# Patient Record
Sex: FEMALE | Race: WHITE | NOT HISPANIC OR LATINO | Employment: OTHER | URBAN - METROPOLITAN AREA
[De-identification: names, ages, dates, MRNs, and addresses within clinical notes are randomized per-mention and may not be internally consistent; named-entity substitution may affect disease eponyms.]

---

## 2017-02-13 ENCOUNTER — GENERIC CONVERSION - ENCOUNTER (OUTPATIENT)
Dept: OTHER | Facility: OTHER | Age: 72
End: 2017-02-13

## 2017-04-20 ENCOUNTER — LAB (OUTPATIENT)
Dept: LAB | Facility: OTHER | Age: 72
End: 2017-04-20
Payer: MEDICARE

## 2017-04-20 ENCOUNTER — TRANSCRIBE ORDERS (OUTPATIENT)
Dept: LAB | Facility: OTHER | Age: 72
End: 2017-04-20

## 2017-04-20 DIAGNOSIS — R94.6 ABNORMAL RESULTS OF THYROID FUNCTION STUDIES: ICD-10-CM

## 2017-04-20 DIAGNOSIS — R73.09 OTHER ABNORMAL GLUCOSE: Primary | ICD-10-CM

## 2017-04-20 DIAGNOSIS — R73.03 DIABETES MELLITUS, LATENT: ICD-10-CM

## 2017-04-20 LAB
EST. AVERAGE GLUCOSE BLD GHB EST-MCNC: 114 MG/DL
HBA1C MFR BLD: 5.6 % (ref 4.2–6.3)
TSH SERPL DL<=0.05 MIU/L-ACNC: 2.44 UIU/ML (ref 0.36–3.74)

## 2017-04-20 PROCEDURE — 83036 HEMOGLOBIN GLYCOSYLATED A1C: CPT

## 2017-04-20 PROCEDURE — 36415 COLL VENOUS BLD VENIPUNCTURE: CPT

## 2017-04-20 PROCEDURE — 84443 ASSAY THYROID STIM HORMONE: CPT

## 2017-04-25 ENCOUNTER — ALLSCRIPTS OFFICE VISIT (OUTPATIENT)
Dept: OTHER | Facility: OTHER | Age: 72
End: 2017-04-25

## 2017-06-16 ENCOUNTER — TRANSCRIBE ORDERS (OUTPATIENT)
Dept: ADMINISTRATIVE | Facility: HOSPITAL | Age: 72
End: 2017-06-16

## 2017-06-16 DIAGNOSIS — Z09 FOLLOW UP: ICD-10-CM

## 2017-06-16 DIAGNOSIS — R91.8 OTHER NONSPECIFIC ABNORMAL FINDING OF LUNG FIELD: Primary | ICD-10-CM

## 2017-06-22 ENCOUNTER — HOSPITAL ENCOUNTER (OUTPATIENT)
Dept: RADIOLOGY | Facility: MEDICAL CENTER | Age: 72
Discharge: HOME | End: 2017-06-22
Payer: MEDICARE

## 2017-06-22 DIAGNOSIS — Z09 FOLLOW UP: ICD-10-CM

## 2017-06-22 DIAGNOSIS — R91.8 OTHER NONSPECIFIC ABNORMAL FINDING OF LUNG FIELD: ICD-10-CM

## 2017-06-22 PROCEDURE — 71250 CT THORAX DX C-: CPT

## 2017-08-14 ENCOUNTER — GENERIC CONVERSION - ENCOUNTER (OUTPATIENT)
Dept: OTHER | Facility: OTHER | Age: 72
End: 2017-08-14

## 2017-09-01 DIAGNOSIS — E78.5 HYPERLIPIDEMIA: ICD-10-CM

## 2017-09-01 DIAGNOSIS — R94.6 ABNORMAL RESULTS OF THYROID FUNCTION STUDIES: ICD-10-CM

## 2017-09-01 DIAGNOSIS — R73.03 PREDIABETES: ICD-10-CM

## 2017-09-01 DIAGNOSIS — M85.80 OTHER SPECIFIED DISORDERS OF BONE DENSITY AND STRUCTURE, UNSPECIFIED SITE: ICD-10-CM

## 2017-10-19 ENCOUNTER — APPOINTMENT (OUTPATIENT)
Dept: LAB | Facility: OTHER | Age: 72
End: 2017-10-19
Payer: MEDICARE

## 2017-10-19 ENCOUNTER — TRANSCRIBE ORDERS (OUTPATIENT)
Dept: LAB | Facility: OTHER | Age: 72
End: 2017-10-19

## 2017-10-19 DIAGNOSIS — R73.03 PREDIABETES: ICD-10-CM

## 2017-10-19 DIAGNOSIS — M85.80 OTHER SPECIFIED DISORDERS OF BONE DENSITY AND STRUCTURE: ICD-10-CM

## 2017-10-19 DIAGNOSIS — E78.5 HYPERLIPIDEMIA: ICD-10-CM

## 2017-10-19 DIAGNOSIS — R94.6 ABNORMAL RESULTS OF THYROID FUNCTION STUDIES: ICD-10-CM

## 2017-10-19 LAB
ALBUMIN SERPL BCP-MCNC: 3.7 G/DL (ref 3.5–5)
ALP SERPL-CCNC: 58 U/L (ref 46–116)
ALT SERPL W P-5'-P-CCNC: 46 U/L (ref 12–78)
ANION GAP SERPL CALCULATED.3IONS-SCNC: 4 MMOL/L (ref 4–13)
AST SERPL W P-5'-P-CCNC: 24 U/L (ref 5–45)
BASOPHILS # BLD AUTO: 0.05 THOUSANDS/ΜL (ref 0–0.1)
BASOPHILS NFR BLD AUTO: 1 % (ref 0–1)
BILIRUB SERPL-MCNC: 0.51 MG/DL (ref 0.2–1)
BUN SERPL-MCNC: 15 MG/DL (ref 5–25)
CALCIUM SERPL-MCNC: 9.3 MG/DL (ref 8.3–10.1)
CHLORIDE SERPL-SCNC: 103 MMOL/L (ref 100–108)
CHOLEST SERPL-MCNC: 179 MG/DL (ref 50–200)
CO2 SERPL-SCNC: 32 MMOL/L (ref 21–32)
CREAT SERPL-MCNC: 0.58 MG/DL (ref 0.6–1.3)
EOSINOPHIL # BLD AUTO: 0.28 THOUSAND/ΜL (ref 0–0.61)
EOSINOPHIL NFR BLD AUTO: 3 % (ref 0–6)
ERYTHROCYTE [DISTWIDTH] IN BLOOD BY AUTOMATED COUNT: 13 % (ref 11.6–15.1)
GFR SERPL CREATININE-BSD FRML MDRD: 93 ML/MIN/1.73SQ M
GLUCOSE P FAST SERPL-MCNC: 100 MG/DL (ref 65–99)
HCT VFR BLD AUTO: 45.9 % (ref 34.8–46.1)
HDLC SERPL-MCNC: 64 MG/DL (ref 40–60)
HGB BLD-MCNC: 15.2 G/DL (ref 11.5–15.4)
LDLC SERPL CALC-MCNC: 94 MG/DL (ref 0–100)
LYMPHOCYTES # BLD AUTO: 2.39 THOUSANDS/ΜL (ref 0.6–4.47)
LYMPHOCYTES NFR BLD AUTO: 28 % (ref 14–44)
MCH RBC QN AUTO: 30.2 PG (ref 26.8–34.3)
MCHC RBC AUTO-ENTMCNC: 33.1 G/DL (ref 31.4–37.4)
MCV RBC AUTO: 91 FL (ref 82–98)
MONOCYTES # BLD AUTO: 0.45 THOUSAND/ΜL (ref 0.17–1.22)
MONOCYTES NFR BLD AUTO: 5 % (ref 4–12)
NEUTROPHILS # BLD AUTO: 5.24 THOUSANDS/ΜL (ref 1.85–7.62)
NEUTS SEG NFR BLD AUTO: 63 % (ref 43–75)
NRBC BLD AUTO-RTO: 0 /100 WBCS
PLATELET # BLD AUTO: 190 THOUSANDS/UL (ref 149–390)
PMV BLD AUTO: 11.5 FL (ref 8.9–12.7)
POTASSIUM SERPL-SCNC: 4.3 MMOL/L (ref 3.5–5.3)
PROT SERPL-MCNC: 7 G/DL (ref 6.4–8.2)
RBC # BLD AUTO: 5.04 MILLION/UL (ref 3.81–5.12)
SODIUM SERPL-SCNC: 139 MMOL/L (ref 136–145)
TRIGL SERPL-MCNC: 107 MG/DL
TSH SERPL DL<=0.05 MIU/L-ACNC: 3.68 UIU/ML (ref 0.36–3.74)
WBC # BLD AUTO: 8.43 THOUSAND/UL (ref 4.31–10.16)

## 2017-10-19 PROCEDURE — 36415 COLL VENOUS BLD VENIPUNCTURE: CPT

## 2017-10-19 PROCEDURE — 84443 ASSAY THYROID STIM HORMONE: CPT

## 2017-10-19 PROCEDURE — 85025 COMPLETE CBC W/AUTO DIFF WBC: CPT

## 2017-10-19 PROCEDURE — 80061 LIPID PANEL: CPT

## 2017-10-19 PROCEDURE — 80053 COMPREHEN METABOLIC PANEL: CPT

## 2017-10-31 ENCOUNTER — ALLSCRIPTS OFFICE VISIT (OUTPATIENT)
Dept: OTHER | Facility: OTHER | Age: 72
End: 2017-10-31

## 2017-11-01 NOTE — PROGRESS NOTES
Assessment  1  History of Cataract Surgery   2  Elevated glucose level (790 29) (R73 09)   3  Encounter for preventive health examination (V70 0) (Z00 00)   4  Screening mammogram, encounter for (V76 12) (Z12 31)   5  Osteoporosis (733 00) (M81 0)   6  Chronic obstructive pulmonary disease (496) (J44 9)   7  Prediabetes (790 29) (R73 03)   8  Hyperlipidemia (272 4) (E78 5)    Plan  PMH: Flu vaccine need    · Fluzone High-Dose 0 5 ML Intramuscular Suspension Prefilled Syringe  Screening mammogram, encounter for    · * MAMMO SCREENING BILATERAL W CAD; Status:Active - Retrospective Authorization; Requested for:23Nov2017;     Flu shot  Blood work and recheck in 6 months  Discussion/Summary    1  VAQT-vwcrbjEzphiizeqysHxqlgxfgivgk-psmgnxvi treatment  Will continue calcium supplement and vitamin-D supplementation  Weightbearing exercise as she is able  Avoid bone robbers  Abdominal pain-not consistent  Had a relatively normal colonoscopy  May be IBS  Will monitor; should let me know if becomes more constant or bothersome  Health -flu shot today  Declines Tdap and Zostavax  Mammogram  -resolved      History of Present Illness  Rosa Arnold is doing well overall  Her breathing is stable  Using her Advair bid and Combivent 2-3 times a day  Used the albuterol once daily over the summer  respiratory infections  checkup due  ; declines mammogram   a Dexa scan in 2016  Has been treated with Fosamax in the past  Had Dexa scan in 2011 with -2 5 femoral neck  Same finding 2016  She declined treatment  Taking Ca supp and Vit D   pulmonology  Doesn't go back until 2019?still gets occzings n her RLQ  MOre frequent but still lasts only seconds  Had colonoscopy last year which showed some diverticulosis in the sigmoid colon  Has had an appendectomy  Active Problems  1  Anaphylaxis Due To Bee Venom (995 0)   2  History of Breast cancer screening (V76 10) (Z12 39)   3  Chronic obstructive pulmonary disease (496) (J44 9)   4   Elevated glucose level (790 29) (R73 09)   5  Elevated TSH (794 5) (R94 6)   6  Hyperlipidemia (272 4) (E78 5)   7  Need for pneumococcal vaccination (V03 82) (Z23)   8  Osteopenia (733 90) (M85 80)   9  Palpitations (785 1) (R00 2)   10  Prediabetes (790 29) (R73 03)   11  Screen for colon cancer (V76 51) (Z12 11)   12  Throat discomfort (784 1) (R07 0)   13  Upper respiratory infection (465 9) (J06 9)    Past Medical History  1  History of Breast cancer screening (V76 10) (Z12 39)   2  Former smoker (V15 82) (N05 390)   3  History of Influenza vaccination administered at current visit (V04 81) (Z23)   4  History of Osteoporosis screening (V82 81) (Z13 820)   5  History of Screening for hypothyroidism (V77 0) (Z13 29)    Surgical History  1  History of Appendectomy   2  History of Tonsillectomy    Family History  Mother    1  Family history of Arthritis (V17 7)   2  Family history of Breast Cancer (V16 3)   3  Family history of Heart Disease (V17 49)   4  Family history of Hypertension (V17 49)   5  Family history of Osteoporosis (V17 81)  Father    10  Family history of Diabetes Mellitus (V18 0)   7  Family history of Father  At Age 76   11  Family history of Heart Disease (V17 49)   9  Family history of Hypertension (V17 49)  Family History    10  Family history of Mother  At Age 80    Social History   · Alcohol   · Caffeine Use   · Former smoker (W82 50) (J78 567)    Current Meds   1  Advair Diskus 250-50 MCG/DOSE Inhalation Aerosol Powder Breath Activated; Therapy: 07HYG9531 to (Last Rx:2011)  Requested for: 39IFQ9727 Ordered   2  Aspir-81 TBEC; Take 1 tablet daily Recorded   3  Combivent  MCG/ACT AERO; Therapy: 70ZZW9539 to (Last Rx:33Cai5090)  Requested for: 88RAL2247 Ordered   4  Ventolin  (90 Base) MCG/ACT Inhalation Aerosol Solution; Therapy: 11NFB7806 to (Last Rx:64Qrr6266)  Requested for: 20BPM3389 Ordered    Allergies  1  Biaxin TABS   2  Clarinex TABS   3   Claritin TABS 4  Levaquin TABS  5  Food Dye   6  Bee sting    Vitals  Vital Signs    Recorded: 47YSV1786 12:16AM Recorded: 42NLI0656 08:20AM   Heart Rate  66   Respiration  24   Systolic 710,    Diastolic 80, RUE 84   Height  5 ft    Weight  146 lb 8 oz   BMI Calculated  28 61   BSA Calculated  1 64     Physical Exam    Constitutional   General appearance: No acute distress, well appearing and well nourished  Ears, Nose, Mouth, and Throat   Lips, teeth, and gums: Normal, good dentition  Oropharynx: Normal with no erythema, edema, exudate or lesions  Neck   Neck: Supple, symmetric, trachea midline, no masses  Thyroid: Normal, no thyromegaly  Pulmonary   Respiratory effort: Abnormal  -- Significantly diffusely decreased breath sounds  Auscultation of lungs: Clear to auscultation  Cardiovascular   Auscultation of heart: Normal rate and rhythm, normal S1 and S2, no murmurs  Carotid pulses: 2+ bilaterally  Abdominal aorta: Normal     Femoral pulses: 2+ bilaterally  Pedal pulses: 2+ bilaterally  Peripheral vascular exam: Normal     Examination of extremities for edema and/or varicosities: Normal     Abdomen   Abdomen: Non-tender, no masses  Liver and spleen: No hepatomegaly or splenomegaly  Lymphatic   Palpation of lymph nodes in neck: No lymphadenopathy  Palpation of lymph nodes in groin: No lymphadenopathy  Results/Data  (1) CBC/PLT/DIFF 19Oct2017 08:29AM Carrie Valiente Order Number: [id number]     Test Name Result Flag Reference   WBC COUNT 8 43 Thousand/uL  4 31-10 16   RBC COUNT 5 04 Million/uL  3 81-5 12   HEMOGLOBIN 15 2 g/dL  11 5-15 4   HEMATOCRIT 45 9 %  34 8-46  1   MCV 91 fL  82-98   MCH 30 2 pg  26 8-34 3   MCHC 33 1 g/dL  31 4-37 4   RDW 13 0 %  11 6-15 1   MPV 11 5 fL  8 9-12 7   PLATELET COUNT 378 Thousands/uL  149-390   nRBC AUTOMATED 0 /100 WBCs     NEUTROPHILS RELATIVE PERCENT 63 %  43-75   LYMPHOCYTES RELATIVE PERCENT 28 %  14-44   MONOCYTES RELATIVE PERCENT 5 %  4-12   EOSINOPHILS RELATIVE PERCENT 3 %  0-6   BASOPHILS RELATIVE PERCENT 1 %  0-1   NEUTROPHILS ABSOLUTE COUNT 5 24 Thousands/? ??L  1 85-7 62   LYMPHOCYTES ABSOLUTE COUNT 2 39 Thousands/? ??L  0 60-4 47   MONOCYTES ABSOLUTE COUNT 0 45 Thousand/? ??L  0 17-1 22   EOSINOPHILS ABSOLUTE COUNT 0 28 Thousand/? ??L  0 00-0 61   BASOPHILS ABSOLUTE COUNT 0 05 Thousands/? ??L  0 00-0 10     (1) COMPREHENSIVE METABOLIC PANEL 44ADQ6873 97:47MH Mark Wilkins Order Number: [id number]     Test Name Result Flag Reference   SODIUM 139 mmol/L  136-145   POTASSIUM 4 3 mmol/L  3 5-5 3   CHLORIDE 103 mmol/L  100-108   CARBON DIOXIDE 32 mmol/L  21-32   ANION GAP (CALC) 4 mmol/L  4-13   BLOOD UREA NITROGEN 15 mg/dL  5-25   CREATININE 0 58 mg/dL L 0 60-1 30   Standardized to IDMS reference method   CALCIUM 9 3 mg/dL  8 3-10 1   BILI, TOTAL 0 51 mg/dL  0 20-1 00   ALK PHOSPHATAS 58 U/L     ALT (SGPT) 46 U/L  12-78   Specimen collection should occur prior to Sulfasalazine and/or Sulfapyridine administration due to the potential for falsely depressed results  AST(SGOT) 24 U/L  5-45   Specimen collection should occur prior to Sulfasalazine administration due to the potential for falsely depressed results  ALBUMIN 3 7 g/dL  3 5-5 0   TOTAL PROTEIN 7 0 g/dL  6 4-8 2   eGFR 93 ml/min/1 73sq m     National Kidney Disease Education Program recommendations are as follows:  GFR calculation is accurate only with a steady state creatinine  Chronic Kidney disease less than 60 ml/min/1 73 sq  meters  Kidney failure less than 15 ml/min/1 73 sq  meters  GLUCOSE FASTING 100 mg/dL H 65-99   Specimen collection should occur prior to Sulfasalazine administration due to the potential for falsely depressed results  Specimen collection should occur prior to Sulfapyridine administration due to the potential for falsely elevated results       (1) LIPID PANEL, FASTING 19Oct2017 08:29AM Antonia Black   TW Order Number: [id number]     Test Name Result Flag Reference   CHOLESTEROL 179 mg/dL     HDL,DIRECT 64 mg/dL H 40-60   Specimen collection should occur prior to Metamizole administration due to the potential for falsley depressed results  LDL CHOLESTEROL CALCULATED 94 mg/dL  0-100   Triglyceride:        Normal <150 mg/dl   Borderline High 150-199 mg/dl   High 200-499 mg/dl   Very High >499 mg/dl      Cholesterol:       Desirable <200 mg/dl    Borderline High 200-239 mg/dl    High >239 mg/dl      HDL Cholesterol:       High>59 mg/dL    Low <41 mg/dL      This screening LDL is a calculated result  It does not have the accuracy of the Direct Measured LDL in the monitoring of patients with hyperlipidemia and/or statin therapy  Direct Measure LDL (BYH094) must be ordered separately in these patients  TRIGLYCERIDES 107 mg/dL  <=150   Specimen collection should occur prior to N-Acetylcysteine or Metamizole administration due to the potential for falsely depressed results  (1) TSH 19Oct2017 08:29AM [facility] Number: [id number]     Test Name Result Flag Reference   TSH 3 680 uIU/mL  0 358-3 740   Patients undergoing fluorescein dye angiography may retain small amounts of fluorescein in the body for 48-72 hours post procedure  Samples containing fluorescein can produce falsely depressed TSH values  If the patient had this procedure,a specimen should be resubmitted post fluorescein clearance  The recommended reference ranges for TSH during pregnancy are as follows:  First trimester 0 1 to 2 5 uIU/mL  Second trimester  0 2 to 3 0 uIU/mL  Third trimester 0 3 to 3 0 uIU/m     Future Appointments    Date/Time Provider Specialty Site   05/08/2018 10:00 AM MARK Levin   [address]     Signatures   Electronically signed by : MARK Gilbert ; Nov 1 2017 12:17AM EST                       (Author)

## 2018-01-12 VITALS
RESPIRATION RATE: 24 BRPM | SYSTOLIC BLOOD PRESSURE: 132 MMHG | OXYGEN SATURATION: 95 % | HEART RATE: 64 BPM | HEIGHT: 60 IN | WEIGHT: 148.38 LBS | DIASTOLIC BLOOD PRESSURE: 80 MMHG | BODY MASS INDEX: 29.13 KG/M2

## 2018-01-15 VITALS
RESPIRATION RATE: 24 BRPM | BODY MASS INDEX: 28.76 KG/M2 | WEIGHT: 146.5 LBS | HEIGHT: 60 IN | HEART RATE: 66 BPM | SYSTOLIC BLOOD PRESSURE: 120 MMHG | DIASTOLIC BLOOD PRESSURE: 80 MMHG

## 2018-04-26 ENCOUNTER — LAB (OUTPATIENT)
Dept: LAB | Facility: CLINIC | Age: 73
End: 2018-04-26
Payer: MEDICARE

## 2018-04-26 DIAGNOSIS — M85.80 OTHER SPECIFIED DISORDERS OF BONE DENSITY AND STRUCTURE, UNSPECIFIED SITE (CODE): ICD-10-CM

## 2018-04-26 DIAGNOSIS — Z13.21 ENCOUNTER FOR SCREENING FOR NUTRITIONAL DISORDER: ICD-10-CM

## 2018-04-26 DIAGNOSIS — R73.03 PREDIABETES: ICD-10-CM

## 2018-04-26 DIAGNOSIS — R73.09 OTHER ABNORMAL GLUCOSE: ICD-10-CM

## 2018-04-26 LAB
25(OH)D3 SERPL-MCNC: 18.7 NG/ML (ref 30–100)
EST. AVERAGE GLUCOSE BLD GHB EST-MCNC: 114 MG/DL
HBA1C MFR BLD: 5.6 % (ref 4.2–6.3)

## 2018-04-26 PROCEDURE — 82306 VITAMIN D 25 HYDROXY: CPT

## 2018-04-26 PROCEDURE — 83036 HEMOGLOBIN GLYCOSYLATED A1C: CPT

## 2018-04-26 PROCEDURE — 36415 COLL VENOUS BLD VENIPUNCTURE: CPT

## 2018-04-30 DIAGNOSIS — M85.80 OTHER SPECIFIED DISORDERS OF BONE DENSITY AND STRUCTURE, UNSPECIFIED SITE (CODE): ICD-10-CM

## 2018-04-30 DIAGNOSIS — R73.03 PREDIABETES: ICD-10-CM

## 2018-04-30 DIAGNOSIS — Z13.21 ENCOUNTER FOR SCREENING FOR NUTRITIONAL DISORDER: ICD-10-CM

## 2018-04-30 DIAGNOSIS — R73.09 OTHER ABNORMAL GLUCOSE: ICD-10-CM

## 2018-05-08 ENCOUNTER — OFFICE VISIT (OUTPATIENT)
Dept: FAMILY MEDICINE CLINIC | Facility: MEDICAL CENTER | Age: 73
End: 2018-05-08
Payer: MEDICARE

## 2018-05-08 VITALS
BODY MASS INDEX: 29.22 KG/M2 | WEIGHT: 149.6 LBS | SYSTOLIC BLOOD PRESSURE: 134 MMHG | HEART RATE: 80 BPM | RESPIRATION RATE: 18 BRPM | DIASTOLIC BLOOD PRESSURE: 76 MMHG

## 2018-05-08 DIAGNOSIS — J43.9 PULMONARY EMPHYSEMA, UNSPECIFIED EMPHYSEMA TYPE (HCC): ICD-10-CM

## 2018-05-08 DIAGNOSIS — E55.9 VITAMIN D DEFICIENCY: ICD-10-CM

## 2018-05-08 DIAGNOSIS — M81.0 OSTEOPOROSIS WITHOUT CURRENT PATHOLOGICAL FRACTURE, UNSPECIFIED OSTEOPOROSIS TYPE: ICD-10-CM

## 2018-05-08 DIAGNOSIS — R73.03 PREDIABETES: Primary | ICD-10-CM

## 2018-05-08 PROCEDURE — 99214 OFFICE O/P EST MOD 30 MIN: CPT | Performed by: FAMILY MEDICINE

## 2018-05-08 RX ORDER — ALBUTEROL SULFATE 90 UG/1
AEROSOL, METERED RESPIRATORY (INHALATION)
COMMUNITY
Start: 2011-07-15 | End: 2019-12-31 | Stop reason: SDUPTHER

## 2018-05-08 NOTE — PROGRESS NOTES
Deepa Bullard has been doing well  No respiratory infections this past winter  She says her breathing is about the same  Do was seen pulmonology soon  Dr Ana King  Looking for a pulmonologist who is closer to home  She will be  amelia prakash to [employer]  She is a little concerned about humidity  She has been taking Vit d 1000 units daily  Will increase to twice daily  O: /76 (BP Location: Left arm, Patient Position: Sitting, Cuff Size: Adult)   Pulse 80   Resp 18   Wt 67 9 kg (149 lb 9 6 oz)   BMI 29 22 kg/m²   Neck no adenopathy thyromegaly bruits  Chest markedly diffusely decreased breath sounds bilaterally but otherwise clear  Cardiac regular rate without murmur    BW 4/26/18  A1C 5 7  Vit D 18 8    Assessment  1  COPD-appears stable  Fortunately she has had no red respiratory infections  2   Pre diabetes very stable  Encouraged exercise as she is able  3  Vitamin-D deficiency-advised to increase her vitamin D3 supplement to 2000 units daily  Has osteoporosis   Due for Dexa scan in November 4    HM-immunizations are up-to-date    Plan  As above  Recheck 6 months

## 2018-10-03 ENCOUNTER — OFFICE VISIT (OUTPATIENT)
Dept: FAMILY MEDICINE CLINIC | Facility: MEDICAL CENTER | Age: 73
End: 2018-10-03
Payer: MEDICARE

## 2018-10-03 VITALS
BODY MASS INDEX: 28.67 KG/M2 | HEART RATE: 78 BPM | SYSTOLIC BLOOD PRESSURE: 146 MMHG | RESPIRATION RATE: 16 BRPM | WEIGHT: 146.8 LBS | OXYGEN SATURATION: 92 % | TEMPERATURE: 99 F | DIASTOLIC BLOOD PRESSURE: 80 MMHG

## 2018-10-03 DIAGNOSIS — Z23 FLU VACCINE NEED: Primary | ICD-10-CM

## 2018-10-03 PROCEDURE — G0008 ADMIN INFLUENZA VIRUS VAC: HCPCS

## 2018-10-03 PROCEDURE — 90662 IIV NO PRSV INCREASED AG IM: CPT

## 2018-10-03 PROCEDURE — 99213 OFFICE O/P EST LOW 20 MIN: CPT | Performed by: FAMILY MEDICINE

## 2018-10-03 NOTE — PROGRESS NOTES
Madie Sewell has  had several episodes of dizziness the past  few months  Occurs with movement  Getting up or turning  Brief  Not spinning  Feels like she is gong to crumble  No associated nausea or other symptoms  No headaches or tinnitus or hearing loss  Doesn't happen when getting up in the morning  No gait abnormalities  Has had a recent cold  Getting better  O: /80 (Cuff Size: Standard)   Pulse 78   Temp 99 °F (37 2 °C)   Resp 16   Wt 66 6 kg (146 lb 12 8 oz)   SpO2 92%   BMI 28 67 kg/m²   HEENT-pupils equally react to light  Extraocular moves intact  No nystagmus  TMs normal   Pharynx normal   Neck no adenopathy thyromegaly bruits  Chest diffusely decreased breath  Sounds but clear   Cardiac regular rate without murmur    Assessment  Dizziness-likely BPH  Due to visual symptoms she will also check with her ophthalmologist  COPD-should have flu shot  Plan  Flu shot  As above

## 2018-10-11 ENCOUNTER — TRANSCRIBE ORDERS (OUTPATIENT)
Dept: ADMINISTRATIVE | Facility: HOSPITAL | Age: 73
End: 2018-10-11

## 2018-10-11 DIAGNOSIS — R91.1 PULMONARY NODULE: Primary | ICD-10-CM

## 2018-10-22 ENCOUNTER — HOSPITAL ENCOUNTER (OUTPATIENT)
Dept: RADIOLOGY | Facility: MEDICAL CENTER | Age: 73
Discharge: HOME | End: 2018-10-22
Payer: MEDICARE

## 2018-10-22 DIAGNOSIS — R91.1 PULMONARY NODULE: ICD-10-CM

## 2018-10-22 PROCEDURE — 71250 CT THORAX DX C-: CPT

## 2018-11-27 ENCOUNTER — OFFICE VISIT (OUTPATIENT)
Dept: FAMILY MEDICINE CLINIC | Facility: MEDICAL CENTER | Age: 73
End: 2018-11-27
Payer: MEDICARE

## 2018-11-27 VITALS
WEIGHT: 144 LBS | RESPIRATION RATE: 24 BRPM | BODY MASS INDEX: 28.12 KG/M2 | SYSTOLIC BLOOD PRESSURE: 140 MMHG | OXYGEN SATURATION: 91 % | HEART RATE: 82 BPM | DIASTOLIC BLOOD PRESSURE: 78 MMHG

## 2018-11-27 DIAGNOSIS — R73.03 PREDIABETES: ICD-10-CM

## 2018-11-27 DIAGNOSIS — R79.89 ELEVATED TSH: ICD-10-CM

## 2018-11-27 DIAGNOSIS — M81.0 OSTEOPOROSIS WITHOUT CURRENT PATHOLOGICAL FRACTURE, UNSPECIFIED OSTEOPOROSIS TYPE: ICD-10-CM

## 2018-11-27 DIAGNOSIS — R73.09 ELEVATED GLUCOSE LEVEL: Primary | ICD-10-CM

## 2018-11-27 DIAGNOSIS — J43.9 PULMONARY EMPHYSEMA, UNSPECIFIED EMPHYSEMA TYPE (HCC): ICD-10-CM

## 2018-11-27 PROCEDURE — 99214 OFFICE O/P EST MOD 30 MIN: CPT | Performed by: FAMILY MEDICINE

## 2018-11-27 RX ORDER — IPRATROPIUM/ALBUTEROL SULFATE 20-100 MCG
MIST INHALER (GRAM) INHALATION
COMMUNITY
Start: 2018-10-04 | End: 2019-12-31 | Stop reason: SDUPTHER

## 2018-11-27 RX ORDER — BUDESONIDE 1 MG/2ML
1 INHALANT ORAL 2 TIMES DAILY
COMMUNITY
End: 2021-07-22 | Stop reason: ALTCHOICE

## 2018-11-28 NOTE — PROGRESS NOTES
In is here for six-month follow-up  She is currently being followed for a  COPD exacerbation  She saw her pulmonologist Dr Richardson  She said she had very little response to prednisone  He is trying her on steroid via nebulizer  She apparently has declined use of oxygen  She said that she is very fatigued and even getting dressed can be an effort  She said she was told that she has severe emphysema  Her oxygen saturations are below 90 with walking  She notes her breathing is  worse with bending over  She has history of a fatty liver  She got a flu shot last month  She otherwise is doing well    O: /78   Pulse 82   Resp (!) 24   Wt 65 3 kg (144 lb)   SpO2 91%   BMI 28 12 kg/m²   Appears mildly dyspneic at rest  Neck no adenopathy thyromegaly bruits  Chest clear however very diffusely decreased breath sounds  Cardiac regular rate without murmur    Assessment  1  Severe COPD-hopefully she will improve with the steroids via nebulizer  2   Pre diabetes-due for blood work  3  Health maintenance-up-to-date    Plan  Check blood work  As above    Recheck 6 months

## 2018-12-03 DIAGNOSIS — R06.02 SHORTNESS OF BREATH: Primary | ICD-10-CM

## 2018-12-30 DIAGNOSIS — R10.13 DYSPEPSIA: Primary | ICD-10-CM

## 2019-01-07 ENCOUNTER — APPOINTMENT (OUTPATIENT)
Dept: LAB | Facility: CLINIC | Age: 74
End: 2019-01-07
Payer: MEDICARE

## 2019-01-07 DIAGNOSIS — M81.0 OSTEOPOROSIS WITHOUT CURRENT PATHOLOGICAL FRACTURE, UNSPECIFIED OSTEOPOROSIS TYPE: ICD-10-CM

## 2019-01-07 DIAGNOSIS — R73.03 PREDIABETES: ICD-10-CM

## 2019-01-07 DIAGNOSIS — R73.09 ELEVATED GLUCOSE LEVEL: ICD-10-CM

## 2019-01-07 DIAGNOSIS — R79.89 ELEVATED TSH: ICD-10-CM

## 2019-01-07 LAB
25(OH)D3 SERPL-MCNC: 36 NG/ML (ref 30–100)
ALBUMIN SERPL BCP-MCNC: 3.6 G/DL (ref 3.5–5)
ALP SERPL-CCNC: 56 U/L (ref 46–116)
ALT SERPL W P-5'-P-CCNC: 38 U/L (ref 12–78)
ANION GAP SERPL CALCULATED.3IONS-SCNC: 7 MMOL/L (ref 4–13)
AST SERPL W P-5'-P-CCNC: 23 U/L (ref 5–45)
BASOPHILS # BLD AUTO: 0.05 THOUSANDS/ΜL (ref 0–0.1)
BASOPHILS NFR BLD AUTO: 1 % (ref 0–1)
BILIRUB SERPL-MCNC: 0.4 MG/DL (ref 0.2–1)
BUN SERPL-MCNC: 16 MG/DL (ref 5–25)
CALCIUM SERPL-MCNC: 8.9 MG/DL (ref 8.3–10.1)
CHLORIDE SERPL-SCNC: 102 MMOL/L (ref 100–108)
CHOLEST SERPL-MCNC: 170 MG/DL (ref 50–200)
CO2 SERPL-SCNC: 31 MMOL/L (ref 21–32)
CREAT SERPL-MCNC: 0.59 MG/DL (ref 0.6–1.3)
EOSINOPHIL # BLD AUTO: 0.14 THOUSAND/ΜL (ref 0–0.61)
EOSINOPHIL NFR BLD AUTO: 2 % (ref 0–6)
ERYTHROCYTE [DISTWIDTH] IN BLOOD BY AUTOMATED COUNT: 13.2 % (ref 11.6–15.1)
GFR SERPL CREATININE-BSD FRML MDRD: 91 ML/MIN/1.73SQ M
GLUCOSE P FAST SERPL-MCNC: 92 MG/DL (ref 65–99)
HCT VFR BLD AUTO: 48.3 % (ref 34.8–46.1)
HDLC SERPL-MCNC: 58 MG/DL (ref 40–60)
HGB BLD-MCNC: 14.8 G/DL (ref 11.5–15.4)
IMM GRANULOCYTES # BLD AUTO: 0.02 THOUSAND/UL (ref 0–0.2)
IMM GRANULOCYTES NFR BLD AUTO: 0 % (ref 0–2)
LDLC SERPL CALC-MCNC: 92 MG/DL (ref 0–100)
LYMPHOCYTES # BLD AUTO: 1.57 THOUSANDS/ΜL (ref 0.6–4.47)
LYMPHOCYTES NFR BLD AUTO: 21 % (ref 14–44)
MCH RBC QN AUTO: 28 PG (ref 26.8–34.3)
MCHC RBC AUTO-ENTMCNC: 30.6 G/DL (ref 31.4–37.4)
MCV RBC AUTO: 92 FL (ref 82–98)
MONOCYTES # BLD AUTO: 0.5 THOUSAND/ΜL (ref 0.17–1.22)
MONOCYTES NFR BLD AUTO: 7 % (ref 4–12)
NEUTROPHILS # BLD AUTO: 5.36 THOUSANDS/ΜL (ref 1.85–7.62)
NEUTS SEG NFR BLD AUTO: 69 % (ref 43–75)
NONHDLC SERPL-MCNC: 112 MG/DL
NRBC BLD AUTO-RTO: 0 /100 WBCS
PLATELET # BLD AUTO: 150 THOUSANDS/UL (ref 149–390)
PMV BLD AUTO: 12.4 FL (ref 8.9–12.7)
POTASSIUM SERPL-SCNC: 4.1 MMOL/L (ref 3.5–5.3)
PROT SERPL-MCNC: 7.2 G/DL (ref 6.4–8.2)
RBC # BLD AUTO: 5.28 MILLION/UL (ref 3.81–5.12)
SODIUM SERPL-SCNC: 140 MMOL/L (ref 136–145)
TRIGL SERPL-MCNC: 100 MG/DL
TSH SERPL DL<=0.05 MIU/L-ACNC: 2.92 UIU/ML (ref 0.36–3.74)
WBC # BLD AUTO: 7.64 THOUSAND/UL (ref 4.31–10.16)

## 2019-01-07 PROCEDURE — 84443 ASSAY THYROID STIM HORMONE: CPT

## 2019-01-07 PROCEDURE — 80061 LIPID PANEL: CPT

## 2019-01-07 PROCEDURE — 80053 COMPREHEN METABOLIC PANEL: CPT

## 2019-01-07 PROCEDURE — 85025 COMPLETE CBC W/AUTO DIFF WBC: CPT

## 2019-01-07 PROCEDURE — 36415 COLL VENOUS BLD VENIPUNCTURE: CPT

## 2019-01-07 PROCEDURE — 83036 HEMOGLOBIN GLYCOSYLATED A1C: CPT

## 2019-01-07 PROCEDURE — 82306 VITAMIN D 25 HYDROXY: CPT

## 2019-01-08 LAB
EST. AVERAGE GLUCOSE BLD GHB EST-MCNC: 131 MG/DL
HBA1C MFR BLD: 6.2 % (ref 4.2–6.3)

## 2019-01-10 ENCOUNTER — TELEPHONE (OUTPATIENT)
Dept: FAMILY MEDICINE CLINIC | Facility: MEDICAL CENTER | Age: 74
End: 2019-01-10

## 2019-01-10 NOTE — TELEPHONE ENCOUNTER
----- Message from Nikky Puri MD sent at 1/10/2019  9:33 AM EST -----  Notify blood work results  Everything is good  Her A1c is a little bit higher at 6 2 although still consistent with pre diabetes  Continue to watch diet and repeat the A1c in 6 months

## 2019-01-22 ENCOUNTER — HOSPITAL ENCOUNTER (OUTPATIENT)
Dept: PULMONOLOGY | Facility: HOSPITAL | Age: 74
Discharge: HOME | End: 2019-01-22
Attending: INTERNAL MEDICINE
Payer: MEDICARE

## 2019-01-22 DIAGNOSIS — R10.13 DYSPEPSIA: ICD-10-CM

## 2019-01-22 PROCEDURE — 94729 DIFFUSING CAPACITY: CPT | Performed by: INTERNAL MEDICINE

## 2019-01-22 PROCEDURE — 94729 DIFFUSING CAPACITY: CPT

## 2019-01-22 PROCEDURE — 94060 EVALUATION OF WHEEZING: CPT | Performed by: INTERNAL MEDICINE

## 2019-01-22 PROCEDURE — 94761 N-INVAS EAR/PLS OXIMETRY MLT: CPT

## 2019-01-22 PROCEDURE — 94618 PULMONARY STRESS TESTING: CPT | Performed by: INTERNAL MEDICINE

## 2019-01-22 PROCEDURE — 94726 PLETHYSMOGRAPHY LUNG VOLUMES: CPT | Performed by: INTERNAL MEDICINE

## 2019-01-22 PROCEDURE — 94726 PLETHYSMOGRAPHY LUNG VOLUMES: CPT

## 2019-01-22 PROCEDURE — 94060 EVALUATION OF WHEEZING: CPT

## 2019-01-22 RX ORDER — ALBUTEROL SULFATE 2.5 MG/3ML
2.5 SOLUTION RESPIRATORY (INHALATION) ONCE
Status: COMPLETED | OUTPATIENT
Start: 2019-01-22 | End: 2019-01-22

## 2019-01-22 RX ADMIN — ALBUTEROL SULFATE 2.5 MG: 2.5 SOLUTION RESPIRATORY (INHALATION) at 10:10

## 2019-01-23 NOTE — PROGRESS NOTES
Chayo Garibay 73 y o  :1945 [id number]    Six minute walk test    Performed on 19  Baseline saturations are 94% on room air  Heart rate is 73 beats per minute with a Grover scale for dyspnea rated at 0/10  Patient walked for a total of 6 minutes  After walking for 2 minutes, saturations dropped to 85% on room air  Patient was then placed on supplemental oxygen at 2 liters/minute to complete testing  Saturations on 2 liters/minute remained greater than or equal to 94%  Patient walked a total distance of 272 meters

## 2019-04-16 ENCOUNTER — CLINICAL SUPPORT (OUTPATIENT)
Dept: PULMONOLOGY | Age: 74
End: 2019-04-16
Payer: MEDICARE

## 2019-04-16 VITALS — WEIGHT: 145 LBS | BODY MASS INDEX: 28.47 KG/M2 | HEIGHT: 60 IN

## 2019-04-16 DIAGNOSIS — J44.9 CHRONIC OBSTRUCTIVE PULMONARY DISEASE, UNSPECIFIED COPD TYPE (HCC): Primary | ICD-10-CM

## 2019-04-17 ENCOUNTER — OFFICE VISIT (OUTPATIENT)
Dept: PULMONOLOGY | Facility: CLINIC | Age: 74
End: 2019-04-17

## 2019-04-17 DIAGNOSIS — J43.9 PULMONARY EMPHYSEMA, UNSPECIFIED EMPHYSEMA TYPE (HCC): Primary | ICD-10-CM

## 2019-04-17 PROCEDURE — RECHECK: Performed by: INTERNAL MEDICINE

## 2019-04-23 ENCOUNTER — CLINICAL SUPPORT (OUTPATIENT)
Dept: PULMONOLOGY | Age: 74
End: 2019-04-23
Payer: MEDICARE

## 2019-04-23 DIAGNOSIS — J44.9 CHRONIC OBSTRUCTIVE PULMONARY DISEASE, UNSPECIFIED COPD TYPE (HCC): ICD-10-CM

## 2019-04-23 PROCEDURE — G0424 PULMONARY REHAB W EXER: HCPCS

## 2019-04-25 ENCOUNTER — CLINICAL SUPPORT (OUTPATIENT)
Dept: PULMONOLOGY | Age: 74
End: 2019-04-25
Payer: MEDICARE

## 2019-04-25 DIAGNOSIS — J43.9 PULMONARY EMPHYSEMA, UNSPECIFIED EMPHYSEMA TYPE (HCC): ICD-10-CM

## 2019-04-25 PROCEDURE — G0424 PULMONARY REHAB W EXER: HCPCS

## 2019-04-30 ENCOUNTER — CLINICAL SUPPORT (OUTPATIENT)
Dept: PULMONOLOGY | Age: 74
End: 2019-04-30
Payer: MEDICARE

## 2019-04-30 DIAGNOSIS — J43.9 PULMONARY EMPHYSEMA, UNSPECIFIED EMPHYSEMA TYPE (HCC): ICD-10-CM

## 2019-04-30 PROCEDURE — G0424 PULMONARY REHAB W EXER: HCPCS

## 2019-05-02 ENCOUNTER — CLINICAL SUPPORT (OUTPATIENT)
Dept: PULMONOLOGY | Age: 74
End: 2019-05-02
Payer: MEDICARE

## 2019-05-02 DIAGNOSIS — J44.9 CHRONIC OBSTRUCTIVE PULMONARY DISEASE, UNSPECIFIED COPD TYPE (HCC): ICD-10-CM

## 2019-05-02 PROCEDURE — G0424 PULMONARY REHAB W EXER: HCPCS

## 2019-05-07 ENCOUNTER — CLINICAL SUPPORT (OUTPATIENT)
Dept: PULMONOLOGY | Age: 74
End: 2019-05-07
Payer: MEDICARE

## 2019-05-07 DIAGNOSIS — J43.9 PULMONARY EMPHYSEMA, UNSPECIFIED EMPHYSEMA TYPE (HCC): ICD-10-CM

## 2019-05-07 PROCEDURE — G0424 PULMONARY REHAB W EXER: HCPCS

## 2019-05-09 ENCOUNTER — CLINICAL SUPPORT (OUTPATIENT)
Dept: PULMONOLOGY | Age: 74
End: 2019-05-09
Payer: MEDICARE

## 2019-05-09 DIAGNOSIS — J43.9 PULMONARY EMPHYSEMA, UNSPECIFIED EMPHYSEMA TYPE (HCC): ICD-10-CM

## 2019-05-09 PROCEDURE — G0424 PULMONARY REHAB W EXER: HCPCS

## 2019-05-14 ENCOUNTER — CLINICAL SUPPORT (OUTPATIENT)
Dept: PULMONOLOGY | Age: 74
End: 2019-05-14
Payer: MEDICARE

## 2019-05-14 DIAGNOSIS — J43.9 PULMONARY EMPHYSEMA, UNSPECIFIED EMPHYSEMA TYPE (HCC): ICD-10-CM

## 2019-05-14 PROCEDURE — G0424 PULMONARY REHAB W EXER: HCPCS

## 2019-05-16 ENCOUNTER — CLINICAL SUPPORT (OUTPATIENT)
Dept: PULMONOLOGY | Age: 74
End: 2019-05-16
Payer: MEDICARE

## 2019-05-16 DIAGNOSIS — J43.9 PULMONARY EMPHYSEMA, UNSPECIFIED EMPHYSEMA TYPE (HCC): ICD-10-CM

## 2019-05-16 PROCEDURE — G0424 PULMONARY REHAB W EXER: HCPCS

## 2019-05-21 ENCOUNTER — CLINICAL SUPPORT (OUTPATIENT)
Dept: PULMONOLOGY | Age: 74
End: 2019-05-21
Payer: MEDICARE

## 2019-05-21 DIAGNOSIS — J43.9 PULMONARY EMPHYSEMA, UNSPECIFIED EMPHYSEMA TYPE (HCC): ICD-10-CM

## 2019-05-21 PROCEDURE — G0424 PULMONARY REHAB W EXER: HCPCS

## 2019-05-23 ENCOUNTER — CLINICAL SUPPORT (OUTPATIENT)
Dept: PULMONOLOGY | Age: 74
End: 2019-05-23
Payer: MEDICARE

## 2019-05-23 DIAGNOSIS — J43.9 PULMONARY EMPHYSEMA, UNSPECIFIED EMPHYSEMA TYPE (HCC): ICD-10-CM

## 2019-05-23 PROCEDURE — G0424 PULMONARY REHAB W EXER: HCPCS

## 2019-05-28 ENCOUNTER — CLINICAL SUPPORT (OUTPATIENT)
Dept: PULMONOLOGY | Age: 74
End: 2019-05-28
Payer: MEDICARE

## 2019-05-28 DIAGNOSIS — J43.9 PULMONARY EMPHYSEMA, UNSPECIFIED EMPHYSEMA TYPE (HCC): ICD-10-CM

## 2019-05-28 PROCEDURE — G0424 PULMONARY REHAB W EXER: HCPCS

## 2019-05-29 ENCOUNTER — OFFICE VISIT (OUTPATIENT)
Dept: FAMILY MEDICINE CLINIC | Facility: MEDICAL CENTER | Age: 74
End: 2019-05-29
Payer: MEDICARE

## 2019-05-29 VITALS
HEART RATE: 72 BPM | WEIGHT: 147 LBS | RESPIRATION RATE: 16 BRPM | HEIGHT: 60 IN | DIASTOLIC BLOOD PRESSURE: 70 MMHG | BODY MASS INDEX: 28.86 KG/M2 | SYSTOLIC BLOOD PRESSURE: 110 MMHG

## 2019-05-29 DIAGNOSIS — Z13.820 SCREENING FOR OSTEOPOROSIS: Primary | ICD-10-CM

## 2019-05-29 DIAGNOSIS — Z00.00 MEDICARE ANNUAL WELLNESS VISIT, SUBSEQUENT: ICD-10-CM

## 2019-05-29 DIAGNOSIS — Z12.31 SCREENING MAMMOGRAM, ENCOUNTER FOR: ICD-10-CM

## 2019-05-29 DIAGNOSIS — M81.0 OSTEOPOROSIS WITHOUT CURRENT PATHOLOGICAL FRACTURE, UNSPECIFIED OSTEOPOROSIS TYPE: ICD-10-CM

## 2019-05-29 PROCEDURE — G0439 PPPS, SUBSEQ VISIT: HCPCS | Performed by: FAMILY MEDICINE

## 2019-05-30 ENCOUNTER — CLINICAL SUPPORT (OUTPATIENT)
Dept: PULMONOLOGY | Age: 74
End: 2019-05-30
Payer: MEDICARE

## 2019-05-30 DIAGNOSIS — J43.9 PULMONARY EMPHYSEMA, UNSPECIFIED EMPHYSEMA TYPE (HCC): ICD-10-CM

## 2019-05-30 PROCEDURE — G0424 PULMONARY REHAB W EXER: HCPCS

## 2019-06-04 ENCOUNTER — CLINICAL SUPPORT (OUTPATIENT)
Dept: PULMONOLOGY | Age: 74
End: 2019-06-04
Payer: MEDICARE

## 2019-06-04 DIAGNOSIS — J43.9 PULMONARY EMPHYSEMA, UNSPECIFIED EMPHYSEMA TYPE (HCC): ICD-10-CM

## 2019-06-04 PROCEDURE — G0424 PULMONARY REHAB W EXER: HCPCS

## 2019-06-06 ENCOUNTER — CLINICAL SUPPORT (OUTPATIENT)
Dept: PULMONOLOGY | Age: 74
End: 2019-06-06
Payer: MEDICARE

## 2019-06-06 ENCOUNTER — OFFICE VISIT (OUTPATIENT)
Dept: PULMONOLOGY | Facility: CLINIC | Age: 74
End: 2019-06-06

## 2019-06-06 DIAGNOSIS — J43.9 PULMONARY EMPHYSEMA, UNSPECIFIED EMPHYSEMA TYPE (HCC): Primary | ICD-10-CM

## 2019-06-06 DIAGNOSIS — J43.9 PULMONARY EMPHYSEMA, UNSPECIFIED EMPHYSEMA TYPE (HCC): ICD-10-CM

## 2019-06-06 PROCEDURE — RECHECK: Performed by: INTERNAL MEDICINE

## 2019-06-06 PROCEDURE — G0424 PULMONARY REHAB W EXER: HCPCS

## 2019-06-11 ENCOUNTER — CLINICAL SUPPORT (OUTPATIENT)
Dept: PULMONOLOGY | Age: 74
End: 2019-06-11
Payer: MEDICARE

## 2019-06-11 DIAGNOSIS — J43.9 PULMONARY EMPHYSEMA, UNSPECIFIED EMPHYSEMA TYPE (HCC): ICD-10-CM

## 2019-06-11 PROCEDURE — G0424 PULMONARY REHAB W EXER: HCPCS

## 2019-06-13 ENCOUNTER — CLINICAL SUPPORT (OUTPATIENT)
Dept: PULMONOLOGY | Age: 74
End: 2019-06-13
Payer: MEDICARE

## 2019-06-13 DIAGNOSIS — J43.9 PULMONARY EMPHYSEMA, UNSPECIFIED EMPHYSEMA TYPE (HCC): ICD-10-CM

## 2019-06-13 PROCEDURE — G0424 PULMONARY REHAB W EXER: HCPCS

## 2019-06-18 ENCOUNTER — CLINICAL SUPPORT (OUTPATIENT)
Dept: PULMONOLOGY | Age: 74
End: 2019-06-18
Payer: MEDICARE

## 2019-06-18 DIAGNOSIS — J43.9 PULMONARY EMPHYSEMA, UNSPECIFIED EMPHYSEMA TYPE (HCC): ICD-10-CM

## 2019-06-18 PROCEDURE — G0424 PULMONARY REHAB W EXER: HCPCS

## 2019-06-20 ENCOUNTER — CLINICAL SUPPORT (OUTPATIENT)
Dept: PULMONOLOGY | Age: 74
End: 2019-06-20
Payer: MEDICARE

## 2019-06-20 DIAGNOSIS — J43.9 PULMONARY EMPHYSEMA, UNSPECIFIED EMPHYSEMA TYPE (HCC): ICD-10-CM

## 2019-06-20 PROCEDURE — G0424 PULMONARY REHAB W EXER: HCPCS

## 2019-06-25 ENCOUNTER — CLINICAL SUPPORT (OUTPATIENT)
Dept: PULMONOLOGY | Age: 74
End: 2019-06-25
Payer: MEDICARE

## 2019-06-25 DIAGNOSIS — J43.9 PULMONARY EMPHYSEMA, UNSPECIFIED EMPHYSEMA TYPE (HCC): ICD-10-CM

## 2019-06-25 PROCEDURE — G0424 PULMONARY REHAB W EXER: HCPCS

## 2019-06-27 ENCOUNTER — CLINICAL SUPPORT (OUTPATIENT)
Dept: PULMONOLOGY | Age: 74
End: 2019-06-27
Payer: MEDICARE

## 2019-06-27 DIAGNOSIS — J43.9 PULMONARY EMPHYSEMA, UNSPECIFIED EMPHYSEMA TYPE (HCC): ICD-10-CM

## 2019-06-27 PROCEDURE — G0424 PULMONARY REHAB W EXER: HCPCS

## 2019-07-02 ENCOUNTER — CLINICAL SUPPORT (OUTPATIENT)
Dept: PULMONOLOGY | Age: 74
End: 2019-07-02
Payer: MEDICARE

## 2019-07-02 DIAGNOSIS — J43.9 PULMONARY EMPHYSEMA, UNSPECIFIED EMPHYSEMA TYPE (HCC): ICD-10-CM

## 2019-07-02 PROCEDURE — G0424 PULMONARY REHAB W EXER: HCPCS

## 2019-07-09 ENCOUNTER — CLINICAL SUPPORT (OUTPATIENT)
Dept: PULMONOLOGY | Age: 74
End: 2019-07-09
Payer: MEDICARE

## 2019-07-09 DIAGNOSIS — J43.9 PULMONARY EMPHYSEMA, UNSPECIFIED EMPHYSEMA TYPE (HCC): ICD-10-CM

## 2019-07-09 PROCEDURE — G0424 PULMONARY REHAB W EXER: HCPCS

## 2019-07-11 ENCOUNTER — CLINICAL SUPPORT (OUTPATIENT)
Dept: PULMONOLOGY | Age: 74
End: 2019-07-11
Payer: MEDICARE

## 2019-07-11 DIAGNOSIS — J43.9 PULMONARY EMPHYSEMA, UNSPECIFIED EMPHYSEMA TYPE (HCC): ICD-10-CM

## 2019-07-11 PROCEDURE — G0424 PULMONARY REHAB W EXER: HCPCS

## 2019-07-16 ENCOUNTER — CLINICAL SUPPORT (OUTPATIENT)
Dept: PULMONOLOGY | Age: 74
End: 2019-07-16
Payer: MEDICARE

## 2019-07-16 ENCOUNTER — OFFICE VISIT (OUTPATIENT)
Dept: PULMONOLOGY | Facility: CLINIC | Age: 74
End: 2019-07-16

## 2019-07-16 VITALS — HEIGHT: 60 IN | WEIGHT: 146 LBS | BODY MASS INDEX: 28.66 KG/M2

## 2019-07-16 DIAGNOSIS — J43.9 PULMONARY EMPHYSEMA, UNSPECIFIED EMPHYSEMA TYPE (HCC): ICD-10-CM

## 2019-07-16 DIAGNOSIS — J43.2 CENTRILOBULAR EMPHYSEMA (HCC): Primary | ICD-10-CM

## 2019-07-16 PROCEDURE — G0424 PULMONARY REHAB W EXER: HCPCS

## 2019-07-16 PROCEDURE — RECHECK: Performed by: INTERNAL MEDICINE

## 2019-07-16 NOTE — PROGRESS NOTES
Medical Director Pulmonary Rehabilitation Review    I certify that I have met with Chago Holloway face-to face to provide a  progress review of his/her pulmonary rehabilitation program at [address]  I have reviewed the most recent individualized treatment plan (ITP), outcomes assessment, and provided opportunity for discussion with the patient    Comments:  Completed therapy, feeling better      Continue with current treatment plan yes    Please provide the following modifications to the current treatment plan: Bonita Miranda MD

## 2019-07-16 NOTE — PROGRESS NOTES
Mau Baldwin   1945     Risk: moderate     Pre Post % Change Goal   Date:   19     Physical       Sub Max ETT (mets)    10% increase   6MWT (feet) 940 950 1% 10% increase   UCSD Dyspnea Score 55  40 15 pt decrease 5 pt decrease   Supplemental O2 use (L) 2 O2 Flow Rate (L/min): 2 L/min same    [facility] (est peak O2) CAT 16 CAT 15 1 pt decrease    Peak exercise CR/RI (mets)    40% increase   Emotional       PHQ9 (> 10 refer to MD) 1 1 same 4 pt decrease   [place] (lower score = improvement)       Total 25  21 4 pt decrease < 27   Feelings 2 1 1 pt decrease < 3   Physical Fitness 5  4 1 pt decrease < 3   Social Support 2  2 same < 3   Daily Activities 4 3 1 pt decrease < 3   Social Activities 3 3 same < 3   Pain 1 1 same < 3   Overall Health 3 3 same < 3   Quality of Life 2 2 same < 3   Change in Health 3 2 1 pt decrease < 3   Dietary       Rate your plate 52 50 2 pt decrease > 58   Measurements       Weight 145 Weight - Scale: 66 2 kg (146 lb)   1 lb increase 2 5 - 5%   BMI 28 3 Body mass index is 28 51 kg/m²  same 19 - 25   Waist Circ       < 40 M / < 35 F   % Body fat     < 25 M / < 33 F   BP left arm               (systolic) 932 596 good < 514   (diastolic) 78 81 good < 90   Smoking #/day  (if applicable)    0                                                    CARDIAC/PULMONARY REHAB ASSESSMENT    Today's date: 2019   Patient name: Mau Baldwin      : 1945       MRN: [id number]  PCP: Emani River MD  Pulmonologist: Bard Boston  Dx: COPD  Date of onset: 19 PFT  Cultural needs: none    Height: Height: 5' (152 4 cm)   Weight:  Weight - Scale: 66 2 kg (146 lb)   Medical History:   Past Medical History:   Diagnosis Date    History of screening mammography     last assessed: 10/31/2017       Physical Limitations: none aside from shortness of breath    Risk Factors   Smoking: Quit in   HTN: no  DM: no  Obesity: no   Inactivity: yes  Family History:   Family History   Problem Relation Age of Onset    Arthritis Mother     Breast cancer Mother     Heart disease Mother     Hypertension Mother     Osteoporosis Mother     Diabetes Father     Heart disease Father     Hypertension Father      Allergies: Allergies   Allergen Reactions    Bee Venom     Clarithromycin      Reaction Date: 2011;     Food Color Red Itching    Levofloxacin     Loratadine      Reaction Date: [id number]; Other: none    Current Medications:   Current Outpatient Medications   Medication Sig Dispense Refill    albuterol (VENTOLIN HFA) 90 mcg/act inhaler Inhale      aspirin 81 MG tablet Take 1 tablet by mouth daily      budesonide (PULMICORT) 1 MG/2ML nebulizer solution Take 1 mg by nebulization 2 (two) times a day      COMBIVENT RESPIMAT inhaler       fluticasone-salmeterol (ADVAIR DISKUS) 250-50 mcg/dose inhaler Inhale       No current facility-administered medications for this visit  Functional Status Prior to Diagnosis for Treatment   Occupation: retired  Recreation: walking  ADLs: none  [place]: yes  Exercise: little  Other: none    Short Term Program Goals: Decrease shortness of breath, improve stamina, increase strength   Long Term Goals: Be able to participate in favorite activity, sport, hobby (golf, hunt, sew, play games, cook) and enjoy family, friends without breathing difficulties    Comments:  Ability to reach goals/rehabilitation potential: medium    Projected return to function: Partial    Emotional/Social    Marital status: single    Life Stressors: shortness of breath    Goals: To gain strength and stamina and feel more energetic with less   shortness of breath         Pulmonary Rehabilitation Plan of Care   Completion review          Today's date: 2019   Total visits to date:  25  Patient name: Alok Hollins      : 1945  Age: 68 y o         MRN: [id number]  Referring Physician: Loreta Alfonso MD  Provider: Ida Herring  Clinician: Joe Ramirez    Dx: COPD, Very severe  Date of onset: 19    COMMENTS:  Patient did a good job in [place] and gained strength and endurance  She made improvements in the assessments and plans to continue exercising  She still struggles with shortness of breath during activities      Comments: none  Medication compliance: Yes   Comments: none  Fall Risk: Low   Comments: none    EXERCISE/ACTIVITY    Cardiopulmonary Goals:   Min: 30-50   HR:    RPE: 5-7 moderate to somewhat hard exercise   O2 sat: >90%    Modalities: Treadmill, UBE, NuStep and Recumbent bike  Strength trainin-3 days / week, 12-15 repitations  and 1-2 sets per modality    Modalities: Leg press, Chest press, Lateral raise, Arm curl and Seated Row    Exercise Progression: Treadmill for 15 min at speed of 1 7 and incline of 1 5; Nustep for 12 min on level 5; arm ergometer for 10 min on level 4 5; weights increasing on Cybex    RPE 5  Home activity: yes  Goals: 10% improvement in functional capacity and home exercise days opposite PA  Education: Benefit of exercise, home exercise and pursed lip breathing   Plan:home exercise target 30 mins, 2 days opposite PA  Readiness to change: Action    NUTRITION    Weight control:    Starting weight: 145 lb Ending 146 lb     Diabetes: N/A  Goals:Improved Rate Your Plate score  Education:More frequent meals, smaller portions  hydration  Plan: Education Video- Diet and Nutrition  Readiness to change: Action    PSYCHOSOCIAL    Emotional: depression and chronic disease  Social support: good  Goals: Physical Fitness in [place] Score < 3, Daily Activity in [place] Score < 3, Overall Health in [place] Score < 3 and Change in Health in [place] Score < 3   Education: Mental Health Education  Plan: Stress and Your Lungs  Readiness to change: Action    OTHER CORE COMPONENTS     Tobacco:   Social History     Tobacco Use   Smoking Status Former Smoker    Types: Cigarettes   Smokeless Tobacco Never Used     Oxygen: room air  Blood pressure: Resting: Resting BP: 135/81   Exercise:    Goals: consistent exercise >150 mins/wk  Education: Pulmonary Disease, physiology, Exercise, strength, flexibility, Conservation of energy, oxygen, breathing techniques, Mental Health, Diet,nutrition, Medication and Avoiding Infections  Plan: Exercise benefits and Proper nutrition  Readiness to change: Action

## 2019-08-12 ENCOUNTER — APPOINTMENT (OUTPATIENT)
Dept: RADIOLOGY | Facility: CLINIC | Age: 74
End: 2019-08-12
Payer: MEDICARE

## 2019-08-12 ENCOUNTER — TRANSCRIBE ORDERS (OUTPATIENT)
Dept: ADMINISTRATIVE | Facility: HOSPITAL | Age: 74
End: 2019-08-12

## 2019-08-12 DIAGNOSIS — M25.559 ARTHRALGIA OF HIP, UNSPECIFIED LATERALITY: ICD-10-CM

## 2019-08-12 DIAGNOSIS — M54.5 LOW BACK PAIN, UNSPECIFIED BACK PAIN LATERALITY, UNSPECIFIED CHRONICITY, WITH SCIATICA PRESENCE UNSPECIFIED: ICD-10-CM

## 2019-08-12 DIAGNOSIS — M54.5 LOW BACK PAIN, UNSPECIFIED BACK PAIN LATERALITY, UNSPECIFIED CHRONICITY, WITH SCIATICA PRESENCE UNSPECIFIED: Primary | ICD-10-CM

## 2019-08-12 PROCEDURE — 73502 X-RAY EXAM HIP UNI 2-3 VIEWS: CPT

## 2019-08-12 PROCEDURE — 72100 X-RAY EXAM L-S SPINE 2/3 VWS: CPT

## 2019-10-11 ENCOUNTER — HOSPITAL ENCOUNTER (OUTPATIENT)
Dept: RADIOLOGY | Age: 74
Discharge: HOME | End: 2019-10-11
Payer: MEDICARE

## 2019-10-11 VITALS — WEIGHT: 145 LBS | BODY MASS INDEX: 28.47 KG/M2 | HEIGHT: 60 IN

## 2019-10-11 DIAGNOSIS — M81.0 OSTEOPOROSIS WITHOUT CURRENT PATHOLOGICAL FRACTURE, UNSPECIFIED OSTEOPOROSIS TYPE: ICD-10-CM

## 2019-10-11 DIAGNOSIS — Z12.31 SCREENING MAMMOGRAM, ENCOUNTER FOR: ICD-10-CM

## 2019-10-11 PROCEDURE — 77080 DXA BONE DENSITY AXIAL: CPT

## 2019-10-11 PROCEDURE — 77067 SCR MAMMO BI INCL CAD: CPT

## 2019-10-14 ENCOUNTER — TELEPHONE (OUTPATIENT)
Dept: FAMILY MEDICINE CLINIC | Facility: MEDICAL CENTER | Age: 74
End: 2019-10-14

## 2019-10-14 DIAGNOSIS — M81.0 OSTEOPOROSIS WITHOUT CURRENT PATHOLOGICAL FRACTURE, UNSPECIFIED OSTEOPOROSIS TYPE: Primary | ICD-10-CM

## 2019-10-14 NOTE — TELEPHONE ENCOUNTER
----- Message from Adriano Calvo MD sent at 10/13/2019  8:23 PM EDT -----  DEXA scan shows osteoporosis  Lumbar spine did not change much but the hip showed a 10% decrease     I think that she should see Endocrinology for further recommendations

## 2019-12-23 ENCOUNTER — OFFICE VISIT (OUTPATIENT)
Dept: FAMILY MEDICINE CLINIC | Facility: MEDICAL CENTER | Age: 74
End: 2019-12-23
Payer: MEDICARE

## 2019-12-23 VITALS
DIASTOLIC BLOOD PRESSURE: 80 MMHG | SYSTOLIC BLOOD PRESSURE: 138 MMHG | BODY MASS INDEX: 28.15 KG/M2 | HEIGHT: 60 IN | RESPIRATION RATE: 14 BRPM | HEART RATE: 80 BPM | WEIGHT: 143.38 LBS

## 2019-12-23 DIAGNOSIS — J43.8 OTHER EMPHYSEMA (HCC): Primary | ICD-10-CM

## 2019-12-23 DIAGNOSIS — M81.6 LOCALIZED OSTEOPOROSIS WITHOUT CURRENT PATHOLOGICAL FRACTURE: ICD-10-CM

## 2019-12-23 DIAGNOSIS — R73.03 PREDIABETES: ICD-10-CM

## 2019-12-23 PROCEDURE — 99214 OFFICE O/P EST MOD 30 MIN: CPT | Performed by: FAMILY MEDICINE

## 2019-12-23 NOTE — PROGRESS NOTES
Rosana Gaucher is here for 6 month checkup  She sees pulmonology for her emphysema  It continues to get worse  She did do pulmonary rehab which she thought was helpful  She requests a 2nd opinion for pulmonology due to distance and problems with the office  She was referred to Endocrinology because based on her last DEXA scan which shows worsening osteoporosis of her hip  She is not taking a calcium supplement  She does gets dietary at least 3 servings a day  Otherwise no complaints    O: //80   Pulse 80   Resp 14   Ht 4' 11 5" (1 511 m)   Wt 65 kg (143 lb 6 oz)   BMI 28 47 kg/m²    Neck no adenopathy thyromegaly  Chest significant diffusely decreased breath sounds but clear  Cardiac regular rate without murmur    Assessment  1  Pulmonary emphysema-requests refill of her albuterol  Discussed 2nd opinion and lung reduction surgery  2  Pre diabetes-due for blood work  Weight is stable  3  Osteoporosis-encouraged to follow through with Endocrinology referral   I encouraged her to take a calcium supplement and consider treatment which will likely be recommended by Endocrinology  Plan  As above  Blood work in January  Call with results    Pulmonology referral   Aron Mehta 6 months

## 2019-12-31 ENCOUNTER — OFFICE VISIT (OUTPATIENT)
Dept: PULMONOLOGY | Facility: CLINIC | Age: 74
End: 2019-12-31
Payer: MEDICARE

## 2019-12-31 VITALS
OXYGEN SATURATION: 93 % | DIASTOLIC BLOOD PRESSURE: 100 MMHG | HEART RATE: 78 BPM | SYSTOLIC BLOOD PRESSURE: 150 MMHG | HEIGHT: 59 IN | BODY MASS INDEX: 28.87 KG/M2 | TEMPERATURE: 96.6 F | WEIGHT: 143.2 LBS

## 2019-12-31 DIAGNOSIS — J43.8 OTHER EMPHYSEMA (HCC): Primary | ICD-10-CM

## 2019-12-31 DIAGNOSIS — R06.02 SOB (SHORTNESS OF BREATH): ICD-10-CM

## 2019-12-31 DIAGNOSIS — J44.9 COPD, SEVERE (HCC): ICD-10-CM

## 2019-12-31 PROCEDURE — 99205 OFFICE O/P NEW HI 60 MIN: CPT | Performed by: INTERNAL MEDICINE

## 2019-12-31 RX ORDER — ALBUTEROL SULFATE 90 UG/1
2 AEROSOL, METERED RESPIRATORY (INHALATION) EVERY 4 HOURS PRN
Qty: 1 INHALER | Refills: 5 | Status: SHIPPED | OUTPATIENT
Start: 2019-12-31 | End: 2020-07-01 | Stop reason: SDUPTHER

## 2019-12-31 RX ORDER — IPRATROPIUM/ALBUTEROL SULFATE 20-100 MCG
1 MIST INHALER (GRAM) INHALATION 3 TIMES DAILY
Qty: 3 INHALER | Refills: 3 | Status: SHIPPED | OUTPATIENT
Start: 2019-12-31 | End: 2020-07-01 | Stop reason: ALTCHOICE

## 2019-12-31 NOTE — PROGRESS NOTES
Pulmonary Outpatient Consultation Note   Simba Wallis 76 y o  female MRN: [id number]  12/31/2019    Referring provider: Dr Darlyn Aguilar    Assessment/Plan:      COPD, very severe [facility])   Patient has very severe /advanced COPD /  Emphysema and despite maximal medical therapy, she continues to struggle with daily symptoms of shortness of breath and exercise intolerance  She is compliant with her inhaler regimen  She has completed pulmonary rehabilitation  She is not a lung transplant candidate  She is seeking more advanced therapies, specifically bronchoscopic lung volume reduction  Based on testing to date, she appears to be a candidate, however requires additional testing including chest CT to evaluate for emphysema score and fissure completeness, 2D echocardiogram to rule out pulmonary hypertension and arterial blood gas to rule out hypercapnia  We will also update pulmonary function testing to be sure that her FEV1 is still within the range of 15-45% of predicted, as indicated for the valves  Once testing is complete, I will call the patient to determine if she is a candidate  I spent 60 minutes with the patient counseling and coordinating care:  reviewing all available patient data related to procedure, personally reviewing imaging, studies and pulmonary function testing and discussing the procedure of bronchoscopic lung volume reduction  I shared a slide show presentation and reviewed data from the LIBERATE trial   They understand that 50% of patients will have a 15% improvement in FEV1  Patients will generally have improved exercise tolerance and quality of life related to COPD after treatment  We also discussed the risks, notably a 30% risk of pneumothorax in the postprocedure period  Patient will require a minimum of a 4 day hospitalization to monitor for postprocedure complications   Patient also  provided with literature and web site resource regarding [place] valve treatment (www [email or web address])      Visit orders:    Diagnoses and all orders for this visit:    Other emphysema ([address] )  -     Six minute walk; Future    COPD, severe (HCC)  -     fluticasone-salmeterol (ADVAIR DISKUS) 250-50 mcg/dose inhaler; Inhale 1 puff 2 (two) times a day  -     COMBIVENT RESPIMAT inhaler; Inhale 1 puff 3 (three) times a day  -     albuterol (VENTOLIN HFA) 90 mcg/act inhaler; Inhale 2 puffs every 4 (four) hours as needed for wheezing  -     Complete PFT with Post Bronchodilator and ABG; Future  -     CT chest without contrast; Future  -     Echo complete with contrast if indicated; Future  -     Six minute walk; Future    SOB (shortness of breath)        History of Present Illness   HPI:  Shanika Sandhu is a 76 y o  female who  Is here today seeking an opinion regarding bronchoscopic lung volume reduction  She has been treated for advanced COPD / emphysema for the past 15 years by Dr Jermaine Castañeda  Her current inhaler regimen includes Advair and Combivent  Despite compliance with her regimen, she struggles with shortness of breath  She finds it difficult to perform activities of daily living including making her bed and cleaning up the house  She also becomes more breathless when bending over  She does not have significant cough, wheeze or sputum production  She had an exacerbation in November of 2018 in feels that she never fully regained function thereafter  She has not been hospitalized or intubated for COPD  She had pneumonia as a child, but none in adulthood  She uses Ventolin approximately 2 times per week for symptoms of shortness of breath  She completed pulmonary rehabilitation  She does not use supplemental oxygen  Review of Systems   Constitutional: Negative for appetite change and fever  HENT: Positive for rhinorrhea  Negative for ear pain, postnasal drip, sneezing, sore throat and trouble swallowing  Respiratory: Positive for shortness of breath      Cardiovascular: Negative for chest pain  Gastrointestinal: Negative for diarrhea and nausea  Genitourinary: Negative  Musculoskeletal: Negative for myalgias  Skin: Negative for rash  Neurological: Negative for headaches  Hematological: Negative for adenopathy         All other review systems are negative    Historical Information   Past Medical History:   Diagnosis Date    History of screening mammography     last assessed: 10/31/2017    Osteoporosis      Past Surgical History:   Procedure Laterality Date    APPENDECTOMY      onset: 1966    CATARACT EXTRACTION      last assessed: 10/31/2017    TONSILLECTOMY      onset: 1954     Family History   Problem Relation Age of Onset    Arthritis Mother     Breast cancer Mother [de-identified]    Heart disease Mother     Hypertension Mother     Osteoporosis Mother     Diabetes Father     Heart disease Father     Hypertension Father     Ovarian cancer Sister 52    No Known Problems Maternal Grandmother     No Known Problems Maternal Grandfather     No Known Problems Paternal Grandmother     No Known Problems Paternal Grandfather     No Known Problems Maternal Aunt     No Known Problems Maternal Aunt     No Known Problems Paternal Aunt     No Known Problems Paternal Aunt     No Known Problems Paternal Aunt          Social History     Tobacco Use   Smoking Status Former Smoker    Packs/day: 1 00    Years: 40 00    Pack years: 40 00    Types: Cigarettes    Last attempt to quit: 2005    Years since quitting: 15 0   Smokeless Tobacco Never Used       Meds/Allergies     Current Outpatient Medications:     albuterol (VENTOLIN HFA) 90 mcg/act inhaler, Inhale 2 puffs every 4 (four) hours as needed for wheezing, Disp: 1 Inhaler, Rfl: 5    aspirin 81 MG tablet, Take 1 tablet by mouth daily, Disp: , Rfl:     budesonide (PULMICORT) 1 MG/2ML nebulizer solution, Take 1 mg by nebulization 2 (two) times a day, Disp: , Rfl:     COMBIVENT RESPIMAT inhaler, Inhale 1 puff 3 (three) times a day, Disp: 3 Inhaler, Rfl: 3    fluticasone-salmeterol (ADVAIR DISKUS) 250-50 mcg/dose inhaler, Inhale 1 puff 2 (two) times a day, Disp: 3 Inhaler, Rfl: 3  Allergies   Allergen Reactions    Bee Venom     Clarithromycin      Reaction Date: 29Apr2011;     Food Color Red Itching    Levofloxacin     Loratadine      Reaction Date: [id number];        Vitals: Blood pressure 150/100, pulse 78, temperature (!) 96 6 °F (35 9 °C), temperature source Tympanic, height 4' 11" (1 499 m), weight 65 kg (143 lb 3 2 oz), SpO2 93 %  , Body mass index is 28 92 kg/m²  Oxygen Therapy  SpO2: 93 %  Oxygen Therapy: None (Room air)      Physical Exam   Constitutional: She is oriented to person, place, and time  No distress  HENT:   Head: Normocephalic  Mouth/Throat: No oropharyngeal exudate  Eyes: Pupils are equal, round, and reactive to light  No scleral icterus  Neck: Neck supple  No JVD present  Cardiovascular: Normal rate and regular rhythm  Pulmonary/Chest:    Markedly decreased breath sounds throughout, no wheeze, rales or rhonchi   Abdominal: Soft  There is no tenderness  Musculoskeletal: She exhibits no edema  Lymphadenopathy:     She has no cervical adenopathy  Neurological: She is alert and oriented to person, place, and time  Skin: Skin is warm and dry  Psychiatric: She has a normal mood and affect  Labs: I have personally reviewed pertinent lab results    Lab Results   Component Value Date    WBC 7 64 01/07/2019    HGB 14 8 01/07/2019    HCT 48 3 (H) 01/07/2019    MCV 92 01/07/2019     01/07/2019     Lab Results   Component Value Date    GLUCOSE 105 06/25/2015    CALCIUM 8 9 01/07/2019     06/25/2015    K 4 1 01/07/2019    CO2 31 01/07/2019     01/07/2019    BUN 16 01/07/2019    CREATININE 0 59 (L) 01/07/2019     No results found for: IGE  Lab Results   Component Value Date    ALT 38 01/07/2019    AST 23 01/07/2019    ALKPHOS 56 01/07/2019    BILITOT 0 51 06/25/2015       Imaging and other studies: I have personally reviewed pertinent reports  and I have personally reviewed pertinent films in PACS   chest CT 10/22/18   there is centrilobular emphysema involving both lungs with chronic scarring in the right middle lobe and lingula  There is a 13 mm ground-glass nodule which is stable  There is an additional 5 mm nodule in the superior segment left lower lobe which appears less conspicuous when compared with scan in 2017    Pulmonary function testing:  Performed  1/22/19   FEV1/FVC ratio 39%    FEV1 27% predicted  FVC 51% predicted  No response to bronchodilators  Post BD FEV1  25% predicted   % predicted   % predicted  DLCO corrected for hemoglobin 26 % predicted   PFTs show very severe obstruction with reduced vital capacity due to severe air trapping  Diffusion capacity is reduced      Pulmonary Rehabilitation - completed

## 2020-01-06 ENCOUNTER — HOSPITAL ENCOUNTER (OUTPATIENT)
Dept: RADIOLOGY | Facility: MEDICAL CENTER | Age: 75
Discharge: HOME | End: 2020-01-06
Payer: MEDICARE

## 2020-01-06 DIAGNOSIS — J44.9 COPD, SEVERE (HCC): ICD-10-CM

## 2020-01-06 PROCEDURE — 71250 CT THORAX DX C-: CPT

## 2020-01-10 ENCOUNTER — HOSPITAL ENCOUNTER (OUTPATIENT)
Dept: NON INVASIVE DIAGNOSTICS | Facility: CLINIC | Age: 75
Discharge: HOME | End: 2020-01-10
Payer: MEDICARE

## 2020-01-10 DIAGNOSIS — J44.9 COPD, SEVERE (HCC): ICD-10-CM

## 2020-01-10 PROCEDURE — 93306 TTE W/DOPPLER COMPLETE: CPT

## 2020-01-10 PROCEDURE — 93306 TTE W/DOPPLER COMPLETE: CPT | Performed by: INTERNAL MEDICINE

## 2020-01-13 ENCOUNTER — TELEPHONE (OUTPATIENT)
Dept: PULMONOLOGY | Facility: CLINIC | Age: 75
End: 2020-01-13

## 2020-01-13 DIAGNOSIS — J44.9 CHRONIC OBSTRUCTIVE PULMONARY DISEASE, UNSPECIFIED COPD TYPE (HCC): Primary | ICD-10-CM

## 2020-01-13 NOTE — TELEPHONE ENCOUNTER
Patient sent in email saying she is sick and wants to know if she should reschedule her PFT  I called patient  She has been sick for a week now  She is coughing up yellow/green mucous and she is SOB on exertion  She is tired and has the chills  She has no appetite  Denies wheezing, chest tightness and fever  She is using her nebulizer and inhalers  She is not taking anything OTC  She was supposed to get her PFT for EBV evaluation  I advised her it would be best to reschedule it  Please advise

## 2020-01-14 RX ORDER — PREDNISONE 20 MG/1
40 TABLET ORAL DAILY
Qty: 10 TABLET | Refills: 0 | Status: SHIPPED | OUTPATIENT
Start: 2020-01-14 | End: 2020-05-15 | Stop reason: ALTCHOICE

## 2020-01-14 RX ORDER — AZITHROMYCIN 250 MG/1
TABLET, FILM COATED ORAL
Qty: 6 TABLET | Refills: 0 | Status: SHIPPED | OUTPATIENT
Start: 2020-01-14 | End: 2020-01-18

## 2020-01-14 NOTE — TELEPHONE ENCOUNTER
I spoke with Rich Eric via telephone  She has a harsh wheezy cough productive of yellow mucus  She is short of breath on exertion  She is using her rescue inhaler more than normal   She denies any chest pain  She has not had any fevers or chills  Her  was at the PCP and was prescribed amoxicillin per her report  I have prescribed her prednisone 40 milligrams daily for five days and azithromycin, which she reports she has taken before and tolerated  She is aware to call our office if symptoms do not improve or with any questions or concerns  She will be rescheduling her PFTs for mid to late February  Discussed with KELSEY Armijo

## 2020-01-30 ENCOUNTER — CONSULT (OUTPATIENT)
Dept: ENDOCRINOLOGY | Facility: CLINIC | Age: 75
End: 2020-01-30
Payer: MEDICARE

## 2020-01-30 DIAGNOSIS — E55.9 VITAMIN D DEFICIENCY: Primary | ICD-10-CM

## 2020-01-30 DIAGNOSIS — R79.89 ELEVATED TSH: ICD-10-CM

## 2020-01-30 DIAGNOSIS — M81.0 OSTEOPOROSIS WITHOUT CURRENT PATHOLOGICAL FRACTURE, UNSPECIFIED OSTEOPOROSIS TYPE: ICD-10-CM

## 2020-01-30 PROCEDURE — 1160F RVW MEDS BY RX/DR IN RCRD: CPT | Performed by: INTERNAL MEDICINE

## 2020-01-30 PROCEDURE — 99204 OFFICE O/P NEW MOD 45 MIN: CPT | Performed by: INTERNAL MEDICINE

## 2020-01-30 PROCEDURE — 1036F TOBACCO NON-USER: CPT | Performed by: INTERNAL MEDICINE

## 2020-01-30 PROCEDURE — 4040F PNEUMOC VAC/ADMIN/RCVD: CPT | Performed by: INTERNAL MEDICINE

## 2020-01-30 RX ORDER — PHENOL 1.4 %
AEROSOL, SPRAY (ML) MUCOUS MEMBRANE
Start: 2020-01-30 | End: 2021-07-22 | Stop reason: ALTCHOICE

## 2020-01-30 RX ORDER — ACETAMINOPHEN 160 MG
TABLET,DISINTEGRATING ORAL
Refills: 0
Start: 2020-01-30 | End: 2021-07-22 | Stop reason: ALTCHOICE

## 2020-01-30 NOTE — PATIENT INSTRUCTIONS
Zoledronic Acid (By injection)   Zoledronic Acid ([id number]-[id number]-PIIM-ik AS-id)  Treats high blood calcium levels  Also treats bone damage caused by Paget disease, multiple myeloma, and cancers that spread to the bone  Also treats osteoporosis and reduces the risk of hip fractures in certain patients  Brand Name(s): [facility] Rx Zoledronic Acid, Reclast, Zoledronic Acid Novaplus, Zometa   There may be other brand names for this medicine  When This Medicine Should Not Be Used: This medicine is not right for everyone  You should not receive it if you had an allergic reaction to zoledronic acid, or if you are pregnant  How to Use This Medicine:   Injectable  · A nurse or other health provider will give you this medicine  · Your doctor will prescribe your dose and schedule  This medicine is given through a needle placed in a vein  · Your doctor may tell you to drink extra liquids before your treatment to prevent kidney problems  · Your doctor may also give you vitamin D and calcium supplements  Tell your doctor if you are not able to take these medicines  · This medicine should come with a Medication Guide  Ask your pharmacist for a copy if you do not have one  · Missed dose: You must use this medicine on a fixed schedule  Call your doctor or pharmacist if you miss a dose  Drugs and Foods to Avoid:   Ask your doctor or pharmacist before using any other medicine, including over-the-counter medicines, vitamins, and herbal products  · Do not use other medicines that also contain zoledronic acid  Do not use zoledronic acid together with another bisphosphonate medicine  · Some foods and medicines can affect how zoledronic acid works  Tell your doctor if you are using digoxin, antibiotics, diuretics (water pills), an NSAID pain or arthritis medicine (such as aspirin, celecoxib, ibuprofen, naproxen), steroid medicines, or cancer medicines    Warnings While Using This Medicine:   · It is not safe to take this medicine during pregnancy  It could harm an unborn baby  Tell your doctor right away if you become pregnant  · Tell your doctor if you are breastfeeding, or if you have kidney disease, anemia, aspirin-sensitive asthma, bleeding problems, cancer, congestive heart failure, low blood calcium levels, stomach absorption problems, mineral imbalance, dental problems or gum disease  Also tell your doctor if you had surgery on your bowel or parathyroid or thyroid gland  · This medicine may cause the following problems:  ¨ Jaw or teeth problems  ¨ Severe bone, joint, or muscle pain  ¨ Increased risk of thigh bone fracture  ¨ Low calcium levels in your blood  · You must have regular dental exams while you are being treated with this medicine  Tell your dentist or oral surgeon that you are using this medicine  · Your doctor will do lab tests at regular visits to check on the effects of this medicine  Keep all appointments    Possible Side Effects While Using This Medicine:   Call your doctor right away if you notice any of these side effects:  · Allergic reaction: Itching or hives, swelling in your face or hands, swelling or tingling in your mouth or throat, chest tightness, trouble breathing  · Chest pain, trouble breathing, fast or uneven heartbeat  · Decrease in how much or how often you urinate, blood in the urine, lower back or side pain, burning or painful urination  · Muscle spasm or twitching, or numbness or tingling in your fingers, feet, or around your mouth  · Pain, swelling, or numbness in the mouth or jaw, loose teeth or other teeth problems  · Severe muscle, bone, or joint pain  · Unusual pain in your thigh, groin, or hip  If you notice these less serious side effects, talk with your doctor:   · Fever, chills, cough, sore throat, and body aches  · Headache  · Mild nausea, constipation, diarrhea, stomach pain or upset  · Redness, pain, or swelling of your skin where the needle is placed  If you notice other side effects that you think are caused by this medicine, tell your doctor  Call your doctor for medical advice about side effects  You may report side effects to FDA at 6-586-FDA-6049  © 2017 [address] [employer] Information is for End User's use only and may not be sold, redistributed or otherwise used for commercial purposes  The above information is an  only  It is not intended as medical advice for individual conditions or treatments  Talk to your doctor, nurse or pharmacist before following any medical regimen to see if it is safe and effective for you

## 2020-01-30 NOTE — PROGRESS NOTES
Eletha Dakin 76 y o  female MRN: [id number]    Encounter: [id number]      Assessment/Plan     Assessment: This is a 76y o -year-old female with vitamin D deficiency, Osteoporosis and Abnormal TSH     Plan:     Diagnoses and all orders for this visit:    Vitamin D deficiency  Will obtain vitamin-D level  Continue current dose of vitamin-D supplementation for now  -     Vitamin D 25 hydroxy; Future  -     Cholecalciferol (VITAMIN D3) 50 MCG (2000 UT) capsule; She takes 1 capsule daily    Osteoporosis without current pathological fracture, unspecified osteoporosis type  Patient has worsening of bone density, and also because of her history of steroid use in the past for chronic emphysema, she would benefit from injectable bisphosphonates or Prolia injection  Discussed about both once yearly Reclast injection as well as Prolia injection, discussed the safety and efficacy of both  Patient is leaning towords IV Reclast injection, she wants to do research and she wants to read about the medication and then she will let us know when she is ready  Also discussed importance of fall precautions  Discussed to continue calcium supplementation 500 mg, and rest of calcium through diet  Discussed to continue vitamin-D supplementation  Also have ordered the blood work to rule out secondary causes of osteoporosis  -     Ambulatory referral to Endocrinology  -     Basic metabolic panel; Future  -     PTH, intact- Lab Collect; Future  -     Protein electrophoresis, serum Lab Collect; Future  -     calcium carbonate (OS-CRISTIANO) 600 MG tablet; Take one with dinner    Elevated TSH  Obtain TSH and free T4 as previously TSH was elevated  -     TSH, 3rd generation; Future  -     T4, free;  Future      CC:     Osteoporosis     History of Present Illness     HPI:    Eletha Dakin is 77-year-old woman with past medical history of osteoporosis, pulmonary emphysema, prediabetes, vitamin-D deficiency is here for evaluation of osteoporosis and management  Patient has history of emphysema and she needs steroids at least once or twice a year  She denies any history of major fracture bones  She denies history of kidney stones  She is currently taking vitamin-D supplementation, she takes calcium through diet    She was   Dexa Scan     TECHNIQUE: Bone densitometry was performed using a Hologic Horizon A bone densitometer  Regions of interest appear properly placed  There are no obvious fractures or other confounding variables which could limit the study  Degenerative changes of the   lumbar spine and hip  This will falsely elevate the bone mineral densities in these regions  Mild shaped scoliosis      COMPARISON:  Multiple prior studies, most recent November 18, 2016     RESULTS:   LUMBAR SPINE:  L1-L4:  BMD 0 875 gm/cm2  T-score -1 6  Z-score 0 8     LUMBAR SPINE L1, L3 and L4 (average) :   BMD 0 871 gm/sq-cm  T-score is -1 7   Z-score is 0 7            LEFT TOTAL HIP:  BMD 0 704 gm/cm2  T-score -2 0  Z-score -0 2     LEFT FEMORAL NECK:  BMD 0 538 gm/cm2  T-score -2 8  Z-score -0 8           IMPRESSION:  1  Based on the [facility] classification, the T-score of -2 8 in the left hip is consistent with OSTEOPOROSIS      2    When compared to the prior examination, there has been NO SIGNIFICANT CHANGE in the total bone mineral density of the lumbar spine, when allowing for the least significant change  There has however been a 10% DECREASE in the total bone mineral   density of the left hip  Review of Systems   Constitutional: Negative for activity change, diaphoresis, fatigue, fever and unexpected weight change  HENT: Negative  Eyes: Negative for visual disturbance  Respiratory: Positive for shortness of breath  Negative for cough and chest tightness  Cardiovascular: Negative for chest pain, palpitations and leg swelling  Gastrointestinal: Negative for abdominal pain, constipation, diarrhea, nausea and vomiting     Endocrine: Negative for cold intolerance, heat intolerance, polydipsia, polyphagia and polyuria  Genitourinary: Negative for dysuria, enuresis, frequency and urgency  Musculoskeletal: Positive for arthralgias and myalgias  Skin: Negative for pallor, rash and wound  Allergic/Immunologic: Negative  Neurological: Negative for dizziness, tremors, weakness and numbness  Hematological: Negative  Psychiatric/Behavioral: Negative          Historical Information   Past Medical History:   Diagnosis Date    History of screening mammography     last assessed: 10/31/2017    Osteoporosis      Past Surgical History:   Procedure Laterality Date    APPENDECTOMY      onset: 1966    CATARACT EXTRACTION      last assessed: 10/31/2017    TONSILLECTOMY      onset: 12     Social History   Social History     Substance and Sexual Activity   Alcohol Use Yes    Frequency: Monthly or less     Social History     Substance and Sexual Activity   Drug Use Never     Social History     Tobacco Use   Smoking Status Former Smoker    Packs/day: 1 00    Years: 40 00    Pack years: 40 00    Types: Cigarettes    Last attempt to quit: 2005    Years since quitting: 15 0   Smokeless Tobacco Never Used     Family History:   Family History   Problem Relation Age of Onset    Arthritis Mother     Breast cancer Mother [de-identified]    Heart disease Mother     Hypertension Mother     Osteoporosis Mother     Diabetes Father     Heart disease Father     Hypertension Father     Ovarian cancer Sister 52    No Known Problems Maternal Grandmother     No Known Problems Maternal Grandfather     No Known Problems Paternal Grandmother     No Known Problems Paternal Grandfather     No Known Problems Maternal Aunt     No Known Problems Maternal Aunt     No Known Problems Paternal Aunt     No Known Problems Paternal Aunt     No Known Problems Paternal Aunt        Meds/Allergies   Current Outpatient Medications   Medication Sig Dispense Refill    albuterol (VENTOLIN HFA) 90 mcg/act inhaler Inhale 2 puffs every 4 (four) hours as needed for wheezing 1 Inhaler 5    aspirin 81 MG tablet Take 1 tablet by mouth daily      budesonide (PULMICORT) 1 MG/2ML nebulizer solution Take 1 mg by nebulization 2 (two) times a day      COMBIVENT RESPIMAT inhaler Inhale 1 puff 3 (three) times a day 3 Inhaler 3    fluticasone-salmeterol (ADVAIR DISKUS) 250-50 mcg/dose inhaler Inhale 1 puff 2 (two) times a day 3 Inhaler 3    predniSONE 20 mg tablet Take 2 tablets (40 mg total) by mouth daily 10 tablet 0     No current facility-administered medications for this visit  Allergies   Allergen Reactions    Bee Venom     Clarithromycin      Reaction Date: [id number];     Food Color Red Itching    Levofloxacin     Loratadine      Reaction Date: [id number];        Objective   Vitals: Blood pressure 126/68, pulse 76, height 4' 10" (1 473 m), weight 63 kg (139 lb)  Physical Exam   Constitutional: She is oriented to person, place, and time  She appears well-developed and well-nourished  No distress  HENT:   Head: Normocephalic and atraumatic  Eyes: Conjunctivae and EOM are normal  Right eye exhibits no discharge  Left eye exhibits no discharge  Neck: Normal range of motion  Neck supple  No thyromegaly present  Cardiovascular: Normal rate, regular rhythm and normal heart sounds  No murmur heard  Pulmonary/Chest: Effort normal and breath sounds normal  No respiratory distress  Abdominal: Soft  Bowel sounds are normal    Musculoskeletal: Normal range of motion  She exhibits no edema or deformity  Neurological: She is alert and oriented to person, place, and time  She has normal reflexes  She displays normal reflexes  Skin: Skin is warm and dry  She is not diaphoretic  No erythema  Psychiatric: She has a normal mood and affect  Her behavior is normal    Vitals reviewed  The history was obtained from the review of the chart, patient      Lab Results:          Imaging Studies:            Results for orders placed during the hospital encounter of 10/11/19   DXA bone density spine hip and pelvis    Impression 1  Based on the [facility] classification, the T-score of -2 8 in the left hip is consistent with OSTEOPOROSIS     2  When compared to the prior examination, there has been NO SIGNIFICANT CHANGE in the total bone mineral density of the lumbar spine, when allowing for the least significant change  There has however been a 10% DECREASE in the total bone mineral   density of the left hip  3   According to the [address] [place] [address], prescription therapy is recommended with a T-score of -2 5 or less in the spine or hip after appropriate evaluation to exclude secondary causes  4   A daily intake of at least 1200 mg Calcium and 800 to 1000 IU of Vitamin D, as well as weight bearing and muscle strengthening exercise, fall prevention and avoidance of tobacco and excessive alcohol intake as  basic preventive measures are   suggested  5   Repeat DXA  in 18 - 24 months, on the same machine, as clinically indicated  The 10 year risk of hip fracture is 20%, with the 10 year risk of major osteoporotic fracture being 32%, as calculated by the [facility] fracture risk assessment tool (FRAX)  The current NOF guidelines recommend treating patients with FRAX 10 year risk score   of >3% for hip fracture and >20% for major osteoporotic fracture  WHO CLASSIFICATION:  Normal (a T-score of -1 0 or higher)  Low bone mineral density (a T-score of less than -1 0 but higher than -2 5)  Osteoporosis (a T-score of -2 5 or less)  Severe osteoporosis (a T-score of -2 5 or less with a fragility fracture)                Workstation performed: [id number]                  I have personally reviewed pertinent reports  Portions of the record may have been created with voice recognition software   Occasional wrong word or "sound a like" substitutions may have occurred due to the inherent limitations of voice recognition software  Read the chart carefully and recognize, using context, where substitutions have occurred

## 2020-02-03 ENCOUNTER — PATIENT MESSAGE (OUTPATIENT)
Dept: ENDOCRINOLOGY | Facility: CLINIC | Age: 75
End: 2020-02-03

## 2020-02-03 ENCOUNTER — APPOINTMENT (OUTPATIENT)
Dept: LAB | Facility: CLINIC | Age: 75
End: 2020-02-03
Payer: MEDICARE

## 2020-02-03 DIAGNOSIS — R73.03 PREDIABETES: ICD-10-CM

## 2020-02-03 DIAGNOSIS — M81.0 OSTEOPOROSIS WITHOUT CURRENT PATHOLOGICAL FRACTURE, UNSPECIFIED OSTEOPOROSIS TYPE: ICD-10-CM

## 2020-02-03 DIAGNOSIS — R79.89 ELEVATED TSH: ICD-10-CM

## 2020-02-03 DIAGNOSIS — J43.8 OTHER EMPHYSEMA (HCC): ICD-10-CM

## 2020-02-03 DIAGNOSIS — M81.6 LOCALIZED OSTEOPOROSIS WITHOUT CURRENT PATHOLOGICAL FRACTURE: ICD-10-CM

## 2020-02-03 DIAGNOSIS — E55.9 VITAMIN D DEFICIENCY: ICD-10-CM

## 2020-02-03 LAB
25(OH)D3 SERPL-MCNC: 34.5 NG/ML (ref 30–100)
ALBUMIN SERPL BCP-MCNC: 3.5 G/DL (ref 3.5–5)
ALP SERPL-CCNC: 60 U/L (ref 46–116)
ALT SERPL W P-5'-P-CCNC: 39 U/L (ref 12–78)
ANION GAP SERPL CALCULATED.3IONS-SCNC: 2 MMOL/L (ref 4–13)
AST SERPL W P-5'-P-CCNC: 19 U/L (ref 5–45)
BASOPHILS # BLD AUTO: 0.05 THOUSANDS/ΜL (ref 0–0.1)
BASOPHILS NFR BLD AUTO: 1 % (ref 0–1)
BILIRUB SERPL-MCNC: 0.51 MG/DL (ref 0.2–1)
BUN SERPL-MCNC: 13 MG/DL (ref 5–25)
CALCIUM SERPL-MCNC: 9 MG/DL (ref 8.3–10.1)
CHLORIDE SERPL-SCNC: 105 MMOL/L (ref 100–108)
CHOLEST SERPL-MCNC: 179 MG/DL (ref 50–200)
CO2 SERPL-SCNC: 31 MMOL/L (ref 21–32)
CREAT SERPL-MCNC: 0.59 MG/DL (ref 0.6–1.3)
EOSINOPHIL # BLD AUTO: 0.18 THOUSAND/ΜL (ref 0–0.61)
EOSINOPHIL NFR BLD AUTO: 3 % (ref 0–6)
ERYTHROCYTE [DISTWIDTH] IN BLOOD BY AUTOMATED COUNT: 13.1 % (ref 11.6–15.1)
EST. AVERAGE GLUCOSE BLD GHB EST-MCNC: 126 MG/DL
GFR SERPL CREATININE-BSD FRML MDRD: 91 ML/MIN/1.73SQ M
GLUCOSE P FAST SERPL-MCNC: 99 MG/DL (ref 65–99)
HBA1C MFR BLD: 6 % (ref 4.2–6.3)
HCT VFR BLD AUTO: 47.1 % (ref 34.8–46.1)
HDLC SERPL-MCNC: 58 MG/DL
HGB BLD-MCNC: 14.5 G/DL (ref 11.5–15.4)
IMM GRANULOCYTES # BLD AUTO: 0.01 THOUSAND/UL (ref 0–0.2)
IMM GRANULOCYTES NFR BLD AUTO: 0 % (ref 0–2)
LDLC SERPL CALC-MCNC: 103 MG/DL (ref 0–100)
LYMPHOCYTES # BLD AUTO: 1.61 THOUSANDS/ΜL (ref 0.6–4.47)
LYMPHOCYTES NFR BLD AUTO: 22 % (ref 14–44)
MCH RBC QN AUTO: 29.3 PG (ref 26.8–34.3)
MCHC RBC AUTO-ENTMCNC: 30.8 G/DL (ref 31.4–37.4)
MCV RBC AUTO: 95 FL (ref 82–98)
MONOCYTES # BLD AUTO: 0.49 THOUSAND/ΜL (ref 0.17–1.22)
MONOCYTES NFR BLD AUTO: 7 % (ref 4–12)
NEUTROPHILS # BLD AUTO: 4.94 THOUSANDS/ΜL (ref 1.85–7.62)
NEUTS SEG NFR BLD AUTO: 67 % (ref 43–75)
NONHDLC SERPL-MCNC: 121 MG/DL
NRBC BLD AUTO-RTO: 0 /100 WBCS
PLATELET # BLD AUTO: 166 THOUSANDS/UL (ref 149–390)
PMV BLD AUTO: 11.6 FL (ref 8.9–12.7)
POTASSIUM SERPL-SCNC: 4 MMOL/L (ref 3.5–5.3)
PROT SERPL-MCNC: 6.9 G/DL (ref 6.4–8.2)
PTH-INTACT SERPL-MCNC: 88.4 PG/ML (ref 18.4–80.1)
RBC # BLD AUTO: 4.95 MILLION/UL (ref 3.81–5.12)
SODIUM SERPL-SCNC: 138 MMOL/L (ref 136–145)
T4 FREE SERPL-MCNC: 1.09 NG/DL (ref 0.76–1.46)
TRIGL SERPL-MCNC: 89 MG/DL
TSH SERPL DL<=0.05 MIU/L-ACNC: 2.64 UIU/ML (ref 0.36–3.74)
WBC # BLD AUTO: 7.28 THOUSAND/UL (ref 4.31–10.16)

## 2020-02-03 PROCEDURE — 83036 HEMOGLOBIN GLYCOSYLATED A1C: CPT

## 2020-02-03 PROCEDURE — 80053 COMPREHEN METABOLIC PANEL: CPT

## 2020-02-03 PROCEDURE — 84165 PROTEIN E-PHORESIS SERUM: CPT | Performed by: PATHOLOGY

## 2020-02-03 PROCEDURE — 84439 ASSAY OF FREE THYROXINE: CPT

## 2020-02-03 PROCEDURE — 36415 COLL VENOUS BLD VENIPUNCTURE: CPT

## 2020-02-03 PROCEDURE — 84165 PROTEIN E-PHORESIS SERUM: CPT

## 2020-02-03 PROCEDURE — 85025 COMPLETE CBC W/AUTO DIFF WBC: CPT

## 2020-02-03 PROCEDURE — 82306 VITAMIN D 25 HYDROXY: CPT

## 2020-02-03 PROCEDURE — 84443 ASSAY THYROID STIM HORMONE: CPT

## 2020-02-03 PROCEDURE — 80061 LIPID PANEL: CPT

## 2020-02-03 PROCEDURE — 83970 ASSAY OF PARATHORMONE: CPT

## 2020-02-04 VITALS
HEIGHT: 60 IN | SYSTOLIC BLOOD PRESSURE: 126 MMHG | WEIGHT: 139 LBS | BODY MASS INDEX: 27.29 KG/M2 | HEART RATE: 76 BPM | DIASTOLIC BLOOD PRESSURE: 68 MMHG

## 2020-02-05 LAB
ALBUMIN SERPL ELPH-MCNC: 4.03 G/DL (ref 3.5–5)
ALBUMIN SERPL ELPH-MCNC: 62.9 % (ref 52–65)
ALPHA1 GLOB SERPL ELPH-MCNC: 0.25 G/DL (ref 0.1–0.4)
ALPHA1 GLOB SERPL ELPH-MCNC: 3.9 % (ref 2.5–5)
ALPHA2 GLOB SERPL ELPH-MCNC: 0.76 G/DL (ref 0.4–1.2)
ALPHA2 GLOB SERPL ELPH-MCNC: 11.9 % (ref 7–13)
BETA GLOB ABNORMAL SERPL ELPH-MCNC: 0.41 G/DL (ref 0.4–0.8)
BETA1 GLOB SERPL ELPH-MCNC: 6.4 % (ref 5–13)
BETA2 GLOB SERPL ELPH-MCNC: 4.6 % (ref 2–8)
BETA2+GAMMA GLOB SERPL ELPH-MCNC: 0.29 G/DL (ref 0.2–0.5)
GAMMA GLOB ABNORMAL SERPL ELPH-MCNC: 0.66 G/DL (ref 0.5–1.6)
GAMMA GLOB SERPL ELPH-MCNC: 10.3 % (ref 12–22)
IGG/ALB SER: 1.7 {RATIO} (ref 1.1–1.8)
PROT PATTERN SERPL ELPH-IMP: ABNORMAL
PROT SERPL-MCNC: 6.4 G/DL (ref 6.4–8.2)

## 2020-02-10 DIAGNOSIS — E21.3 HYPERPARATHYROIDISM (HCC): Primary | ICD-10-CM

## 2020-02-10 DIAGNOSIS — E55.9 VITAMIN D DEFICIENCY: ICD-10-CM

## 2020-02-10 RX ORDER — ACETAMINOPHEN 160 MG
TABLET,DISINTEGRATING ORAL
Refills: 0 | Status: CANCELLED
Start: 2020-02-10

## 2020-02-10 NOTE — TELEPHONE ENCOUNTER
----- Message from Marian Jarrett MD sent at 2/10/2020  1:05 PM EST -----  Please call the patient regarding her blood work results, her serum protein analysis is normal, kidney function is normal, thyroid function test is normal, her parathyroid hormone is slightly elevated, she should increase her vitamin-D supplementation to 4000 International Units daily, she should follow-up as scheduled

## 2020-02-10 NOTE — TELEPHONE ENCOUNTER
Spoke to patient but she has questions   She would like you to call her re: increasing vitamin d  Med list updated

## 2020-02-10 NOTE — TELEPHONE ENCOUNTER
Spoke with patient, her dose was recently increased to [employer] Units daily from 1000 International Units daily for vitamin-D    Discussed to continue 2000 International Units daily  Repeat PTH, basic metabolic profile and vitamin-D in 3 months    Vanna Mckeon MD

## 2020-02-13 ENCOUNTER — TELEPHONE (OUTPATIENT)
Dept: FAMILY MEDICINE CLINIC | Facility: MEDICAL CENTER | Age: 75
End: 2020-02-13

## 2020-02-13 NOTE — TELEPHONE ENCOUNTER
----- Message from Adelaide Go MD sent at 2/13/2020  9:48 AM EST -----  Notify blood work is good  A1c is stable at 6 0 pre diabetes  Cholesterol is good at 179

## 2020-02-26 ENCOUNTER — HOSPITAL ENCOUNTER (OUTPATIENT)
Dept: PULMONOLOGY | Facility: HOSPITAL | Age: 75
Discharge: HOME | End: 2020-02-26
Attending: INTERNAL MEDICINE
Payer: MEDICARE

## 2020-02-26 DIAGNOSIS — J44.9 COPD, SEVERE (HCC): ICD-10-CM

## 2020-02-26 DIAGNOSIS — J43.8 OTHER EMPHYSEMA (HCC): ICD-10-CM

## 2020-02-26 LAB
ARTERIAL PATENCY WRIST A: ABNORMAL
BASE EXCESS BLDA CALC-SCNC: 2 MMOL/L (ref -2–3)
CA-I BLD-SCNC: 1.2 MMOL/L (ref 1.12–1.32)
FIO2 GAS DIL.REBREATH: 0.21 L
GLUCOSE SERPL-MCNC: 114 MG/DL (ref 65–140)
HCO3 BLDA-SCNC: 27.7 MMOL/L (ref 22–28)
HCT VFR BLD CALC: 44 % (ref 34.8–46.1)
HGB BLDA-MCNC: 15 G/DL (ref 11.5–15.4)
PCO2 BLD: 29 MMOL/L (ref 21–32)
PCO2 BLD: 43.7 MM HG (ref 36–44)
PH BLD: 7.41 [PH] (ref 7.35–7.45)
PO2 BLD: 71 MM HG (ref 75–129)
POTASSIUM BLD-SCNC: 4.4 MMOL/L (ref 3.5–5.3)
SAMPLE SITE: ABNORMAL
SAO2 % BLD FROM PO2: 94 % (ref 60–85)
SODIUM BLD-SCNC: 138 MMOL/L (ref 136–145)
SPECIMEN SOURCE: ABNORMAL

## 2020-02-26 PROCEDURE — 94726 PLETHYSMOGRAPHY LUNG VOLUMES: CPT | Performed by: INTERNAL MEDICINE

## 2020-02-26 PROCEDURE — 36600 WITHDRAWAL OF ARTERIAL BLOOD: CPT

## 2020-02-26 PROCEDURE — 85014 HEMATOCRIT: CPT

## 2020-02-26 PROCEDURE — 94060 EVALUATION OF WHEEZING: CPT | Performed by: INTERNAL MEDICINE

## 2020-02-26 PROCEDURE — 84132 ASSAY OF SERUM POTASSIUM: CPT

## 2020-02-26 PROCEDURE — 82947 ASSAY GLUCOSE BLOOD QUANT: CPT

## 2020-02-26 PROCEDURE — 82803 BLOOD GASES ANY COMBINATION: CPT

## 2020-02-26 PROCEDURE — 94761 N-INVAS EAR/PLS OXIMETRY MLT: CPT

## 2020-02-26 PROCEDURE — 82330 ASSAY OF CALCIUM: CPT

## 2020-02-26 PROCEDURE — 94729 DIFFUSING CAPACITY: CPT | Performed by: INTERNAL MEDICINE

## 2020-02-26 PROCEDURE — 84295 ASSAY OF SERUM SODIUM: CPT

## 2020-02-26 PROCEDURE — 94729 DIFFUSING CAPACITY: CPT

## 2020-02-26 PROCEDURE — 94618 PULMONARY STRESS TESTING: CPT | Performed by: INTERNAL MEDICINE

## 2020-02-26 PROCEDURE — 94060 EVALUATION OF WHEEZING: CPT

## 2020-02-26 PROCEDURE — 94726 PLETHYSMOGRAPHY LUNG VOLUMES: CPT

## 2020-02-26 RX ORDER — ALBUTEROL SULFATE 2.5 MG/3ML
2.5 SOLUTION RESPIRATORY (INHALATION) ONCE
Status: COMPLETED | OUTPATIENT
Start: 2020-02-26 | End: 2020-02-26

## 2020-02-26 RX ADMIN — ALBUTEROL SULFATE 2.5 MG: 2.5 SOLUTION RESPIRATORY (INHALATION) at 09:38

## 2020-03-04 DIAGNOSIS — J43.8 OTHER EMPHYSEMA (HCC): Primary | ICD-10-CM

## 2020-03-04 DIAGNOSIS — J43.2 CENTRILOBULAR EMPHYSEMA (HCC): ICD-10-CM

## 2020-03-10 DIAGNOSIS — J44.9 COPD, VERY SEVERE (HCC): Primary | ICD-10-CM

## 2020-03-11 ENCOUNTER — HOSPITAL ENCOUNTER (OUTPATIENT)
Dept: NUCLEAR MEDICINE | Facility: HOSPITAL | Age: 75
Discharge: HOME | End: 2020-03-11
Attending: INTERNAL MEDICINE
Payer: MEDICARE

## 2020-03-11 DIAGNOSIS — J43.2 CENTRILOBULAR EMPHYSEMA (HCC): ICD-10-CM

## 2020-03-11 PROCEDURE — 78597 LUNG PERFUSION DIFFERENTIAL: CPT

## 2020-03-11 PROCEDURE — A9540 TC99M MAA: HCPCS

## 2020-05-15 DIAGNOSIS — J44.9 COPD, VERY SEVERE (HCC): Primary | ICD-10-CM

## 2020-05-28 ENCOUNTER — HOSPITAL ENCOUNTER (OUTPATIENT)
Dept: PULMONOLOGY | Facility: HOSPITAL | Age: 75
Discharge: HOME | End: 2020-05-28
Attending: INTERNAL MEDICINE
Payer: MEDICARE

## 2020-05-28 DIAGNOSIS — J44.9 COPD, VERY SEVERE (HCC): ICD-10-CM

## 2020-05-28 PROCEDURE — 94729 DIFFUSING CAPACITY: CPT | Performed by: INTERNAL MEDICINE

## 2020-05-28 PROCEDURE — 94010 BREATHING CAPACITY TEST: CPT | Performed by: INTERNAL MEDICINE

## 2020-05-28 PROCEDURE — 94760 N-INVAS EAR/PLS OXIMETRY 1: CPT

## 2020-05-28 PROCEDURE — 94729 DIFFUSING CAPACITY: CPT

## 2020-05-28 PROCEDURE — 94010 BREATHING CAPACITY TEST: CPT

## 2020-06-01 ENCOUNTER — OFFICE VISIT (OUTPATIENT)
Dept: FAMILY MEDICINE CLINIC | Facility: MEDICAL CENTER | Age: 75
End: 2020-06-01
Payer: MEDICARE

## 2020-06-01 VITALS
OXYGEN SATURATION: 88 % | RESPIRATION RATE: 20 BRPM | DIASTOLIC BLOOD PRESSURE: 82 MMHG | HEIGHT: 60 IN | WEIGHT: 143.13 LBS | BODY MASS INDEX: 28.1 KG/M2 | SYSTOLIC BLOOD PRESSURE: 136 MMHG | TEMPERATURE: 97 F | HEART RATE: 90 BPM

## 2020-06-01 DIAGNOSIS — M81.0 OSTEOPOROSIS WITHOUT CURRENT PATHOLOGICAL FRACTURE, UNSPECIFIED OSTEOPOROSIS TYPE: ICD-10-CM

## 2020-06-01 DIAGNOSIS — R73.03 PREDIABETES: ICD-10-CM

## 2020-06-01 DIAGNOSIS — Z00.00 MEDICARE ANNUAL WELLNESS VISIT, SUBSEQUENT: ICD-10-CM

## 2020-06-01 DIAGNOSIS — J43.8 OTHER EMPHYSEMA (HCC): Primary | ICD-10-CM

## 2020-06-01 PROCEDURE — 1036F TOBACCO NON-USER: CPT | Performed by: FAMILY MEDICINE

## 2020-06-01 PROCEDURE — 1170F FXNL STATUS ASSESSED: CPT | Performed by: FAMILY MEDICINE

## 2020-06-01 PROCEDURE — 99213 OFFICE O/P EST LOW 20 MIN: CPT | Performed by: FAMILY MEDICINE

## 2020-06-01 PROCEDURE — 1125F AMNT PAIN NOTED PAIN PRSNT: CPT | Performed by: FAMILY MEDICINE

## 2020-06-01 PROCEDURE — 1160F RVW MEDS BY RX/DR IN RCRD: CPT | Performed by: FAMILY MEDICINE

## 2020-06-01 PROCEDURE — 4040F PNEUMOC VAC/ADMIN/RCVD: CPT | Performed by: FAMILY MEDICINE

## 2020-06-01 PROCEDURE — G0438 PPPS, INITIAL VISIT: HCPCS | Performed by: FAMILY MEDICINE

## 2020-06-01 PROCEDURE — 3008F BODY MASS INDEX DOCD: CPT | Performed by: FAMILY MEDICINE

## 2020-06-03 ENCOUNTER — TELEPHONE (OUTPATIENT)
Dept: PULMONOLOGY | Facility: CLINIC | Age: 75
End: 2020-06-03

## 2020-06-04 ENCOUNTER — TELEPHONE (OUTPATIENT)
Dept: PULMONOLOGY | Facility: CLINIC | Age: 75
End: 2020-06-04

## 2020-06-04 ENCOUNTER — OFFICE VISIT (OUTPATIENT)
Dept: PULMONOLOGY | Facility: CLINIC | Age: 75
End: 2020-06-04
Payer: MEDICARE

## 2020-06-04 ENCOUNTER — PREP FOR PROCEDURE (OUTPATIENT)
Dept: PULMONOLOGY | Facility: CLINIC | Age: 75
End: 2020-06-04

## 2020-06-04 VITALS
HEIGHT: 60 IN | WEIGHT: 142.2 LBS | DIASTOLIC BLOOD PRESSURE: 76 MMHG | BODY MASS INDEX: 27.92 KG/M2 | SYSTOLIC BLOOD PRESSURE: 118 MMHG | OXYGEN SATURATION: 96 % | TEMPERATURE: 98.1 F | HEART RATE: 71 BPM

## 2020-06-04 DIAGNOSIS — J43.8 OTHER EMPHYSEMA (HCC): Primary | ICD-10-CM

## 2020-06-04 DIAGNOSIS — J43.2 CENTRILOBULAR EMPHYSEMA (HCC): Primary | ICD-10-CM

## 2020-06-04 DIAGNOSIS — R73.03 DIABETES MELLITUS, LATENT: ICD-10-CM

## 2020-06-04 DIAGNOSIS — J44.9 COPD, VERY SEVERE (HCC): ICD-10-CM

## 2020-06-04 PROBLEM — R91.1 PULMONARY NODULE: Status: ACTIVE | Noted: 2020-06-04

## 2020-06-04 PROBLEM — J30.1 NON-SEASONAL ALLERGIC RHINITIS DUE TO POLLEN: Status: ACTIVE | Noted: 2020-06-04

## 2020-06-04 PROCEDURE — 4040F PNEUMOC VAC/ADMIN/RCVD: CPT | Performed by: INTERNAL MEDICINE

## 2020-06-04 PROCEDURE — 1036F TOBACCO NON-USER: CPT | Performed by: INTERNAL MEDICINE

## 2020-06-04 PROCEDURE — 99215 OFFICE O/P EST HI 40 MIN: CPT | Performed by: INTERNAL MEDICINE

## 2020-06-04 PROCEDURE — 3008F BODY MASS INDEX DOCD: CPT | Performed by: INTERNAL MEDICINE

## 2020-06-04 PROCEDURE — 1170F FXNL STATUS ASSESSED: CPT | Performed by: INTERNAL MEDICINE

## 2020-06-04 PROCEDURE — 1160F RVW MEDS BY RX/DR IN RCRD: CPT | Performed by: INTERNAL MEDICINE

## 2020-06-05 ENCOUNTER — CLINICAL SUPPORT (OUTPATIENT)
Dept: URGENT CARE | Facility: MEDICAL CENTER | Age: 75
End: 2020-06-05
Payer: MEDICARE

## 2020-06-05 ENCOUNTER — APPOINTMENT (OUTPATIENT)
Dept: LAB | Facility: MEDICAL CENTER | Age: 75
End: 2020-06-05
Payer: MEDICARE

## 2020-06-05 DIAGNOSIS — J44.9 COPD, VERY SEVERE (HCC): ICD-10-CM

## 2020-06-05 DIAGNOSIS — R73.03 DIABETES MELLITUS, LATENT: ICD-10-CM

## 2020-06-05 LAB
ALBUMIN SERPL BCP-MCNC: 3.8 G/DL (ref 3.5–5)
ALP SERPL-CCNC: 58 U/L (ref 46–116)
ALT SERPL W P-5'-P-CCNC: 46 U/L (ref 12–78)
ANION GAP SERPL CALCULATED.3IONS-SCNC: 2 MMOL/L (ref 4–13)
APTT PPP: 32 SECONDS (ref 23–37)
AST SERPL W P-5'-P-CCNC: 24 U/L (ref 5–45)
ATRIAL RATE: 66 BPM
BASOPHILS # BLD AUTO: 0.07 THOUSANDS/ΜL (ref 0–0.1)
BASOPHILS NFR BLD AUTO: 1 % (ref 0–1)
BILIRUB SERPL-MCNC: 0.66 MG/DL (ref 0.2–1)
BUN SERPL-MCNC: 17 MG/DL (ref 5–25)
CALCIUM SERPL-MCNC: 9.6 MG/DL (ref 8.3–10.1)
CHLORIDE SERPL-SCNC: 105 MMOL/L (ref 100–108)
CO2 SERPL-SCNC: 33 MMOL/L (ref 21–32)
CREAT SERPL-MCNC: 0.62 MG/DL (ref 0.6–1.3)
EOSINOPHIL # BLD AUTO: 0.13 THOUSAND/ΜL (ref 0–0.61)
EOSINOPHIL NFR BLD AUTO: 2 % (ref 0–6)
ERYTHROCYTE [DISTWIDTH] IN BLOOD BY AUTOMATED COUNT: 12.7 % (ref 11.6–15.1)
GFR SERPL CREATININE-BSD FRML MDRD: 89 ML/MIN/1.73SQ M
GLUCOSE P FAST SERPL-MCNC: 113 MG/DL (ref 65–99)
HCT VFR BLD AUTO: 49.2 % (ref 34.8–46.1)
HGB BLD-MCNC: 15.7 G/DL (ref 11.5–15.4)
IMM GRANULOCYTES # BLD AUTO: 0.02 THOUSAND/UL (ref 0–0.2)
IMM GRANULOCYTES NFR BLD AUTO: 0 % (ref 0–2)
INR PPP: 0.97 (ref 0.84–1.19)
LYMPHOCYTES # BLD AUTO: 2.11 THOUSANDS/ΜL (ref 0.6–4.47)
LYMPHOCYTES NFR BLD AUTO: 25 % (ref 14–44)
MCH RBC QN AUTO: 29.8 PG (ref 26.8–34.3)
MCHC RBC AUTO-ENTMCNC: 31.9 G/DL (ref 31.4–37.4)
MCV RBC AUTO: 93 FL (ref 82–98)
MONOCYTES # BLD AUTO: 0.53 THOUSAND/ΜL (ref 0.17–1.22)
MONOCYTES NFR BLD AUTO: 6 % (ref 4–12)
NEUTROPHILS # BLD AUTO: 5.51 THOUSANDS/ΜL (ref 1.85–7.62)
NEUTS SEG NFR BLD AUTO: 66 % (ref 43–75)
NRBC BLD AUTO-RTO: 0 /100 WBCS
P AXIS: 78 DEGREES
PLATELET # BLD AUTO: 155 THOUSANDS/UL (ref 149–390)
PMV BLD AUTO: 11.7 FL (ref 8.9–12.7)
POTASSIUM SERPL-SCNC: 4.7 MMOL/L (ref 3.5–5.3)
PR INTERVAL: 162 MS
PROT SERPL-MCNC: 7.1 G/DL (ref 6.4–8.2)
PROTHROMBIN TIME: 12.5 SECONDS (ref 11.6–14.5)
QRS AXIS: -6 DEGREES
QRSD INTERVAL: 70 MS
QT INTERVAL: 416 MS
QTC INTERVAL: 436 MS
RBC # BLD AUTO: 5.27 MILLION/UL (ref 3.81–5.12)
SODIUM SERPL-SCNC: 140 MMOL/L (ref 136–145)
T WAVE AXIS: 54 DEGREES
VENTRICULAR RATE: 66 BPM
WBC # BLD AUTO: 8.37 THOUSAND/UL (ref 4.31–10.16)

## 2020-06-05 PROCEDURE — 85025 COMPLETE CBC W/AUTO DIFF WBC: CPT

## 2020-06-05 PROCEDURE — 85610 PROTHROMBIN TIME: CPT

## 2020-06-05 PROCEDURE — 85730 THROMBOPLASTIN TIME PARTIAL: CPT

## 2020-06-05 PROCEDURE — 93010 ELECTROCARDIOGRAM REPORT: CPT | Performed by: INTERNAL MEDICINE

## 2020-06-05 PROCEDURE — 80053 COMPREHEN METABOLIC PANEL: CPT

## 2020-06-05 PROCEDURE — 93005 ELECTROCARDIOGRAM TRACING: CPT

## 2020-06-05 PROCEDURE — 36415 COLL VENOUS BLD VENIPUNCTURE: CPT

## 2020-06-11 DIAGNOSIS — Z11.59 SCREENING FOR VIRAL DISEASE: ICD-10-CM

## 2020-06-11 DIAGNOSIS — Z11.59 SCREENING FOR VIRAL DISEASE: Primary | ICD-10-CM

## 2020-06-11 PROCEDURE — U0003 INFECTIOUS AGENT DETECTION BY NUCLEIC ACID (DNA OR RNA); SEVERE ACUTE RESPIRATORY SYNDROME CORONAVIRUS 2 (SARS-COV-2) (CORONAVIRUS DISEASE [COVID-19]), AMPLIFIED PROBE TECHNIQUE, MAKING USE OF HIGH THROUGHPUT TECHNOLOGIES AS DESCRIBED BY CMS-2020-01-R: HCPCS

## 2020-06-12 LAB — SARS-COV-2 RNA SPEC QL NAA+PROBE: NOT DETECTED

## 2020-06-17 DIAGNOSIS — J43.2 CENTRILOBULAR EMPHYSEMA (HCC): Primary | ICD-10-CM

## 2020-06-18 ENCOUNTER — HOSPITAL ENCOUNTER (OUTPATIENT)
Dept: PERIOP | Facility: HOSPITAL | Age: 75
Setting detail: OUTPATIENT SURGERY
Discharge: HOME | End: 2020-06-18
Attending: INTERNAL MEDICINE | Admitting: INTERNAL MEDICINE
Payer: MEDICARE

## 2020-06-18 ENCOUNTER — HOSPITAL ENCOUNTER (OUTPATIENT)
Dept: RADIOLOGY | Facility: HOSPITAL | Age: 75
Discharge: HOME | End: 2020-06-18
Attending: INTERNAL MEDICINE

## 2020-06-18 ENCOUNTER — ANESTHESIA EVENT (OUTPATIENT)
Dept: PERIOP | Facility: HOSPITAL | Age: 75
End: 2020-06-18

## 2020-06-18 ENCOUNTER — ANESTHESIA (OUTPATIENT)
Dept: PERIOP | Facility: HOSPITAL | Age: 75
End: 2020-06-18

## 2020-06-18 VITALS
HEIGHT: 60 IN | HEART RATE: 84 BPM | DIASTOLIC BLOOD PRESSURE: 72 MMHG | RESPIRATION RATE: 24 BRPM | SYSTOLIC BLOOD PRESSURE: 120 MMHG | OXYGEN SATURATION: 87 % | WEIGHT: 142 LBS | TEMPERATURE: 97.6 F | BODY MASS INDEX: 27.88 KG/M2

## 2020-06-18 DIAGNOSIS — Z11.59 SCREENING FOR VIRAL DISEASE: Primary | ICD-10-CM

## 2020-06-18 DIAGNOSIS — J43.2 CENTRILOBULAR EMPHYSEMA (HCC): ICD-10-CM

## 2020-06-18 DIAGNOSIS — J44.9 COPD, VERY SEVERE (HCC): ICD-10-CM

## 2020-06-18 PROCEDURE — C1889 IMPLANT/INSERT DEVICE, NOC: HCPCS

## 2020-06-18 PROCEDURE — 71045 X-RAY EXAM CHEST 1 VIEW: CPT

## 2020-06-18 PROCEDURE — 31634 BRONCH W/BALLOON OCCLUSION: CPT | Performed by: INTERNAL MEDICINE

## 2020-06-18 RX ORDER — LIDOCAINE HYDROCHLORIDE 10 MG/ML
INJECTION, SOLUTION EPIDURAL; INFILTRATION; INTRACAUDAL; PERINEURAL AS NEEDED
Status: DISCONTINUED | OUTPATIENT
Start: 2020-06-18 | End: 2020-06-18 | Stop reason: SURG

## 2020-06-18 RX ORDER — GLYCOPYRROLATE 0.2 MG/ML
INJECTION INTRAMUSCULAR; INTRAVENOUS AS NEEDED
Status: DISCONTINUED | OUTPATIENT
Start: 2020-06-18 | End: 2020-06-18 | Stop reason: SURG

## 2020-06-18 RX ORDER — ROCURONIUM BROMIDE 10 MG/ML
INJECTION, SOLUTION INTRAVENOUS AS NEEDED
Status: DISCONTINUED | OUTPATIENT
Start: 2020-06-18 | End: 2020-06-18 | Stop reason: SURG

## 2020-06-18 RX ORDER — ONDANSETRON 2 MG/ML
4 INJECTION INTRAMUSCULAR; INTRAVENOUS ONCE AS NEEDED
Status: DISCONTINUED | OUTPATIENT
Start: 2020-06-18 | End: 2020-06-18 | Stop reason: HOSPADM

## 2020-06-18 RX ORDER — ONDANSETRON 2 MG/ML
INJECTION INTRAMUSCULAR; INTRAVENOUS AS NEEDED
Status: DISCONTINUED | OUTPATIENT
Start: 2020-06-18 | End: 2020-06-18 | Stop reason: SURG

## 2020-06-18 RX ORDER — LIDOCAINE HYDROCHLORIDE 20 MG/ML
INJECTION, SOLUTION EPIDURAL; INFILTRATION; INTRACAUDAL; PERINEURAL CODE/TRAUMA/SEDATION MEDICATION
Status: COMPLETED | OUTPATIENT
Start: 2020-06-18 | End: 2020-06-18

## 2020-06-18 RX ORDER — FENTANYL CITRATE 50 UG/ML
INJECTION, SOLUTION INTRAMUSCULAR; INTRAVENOUS AS NEEDED
Status: DISCONTINUED | OUTPATIENT
Start: 2020-06-18 | End: 2020-06-18 | Stop reason: SURG

## 2020-06-18 RX ORDER — SODIUM CHLORIDE, SODIUM LACTATE, POTASSIUM CHLORIDE, CALCIUM CHLORIDE 600; 310; 30; 20 MG/100ML; MG/100ML; MG/100ML; MG/100ML
INJECTION, SOLUTION INTRAVENOUS CONTINUOUS PRN
Status: DISCONTINUED | OUTPATIENT
Start: 2020-06-18 | End: 2020-06-18 | Stop reason: SURG

## 2020-06-18 RX ORDER — SUCCINYLCHOLINE/SOD CL,ISO/PF 100 MG/5ML
SYRINGE (ML) INTRAVENOUS AS NEEDED
Status: DISCONTINUED | OUTPATIENT
Start: 2020-06-18 | End: 2020-06-18 | Stop reason: SURG

## 2020-06-18 RX ORDER — SODIUM CHLORIDE, SODIUM LACTATE, POTASSIUM CHLORIDE, CALCIUM CHLORIDE 600; 310; 30; 20 MG/100ML; MG/100ML; MG/100ML; MG/100ML
75 INJECTION, SOLUTION INTRAVENOUS CONTINUOUS
Status: DISCONTINUED | OUTPATIENT
Start: 2020-06-18 | End: 2020-06-22 | Stop reason: HOSPADM

## 2020-06-18 RX ORDER — SODIUM CHLORIDE, SODIUM LACTATE, POTASSIUM CHLORIDE, CALCIUM CHLORIDE 600; 310; 30; 20 MG/100ML; MG/100ML; MG/100ML; MG/100ML
125 INJECTION, SOLUTION INTRAVENOUS CONTINUOUS
Status: DISCONTINUED | OUTPATIENT
Start: 2020-06-18 | End: 2020-06-22 | Stop reason: HOSPADM

## 2020-06-18 RX ORDER — PROPOFOL 10 MG/ML
INJECTION, EMULSION INTRAVENOUS AS NEEDED
Status: DISCONTINUED | OUTPATIENT
Start: 2020-06-18 | End: 2020-06-18 | Stop reason: SURG

## 2020-06-18 RX ORDER — IPRATROPIUM BROMIDE AND ALBUTEROL SULFATE 2.5; .5 MG/3ML; MG/3ML
3 SOLUTION RESPIRATORY (INHALATION) ONCE
Status: COMPLETED | OUTPATIENT
Start: 2020-06-18 | End: 2020-06-18

## 2020-06-18 RX ORDER — DEXAMETHASONE SODIUM PHOSPHATE 10 MG/ML
INJECTION, SOLUTION INTRAMUSCULAR; INTRAVENOUS AS NEEDED
Status: DISCONTINUED | OUTPATIENT
Start: 2020-06-18 | End: 2020-06-18 | Stop reason: SURG

## 2020-06-18 RX ORDER — IPRATROPIUM BROMIDE AND ALBUTEROL SULFATE 2.5; .5 MG/3ML; MG/3ML
SOLUTION RESPIRATORY (INHALATION)
Status: DISCONTINUED
Start: 2020-06-18 | End: 2020-06-18 | Stop reason: WASHOUT

## 2020-06-18 RX ORDER — PROPOFOL 10 MG/ML
INJECTION, EMULSION INTRAVENOUS CONTINUOUS PRN
Status: DISCONTINUED | OUTPATIENT
Start: 2020-06-18 | End: 2020-06-18 | Stop reason: SURG

## 2020-06-18 RX ORDER — FENTANYL CITRATE/PF 50 MCG/ML
25 SYRINGE (ML) INJECTION
Status: DISCONTINUED | OUTPATIENT
Start: 2020-06-18 | End: 2020-06-18 | Stop reason: HOSPADM

## 2020-06-18 RX ADMIN — FENTANYL CITRATE 50 MCG: 50 INJECTION, SOLUTION INTRAMUSCULAR; INTRAVENOUS at 09:55

## 2020-06-18 RX ADMIN — SUGAMMADEX 200 MG: 100 INJECTION, SOLUTION INTRAVENOUS at 10:28

## 2020-06-18 RX ADMIN — IPRATROPIUM BROMIDE AND ALBUTEROL SULFATE 3 ML: 2.5; .5 SOLUTION RESPIRATORY (INHALATION) at 08:57

## 2020-06-18 RX ADMIN — FENTANYL CITRATE 50 MCG: 50 INJECTION, SOLUTION INTRAMUSCULAR; INTRAVENOUS at 10:31

## 2020-06-18 RX ADMIN — SODIUM CHLORIDE 5 MCG/MIN: 0.9 INJECTION, SOLUTION INTRAVENOUS at 10:05

## 2020-06-18 RX ADMIN — DEXAMETHASONE SODIUM PHOSPHATE 4 MG: 10 INJECTION, SOLUTION INTRAMUSCULAR; INTRAVENOUS at 10:05

## 2020-06-18 RX ADMIN — SODIUM CHLORIDE, SODIUM LACTATE, POTASSIUM CHLORIDE, AND CALCIUM CHLORIDE: .6; .31; .03; .02 INJECTION, SOLUTION INTRAVENOUS at 09:49

## 2020-06-18 RX ADMIN — LIDOCAINE HYDROCHLORIDE 8 ML: 20 INJECTION, SOLUTION EPIDURAL; INFILTRATION; INTRACAUDAL; PERINEURAL at 10:05

## 2020-06-18 RX ADMIN — ROCURONIUM BROMIDE 10 MG: 10 INJECTION, SOLUTION INTRAVENOUS at 10:17

## 2020-06-18 RX ADMIN — ROCURONIUM BROMIDE 25 MG: 10 INJECTION, SOLUTION INTRAVENOUS at 09:57

## 2020-06-18 RX ADMIN — PHENYLEPHRINE HYDROCHLORIDE 100 MCG: 10 INJECTION INTRAVENOUS at 10:08

## 2020-06-18 RX ADMIN — ONDANSETRON 4 MG: 2 INJECTION INTRAMUSCULAR; INTRAVENOUS at 10:14

## 2020-06-18 RX ADMIN — ROCURONIUM BROMIDE 5 MG: 10 INJECTION, SOLUTION INTRAVENOUS at 09:55

## 2020-06-18 RX ADMIN — Medication 80 MG: at 09:55

## 2020-06-18 RX ADMIN — SODIUM CHLORIDE, SODIUM LACTATE, POTASSIUM CHLORIDE, AND CALCIUM CHLORIDE: .6; .31; .03; .02 INJECTION, SOLUTION INTRAVENOUS at 08:25

## 2020-06-18 RX ADMIN — SODIUM CHLORIDE, SODIUM LACTATE, POTASSIUM CHLORIDE, AND CALCIUM CHLORIDE 125 ML/HR: .6; .31; .03; .02 INJECTION, SOLUTION INTRAVENOUS at 08:10

## 2020-06-18 RX ADMIN — LIDOCAINE HYDROCHLORIDE 50 MG: 10 INJECTION, SOLUTION EPIDURAL; INFILTRATION; INTRACAUDAL; PERINEURAL at 09:55

## 2020-06-18 RX ADMIN — PROPOFOL 150 MG: 10 INJECTION, EMULSION INTRAVENOUS at 09:55

## 2020-06-18 RX ADMIN — GLYCOPYRROLATE 0.2 MG: 0.2 INJECTION, SOLUTION INTRAMUSCULAR; INTRAVENOUS at 09:49

## 2020-06-18 RX ADMIN — PROPOFOL 120 MCG/KG/MIN: 10 INJECTION, EMULSION INTRAVENOUS at 09:56

## 2020-07-01 ENCOUNTER — OFFICE VISIT (OUTPATIENT)
Dept: PULMONOLOGY | Facility: CLINIC | Age: 75
End: 2020-07-01
Payer: MEDICARE

## 2020-07-01 ENCOUNTER — TELEPHONE (OUTPATIENT)
Dept: PULMONOLOGY | Facility: CLINIC | Age: 75
End: 2020-07-01

## 2020-07-01 VITALS
HEART RATE: 82 BPM | SYSTOLIC BLOOD PRESSURE: 112 MMHG | HEIGHT: 60 IN | OXYGEN SATURATION: 93 % | DIASTOLIC BLOOD PRESSURE: 70 MMHG | WEIGHT: 140 LBS | TEMPERATURE: 96.7 F | BODY MASS INDEX: 27.48 KG/M2

## 2020-07-01 DIAGNOSIS — J44.9 COPD, SEVERE (HCC): ICD-10-CM

## 2020-07-01 DIAGNOSIS — J44.9 COPD, VERY SEVERE (HCC): Primary | ICD-10-CM

## 2020-07-01 DIAGNOSIS — R09.02 HYPOXIA: ICD-10-CM

## 2020-07-01 DIAGNOSIS — R91.1 PULMONARY NODULE: ICD-10-CM

## 2020-07-01 PROCEDURE — 3008F BODY MASS INDEX DOCD: CPT | Performed by: INTERNAL MEDICINE

## 2020-07-01 PROCEDURE — 1160F RVW MEDS BY RX/DR IN RCRD: CPT | Performed by: INTERNAL MEDICINE

## 2020-07-01 PROCEDURE — 99215 OFFICE O/P EST HI 40 MIN: CPT | Performed by: INTERNAL MEDICINE

## 2020-07-01 PROCEDURE — 1036F TOBACCO NON-USER: CPT | Performed by: INTERNAL MEDICINE

## 2020-07-01 PROCEDURE — 4040F PNEUMOC VAC/ADMIN/RCVD: CPT | Performed by: INTERNAL MEDICINE

## 2020-07-01 RX ORDER — ALBUTEROL SULFATE 90 UG/1
2 AEROSOL, METERED RESPIRATORY (INHALATION) EVERY 4 HOURS PRN
Qty: 3 INHALER | Refills: 3 | Status: SHIPPED | OUTPATIENT
Start: 2020-07-01 | End: 2020-10-13 | Stop reason: SDUPTHER

## 2020-07-01 NOTE — PROGRESS NOTES
Pulmonary Follow Up Note   Yaritza John 76 y o  female MRN: [id number]  7/1/2020      Assessment/Plan:     COPD, very severe [facility])   Patient underwent bronchoscopy with Chartis  Assessment on 6/18/20  She was found to be collateral ventilation positive, therefore no valves were placed  Her age precludes her from transplant evaluation  We will instead try to focus on medical management  I have given her sample of Trelegy Ellipta to use in place of Combivent and Advair  Hopefully this will improve her symptoms and reduce her rescue inhaler needs  I also suggested that she re-enroll in pulmonary rehabilitation, but she would like to wait until the [place] pandemic settles down  Pulmonary nodule    Patient will need repeat chest CT in October for follow-up of ground-glass pulmonary nodule  This was initially identified in October 2018  Hypoxia   Patient has demonstrated exertional hypoxemia and requires 2 liters/minute with ambulation  I have faxed over the prescription for her to obtain an Inogen  Unit which she will be purchasing out of pocket  Visit orders:    Diagnoses and all orders for this visit:    COPD, very severe ([address] )  -     fluticasone-umeclidinium-vilanterol (TRELEGY) 100-62 5-25 MCG/INH inhaler; Inhale 1 puff daily Rinse mouth after use  -     fluticasone-umeclidinium-vilanterol (TRELEGY) 100-62 5-25 MCG/INH inhaler; Inhale 1 puff daily Rinse mouth after use  COPD, severe ([address] )  -     albuterol (Ventolin HFA) 90 mcg/act inhaler; Inhale 2 puffs every 4 (four) hours as needed for wheezing    Pulmonary nodule    Hypoxia  -     Home Oxygen Portability Evaluation Only        Return in about 3 months (around 10/1/2020)  History of Present Illness   HPI:  Yaritza John is a 76 y o  female who  Is here today for follow-up regarding severe COPD  She underwent bronchoscopy last week with the intention of having bronchoscopic lung volume reduction    Unfortunately, she was found to be collateral ventilation positive, therefore no valves were placed  She comes in today for further evaluation and to discuss next steps  Patient continues to have shortness of breath with exertion  She denies dyspnea at rest   She has no significant cough, wheeze or sputum production  She has been on Advair twice daily and Combivent as needed for several years  She feels that the Advair causes some shakiness in her hands, more over the past few months  She has tried other inhalers in the past, but is not sure which ones  She is using her rescue inhaler 1-2 times per day for symptoms of shortness of breath  She does not have supplemental oxygen, but is planning to purchase an Inogen unit  Recent 6 minutes walk test did show that she requires 2 liters/minute with ambulation  Review of Systems   Constitutional: Negative for chills, fever and unexpected weight change  HENT: Negative for postnasal drip and sore throat  Eyes: Negative for visual disturbance  Respiratory:        As noted in HPI   Cardiovascular: Negative for chest pain  Gastrointestinal: Negative for abdominal pain, diarrhea and vomiting  Genitourinary: Negative for difficulty urinating  Skin: Negative for rash  Neurological: Negative for headaches  Hematological: Negative for adenopathy  Psychiatric/Behavioral: Negative  All other systems reviewed and are negative          Historical Information   Past Medical History:   Diagnosis Date    COPD (chronic obstructive pulmonary disease) ([facility] [address] [address] )     History of screening mammography     last assessed: 10/31/2017    Osteoporosis      Past Surgical History:   Procedure Laterality Date    APPENDECTOMY      onset: 1966    CATARACT EXTRACTION      last assessed: 10/31/2017    TONSILLECTOMY      onset: 1954     Family History   Problem Relation Age of Onset    Arthritis Mother     Breast cancer Mother [de-identified]    Heart disease Mother     Hypertension Mother     Osteoporosis Mother  Diabetes Father     Heart disease Father     Hypertension Father     Ovarian cancer Sister 52    No Known Problems Maternal Grandmother     No Known Problems Maternal Grandfather     No Known Problems Paternal Grandmother     No Known Problems Paternal Grandfather     No Known Problems Maternal Aunt     No Known Problems Maternal Aunt     No Known Problems Paternal Aunt     No Known Problems Paternal Aunt     No Known Problems Paternal Aunt        Social History     Tobacco Use   Smoking Status Former Smoker    Packs/day: 1 00    Years: 40 00    Pack years: 40 00    Types: Cigarettes    Last attempt to quit: 2005    Years since quitting: 15 5   Smokeless Tobacco Never Used         Meds/Allergies     Current Outpatient Medications:     albuterol (Ventolin HFA) 90 mcg/act inhaler, Inhale 2 puffs every 4 (four) hours as needed for wheezing, Disp: 3 Inhaler, Rfl: 3    calcium carbonate (OS-CRISTIANO) 600 MG tablet, Take one with dinner, Disp: , Rfl:     Cholecalciferol (VITAMIN D3) 50 MCG (2000 UT) capsule, She takes 1 capsule daily, Disp: , Rfl: 0    budesonide (PULMICORT) 1 MG/2ML nebulizer solution, Take 1 mg by nebulization 2 (two) times a day, Disp: , Rfl:     fluticasone-umeclidinium-vilanterol (TRELEGY) 100-62 5-25 MCG/INH inhaler, Inhale 1 puff daily Rinse mouth after use , Disp: 3 Inhaler, Rfl: 3    fluticasone-umeclidinium-vilanterol (TRELEGY) 100-62 5-25 MCG/INH inhaler, Inhale 1 puff daily Rinse mouth after use , Disp: 1 Inhaler, Rfl: 12  Allergies   Allergen Reactions    Clarinex [Desloratadine] Wheezing    Bee Venom     Clarithromycin      Reaction Date: 29Apr2011;     Food Color Red Itching    Levofloxacin     Loratadine      Reaction Date: 29Apr2011;        Vitals: Blood pressure 112/70, pulse 82, temperature (!) 96 7 °F (35 9 °C), temperature source Temporal, height 4' 11 5" (1 511 m), weight 63 5 kg (140 lb), SpO2 93 %  Body mass index is 27 8 kg/m²   Oxygen Therapy  SpO2: 93 %  Oxygen Therapy: None (Room air)    Physical Exam   Constitutional: She is oriented to person, place, and time  No distress  HENT:   Head: Normocephalic  Mouth/Throat: No oropharyngeal exudate  Eyes: Pupils are equal, round, and reactive to light  No scleral icterus  Neck: Neck supple  No JVD present  Cardiovascular: Normal rate and regular rhythm  Pulmonary/Chest: No respiratory distress  She has no wheezes  She has no rales  Abdominal: Soft  There is no tenderness  Musculoskeletal: She exhibits no edema  Lymphadenopathy:     She has no cervical adenopathy  Neurological: She is alert and oriented to person, place, and time  Skin: Skin is warm and dry  Psychiatric: She has a normal mood and affect  Labs: I have personally reviewed pertinent lab results  Lab Results   Component Value Date    WBC 8 37 06/05/2020    HGB 15 7 (H) 06/05/2020    HCT 49 2 (H) 06/05/2020    MCV 93 06/05/2020     06/05/2020     Lab Results   Component Value Date    GLUCOSE 114 02/26/2020    CALCIUM 9 6 06/05/2020     06/25/2015    K 4 7 06/05/2020    CO2 33 (H) 06/05/2020     06/05/2020    BUN 17 06/05/2020    CREATININE 0 62 06/05/2020     No results found for: IGE  Lab Results   Component Value Date    ALT 46 06/05/2020    AST 24 06/05/2020    ALKPHOS 58 06/05/2020    BILITOT 0 51 06/25/2015       Imaging and other studies: I have personally reviewed pertinent reports  and I have personally reviewed pertinent films in PACS   Chest x-ray on 6/18/20 shows clear lungs       Pulmonary function testing:  Performed  2/26/20   FEV1/FVC ratio 36%   FEV1 21% predicted  FVC 50% predicted  No response to bronchodilators   post bronchodilator FEV1 is 430 mL, 21% predicted   % predicted   % predicted  DLCO corrected for hemoglobin 9 % predicted      Pulmonary function testing:  Performed  5/28/20   FEV1/FVC ratio 38%   FEV1  23% predicted  FVC 51% predicted    DLCO corrected for hemoglobin 23 % predicted       6 minutes walk distance 263 m     Other Studies: I have personally reviewed pertinent reports      echocardiogram from 1/10/20 shows grade 1 diastolic dysfunction, ejection fraction 60%, no evidence of pulmonary hypertension

## 2020-07-01 NOTE — ASSESSMENT & PLAN NOTE
Patient has demonstrated exertional hypoxemia and requires 2 liters/minute with ambulation  I have faxed over the prescription for her to obtain an Inogen  Unit which she will be purchasing out of pocket

## 2020-07-01 NOTE — ASSESSMENT & PLAN NOTE
Patient underwent bronchoscopy with Chartis  Assessment on 6/18/20  She was found to be collateral ventilation positive, therefore no valves were placed  Her age precludes her from transplant evaluation  We will instead try to focus on medical management  I have given her sample of Trelegy Ellipta to use in place of Combivent and Advair  Hopefully this will improve her symptoms and reduce her rescue inhaler needs  I also suggested that she re-enroll in pulmonary rehabilitation, but she would like to wait until the [place] pandemic settles down

## 2020-07-01 NOTE — ASSESSMENT & PLAN NOTE
Patient will need repeat chest CT in October for follow-up of ground-glass pulmonary nodule  This was initially identified in October 2018

## 2020-07-01 NOTE — TELEPHONE ENCOUNTER
1  Do you presently have a fever or flu-like symptoms? NO   2  Do you have symptoms of an upper respiratory infection like runny nose, sore throat, or cough? NO  3  Are you suffering from new headache that you have not had in the past? NO  4  Do you have/have you experienced any new shortness of breath recently? NO  5  Do you have any new diarrhea, nausea or vomiting? NO  6  Have you been in contact with anyone who has been sick or diagnosed with COVID-19?   NO

## 2020-10-01 ENCOUNTER — HOSPITAL ENCOUNTER (OUTPATIENT)
Dept: RADIOLOGY | Facility: MEDICAL CENTER | Age: 75
Discharge: HOME | End: 2020-10-01
Payer: MEDICARE

## 2020-10-01 DIAGNOSIS — J43.2 CENTRILOBULAR EMPHYSEMA (HCC): ICD-10-CM

## 2020-10-01 PROCEDURE — 71250 CT THORAX DX C-: CPT

## 2020-10-13 ENCOUNTER — OFFICE VISIT (OUTPATIENT)
Dept: PULMONOLOGY | Facility: CLINIC | Age: 75
End: 2020-10-13
Payer: MEDICARE

## 2020-10-13 VITALS
WEIGHT: 139 LBS | RESPIRATION RATE: 18 BRPM | OXYGEN SATURATION: 94 % | HEART RATE: 69 BPM | TEMPERATURE: 97.3 F | SYSTOLIC BLOOD PRESSURE: 128 MMHG | HEIGHT: 59 IN | BODY MASS INDEX: 28.02 KG/M2 | DIASTOLIC BLOOD PRESSURE: 74 MMHG

## 2020-10-13 DIAGNOSIS — J44.9 COPD, SEVERE (HCC): ICD-10-CM

## 2020-10-13 DIAGNOSIS — R91.1 PULMONARY NODULE: ICD-10-CM

## 2020-10-13 DIAGNOSIS — Z23 NEED FOR INFLUENZA VACCINATION: ICD-10-CM

## 2020-10-13 DIAGNOSIS — J44.9 COPD, VERY SEVERE (HCC): Primary | ICD-10-CM

## 2020-10-13 PROCEDURE — 90662 IIV NO PRSV INCREASED AG IM: CPT

## 2020-10-13 PROCEDURE — G0008 ADMIN INFLUENZA VIRUS VAC: HCPCS

## 2020-10-13 PROCEDURE — 99214 OFFICE O/P EST MOD 30 MIN: CPT | Performed by: INTERNAL MEDICINE

## 2020-10-13 RX ORDER — ALBUTEROL SULFATE 90 UG/1
2 AEROSOL, METERED RESPIRATORY (INHALATION) EVERY 4 HOURS PRN
Qty: 3 INHALER | Refills: 3 | Status: SHIPPED | OUTPATIENT
Start: 2020-10-13 | End: 2021-10-25 | Stop reason: SDUPTHER

## 2020-12-08 ENCOUNTER — OFFICE VISIT (OUTPATIENT)
Dept: FAMILY MEDICINE CLINIC | Facility: MEDICAL CENTER | Age: 75
End: 2020-12-08
Payer: MEDICARE

## 2020-12-08 VITALS
HEIGHT: 59 IN | DIASTOLIC BLOOD PRESSURE: 88 MMHG | SYSTOLIC BLOOD PRESSURE: 140 MMHG | TEMPERATURE: 98.4 F | WEIGHT: 141 LBS | BODY MASS INDEX: 28.43 KG/M2 | HEART RATE: 71 BPM

## 2020-12-08 DIAGNOSIS — J44.9 COPD, VERY SEVERE (HCC): ICD-10-CM

## 2020-12-08 DIAGNOSIS — R73.03 PREDIABETES: ICD-10-CM

## 2020-12-08 DIAGNOSIS — L98.9 FACIAL LESION: Primary | ICD-10-CM

## 2020-12-08 PROCEDURE — 99214 OFFICE O/P EST MOD 30 MIN: CPT | Performed by: FAMILY MEDICINE

## 2020-12-09 ENCOUNTER — TELEPHONE (OUTPATIENT)
Dept: FAMILY MEDICINE CLINIC | Facility: MEDICAL CENTER | Age: 75
End: 2020-12-09

## 2020-12-09 DIAGNOSIS — R73.03 PREDIABETES: Primary | ICD-10-CM

## 2021-01-20 DIAGNOSIS — Z23 ENCOUNTER FOR IMMUNIZATION: ICD-10-CM

## 2021-01-27 ENCOUNTER — IMMUNIZATIONS (OUTPATIENT)
Dept: FAMILY MEDICINE CLINIC | Facility: HOSPITAL | Age: 76
End: 2021-01-27

## 2021-01-27 DIAGNOSIS — Z23 ENCOUNTER FOR IMMUNIZATION: Primary | ICD-10-CM

## 2021-01-27 PROCEDURE — 0011A SARS-COV-2 / COVID-19 MRNA VACCINE (MODERNA) 100 MCG: CPT

## 2021-01-27 PROCEDURE — 91301 SARS-COV-2 / COVID-19 MRNA VACCINE (MODERNA) 100 MCG: CPT

## 2021-02-05 ENCOUNTER — OFFICE VISIT (OUTPATIENT)
Dept: PULMONOLOGY | Facility: CLINIC | Age: 76
End: 2021-02-05
Payer: MEDICARE

## 2021-02-05 VITALS
DIASTOLIC BLOOD PRESSURE: 80 MMHG | BODY MASS INDEX: 27.88 KG/M2 | RESPIRATION RATE: 18 BRPM | OXYGEN SATURATION: 90 % | TEMPERATURE: 97.5 F | HEART RATE: 77 BPM | HEIGHT: 60 IN | WEIGHT: 142 LBS | SYSTOLIC BLOOD PRESSURE: 132 MMHG

## 2021-02-05 DIAGNOSIS — R91.1 PULMONARY NODULE: Primary | ICD-10-CM

## 2021-02-05 DIAGNOSIS — J44.9 COPD, VERY SEVERE (HCC): ICD-10-CM

## 2021-02-05 PROCEDURE — 99213 OFFICE O/P EST LOW 20 MIN: CPT | Performed by: INTERNAL MEDICINE

## 2021-02-05 NOTE — ASSESSMENT & PLAN NOTE
She will undergo repeat CT in October 2021 to document stability of right upper lobe ground-glass nodule which has been present going back to November 2016

## 2021-02-05 NOTE — PROGRESS NOTES
Pulmonary Follow Up Note   Abhijit Sharp 76 y o  female MRN: [id number]  2/5/2021      Assessment/Plan:     COPD, very severe [facility]  Patient has very severe COPD/asthma  We have explored the option of bronchoscopic lung volume reduction, however she was noted to have collateral ventilation on Chartis assessment  She tried using Trelegy Ellipta but it was not as effective as Advair and Combivent  She will continue on this regimen  She is up-to-date on refills  Pulmonary nodule  She will undergo repeat CT in October 2021 to document stability of right upper lobe ground-glass nodule which has been present going back to November 2016      Visit orders:    Diagnoses and all orders for this visit:    Pulmonary nodule    COPD, very severe ([facility] [address] )      History of Present Illness   HPI:  Abhijit Sharp is a 76 y o  female who is here today for follow-up regarding severe COPD  She is stable from pulmonary perspective  She has been compliant with Advair and Combivent  She has no significant cough, wheeze or sputum production  She has shortness of breath with exertion but denies dyspnea at rest   She received her first COVID vaccine  No recent hospitalizations or ER visits  Review of Systems   Constitutional: Negative for chills, fever and unexpected weight change  HENT: Negative for postnasal drip and sore throat  Eyes: Negative for visual disturbance  Respiratory:        As noted in HPI   Cardiovascular: Negative for chest pain  Gastrointestinal: Negative for abdominal pain, diarrhea and vomiting  Musculoskeletal: Negative for arthralgias  Skin: Negative for rash  Neurological: Negative for headaches  Hematological: Negative for adenopathy  Psychiatric/Behavioral: Negative  All other systems reviewed and are negative          Medical, Family and Social history reviewed and updated as appropriate    Historical Information   Past Medical History:   Diagnosis Date    COPD (chronic obstructive pulmonary disease) ([facility] [address] )     History of screening mammography     last assessed: 10/31/2017    Osteoporosis      Past Surgical History:   Procedure Laterality Date    APPENDECTOMY      onset:     CATARACT EXTRACTION      last assessed: 10/31/2017    TONSILLECTOMY      onset:      Family History   Problem Relation Age of Onset    Arthritis Mother     Breast cancer Mother [address]    Heart disease Mother     Hypertension Mother     Osteoporosis Mother     Diabetes Father     Heart disease Father     Hypertension Father     Ovarian cancer Sister 52    No Known Problems Maternal Grandmother     No Known Problems Maternal Grandfather     No Known Problems Paternal Grandmother     No Known Problems Paternal Grandfather     No Known Problems Maternal Aunt     No Known Problems Maternal Aunt     No Known Problems Paternal Aunt     No Known Problems Paternal Aunt     No Known Problems Paternal Aunt        Social History     Tobacco Use   Smoking Status Former Smoker    Packs/day: 1 00    Years: 40 00    Pack years: 40 00    Types: Cigarettes    Quit date:     Years since quittin 1   Smokeless Tobacco Never Used         Meds/Allergies     Current Outpatient Medications:     albuterol (Ventolin HFA) 90 mcg/act inhaler, Inhale 2 puffs every 4 (four) hours as needed for wheezing, Disp: 3 Inhaler, Rfl: 3    budesonide (PULMICORT) 1 MG/2ML nebulizer solution, Take 1 mg by nebulization 2 (two) times a day, Disp: , Rfl:     calcium carbonate (OS-CRISTIANO) 600 MG tablet, Take one with dinner, Disp: , Rfl:     Cholecalciferol (VITAMIN D3) 50 MCG (2000) capsule, She takes 1 capsule daily, Disp: , Rfl: 0    fluticasone-salmeterol (Advair Diskus) 250-50 mcg/dose inhaler, Inhale 1 puff every 12 (twelve) hours, Disp: 3 Inhaler, Rfl: 3    ipratropium-albuterol (COMBIVENT RESPIMAT) inhaler, Inhale 1 puff 4 (four) times a day, Disp: 4 g, Rfl: 5  Allergies   Allergen Reactions    Clarinex [Desloratadine] Wheezing    Bee Venom     Clarithromycin      Reaction Date: [id number];     Food Color Red Itching    Levofloxacin     Loratadine      Reaction Date: [id number];        Vitals: Blood pressure 132/80, pulse 77, temperature 97 5 °F (36 4 °C), temperature source Tympanic, resp  rate 18, height 5' (1 524 m), weight 64 4 kg (142 lb), SpO2 90 %  Body mass index is 27 73 kg/m²  Oxygen Therapy  SpO2: 90 %    Physical Exam   Physical Exam  Constitutional:       General: She is not in acute distress  HENT:      Head: Normocephalic  Eyes:      General: No scleral icterus  Neck:      Musculoskeletal: Neck supple  Vascular: No JVD  Cardiovascular:      Rate and Rhythm: Normal rate and regular rhythm  Pulmonary:      Breath sounds: No wheezing or rhonchi  Musculoskeletal:         General: No swelling  Skin:     General: Skin is warm and dry  Neurological:      Mental Status: She is alert and oriented to person, place, and time  Psychiatric:         Mood and Affect: Mood normal          Behavior: Behavior normal          Labs: I have personally reviewed pertinent lab results  Lab Results   Component Value Date    WBC 8 37 06/05/2020    HGB 15 7 (H) 06/05/2020    HCT 49 2 (H) 06/05/2020    MCV 93 06/05/2020     06/05/2020     Lab Results   Component Value Date    GLUCOSE 114 02/26/2020    CALCIUM 9 6 06/05/2020     06/25/2015    K 4 7 06/05/2020    CO2 33 (H) 06/05/2020     06/05/2020    BUN 17 06/05/2020    CREATININE 0 62 06/05/2020     No results found for: IGE  Lab Results   Component Value Date    ALT 46 06/05/2020    AST 24 06/05/2020    ALKPHOS 58 06/05/2020    BILITOT 0 51 06/25/2015       Imaging and other studies: I have personally reviewed pertinent reports  and I have personally reviewed pertinent films in PACS chest CT from 10/1/20 shows ground-glass opacity in the right upper lobe, stable going back to 2016   There are additional tiny nodules in the left upper and lower lobes, also stable  Emphysema unchanged  Pulmonary function testing:  Performed 5/28/20 and personally interpreted  FEV1/FVC ratio 38%   FEV1 23% predicted  FVC 51% predicted  DLCO corrected for hemoglobin 23 % predicted    Spirometry shows very severe obstruction, reduced vital capacity, likely on the basis of air trapping and severe diffusion impairment

## 2021-02-05 NOTE — ASSESSMENT & PLAN NOTE
Patient has very severe COPD/asthma  We have explored the option of bronchoscopic lung volume reduction, however she was noted to have collateral ventilation on Chartis assessment  She tried using Trelegy Ellipta but it was not as effective as Advair and Combivent  She will continue on this regimen  She is up-to-date on refills

## 2021-02-23 ENCOUNTER — IMMUNIZATIONS (OUTPATIENT)
Dept: FAMILY MEDICINE CLINIC | Facility: HOSPITAL | Age: 76
End: 2021-02-23

## 2021-02-23 DIAGNOSIS — Z23 ENCOUNTER FOR IMMUNIZATION: Primary | ICD-10-CM

## 2021-02-23 PROCEDURE — 0012A SARS-COV-2 / COVID-19 MRNA VACCINE (MODERNA) 100 MCG: CPT

## 2021-02-23 PROCEDURE — 91301 SARS-COV-2 / COVID-19 MRNA VACCINE (MODERNA) 100 MCG: CPT

## 2021-05-13 ENCOUNTER — APPOINTMENT (OUTPATIENT)
Dept: LAB | Facility: CLINIC | Age: 76
End: 2021-05-13
Payer: MEDICARE

## 2021-05-13 DIAGNOSIS — R73.03 PREDIABETES: ICD-10-CM

## 2021-05-13 LAB
EST. AVERAGE GLUCOSE BLD GHB EST-MCNC: 105 MG/DL
HBA1C MFR BLD: 5.3 %
TSH SERPL DL<=0.05 MIU/L-ACNC: 1.9 UIU/ML (ref 0.36–3.74)

## 2021-05-13 PROCEDURE — 36415 COLL VENOUS BLD VENIPUNCTURE: CPT

## 2021-05-13 PROCEDURE — 83036 HEMOGLOBIN GLYCOSYLATED A1C: CPT

## 2021-05-13 PROCEDURE — 84443 ASSAY THYROID STIM HORMONE: CPT

## 2021-05-20 ENCOUNTER — OFFICE VISIT (OUTPATIENT)
Dept: PULMONOLOGY | Facility: CLINIC | Age: 76
End: 2021-05-20
Payer: MEDICARE

## 2021-05-20 ENCOUNTER — APPOINTMENT (EMERGENCY)
Dept: RADIOLOGY | Facility: HOSPITAL | Age: 76
DRG: 189 | End: 2021-05-20
Payer: MEDICARE

## 2021-05-20 ENCOUNTER — HOSPITAL ENCOUNTER (INPATIENT)
Facility: HOSPITAL | Age: 76
LOS: 6 days | Discharge: HOME | DRG: 189 | End: 2021-05-26
Attending: EMERGENCY MEDICINE | Admitting: INTERNAL MEDICINE
Payer: MEDICARE

## 2021-05-20 VITALS
BODY MASS INDEX: 25.01 KG/M2 | OXYGEN SATURATION: 92 % | WEIGHT: 127.4 LBS | HEART RATE: 105 BPM | DIASTOLIC BLOOD PRESSURE: 74 MMHG | SYSTOLIC BLOOD PRESSURE: 138 MMHG | HEIGHT: 60 IN | TEMPERATURE: 98 F

## 2021-05-20 DIAGNOSIS — J44.1 COPD WITH ACUTE EXACERBATION (HCC): Primary | ICD-10-CM

## 2021-05-20 DIAGNOSIS — J96.01 ACUTE RESPIRATORY FAILURE WITH HYPOXIA (HCC): ICD-10-CM

## 2021-05-20 DIAGNOSIS — J43.2 CENTRILOBULAR EMPHYSEMA (HCC): ICD-10-CM

## 2021-05-20 DIAGNOSIS — R09.02 HYPOXIA: ICD-10-CM

## 2021-05-20 DIAGNOSIS — J44.9 COPD, VERY SEVERE (HCC): Chronic | ICD-10-CM

## 2021-05-20 DIAGNOSIS — J44.9 COPD, VERY SEVERE (HCC): Primary | ICD-10-CM

## 2021-05-20 DIAGNOSIS — J96.21 ACUTE ON CHRONIC RESPIRATORY FAILURE WITH HYPOXIA (HCC): ICD-10-CM

## 2021-05-20 LAB
ALBUMIN SERPL BCP-MCNC: 3.4 G/DL (ref 3.5–5)
ALP SERPL-CCNC: 60 U/L (ref 46–116)
ALT SERPL W P-5'-P-CCNC: 41 U/L (ref 12–78)
ANION GAP SERPL CALCULATED.3IONS-SCNC: 6 MMOL/L (ref 4–13)
AST SERPL W P-5'-P-CCNC: 21 U/L (ref 5–45)
BASOPHILS # BLD AUTO: 0.08 THOUSANDS/ΜL (ref 0–0.1)
BASOPHILS NFR BLD AUTO: 1 % (ref 0–1)
BILIRUB SERPL-MCNC: 0.33 MG/DL (ref 0.2–1)
BUN SERPL-MCNC: 16 MG/DL (ref 5–25)
CALCIUM ALBUM COR SERPL-MCNC: 9.5 MG/DL (ref 8.3–10.1)
CALCIUM SERPL-MCNC: 9 MG/DL (ref 8.3–10.1)
CHLORIDE SERPL-SCNC: 103 MMOL/L (ref 100–108)
CO2 SERPL-SCNC: 34 MMOL/L (ref 21–32)
CREAT SERPL-MCNC: 0.5 MG/DL (ref 0.6–1.3)
EOSINOPHIL # BLD AUTO: 0.18 THOUSAND/ΜL (ref 0–0.61)
EOSINOPHIL NFR BLD AUTO: 2 % (ref 0–6)
ERYTHROCYTE [DISTWIDTH] IN BLOOD BY AUTOMATED COUNT: 12.4 % (ref 11.6–15.1)
GFR SERPL CREATININE-BSD FRML MDRD: 95 ML/MIN/1.73SQ M
GLUCOSE SERPL-MCNC: 103 MG/DL (ref 65–140)
HCT VFR BLD AUTO: 46 % (ref 34.8–46.1)
HGB BLD-MCNC: 14.1 G/DL (ref 11.5–15.4)
IMM GRANULOCYTES # BLD AUTO: 0.05 THOUSAND/UL (ref 0–0.2)
IMM GRANULOCYTES NFR BLD AUTO: 1 % (ref 0–2)
LYMPHOCYTES # BLD AUTO: 1.29 THOUSANDS/ΜL (ref 0.6–4.47)
LYMPHOCYTES NFR BLD AUTO: 12 % (ref 14–44)
MCH RBC QN AUTO: 28.8 PG (ref 26.8–34.3)
MCHC RBC AUTO-ENTMCNC: 30.7 G/DL (ref 31.4–37.4)
MCV RBC AUTO: 94 FL (ref 82–98)
MONOCYTES # BLD AUTO: 0.77 THOUSAND/ΜL (ref 0.17–1.22)
MONOCYTES NFR BLD AUTO: 7 % (ref 4–12)
NEUTROPHILS # BLD AUTO: 8.43 THOUSANDS/ΜL (ref 1.85–7.62)
NEUTS SEG NFR BLD AUTO: 77 % (ref 43–75)
NRBC BLD AUTO-RTO: 0 /100 WBCS
PLATELET # BLD AUTO: 235 THOUSANDS/UL (ref 149–390)
PMV BLD AUTO: 10.3 FL (ref 8.9–12.7)
POTASSIUM SERPL-SCNC: 4.2 MMOL/L (ref 3.5–5.3)
PROT SERPL-MCNC: 7.2 G/DL (ref 6.4–8.2)
RBC # BLD AUTO: 4.89 MILLION/UL (ref 3.81–5.12)
SARS-COV-2 RNA RESP QL NAA+PROBE: NEGATIVE
SODIUM SERPL-SCNC: 143 MMOL/L (ref 136–145)
WBC # BLD AUTO: 10.8 THOUSAND/UL (ref 4.31–10.16)

## 2021-05-20 PROCEDURE — 80053 COMPREHEN METABOLIC PANEL: CPT | Performed by: EMERGENCY MEDICINE

## 2021-05-20 PROCEDURE — 71045 X-RAY EXAM CHEST 1 VIEW: CPT

## 2021-05-20 PROCEDURE — U0005 INFEC AGEN DETEC AMPLI PROBE: HCPCS | Performed by: EMERGENCY MEDICINE

## 2021-05-20 PROCEDURE — 99215 OFFICE O/P EST HI 40 MIN: CPT | Performed by: INTERNAL MEDICINE

## 2021-05-20 PROCEDURE — 99223 1ST HOSP IP/OBS HIGH 75: CPT | Performed by: INTERNAL MEDICINE

## 2021-05-20 PROCEDURE — 85025 COMPLETE CBC W/AUTO DIFF WBC: CPT | Performed by: EMERGENCY MEDICINE

## 2021-05-20 PROCEDURE — U0003 INFECTIOUS AGENT DETECTION BY NUCLEIC ACID (DNA OR RNA); SEVERE ACUTE RESPIRATORY SYNDROME CORONAVIRUS 2 (SARS-COV-2) (CORONAVIRUS DISEASE [COVID-19]), AMPLIFIED PROBE TECHNIQUE, MAKING USE OF HIGH THROUGHPUT TECHNOLOGIES AS DESCRIBED BY CMS-2020-01-R: HCPCS | Performed by: EMERGENCY MEDICINE

## 2021-05-20 PROCEDURE — 99285 EMERGENCY DEPT VISIT HI MDM: CPT

## 2021-05-20 PROCEDURE — 36415 COLL VENOUS BLD VENIPUNCTURE: CPT | Performed by: EMERGENCY MEDICINE

## 2021-05-20 PROCEDURE — 84145 PROCALCITONIN (PCT): CPT | Performed by: PHYSICIAN ASSISTANT

## 2021-05-20 PROCEDURE — 99285 EMERGENCY DEPT VISIT HI MDM: CPT | Performed by: EMERGENCY MEDICINE

## 2021-05-20 PROCEDURE — 96374 THER/PROPH/DIAG INJ IV PUSH: CPT

## 2021-05-20 PROCEDURE — 94640 AIRWAY INHALATION TREATMENT: CPT

## 2021-05-20 RX ORDER — LEVALBUTEROL 1.25 MG/.5ML
1.25 SOLUTION, CONCENTRATE RESPIRATORY (INHALATION)
Status: DISCONTINUED | OUTPATIENT
Start: 2021-05-20 | End: 2021-05-26 | Stop reason: HOSPADM

## 2021-05-20 RX ORDER — ALBUTEROL SULFATE 2.5 MG/3ML
2.5 SOLUTION RESPIRATORY (INHALATION)
Status: DISCONTINUED | OUTPATIENT
Start: 2021-05-20 | End: 2021-05-20

## 2021-05-20 RX ORDER — METHYLPREDNISOLONE SODIUM SUCCINATE 40 MG/ML
40 INJECTION, POWDER, LYOPHILIZED, FOR SOLUTION INTRAMUSCULAR; INTRAVENOUS EVERY 8 HOURS SCHEDULED
Status: DISCONTINUED | OUTPATIENT
Start: 2021-05-20 | End: 2021-05-22

## 2021-05-20 RX ORDER — METHYLPREDNISOLONE SODIUM SUCCINATE 125 MG/2ML
80 INJECTION, POWDER, LYOPHILIZED, FOR SOLUTION INTRAMUSCULAR; INTRAVENOUS ONCE
Status: COMPLETED | OUTPATIENT
Start: 2021-05-20 | End: 2021-05-20

## 2021-05-20 RX ORDER — FLUTICASONE FUROATE AND VILANTEROL 200; 25 UG/1; UG/1
1 POWDER RESPIRATORY (INHALATION)
Status: DISCONTINUED | OUTPATIENT
Start: 2021-05-21 | End: 2021-05-22

## 2021-05-20 RX ORDER — ACETAMINOPHEN 325 MG/1
650 TABLET ORAL EVERY 6 HOURS PRN
Status: DISCONTINUED | OUTPATIENT
Start: 2021-05-20 | End: 2021-05-26 | Stop reason: HOSPADM

## 2021-05-20 RX ORDER — MELATONIN
2000 DAILY
Status: DISCONTINUED | OUTPATIENT
Start: 2021-05-20 | End: 2021-05-26 | Stop reason: HOSPADM

## 2021-05-20 RX ORDER — IPRATROPIUM BROMIDE AND ALBUTEROL SULFATE 2.5; .5 MG/3ML; MG/3ML
3 SOLUTION RESPIRATORY (INHALATION) ONCE
Status: COMPLETED | OUTPATIENT
Start: 2021-05-20 | End: 2021-05-20

## 2021-05-20 RX ADMIN — METHYLPREDNISOLONE SODIUM SUCCINATE 40 MG: 40 INJECTION, POWDER, FOR SOLUTION INTRAMUSCULAR; INTRAVENOUS at 21:20

## 2021-05-20 RX ADMIN — Medication 2000 UNITS: at 20:12

## 2021-05-20 RX ADMIN — IPRATROPIUM BROMIDE AND ALBUTEROL SULFATE 3 ML: .5; 3 SOLUTION RESPIRATORY (INHALATION) at 12:32

## 2021-05-20 RX ADMIN — IPRATROPIUM BROMIDE 0.5 MG: 0.5 SOLUTION RESPIRATORY (INHALATION) at 20:31

## 2021-05-20 RX ADMIN — METHYLPREDNISOLONE SODIUM SUCCINATE 80 MG: 125 INJECTION, POWDER, FOR SOLUTION INTRAMUSCULAR; INTRAVENOUS at 12:32

## 2021-05-20 RX ADMIN — LEVALBUTEROL HYDROCHLORIDE 1.25 MG: 1.25 SOLUTION, CONCENTRATE RESPIRATORY (INHALATION) at 20:31

## 2021-05-20 RX ADMIN — ACETAMINOPHEN 650 MG: 325 TABLET, FILM COATED ORAL at 22:20

## 2021-05-20 NOTE — ASSESSMENT & PLAN NOTE
· Patient was satting in the 70s at her pulmonary disease provider's office   Does not wear O2 at home  · Continue supplementary O2  · Plan as above

## 2021-05-20 NOTE — ED PROVIDER NOTES
History  Chief Complaint   Patient presents with    Shortness of Breath     states since april has been experiencing shortness of breath  sent by doctor's office for eval today  denies chest pain     19-year-old female with past medical history significant for COPD presents today from her pulmonologist's office for evaluation of severe shortness of breath and hypoxia  According to the pulmonology note she was 77% on room air  She does not wear oxygen at home  States her breathing has been difficult for the last several weeks, getting progressively worse  She denies fever  Denies chest pain  She has not had a nebulizer or steroids prior to arrival       History provided by:  Patient  Shortness of Breath  Severity:  Moderate  Onset quality:  Gradual  Timing:  Constant  Progression:  Worsening  Chronicity:  Recurrent  Context comment:  See HPI  Relieved by:  None tried  Worsened by:  Nothing  Ineffective treatments:  None tried  Associated symptoms: cough    Associated symptoms: no abdominal pain, no fever, no headaches, no rash and no sore throat    Risk factors: tobacco use (Former smoker)    Risk factors: no hx of cancer and no hx of PE/DVT        Prior to Admission Medications   Prescriptions Last Dose Informant Patient Reported? Taking?    Cholecalciferol (VITAMIN D3) 50 MCG (2000 UT) capsule  Self No No   Sig: She takes 1 capsule daily   albuterol (Ventolin HFA) 90 mcg/act inhaler  Self No No   Sig: Inhale 2 puffs every 4 (four) hours as needed for wheezing   budesonide (PULMICORT) 1 MG/2ML nebulizer solution  Self Yes No   Sig: Take 1 mg by nebulization 2 (two) times a day   calcium carbonate (OS-CRISTIANO) 600 MG tablet  Self No No   Sig: Take one with dinner   fluticasone-salmeterol (Advair Diskus) 250-50 mcg/dose inhaler  Self No No   Sig: Inhale 1 puff every 12 (twelve) hours   ipratropium-albuterol (COMBIVENT RESPIMAT) inhaler  Self No No   Sig: Inhale 1 puff 4 (four) times a day      Facility-Administered Medications: None       Past Medical History:   Diagnosis Date    COPD (chronic obstructive pulmonary disease) ([facility] [address]     History of screening mammography     last assessed: 10/31/2017    Osteoporosis        Past Surgical History:   Procedure Laterality Date    APPENDECTOMY      onset:     CATARACT EXTRACTION      last assessed: 10/31/2017    TONSILLECTOMY      onset:        Family History   Problem Relation Age of Onset    Arthritis Mother     Breast cancer Mother [de-identified]    Heart disease Mother     Hypertension Mother     Osteoporosis Mother     Diabetes Father     Heart disease Father     Hypertension Father     Ovarian cancer Sister 52    No Known Problems Maternal Grandmother     No Known Problems Maternal Grandfather     No Known Problems Paternal Grandmother     No Known Problems Paternal Grandfather     No Known Problems Maternal Aunt     No Known Problems Maternal Aunt     No Known Problems Paternal Aunt     No Known Problems Paternal Aunt     No Known Problems Paternal Aunt      I have reviewed and agree with the history as documented  E-Cigarette/Vaping    E-Cigarette Use Never User      E-Cigarette/Vaping Substances    Nicotine No     THC No     CBD No     Flavoring No     Other No     Unknown No      Social History     Tobacco Use    Smoking status: Former Smoker     Packs/day: 1 00     Years: 40 00     Pack years: 40 00     Types: Cigarettes     Quit date:      Years since quittin 3    Smokeless tobacco: Never Used   Substance Use Topics    Alcohol use: Yes     Frequency: Monthly or less    Drug use: Never       Review of Systems   Constitutional: Negative for chills, fatigue and fever  HENT: Negative for congestion, postnasal drip, sore throat and trouble swallowing  Eyes: Negative for visual disturbance  Respiratory: Positive for cough and shortness of breath  Negative for chest tightness  Gastrointestinal: Negative for abdominal pain  Genitourinary: Negative for dysuria  Musculoskeletal: Negative for back pain  Skin: Negative for rash  Allergic/Immunologic: Negative for immunocompromised state  Neurological: Negative for dizziness, light-headedness and headaches  Psychiatric/Behavioral: Negative for confusion  Physical Exam  Physical Exam  Vitals signs and nursing note reviewed  Constitutional:       Appearance: She is well-developed  HENT:      Head: Normocephalic and atraumatic  Mouth/Throat:      Mouth: Mucous membranes are moist       Pharynx: Uvula midline  Tonsils: No tonsillar exudate  Eyes:      Pupils: Pupils are equal, round, and reactive to light  Neck:      Musculoskeletal: Normal range of motion and neck supple  Cardiovascular:      Rate and Rhythm: Normal rate and regular rhythm  Pulmonary:      Effort: Tachypnea, accessory muscle usage and respiratory distress (pursed lip breathing) present  Breath sounds: Decreased breath sounds (severely, diffuse) present  Abdominal:      General: Bowel sounds are normal       Palpations: Abdomen is soft  Tenderness: There is no abdominal tenderness  There is no guarding or rebound  Musculoskeletal:         General: No tenderness or deformity  Right lower leg: No edema  Left lower leg: No edema  Skin:     General: Skin is warm and dry  Capillary Refill: Capillary refill takes less than 2 seconds  Neurological:      General: No focal deficit present  Mental Status: She is alert and oriented to person, place, and time  Comments: Patient moving all extremities equally, no focal neuro deficits noted         Psychiatric:         Mood and Affect: Mood normal          Behavior: Behavior normal          Vital Signs  ED Triage Vitals   Temperature Pulse Respirations Blood Pressure SpO2   05/20/21 1122 05/20/21 1120 05/20/21 1120 05/20/21 1121 05/20/21 1120   98 6 °F (37 °C) 77 20 145/80 97 %      Temp Source Heart Rate Source Patient Position - Orthostatic VS BP Location FiO2 (%)   05/20/21 1122 05/20/21 1535 05/20/21 1535 05/20/21 1535 --   Oral Monitor Lying Right arm       Pain Score       --                  Vitals:    05/20/21 1120 05/20/21 1121 05/20/21 1535   BP:  145/80 145/64   Pulse: 77  77   Patient Position - Orthostatic VS:   Lying         Visual Acuity      ED Medications  Medications   cholecalciferol (VITAMIN D3) tablet 2,000 Units (has no administration in time range)   fluticasone-vilanterol (BREO ELLIPTA) 200-25 MCG/INH inhaler 1 puff (has no administration in time range)   acetaminophen (TYLENOL) tablet 650 mg (has no administration in time range)   ipratropium (ATROVENT) 0 02 % inhalation solution 0 5 mg (has no administration in time range)   albuterol inhalation solution 2 5 mg (has no administration in time range)   methylPREDNISolone sodium succinate (Solu-MEDROL) injection 40 mg (has no administration in time range)   enoxaparin (LOVENOX) subcutaneous injection 40 mg (has no administration in time range)   methylPREDNISolone sodium succinate (Solu-MEDROL) injection 80 mg (80 mg Intravenous Given 5/20/21 1232)   ipratropium-albuterol (DUO-NEB) 0 5-2 5 mg/3 mL inhalation solution 3 mL (3 mL Nebulization Given 5/20/21 1232)       Diagnostic Studies  Results Reviewed     Procedure Component Value Units Date/Time    Procalcitonin with AM Reflex [[id number]]     Lab Status: No result Specimen: Blood     Platelet count [[id number]]     Lab Status: No result Specimen: Blood     Novel Coronavirus (Covid-19),PCR [facility] - 2 Hour Stat [[id number]]  (Normal) Collected: 05/20/21 1220    Lab Status: Final result Specimen: Nares from Nose Updated: 05/20/21 1355     SARS-CoV-2 Negative    Narrative:       The specimen collection materials, transport medium, and/or testing methodology utilized in the production of these test results have been proven to be reliable in a limited validation with an abbreviated program under the Emergency Utilization Authorization provided by the FDA  Testing reported as "Presumptive positive" will be confirmed with secondary testing to ensure result accuracy  Clinical caution and judgement should be used with the interpretation of these results with consideration of the clinical impression and other laboratory testing  Testing reported as "Positive" or "Negative" has been proven to be accurate according to standard laboratory validation requirements  All testing is performed with control materials showing appropriate reactivity at standard intervals        Comprehensive metabolic panel [[id number]]  (Abnormal) Collected: 05/20/21 1224    Lab Status: Final result Specimen: Blood from Arm, Right Updated: 05/20/21 1332     Sodium 143 mmol/L      Potassium 4 2 mmol/L      Chloride 103 mmol/L      CO2 34 mmol/L      ANION GAP 6 mmol/L      BUN 16 mg/dL      Creatinine 0 50 mg/dL      Glucose 103 mg/dL      Calcium 9 0 mg/dL      Corrected Calcium 9 5 mg/dL      AST 21 U/L      ALT 41 U/L      Alkaline Phosphatase 60 U/L      Total Protein 7 2 g/dL      Albumin 3 4 g/dL      Total Bilirubin 0 33 mg/dL      eGFR 95 ml/min/1 73sq m     Narrative:      [place] guidelines for Chronic Kidney Disease (CKD):     Stage 1 with normal or high GFR (GFR > 90 mL/min/1 73 square meters)    Stage 2 Mild CKD (GFR = 60-89 mL/min/1 73 square meters)    Stage 3A Moderate CKD (GFR = 45-59 mL/min/1 73 square meters)    Stage 3B Moderate CKD (GFR = 30-44 mL/min/1 73 square meters)    Stage 4 Severe CKD (GFR = 15-29 mL/min/1 73 square meters)    Stage 5 End Stage CKD (GFR <15 mL/min/1 73 square meters)  Note: GFR calculation is accurate only with a steady state creatinine    CBC and differential [[id number]]  (Abnormal) Collected: 05/20/21 1224    Lab Status: Final result Specimen: Blood from Arm, Right Updated: 05/20/21 1318     WBC 10 80 Thousand/uL      RBC 4 89 Million/uL      Hemoglobin 14 1 g/dL Hematocrit 46 0 %      MCV 94 fL      MCH 28 8 pg      MCHC 30 7 g/dL      RDW 12 4 %      MPV 10 3 fL      Platelets 873 Thousands/uL      nRBC 0 /100 WBCs      Neutrophils Relative 77 %      Immat GRANS % 1 %      Lymphocytes Relative 12 %      Monocytes Relative 7 %      Eosinophils Relative 2 %      Basophils Relative 1 %      Neutrophils Absolute 8 43 Thousands/µL      Immature Grans Absolute 0 05 Thousand/uL      Lymphocytes Absolute 1 29 Thousands/µL      Monocytes Absolute 0 77 Thousand/µL      Eosinophils Absolute 0 18 Thousand/µL      Basophils Absolute 0 08 Thousands/µL                  XR chest 1 view portable   Final Result by Woodward Dancer, MD (05/20 1250)      No acute cardiopulmonary disease  Emphysema  Workstation performed: [id number]                    Procedures  Procedures         ED Course                                           MDM  Number of Diagnoses or Management Options  COPD with acute exacerbation [facility]): new and requires workup  Hypoxia: new and requires workup  Diagnosis management comments: 12:11 PM  Patient sent here for evaluation from her pulmonologist's office for shortness of breath  Per pulmonology note:· Patient seems to be having significantly increased shortness of breath  ·  she states that her oxygen drops down to the low 70s with even taking a couple of steps at home  On our evaluation here, she was noted to be 77 % on ambient air while resting   · She is unable to walk even a few steps without getting markedly short of breath and hypoxic  · She is placed on 2 L of supplemental oxygen via nasal cannula  · I recommended evaluation emergency department for potential admission to the hospital given her acute hypoxic respiratory failure and worsening symptoms of shortness of breath    12:12 PM  Patient is currently 97% on 2 L nasal cannula      4:00 PM  MDM: Patient presents to the Emergency Department and was diagnosed with acute COPD exacerbation with hypoxia at 77% on room air  This is a new problem that will require additional planned work-up in a hospitalized setting  Clinical laboratory testing, radiology imaging, and medical testing (EKG) were ordered  I independently reviewed the radiologic imaging, EKG, and laboratory studies  This case is considered high risk secondary to the above listed disease process that poses a threat to bodily function that requires further diagnostic testing and management which may include the administration of parenteral controlled substances  Discussed with MÓNICA  We had a detailed discussion of the patient's condition and case,  including need for admission  Accepts to his/her service  Bed request/bridging orders placed  Amount and/or Complexity of Data Reviewed  Clinical lab tests: ordered and reviewed  Tests in the radiology section of CPT®: ordered and reviewed  Tests in the medicine section of CPT®: ordered and reviewed  Review and summarize past medical records: yes  Discuss the patient with other providers: yes  Independent visualization of images, tracings, or specimens: yes    Risk of Complications, Morbidity, and/or Mortality  Presenting problems: high  Diagnostic procedures: high  Management options: high    Patient Progress  Patient progress: stable      Disposition  Final diagnoses:   COPD with acute exacerbation ([facility] )   Hypoxia     Time reflects when diagnosis was documented in both MDM as applicable and the Disposition within this note     Time User Action Codes Description Comment    5/20/2021 12:20 PM Greg Ansari Add [J44 1] COPD with acute exacerbation ([facility] )     5/20/2021 12:20 PM Ankit Solitario Add [R09 02] Hypoxia       ED Disposition     ED Disposition Condition Date/Time Comment    Admit Stable Thu May 20, 2021  1:46 PM Case was discussed with MÓNICA and the patient's admission status was agreed to be Admission Status: inpatient status to the service of Dr Bard Boucher           Follow-up Information None         Patient's Medications   Discharge Prescriptions    No medications on file     No discharge procedures on file      PDMP Review     None          ED Provider  Electronically Signed by           Bella Almendarez DO  05/20/21 6411

## 2021-05-20 NOTE — ASSESSMENT & PLAN NOTE
· POA, patient with worsening SOB with exertion minimally over the last month, worse over last week  Not moving much air on admission and very tight  Slight wheeze auscultated  Suggestive of COPD exacerbation   COVID and CXR negative   · Admit patient to med/surg under inpatient status   · Consult Pulmonary Disease   · Solumedrol 40 mg IV Q8   · Albuterol/Atrovent nebs  · Continue Breo in place of Advair   · Check Procalcitonin   · Continue supplementary O2

## 2021-05-20 NOTE — H&P
[address] 1945, 76 y o  female MRN: [id number]  Unit/Bed#: ED 14 Encounter: [id number]  Primary Care Provider: Neris Curiel MD   Date and time admitted to hospital: 5/20/2021 12:07 PM    * COPD, very severe, wtih acute excaerbation [facility])  Assessment & Plan  · POA, patient with worsening SOB with exertion minimally over the last month, worse over last week  Not moving much air on admission and very tight  Slight wheeze auscultated  Suggestive of COPD exacerbation  COVID and CXR negative   · Admit patient to med/surg under inpatient status   · Consult Pulmonary Disease   · Solumedrol 40 mg IV Q8   · Albuterol/Atrovent nebs  · Continue Breo in place of Advair   · Check Procalcitonin   · Continue supplementary O2     Acute respiratory failure with hypoxia (HCC)  Assessment & Plan  · Patient was satting in the 70s at her pulmonary disease provider's office  Does not wear O2 at home  · Continue supplementary O2  · Plan as above       VTE Prophylaxis: Enoxaparin (Lovenox)  / sequential compression device   Code Status: DNR/DNI  POLST: POLST form is not discussed and not completed at this time  Discussion with family:  at bedside     Anticipated Length of Stay:  Patient will be admitted on an Inpatient basis with an anticipated length of stay of  Greater than 2 midnights  Justification for Hospital Stay: As per above assessment and plan     Total Time for Visit, including Counseling / Coordination of Care: 70 minutes  Greater than 50% of this total time spent on direct patient counseling and coordination of care  Chief Complaint:   SOB    History of Present Illness:    Nikole Dee is a 76 y o  female with a history of COPD  who presents with SOB  Patient reports that her shortness of breath has been getting worse for about the last month  She reports extreme worsening over the last week    She states that she would get short of breath with minimal exertion such as bending over  She denies cough or wheezing  She denies chest pain or chest tightness  She denies leg swelling or increased weight gain  She denies fevers or chills  Patient states she was using her home Advair, albuterol, and Combivent with no relief  She was sent in from her pulmonary disease provider's office for COPD exacerbation  Patient denies using any fragments is secondary to her sensitivity to them  She denies smoking  Denies other exposure  Review of Systems:    Review of Systems   Constitutional: Negative for appetite change, chills, diaphoresis, fatigue and fever  HENT: Negative for congestion, rhinorrhea and sore throat  Eyes: Negative for visual disturbance  Respiratory: Positive for shortness of breath  Negative for cough, chest tightness and wheezing  Cardiovascular: Negative for chest pain, palpitations and leg swelling  Gastrointestinal: Negative for abdominal pain, constipation, diarrhea, nausea and vomiting  Genitourinary: Negative for dysuria  Musculoskeletal: Negative for arthralgias and myalgias  Neurological: Negative for dizziness, syncope, weakness, light-headedness, numbness and headaches  All other systems reviewed and are negative  Past Medical and Surgical History:     Past Medical History:   Diagnosis Date    COPD [facility]chronic obstructive pulmonary disease) ([facility] [address] )     History of screening mammography     last assessed: 10/31/2017    Osteoporosis        Past Surgical History:   Procedure Laterality Date    APPENDECTOMY      onset: 1966    CATARACT EXTRACTION      last assessed: 10/31/2017    TONSILLECTOMY      onset: 1954       Meds/Allergies:    Prior to Admission medications    Medication Sig Start Date End Date Taking?  Authorizing Provider   albuterol (Ventolin HFA) 90 mcg/act inhaler Inhale 2 puffs every 4 (four) hours as needed for wheezing 10/13/20   Chloe Tolentino,    budesonide (PULMICORT) 1 MG/2ML nebulizer solution Take 1 mg by nebulization 2 (two) times a day    Historical Provider, MD   calcium carbonate (OS-CRISTIANO) 600 MG tablet Take one with dinner 20   Kathrine Jarvis MD   Cholecalciferol (VITAMIN D3) 50 MCG (2000) capsule She takes 1 capsule daily 20   Kathrine Jarvis MD   fluticasone-salmeterol (Advair Diskus) 250-50 mcg/dose inhaler Inhale 1 puff every 12 (twelve) hours 10/13/20   Dania Wong DO   ipratropium-albuterol (COMBIVENT RESPIMAT) inhaler Inhale 1 puff 4 (four) times a day 10/13/20   Dania Wong DO     I have reviewed home medications with patient personally  Allergies:    Allergies   Allergen Reactions    Clarinex [Desloratadine] Wheezing    Bee Venom Swelling and Facial Swelling    Clarithromycin Wheezing     Reaction Date: 2011;     Food Color Red - Food Allergy Itching    Levofloxacin Other (See Comments)     Blood clot in leg    Loratadine Wheezing     Reaction Date: 2011;        Social History:     Marital Status: /Civil Union   Occupation: Noncontributory   Patient Pre-hospital Living Situation: Home  Patient Pre-hospital Level of Mobility: Full  Patient Pre-hospital Diet Restrictions: None  Substance Use History:   Social History     Substance and Sexual Activity   Alcohol Use Yes    Frequency: Monthly or less     Social History     Tobacco Use   Smoking Status Former Smoker    Packs/day: 1 00    Years: 40 00    Pack years: 40 00    Types: Cigarettes    Quit date:     Years since quittin 3   Smokeless Tobacco Never Used     Social History     Substance and Sexual Activity   Drug Use Never       Family History:    Family History   Problem Relation Age of Onset    Arthritis Mother     Breast cancer Mother [de-identified]    Heart disease Mother     Hypertension Mother     Osteoporosis Mother     Diabetes Father     Heart disease Father     Hypertension Father     Ovarian cancer Sister 52    No Known Problems Maternal Grandmother     No Known Problems Maternal Grandfather     No Known Problems Paternal Grandmother     No Known Problems Paternal Grandfather     No Known Problems Maternal Aunt     No Known Problems Maternal Aunt     No Known Problems Paternal Aunt     No Known Problems Paternal Aunt     No Known Problems Paternal Aunt        Physical Exam:     Vitals:   Blood Pressure: 145/80 (05/20/21 1121)  Pulse: 77 (05/20/21 1120)  Temperature: 98 6 °F (37 °C) (05/20/21 1122)  Temp Source: Oral (05/20/21 1122)  Respirations: 20 (05/20/21 1120)  SpO2: 97 % (05/20/21 1120)    Physical Exam  Constitutional:       General: She is not in acute distress  Appearance: Normal appearance  She is normal weight  She is not ill-appearing or diaphoretic  HENT:      Head: Normocephalic and atraumatic  Mouth/Throat:      Mouth: Mucous membranes are moist    Eyes:      General: No scleral icterus  Pupils: Pupils are equal, round, and reactive to light  Cardiovascular:      Rate and Rhythm: Normal rate and regular rhythm  Pulses: Normal pulses  Heart sounds: Normal heart sounds, S1 normal and S2 normal  No murmur  No systolic murmur  No diastolic murmur  No gallop  No S3 or S4 sounds  Pulmonary:      Effort: Pulmonary effort is normal  No accessory muscle usage or respiratory distress  Breath sounds: No stridor  Examination of the right-upper field reveals decreased breath sounds and wheezing  Examination of the left-upper field reveals decreased breath sounds and wheezing  Examination of the right-middle field reveals decreased breath sounds and wheezing  Examination of the left-middle field reveals decreased breath sounds and wheezing  Examination of the right-lower field reveals decreased breath sounds  Examination of the left-lower field reveals decreased breath sounds  Decreased breath sounds and wheezing present  No rhonchi or rales  Chest:      Chest wall: No tenderness     Abdominal:      General: Bowel sounds are normal  There is no distension  Palpations: Abdomen is soft  Tenderness: There is no abdominal tenderness  There is no guarding  Musculoskeletal:      Right lower leg: No edema  Left lower leg: No edema  Skin:     General: Skin is warm and dry  Coloration: Skin is not jaundiced  Neurological:      General: No focal deficit present  Mental Status: She is alert  Mental status is at baseline  Motor: No tremor or seizure activity  Psychiatric:         Behavior: Behavior is cooperative  Additional Data:     Lab Results: I have personally reviewed pertinent reports  Results from last 7 days   Lab Units 05/20/21  1224   WBC Thousand/uL 10 80*   HEMOGLOBIN g/dL 14 1   HEMATOCRIT % 46 0   PLATELETS Thousands/uL 235   NEUTROS PCT % 77*   LYMPHS PCT % 12*   MONOS PCT % 7   EOS PCT % 2     Results from last 7 days   Lab Units 05/20/21  1224   SODIUM mmol/L 143   POTASSIUM mmol/L 4 2   CHLORIDE mmol/L 103   CO2 mmol/L 34*   BUN mg/dL 16   CREATININE mg/dL 0 50*   ANION GAP mmol/L 6   CALCIUM mg/dL 9 0   ALBUMIN g/dL 3 4*   TOTAL BILIRUBIN mg/dL 0 33   ALK PHOS U/L 60   ALT U/L 41   AST U/L 21   GLUCOSE RANDOM mg/dL 103                       Imaging: I have personally reviewed pertinent reports  XR chest 1 view portable   Final Result by Darrius Sutton MD (05/20 1250)      No acute cardiopulmonary disease  Emphysema  Workstation performed: [id number]             EKG, Pathology, and Other Studies Reviewed on Admission:   · CXR: no acute pulmonary disease, emphysema     Allscripts / Epic Records Reviewed: Yes     ** Please Note: This note has been constructed using a voice recognition system   **

## 2021-05-20 NOTE — PROGRESS NOTES
Pulmonary Follow Up Note  Brooke Montana 76 y o  female MRN: [id number]  2021      HPI:     patient states that she has been having worsening shortness of breath for approximately 1 month now over the last 1 week things have gotten extremely difficult  She has been unable to even dress herself without having significant shortness of breath  She denies any fevers or chills  She has a chronic cough but this is not been worse than her baseline  Meds:    Advair   Combivent    ROS:  Constitutional: - Fatigue, - chills, - fever, - weight change  HEENT: - rhinorrhea, - sneezing, - sore throat  Respiratory:  + cough, +shortness of breath, - wheezing  Cardiovascular: - chest pain,  -palpitations, - leg swelling  Gastrointestinal: - abdominal pain, - constipation, - diarrhea, - nausea, - vomiting  Endocrine: - cold intolerance, - heat intolerance  Genitourinary: - dysuria  Musculoskeletal: - arthralgias  Skin:- rash, - wound  Allergic/Immunologic: - allergies  Neurological: - dizziness, - numbness        Vitals: Blood pressure 138/74, pulse 105, temperature 98 °F (36 7 °C), temperature source Temporal, height 5' (1 524 m), weight 57 8 kg (127 lb 6 4 oz), SpO2 92 %  , Body mass index is 24 88 kg/m²      Physical Exam:  GEN  NAD  HEENT  ncat, non icteric, MM moist  NECK  supple, no JVD, no LAD  CV  +s1s2, no mrg,  +tachycardia  PULM   Markedly diminished breath sounds diffusely  ABD  soft, ntnd, + BS  EXT   +lower extremity pitting edema, no cyanosis, no clubbing  NEURO  Aox3, no focal weakness    Imaging and other studies:    none evaluated    Pulmonary function testin/15/2020   Very severe obstructive lung disease   Severe diffusing capacity reduction  Very severe obstructive appearing flow volume loop       EKG, Pathology, and Other Studies:   none    Assessment:   very severe COPD with  Acute exacerbation  Acute on chronic hypoxic respiratory failure    Plan:  · Patient seems to be having significantly increased shortness of breath  ·  she states that her oxygen drops down to the low 70s with even taking a couple of steps at home  On our evaluation here, she was noted to be 77 % on ambient air while resting   · She is unable to walk even a few steps without getting markedly short of breath and hypoxic  · She is placed on 2 L of supplemental oxygen via nasal cannula  · I recommended evaluation emergency department for potential admission to the hospital given her acute hypoxic respiratory failure and worsening symptoms of shortness of breath    Return visit with her regular pulmonologist after discharge from hospital      [facility]

## 2021-05-21 LAB
BASOPHILS # BLD AUTO: 0.01 THOUSANDS/ΜL (ref 0–0.1)
BASOPHILS NFR BLD AUTO: 0 % (ref 0–1)
EOSINOPHIL # BLD AUTO: 0 THOUSAND/ΜL (ref 0–0.61)
EOSINOPHIL NFR BLD AUTO: 0 % (ref 0–6)
ERYTHROCYTE [DISTWIDTH] IN BLOOD BY AUTOMATED COUNT: 12.1 % (ref 11.6–15.1)
HCT VFR BLD AUTO: 43.7 % (ref 34.8–46.1)
HGB BLD-MCNC: 13.4 G/DL (ref 11.5–15.4)
IMM GRANULOCYTES # BLD AUTO: 0.05 THOUSAND/UL (ref 0–0.2)
IMM GRANULOCYTES NFR BLD AUTO: 1 % (ref 0–2)
LYMPHOCYTES # BLD AUTO: 0.83 THOUSANDS/ΜL (ref 0.6–4.47)
LYMPHOCYTES NFR BLD AUTO: 10 % (ref 14–44)
MCH RBC QN AUTO: 28.9 PG (ref 26.8–34.3)
MCHC RBC AUTO-ENTMCNC: 30.7 G/DL (ref 31.4–37.4)
MCV RBC AUTO: 94 FL (ref 82–98)
MONOCYTES # BLD AUTO: 0.22 THOUSAND/ΜL (ref 0.17–1.22)
MONOCYTES NFR BLD AUTO: 3 % (ref 4–12)
NEUTROPHILS # BLD AUTO: 7.56 THOUSANDS/ΜL (ref 1.85–7.62)
NEUTS SEG NFR BLD AUTO: 86 % (ref 43–75)
NRBC BLD AUTO-RTO: 0 /100 WBCS
PLATELET # BLD AUTO: 212 THOUSANDS/UL (ref 149–390)
PMV BLD AUTO: 10.2 FL (ref 8.9–12.7)
PROCALCITONIN SERPL-MCNC: <0.05 NG/ML
RBC # BLD AUTO: 4.64 MILLION/UL (ref 3.81–5.12)
WBC # BLD AUTO: 8.67 THOUSAND/UL (ref 4.31–10.16)

## 2021-05-21 PROCEDURE — 99232 SBSQ HOSP IP/OBS MODERATE 35: CPT | Performed by: PHYSICIAN ASSISTANT

## 2021-05-21 PROCEDURE — 94760 N-INVAS EAR/PLS OXIMETRY 1: CPT

## 2021-05-21 PROCEDURE — 94640 AIRWAY INHALATION TREATMENT: CPT

## 2021-05-21 PROCEDURE — 99222 1ST HOSP IP/OBS MODERATE 55: CPT | Performed by: INTERNAL MEDICINE

## 2021-05-21 PROCEDURE — 85025 COMPLETE CBC W/AUTO DIFF WBC: CPT | Performed by: PHYSICIAN ASSISTANT

## 2021-05-21 RX ADMIN — METHYLPREDNISOLONE SODIUM SUCCINATE 40 MG: 40 INJECTION, POWDER, FOR SOLUTION INTRAMUSCULAR; INTRAVENOUS at 21:50

## 2021-05-21 RX ADMIN — METHYLPREDNISOLONE SODIUM SUCCINATE 40 MG: 40 INJECTION, POWDER, FOR SOLUTION INTRAMUSCULAR; INTRAVENOUS at 14:40

## 2021-05-21 RX ADMIN — FLUTICASONE FUROATE AND VILANTEROL TRIFENATATE 1 PUFF: 200; 25 POWDER RESPIRATORY (INHALATION) at 12:13

## 2021-05-21 RX ADMIN — LEVALBUTEROL HYDROCHLORIDE 1.25 MG: 1.25 SOLUTION, CONCENTRATE RESPIRATORY (INHALATION) at 08:16

## 2021-05-21 RX ADMIN — Medication 2000 UNITS: at 09:08

## 2021-05-21 RX ADMIN — IPRATROPIUM BROMIDE 0.5 MG: 0.5 SOLUTION RESPIRATORY (INHALATION) at 08:16

## 2021-05-21 RX ADMIN — IPRATROPIUM BROMIDE 0.5 MG: 0.5 SOLUTION RESPIRATORY (INHALATION) at 13:44

## 2021-05-21 RX ADMIN — METHYLPREDNISOLONE SODIUM SUCCINATE 40 MG: 40 INJECTION, POWDER, FOR SOLUTION INTRAMUSCULAR; INTRAVENOUS at 06:16

## 2021-05-21 RX ADMIN — LEVALBUTEROL HYDROCHLORIDE 1.25 MG: 1.25 SOLUTION, CONCENTRATE RESPIRATORY (INHALATION) at 13:44

## 2021-05-21 RX ADMIN — LEVALBUTEROL HYDROCHLORIDE 1.25 MG: 1.25 SOLUTION, CONCENTRATE RESPIRATORY (INHALATION) at 21:12

## 2021-05-21 RX ADMIN — IPRATROPIUM BROMIDE 0.5 MG: 0.5 SOLUTION RESPIRATORY (INHALATION) at 21:12

## 2021-05-21 NOTE — ASSESSMENT & PLAN NOTE
· POA, patient with worsening SOB with exertion minimally over the last month, worse over last week  Not moving much air on admission and very tight  Slight wheeze auscultated  Suggestive of COPD exacerbation  COVID and CXR negative   · Patient feeling slightly better today   Moving more air, but still wheezing and dyspneic    · Consult Pulmonary Disease, appreciate input    · Solumedrol 40 mg IV Q8   · Albuterol/Atrovent nebs  · Continue Breo in place of Advair   · Continue supplementary O2

## 2021-05-21 NOTE — PROGRESS NOTES
[facility]  Progress Note - Darryl Draper 1945, 76 y o  female MRN: [id number]  Unit/Bed#: S -37 Encounter: [id number]  Primary Care Provider: Job Neff MD   Date and time admitted to hospital: 5/20/2021 12:07 PM    * COPD, very severe, wtih acute excaerbation [facility])  Assessment & Plan  · POA, patient with worsening SOB with exertion minimally over the last month, worse over last week  Not moving much air on admission and very tight  Slight wheeze auscultated  Suggestive of COPD exacerbation  COVID and CXR negative   · Patient feeling slightly better today  Moving more air, but still wheezing and dyspneic    · Consult Pulmonary Disease, appreciate input    · Solumedrol 40 mg IV Q8   · Albuterol/Atrovent nebs  · Continue Breo in place of Advair   · Continue supplementary O2     Acute respiratory failure with hypoxia (HCC)  Assessment & Plan  · Patient was satting in the 70s at her pulmonary disease provider's office  Does not wear O2 at home  · Continue supplementary O2  · Plan as above       VTE Pharmacologic Prophylaxis:   Pharmacologic: Patient has refused VTE prophylaxis  Mechanical VTE Prophylaxis in Place: Yes    Patient Centered Rounds: I have performed bedside rounds with nursing staff today  Discussions with Specialists or Other Care Team Provider: Discussed with RN, CM    Education and Discussions with Family / Patient: Discussed with patient,  at bedside     Time Spent for Care: 30 minutes  More than 50% of total time spent on counseling and coordination of care as described above      Current Length of Stay: 1 day(s)    Current Patient Status: Inpatient   Certification Statement: The patient will continue to require additional inpatient hospital stay due to on going management of COPD exacerbation     Discharge Plan: Pending, likely 24-48 hours     Code Status: Level 3 - DNAR and DNI      Subjective:   Patient reports that she is feeling slightly better today   at bedside feels that she is still SOB  She states that she is ambulating to the restroom  Denies fevers or chills  Objective:     Vitals:   Temp (24hrs), Av 5 °F (36 4 °C), Min:97 4 °F (36 3 °C), Max:97 6 °F (36 4 °C)    Temp:  [97 4 °F (36 3 °C)-97 6 °F (36 4 °C)] 97 4 °F (36 3 °C)  HR:  [] 69  Resp:  [16-22] 20  BP: (133-147)/(62-82) 133/62  SpO2:  [94 %-97 %] 97 %  Body mass index is 27 34 kg/m²  Input and Output Summary (last 24 hours):     No intake or output data in the 24 hours ending 21 1451    Physical Exam:     Physical Exam  Constitutional:       General: She is not in acute distress  Appearance: She is well-developed  She is not ill-appearing or diaphoretic  Interventions: Nasal cannula in place  HENT:      Head: Normocephalic and atraumatic  Mouth/Throat:      Mouth: Mucous membranes are moist    Eyes:      General: No scleral icterus  Conjunctiva/sclera: Conjunctivae normal       Pupils: Pupils are equal, round, and reactive to light  Neck:      Musculoskeletal: Neck supple  Cardiovascular:      Rate and Rhythm: Normal rate and regular rhythm  No extrasystoles are present  Heart sounds: Normal heart sounds, S1 normal and S2 normal  No murmur  No S3 or S4 sounds  Pulmonary:      Effort: Pulmonary effort is normal  No respiratory distress  Breath sounds: Examination of the right-upper field reveals decreased breath sounds and wheezing  Examination of the left-upper field reveals decreased breath sounds and wheezing  Examination of the right-lower field reveals decreased breath sounds and wheezing  Examination of the left-lower field reveals decreased breath sounds and wheezing  Decreased breath sounds and wheezing present  No rhonchi or rales  Chest:      Chest wall: No tenderness  Abdominal:      General: Bowel sounds are normal  There is no distension  Palpations: Abdomen is soft  There is no mass        Tenderness: There is no abdominal tenderness  There is no guarding or rebound  Musculoskeletal:      Right lower leg: No edema  Left lower leg: No edema  Skin:     General: Skin is warm and dry  Neurological:      General: No focal deficit present  Mental Status: She is alert  Mental status is at baseline  Motor: No tremor or seizure activity  Additional Data:     Labs:    Results from last 7 days   Lab Units 05/21/21  0519   WBC Thousand/uL 8 67   HEMOGLOBIN g/dL 13 4   HEMATOCRIT % 43 7   PLATELETS Thousands/uL 212   NEUTROS PCT % 86*   LYMPHS PCT % 10*   MONOS PCT % 3*   EOS PCT % 0     Results from last 7 days   Lab Units 05/20/21  1224   POTASSIUM mmol/L 4 2   CHLORIDE mmol/L 103   CO2 mmol/L 34*   BUN mg/dL 16   CREATININE mg/dL 0 50*   CALCIUM mg/dL 9 0   ALK PHOS U/L 60   ALT U/L 41   AST U/L 21           * I Have Reviewed All Lab Data Listed Above  * Additional Pertinent Lab Tests Reviewed: [address] Admission Reviewed    Imaging:    Imaging Reports Reviewed Today Include: None  Imaging Personally Reviewed by Myself Includes:  None    Recent Cultures (last 7 days):           Last 24 Hours Medication List:   Current Facility-Administered Medications   Medication Dose Route Frequency Provider Last Rate    acetaminophen  650 mg Oral Q6H PRN Brown Mendez PA-C      cholecalciferol  2,000 Units Oral Daily Efrain Brunson PA-C      enoxaparin  40 mg Subcutaneous Daily Efrain Brunson PA-C      fluticasone-vilanterol  1 puff Inhalation Daily Efrain Brunson PA-C      ipratropium  0 5 mg Nebulization TID Efrain Andrade PA-C      levalbuterol  1 25 mg Nebulization TID Vladimir Kirby MD      methylPREDNISolone sodium succinate  40 mg Intravenous Q8H [address] Efrain Brunson PA-C          Today, Patient Was Seen By: Brown Mendez PA-C    ** Please Note: Dictation voice to text software may have been used in the creation of this document   **

## 2021-05-21 NOTE — CONSULTS
Consultation - Pulmonary Medicine   Emilio Lamb 76 y o  female MRN: [id number]  Unit/Bed#: S -01 Encounter: [id number]      Assessment/Plan:    Acute hypoxic respiratory failure due to very severe COPD with acute exacerbation   Titrate oxygen to maintain POX > or = 89%  OOB as tolerated  Ambulatory POX prior to D/C  Continue Solumedrol 40 mg IV q8hrs today  Continue Breo in place of home Advair  Continue Xopenex/Atrovent TID  At D/C, resume Advair and Combivent with PRN albuterol  Would benefit from pulmonary rehab after D/C  Pulmonary nodule   Repeat imaging due October 2021  Outpatient follow-up as per D/C instructions  D/W primary team     History of Present Illness   Physician Requesting Consult: Susan Gagnon MD  Reason for Consult / Principal Problem: COPD with acute exacerbation  Hx and PE limited by: None  Chief Complaint: "I couldn't breathe "  HPI: Emilio Lamb is a 76 y o   female who presented to [address] [place] [address] with complaints of worsening SOB  Jennifer Isabel reports that over the last few weeks, she has had increased SOB on exertion  She reports a dry cough without mucous production and wheezing as well  These symptoms have been despite increased use of Combivent and albuterol MDI  She reports she has been having a hard time even getting her mail due to SOB  She called our office for evaluation and at sick visit, she was hypoxic and was sent to the ER  In addition to the above, she noted some coldness of her hands and feet, but this has resolved  She denies any other complaints  No chest pain, leg pain/swelling, lightheadedness, dizziness, nausea, vomiting, weight loss  From a pulmonary standpoint, Dianelys follows with Dr Milligan Mail  She maintains on Advair, Combivent BID, and albuterol MDI as needed  She also has a nebulizer, but does not use it  She does not have oxygen at home at present, but has had it in the past with prior acute illnesses      Inpatient consult to Pulmonology  Consult performed by: KELSEY Salcedo  Consult ordered by: Sandro Mccabe PA-C        Review of Systems   All other systems reviewed and are negative  A full 12-point review of systems was completed and is negative except for those outlined in the HPI  Historical Information   Past Medical History:   Diagnosis Date    COPD (chronic obstructive pulmonary disease) [facility] [address] )     History of screening mammography     last assessed: 10/31/2017    Osteoporosis      Past Surgical History:   Procedure Laterality Date    APPENDECTOMY      onset:     CATARACT EXTRACTION      last assessed: 10/31/2017    TONSILLECTOMY      onset: 12     Social History   Social History     Substance and Sexual Activity   Alcohol Use Yes    Frequency: Monthly or less     Social History     Substance and Sexual Activity   Drug Use Never     Social History     Tobacco Use   Smoking Status Former Smoker    Packs/day: 1 00    Years: 40 00    Pack years: 40 00    Types: Cigarettes    Quit date:     Years since quittin 3   Smokeless Tobacco Never Used     E-Cigarette/Vaping    E-Cigarette Use Never User      E-Cigarette/Vaping Substances    Nicotine No     THC No     CBD No     Flavoring No     Other No     Unknown No      Occupational History: Retired      Family History:   Family History   Problem Relation Age of Onset    Arthritis Mother    [facility] Breast cancer Mother [de-identified]    Heart disease Mother     Hypertension Mother     Osteoporosis Mother     Diabetes Father     Heart disease Father     Hypertension Father     Ovarian cancer Sister 52    No Known Problems Maternal Grandmother     No Known Problems Maternal Grandfather     No Known Problems Paternal Grandmother     No Known Problems Paternal Grandfather     No Known Problems Maternal Aunt     No Known Problems Maternal Aunt     No Known Problems Paternal Aunt     No Known Problems Paternal Aunt     No Known Problems Paternal Aunt        Meds/Allergies   all current active meds have been reviewed, pertinent pulmonary meds have been reviewed, current meds:   Current Facility-Administered Medications   Medication Dose Route Frequency    acetaminophen (TYLENOL) tablet 650 mg  650 mg Oral Q6H PRN    cholecalciferol (VITAMIN D3) tablet 2,000 Units  2,000 Units Oral Daily    enoxaparin (LOVENOX) subcutaneous injection 40 mg  40 mg Subcutaneous Daily    fluticasone-vilanterol (BREO ELLIPTA) 200-25 MCG/INH inhaler 1 puff  1 puff Inhalation Daily    ipratropium (ATROVENT) 0 02 % inhalation solution 0 5 mg  0 5 mg Nebulization TID    levalbuterol (XOPENEX) inhalation solution 1 25 mg  1 25 mg Nebulization TID    methylPREDNISolone sodium succinate (Solu-MEDROL) injection 40 mg  40 mg Intravenous Q8H [address]    and PTA meds:   Prior to Admission Medications   Prescriptions Last Dose Informant Patient Reported? Taking?    Cholecalciferol (VITAMIN D3) 50 MCG (2000 UT) capsule 5/19/2021 at Unknown time Self No Yes   Sig: She takes 1 capsule daily   albuterol (Ventolin HFA) 90 mcg/act inhaler 5/19/2021 at Unknown time Self No Yes   Sig: Inhale 2 puffs every 4 (four) hours as needed for wheezing   budesonide (PULMICORT) 1 MG/2ML nebulizer solution More than a month at Unknown time Self Yes No   Sig: Take 1 mg by nebulization 2 (two) times a day   calcium carbonate (OS-CRISTIANO) 600 MG tablet 5/19/2021 at Unknown time Self No Yes   Sig: Take one with dinner   fluticasone-salmeterol (Advair Diskus) 250-50 mcg/dose inhaler 5/20/2021 at Unknown time Self No Yes   Sig: Inhale 1 puff every 12 (twelve) hours   ipratropium-albuterol (COMBIVENT RESPIMAT) inhaler 5/20/2021 at Unknown time Self No Yes   Sig: Inhale 1 puff 4 (four) times a day      Facility-Administered Medications: None       Allergies   Allergen Reactions    Clarinex [Desloratadine] Wheezing    Bee Venom Swelling and Facial Swelling    Clarithromycin Wheezing     Reaction Date: 29Apr2011;     Food Color Red - Food Allergy Itching    Levofloxacin Other (See Comments)     Blood clot in leg    Loratadine Wheezing     Reaction Date: 29Apr2011;        Objective   Vitals: Blood pressure 133/62, pulse 69, temperature (!) 97 4 °F (36 3 °C), temperature source Oral, resp  rate 20, weight 63 5 kg (139 lb 15 9 oz), SpO2 97 %  2L NC,Body mass index is 27 34 kg/m²  No intake or output data in the 24 hours ending 05/21/21 1412  Invasive Devices     Peripheral Intravenous Line            Peripheral IV 05/20/21 Right Antecubital 1 day                Physical Exam  Vitals signs reviewed  Constitutional:       General: She is not in acute distress  Appearance: She is well-developed  She is not toxic-appearing or diaphoretic  Interventions: Nasal cannula in place  HENT:      Head: Normocephalic and atraumatic  Eyes:      General: No scleral icterus  Neck:      Musculoskeletal: Neck supple  Vascular: No JVD  Trachea: No tracheal deviation  Cardiovascular:      Rate and Rhythm: Normal rate and regular rhythm  Heart sounds: S1 normal and S2 normal  No murmur  No friction rub  No gallop  Pulmonary:      Effort: Pulmonary effort is normal  No tachypnea, accessory muscle usage or respiratory distress  Breath sounds: No stridor  Decreased breath sounds and wheezing present  No rhonchi or rales  Chest:      Chest wall: No tenderness  Abdominal:      General: Bowel sounds are normal  There is no distension  Palpations: Abdomen is soft  Tenderness: There is no abdominal tenderness  There is no guarding or rebound  Skin:     General: Skin is warm and dry  Findings: No rash  Neurological:      Mental Status: She is alert and oriented to person, place, and time  GCS: GCS eye subscore is 4  GCS verbal subscore is 5  GCS motor subscore is 6  Psychiatric:         Speech: Speech normal          Behavior: Behavior normal  Behavior is cooperative  Lab Results:   CBC:   Lab Results   Component Value Date    WBC 8 67 05/21/2021    HGB 13 4 05/21/2021    HCT 43 7 05/21/2021    MCV 94 05/21/2021     05/21/2021    MCH 28 9 05/21/2021    MCHC 30 7 (L) 05/21/2021    RDW 12 1 05/21/2021    MPV 10 2 05/21/2021    NRBC 0 05/21/2021   , CMP:   Lab Results   Component Value Date     06/25/2015    SODIUM 143 05/20/2021    K 4 2 05/20/2021     05/20/2021    CO2 34 (H) 05/20/2021    ANIONGAP 4 06/25/2015    AGAP 6 05/20/2021    BUN 16 05/20/2021    CREATININE 0 50 (L) 05/20/2021    GLUC 103 05/20/2021    GLUF 113 (H) 06/05/2020    CALCIUM 9 0 05/20/2021    AST 21 05/20/2021    ALT 41 05/20/2021    ALKPHOS 60 05/20/2021    PROT 7 3 06/25/2015    TP 7 2 05/20/2021    BILITOT 0 51 06/25/2015    TBILI 0 33 05/20/2021    EGFR 95 05/20/2021     Procalcitonin: < 0 05    Viral PCR: COVID negative    Imaging Studies: I have personally reviewed pertinent reports  and I have personally reviewed pertinent films in PACS   CXR shows no acute pulmonary infiltrate, effusion, or mass  EKG, Pathology, and Other Studies: I have personally reviewed pertinent reports  Echocardiogram from 01/2020 shows EF 60% with grade one diastolic dysfunction  Pulmonary Results (PFTs, PSG): I have personally reviewed pertinent reports  PFTs 05/28/2020:   FEV1/FVC Ratio: 38%   FEV1: 0 47L or 23% predicted   FVC: 1 22L or 51% predicted   DLCO corrected for hgb: 23% predicted   Consistent with very severe obstruction  VTE Prophylaxis: Sequential compression device (Venodyne)  and Enoxaparin (Lovenox)    Code Status: Level 3 - DNAR and DNI    None    Portions of the record may have been created with voice recognition software  Occasional wrong word or "sound a like" substitutions may have occurred due to the inherent limitations of voice recognition software  Read the chart carefully and recognize, using context, where substitutions have occurred

## 2021-05-21 NOTE — ED NOTES
Patient provided with lunch box - tolerated well with no complaints        Rachel Mendieta, RN  05/20/21 9871

## 2021-05-21 NOTE — PLAN OF CARE
Problem: Potential for Falls  Goal: Patient will remain free of falls  Description: INTERVENTIONS:  - Assess patient frequently for physical needs  -  Identify cognitive and physical deficits and behaviors that affect risk of falls    -  [place] fall precautions as indicated by assessment   - Educate patient/family on patient safety including physical limitations  - Instruct patient to call for assistance with activity based on assessment  - Modify environment to reduce risk of injury  - Consider OT/PT consult to assist with strengthening/mobility  Outcome: Progressing

## 2021-05-22 LAB
ERYTHROCYTE [DISTWIDTH] IN BLOOD BY AUTOMATED COUNT: 12 % (ref 11.6–15.1)
HCT VFR BLD AUTO: 41.3 % (ref 34.8–46.1)
HGB BLD-MCNC: 12.8 G/DL (ref 11.5–15.4)
MCH RBC QN AUTO: 29.4 PG (ref 26.8–34.3)
MCHC RBC AUTO-ENTMCNC: 31 G/DL (ref 31.4–37.4)
MCV RBC AUTO: 95 FL (ref 82–98)
PLATELET # BLD AUTO: 220 THOUSANDS/UL (ref 149–390)
PMV BLD AUTO: 10.1 FL (ref 8.9–12.7)
PROCALCITONIN SERPL-MCNC: <0.05 NG/ML
RBC # BLD AUTO: 4.36 MILLION/UL (ref 3.81–5.12)
WBC # BLD AUTO: 18.04 THOUSAND/UL (ref 4.31–10.16)

## 2021-05-22 PROCEDURE — 94640 AIRWAY INHALATION TREATMENT: CPT

## 2021-05-22 PROCEDURE — 84145 PROCALCITONIN (PCT): CPT | Performed by: PHYSICIAN ASSISTANT

## 2021-05-22 PROCEDURE — 85027 COMPLETE CBC AUTOMATED: CPT | Performed by: PHYSICIAN ASSISTANT

## 2021-05-22 PROCEDURE — 99232 SBSQ HOSP IP/OBS MODERATE 35: CPT | Performed by: PHYSICIAN ASSISTANT

## 2021-05-22 PROCEDURE — 94760 N-INVAS EAR/PLS OXIMETRY 1: CPT

## 2021-05-22 PROCEDURE — 99233 SBSQ HOSP IP/OBS HIGH 50: CPT | Performed by: INTERNAL MEDICINE

## 2021-05-22 RX ORDER — ECHINACEA PURPUREA EXTRACT 125 MG
1 TABLET ORAL
Status: DISCONTINUED | OUTPATIENT
Start: 2021-05-22 | End: 2021-05-26 | Stop reason: HOSPADM

## 2021-05-22 RX ORDER — FORMOTEROL FUMARATE 20 UG/2ML
20 SOLUTION RESPIRATORY (INHALATION)
Status: DISCONTINUED | OUTPATIENT
Start: 2021-05-22 | End: 2021-05-26 | Stop reason: HOSPADM

## 2021-05-22 RX ORDER — METHYLPREDNISOLONE SODIUM SUCCINATE 40 MG/ML
40 INJECTION, POWDER, LYOPHILIZED, FOR SOLUTION INTRAMUSCULAR; INTRAVENOUS EVERY 6 HOURS SCHEDULED
Status: DISCONTINUED | OUTPATIENT
Start: 2021-05-22 | End: 2021-05-24

## 2021-05-22 RX ORDER — GUAIFENESIN 600 MG
1200 TABLET, EXTENDED RELEASE 12 HR ORAL EVERY 12 HOURS SCHEDULED
Status: DISCONTINUED | OUTPATIENT
Start: 2021-05-22 | End: 2021-05-26 | Stop reason: HOSPADM

## 2021-05-22 RX ORDER — BUDESONIDE 0.5 MG/2ML
0.5 INHALANT ORAL
Status: DISCONTINUED | OUTPATIENT
Start: 2021-05-22 | End: 2021-05-26 | Stop reason: HOSPADM

## 2021-05-22 RX ADMIN — GUAIFENESIN 1200 MG: 600 TABLET, EXTENDED RELEASE ORAL at 21:16

## 2021-05-22 RX ADMIN — Medication 2000 UNITS: at 09:19

## 2021-05-22 RX ADMIN — IPRATROPIUM BROMIDE 0.5 MG: 0.5 SOLUTION RESPIRATORY (INHALATION) at 07:32

## 2021-05-22 RX ADMIN — METHYLPREDNISOLONE SODIUM SUCCINATE 40 MG: 40 INJECTION, POWDER, FOR SOLUTION INTRAMUSCULAR; INTRAVENOUS at 18:22

## 2021-05-22 RX ADMIN — IPRATROPIUM BROMIDE 0.5 MG: 0.5 SOLUTION RESPIRATORY (INHALATION) at 13:07

## 2021-05-22 RX ADMIN — LEVALBUTEROL HYDROCHLORIDE 1.25 MG: 1.25 SOLUTION, CONCENTRATE RESPIRATORY (INHALATION) at 07:32

## 2021-05-22 RX ADMIN — METHYLPREDNISOLONE SODIUM SUCCINATE 40 MG: 40 INJECTION, POWDER, FOR SOLUTION INTRAMUSCULAR; INTRAVENOUS at 23:51

## 2021-05-22 RX ADMIN — FLUTICASONE FUROATE AND VILANTEROL TRIFENATATE 1 PUFF: 200; 25 POWDER RESPIRATORY (INHALATION) at 09:20

## 2021-05-22 RX ADMIN — IPRATROPIUM BROMIDE 0.5 MG: 0.5 SOLUTION RESPIRATORY (INHALATION) at 19:55

## 2021-05-22 RX ADMIN — METHYLPREDNISOLONE SODIUM SUCCINATE 40 MG: 40 INJECTION, POWDER, FOR SOLUTION INTRAMUSCULAR; INTRAVENOUS at 12:16

## 2021-05-22 RX ADMIN — LEVALBUTEROL HYDROCHLORIDE 1.25 MG: 1.25 SOLUTION, CONCENTRATE RESPIRATORY (INHALATION) at 19:55

## 2021-05-22 RX ADMIN — BUDESONIDE 0.5 MG: 0.5 INHALANT ORAL at 19:56

## 2021-05-22 RX ADMIN — LEVALBUTEROL HYDROCHLORIDE 1.25 MG: 1.25 SOLUTION, CONCENTRATE RESPIRATORY (INHALATION) at 13:07

## 2021-05-22 RX ADMIN — GUAIFENESIN 1200 MG: 600 TABLET, EXTENDED RELEASE ORAL at 12:16

## 2021-05-22 RX ADMIN — METHYLPREDNISOLONE SODIUM SUCCINATE 40 MG: 40 INJECTION, POWDER, FOR SOLUTION INTRAMUSCULAR; INTRAVENOUS at 05:57

## 2021-05-22 RX ADMIN — FORMOTEROL FUMARATE DIHYDRATE 20 MCG: 20 SOLUTION RESPIRATORY (INHALATION) at 19:55

## 2021-05-22 NOTE — PROGRESS NOTES
Progress Note - Pulmonary   Gaile Pap 76 y o  female MRN: [id number]  Unit/Bed#: S -99 Encounter: [id number]  Code Status: Level 3 - DNAR and DNI    Jennifer Isabel is a 76 y o  Past Medical History:   Diagnosis Date    COPD [facility]chronic obstructive pulmonary disease) ([facility] [address] )     History of screening mammography     last assessed: 10/31/2017    Osteoporosis      71-year-old female with very severe COPD FEV1/FVC Ratio is 39%   FEV1 is 27% predicted admitted for COPD exacerbation    Hospital day 2     Assessment/Plan:   Very severe COPD with acute exacerbation:  - FEV1/FVC Ratio is 39%   FEV1 is 27% predicted   -lung sounds very diminished  -transition Breo to Perforomist/Pulmicort  -increased steroids from 40 mg q 8 to 40 mg q 6 hours    Acute hypoxemia:  -currently requiring 2 L nasal cannula oxygen  -titrate for oxygen saturations 89-92%    Leukocytosis:  -secondary to steroid demargination  -afebrile with no signs or symptoms of acute infection  _________________________________________________    Subjective: Pt seen and examined at bedside    Chief Complaint:  I am still short of breath     Labs Reviewed:  Yes, increased white blood cell count  Imaging Reviewed:  Chest x-ray :  No acute disease  Collaborative Discussion:  Discussed with physician assistant from the medicine team to increase steroids and transition of Pulmicort Perforomist  Tele Events:     Vitals:   Vitals:    21 1535 21 2112 21 2219 21 0733   BP: 142/66  141/60    BP Location: Left arm  Left arm    Pulse: 91  (!) 113    Resp: 20  18    Temp: 97 8 °F (36 6 °C)  97 8 °F (36 6 °C)    TempSrc: Oral  Oral    SpO2: (!) 88% 96% 90% 92%   Weight:         Weight (last 2 days)     Date/Time   Weight    21 0107   63 5 (139 99)            Temp (24hrs), Av 8 °F (36 6 °C), Min:97 8 °F (36 6 °C), Max:97 8 °F (36 6 °C)  Current: Temperature: 97 8 °F (36 6 °C)          IV Infusions:       Nutrition:        Diet Orders   (From admission, onward)             Start     Ordered    05/21/21 0818  Room Service  Once     Question:  Type of Service  Answer:  Room Service - Appropriate with Assistance    05/21/21 0817    05/20/21 1540  Diet Regular; Regular House  Diet effective now     Question Answer Comment   Diet Type Regular    Regular Regular House    RD to adjust diet per protocol? Yes        05/20/21 1539                Ins/Outs:   I/O       05/20 0701 - 05/21 0700 05/21 0701 - 05/22 0700 05/22 0701 - 05/23 0700    P  O   600     Total Intake(mL/kg)  600 (9 4)     Net  +600            Unmeasured Urine Occurrence  1 x           Lines/Drains:  Invasive Devices     Peripheral Intravenous Line            Peripheral IV 05/20/21 Right Antecubital 1 day                 Active medications:  Scheduled Meds:  Current Facility-Administered Medications   Medication Dose Route Frequency Provider Last Rate    acetaminophen  650 mg Oral Q6H PRN Edith Salmon PA-C      cholecalciferol  2,000 Units Oral Daily Efrain Bernal PA-C      enoxaparin  40 mg Subcutaneous Daily Efrain Bernal PA-C      fluticasone-vilanterol  1 puff Inhalation Daily Efrain Bernal PA-C      ipratropium  0 5 mg Nebulization TID Efrain Andrade PA-C      levalbuterol  1 25 mg Nebulization TID Alexia Ramirez MD      methylPREDNISolone sodium succinate  40 mg Intravenous Q8H [address] Efrain Andrade PA-C       PRN Meds:acetaminophen, 650 mg, Q6H PRN      ____________________________________________________________________    Physical Exam  Constitutional:       Appearance: She is well-developed  HENT:      Head: Normocephalic and atraumatic  Eyes:      Conjunctiva/sclera: Conjunctivae normal       Pupils: Pupils are equal, round, and reactive to light  Neck:      Musculoskeletal: Normal range of motion and neck supple  Cardiovascular:      Rate and Rhythm: Normal rate and regular rhythm  Heart sounds: Normal heart sounds     Pulmonary:      Effort: Accessory muscle usage present  No respiratory distress  Breath sounds: Examination of the right-upper field reveals decreased breath sounds and wheezing  Examination of the left-upper field reveals decreased breath sounds and wheezing  Examination of the right-middle field reveals decreased breath sounds and wheezing  Examination of the left-middle field reveals decreased breath sounds and wheezing  Examination of the right-lower field reveals decreased breath sounds and wheezing  Examination of the left-lower field reveals decreased breath sounds and wheezing  Decreased breath sounds and wheezing present  No rales  Comments: Very diminished breath sounds diffusely with very faint end expiratory wheezes    94% on 1 5 L nasal cannula    Mild abdominal accessory muscle use  Chest:      Chest wall: No tenderness  Abdominal:      General: Bowel sounds are normal       Palpations: Abdomen is soft  Musculoskeletal: Normal range of motion  Skin:     General: Skin is warm and dry  Neurological:      Mental Status: She is alert and oriented to person, place, and time         ____________________________________________________________________    Invasive/non-invasive ventilation settings   Respiratory    Lab Data (Last 4 hours)    None         O2/Vent Data (Last 4 hours)    None                Laboratory and Diagnostics:  Results from last 7 days   Lab Units 05/22/21  0459 05/21/21  0519 05/20/21  1224   WBC Thousand/uL 18 04* 8 67 10 80*   HEMOGLOBIN g/dL 12 8 13 4 14 1   HEMATOCRIT % 41 3 43 7 46 0   PLATELETS Thousands/uL 220 212 235   NEUTROS PCT %  --  86* 77*   MONOS PCT %  --  3* 7     Results from last 7 days   Lab Units 05/20/21  1224   SODIUM mmol/L 143   POTASSIUM mmol/L 4 2   CHLORIDE mmol/L 103   CO2 mmol/L 34*   ANION GAP mmol/L 6   BUN mg/dL 16   CREATININE mg/dL 0 50*   CALCIUM mg/dL 9 0   GLUCOSE RANDOM mg/dL 103   ALT U/L 41   AST U/L 21   ALK PHOS U/L 60   ALBUMIN g/dL 3 4*   TOTAL BILIRUBIN mg/dL 0 33 ABG:    VBG:    Results from last 7 days   Lab Units 05/20/21  2343   PROCALCITONIN ng/ml <0 05       Micro        Imaging:   XR chest 1 view portable   Final Result by Abraham Phan MD (05/20 1250)      No acute cardiopulmonary disease  Emphysema           Workstation performed: [id number]             Micro: No results found for: [facility], [facility], [facility], [facility] [address], [address] [facility]         Invalid input(s): Esvin Guan

## 2021-05-22 NOTE — ASSESSMENT & PLAN NOTE
· POA, patient with worsening SOB with exertion minimally over the last month, worse over last week  Not moving much air on admission and very tight  Slight wheeze auscultated  Suggestive of COPD exacerbation  COVID and CXR negative   · Patient feeling slightly better today  Moving more air, but still wheezing and dyspneic   Requiring O2    · Consult Pulmonary Disease, appreciate input    · Solumedrol 40 mg IV Q6   · Albuterol/Atrovent nebs  · Continue Perforomist and Pulmicort in place of Breo   · Add Mucinex  · Continue supplementary O2

## 2021-05-22 NOTE — PROGRESS NOTES
[facility]  Progress Note - Bianca Burns 1945, 76 y o  female MRN: [id number]  Unit/Bed#: S -69 Encounter: [id number]  Primary Care Provider: Nicolette Pelayo MD   Date and time admitted to hospital: 5/20/2021 12:07 PM    * COPD, very severe, wtih acute excaerbation [facility])  Assessment & Plan  · POA, patient with worsening SOB with exertion minimally over the last month, worse over last week  Not moving much air on admission and very tight  Slight wheeze auscultated  Suggestive of COPD exacerbation  COVID and CXR negative   · Patient feeling slightly better today  Moving more air, but still wheezing and dyspneic  Requiring O2    · Consult Pulmonary Disease, appreciate input    · Solumedrol 40 mg IV Q6   · Albuterol/Atrovent nebs  · Continue Perforomist and Pulmicort in place of Breo   · Add Mucinex  · Continue supplementary O2     Acute respiratory failure with hypoxia ([address] )  Assessment & Plan  · Patient was satting in the 70s at her pulmonary disease provider's office  Does not wear O2 at home  · Continue supplementary O2  · Plan as above       VTE Pharmacologic Prophylaxis:   Pharmacologic: Patient has refused VTE prophylaxis  Mechanical VTE Prophylaxis in Place: Yes    Patient Centered Rounds: I have performed bedside rounds with nursing staff today  Discussions with Specialists or Other Care Team Provider: Discussed with pulmonary, RN, CM    Education and Discussions with Family / Patient: Discussed with patient,      Time Spent for Care: 30 minutes  More than 50% of total time spent on counseling and coordination of care as described above      Current Length of Stay: 2 day(s)    Current Patient Status: Inpatient   Certification Statement: The patient will continue to require additional inpatient hospital stay due to on going IV steroids, nebulization     Discharge Plan: Pending improved respiratory status     Code Status: Level 3 - DNAR and DNI      Subjective: Patient reports that she is feeling slightly better  States that she coughed a bit after her one breathing treatment and felt that she had some mucus to bring up  Denies chest pain  Denies fevers or chills  Objective:     Vitals:   Temp (24hrs), Av 8 °F (36 6 °C), Min:97 8 °F (36 6 °C), Max:97 8 °F (36 6 °C)    Temp:  [97 8 °F (36 6 °C)] 97 8 °F (36 6 °C)  HR:  [] 113  Resp:  [18-20] 18  BP: (141-142)/(60-66) 141/60  SpO2:  [88 %-97 %] 92 %  Body mass index is 27 34 kg/m²  Input and Output Summary (last 24 hours): Intake/Output Summary (Last 24 hours) at 2021 1253  Last data filed at 2021 0601  Gross per 24 hour   Intake 120 ml   Output --   Net 120 ml       Physical Exam:     Physical Exam  Constitutional:       General: She is not in acute distress  Appearance: She is well-developed  She is not ill-appearing or diaphoretic  Interventions: Nasal cannula in place  HENT:      Head: Normocephalic and atraumatic  Mouth/Throat:      Mouth: Mucous membranes are moist    Eyes:      General: No scleral icterus  Conjunctiva/sclera: Conjunctivae normal       Pupils: Pupils are equal, round, and reactive to light  Neck:      Musculoskeletal: Neck supple  Cardiovascular:      Rate and Rhythm: Normal rate and regular rhythm  Heart sounds: Normal heart sounds, S1 normal and S2 normal  No murmur  No S3 or S4 sounds  Pulmonary:      Effort: Pulmonary effort is normal  No accessory muscle usage or respiratory distress  Breath sounds: No stridor  Examination of the right-upper field reveals wheezing  Examination of the left-upper field reveals wheezing  Examination of the right-lower field reveals wheezing  Examination of the left-lower field reveals wheezing  Decreased breath sounds and wheezing present  No rales  Chest:      Chest wall: No tenderness  Abdominal:      General: Bowel sounds are normal  There is no distension        Palpations: Abdomen is soft  There is no mass  Tenderness: There is no abdominal tenderness  There is no guarding or rebound  Musculoskeletal:      Right lower leg: No edema  Left lower leg: No edema  Skin:     General: Skin is warm and dry  Neurological:      General: No focal deficit present  Mental Status: She is alert  Mental status is at baseline  Motor: No tremor or seizure activity  Additional Data:     Labs:    Results from last 7 days   Lab Units 05/22/21  0459 05/21/21  0519   WBC Thousand/uL 18 04* 8 67   HEMOGLOBIN g/dL 12 8 13 4   HEMATOCRIT % 41 3 43 7   PLATELETS Thousands/uL 220 212   NEUTROS PCT %  --  86*   LYMPHS PCT %  --  10*   MONOS PCT %  --  3*   EOS PCT %  --  0     Results from last 7 days   Lab Units 05/20/21  1224   POTASSIUM mmol/L 4 2   CHLORIDE mmol/L 103   CO2 mmol/L 34*   BUN mg/dL 16   CREATININE mg/dL 0 50*   CALCIUM mg/dL 9 0   ALK PHOS U/L 60   ALT U/L 41   AST U/L 21           * I Have Reviewed All Lab Data Listed Above  * Additional Pertinent Lab Tests Reviewed:  Benito Carvalho Admission Reviewed    Imaging:    Imaging Reports Reviewed Today Include: None  Imaging Personally Reviewed by Myself Includes:  None    Recent Cultures (last 7 days):           Last 24 Hours Medication List:   Current Facility-Administered Medications   Medication Dose Route Frequency Provider Last Rate    acetaminophen  650 mg Oral Q6H PRN Wesly Hunter PA-C      budesonide  0 5 mg Nebulization Q12H Shankar Phan PA-C      cholecalciferol  2,000 Units Oral Daily Efrain Luu PA-C      enoxaparin  40 mg Subcutaneous Daily Efrain Luu PA-C      formoterol  20 mcg Nebulization Q12H Shankar Phan PA-C      guaiFENesin  1,200 mg Oral Q12H [address] Efrain Luu PA-C      ipratropium  0 5 mg Nebulization TID Efrain Luu PA-C      levalbuterol  1 25 mg Nebulization TID Misbah Presley MD      methylPREDNISolone sodium succinate  40 mg Intravenous Q6H [address] Thomas Ponce PA-C      sodium chloride  1 spray Each Nare Q1H PRN Elsa Weiner PA-C          Today, Patient Was Seen By: Elsa Weiner PA-C    ** Please Note: Dictation voice to text software may have been used in the creation of this document   ** 27.4

## 2021-05-22 NOTE — PLAN OF CARE
Problem: Potential for Falls  Goal: Patient will remain free of falls  Description: INTERVENTIONS:  - Assess patient frequently for physical needs  -  Identify cognitive and physical deficits and behaviors that affect risk of falls    -  [place] fall precautions as indicated by assessment   - Educate patient/family on patient safety including physical limitations  - Instruct patient to call for assistance with activity based on assessment  - Modify environment to reduce risk of injury  - Consider OT/PT consult to assist with strengthening/mobility  Outcome: Progressing

## 2021-05-23 LAB
BASOPHILS # BLD AUTO: 0.01 THOUSANDS/ΜL (ref 0–0.1)
BASOPHILS NFR BLD AUTO: 0 % (ref 0–1)
EOSINOPHIL # BLD AUTO: 0 THOUSAND/ΜL (ref 0–0.61)
EOSINOPHIL NFR BLD AUTO: 0 % (ref 0–6)
ERYTHROCYTE [DISTWIDTH] IN BLOOD BY AUTOMATED COUNT: 12 % (ref 11.6–15.1)
HCT VFR BLD AUTO: 41.8 % (ref 34.8–46.1)
HGB BLD-MCNC: 12.8 G/DL (ref 11.5–15.4)
IMM GRANULOCYTES # BLD AUTO: 0.1 THOUSAND/UL (ref 0–0.2)
IMM GRANULOCYTES NFR BLD AUTO: 1 % (ref 0–2)
LYMPHOCYTES # BLD AUTO: 0.7 THOUSANDS/ΜL (ref 0.6–4.47)
LYMPHOCYTES NFR BLD AUTO: 5 % (ref 14–44)
MCH RBC QN AUTO: 29.2 PG (ref 26.8–34.3)
MCHC RBC AUTO-ENTMCNC: 30.6 G/DL (ref 31.4–37.4)
MCV RBC AUTO: 95 FL (ref 82–98)
MONOCYTES # BLD AUTO: 0.29 THOUSAND/ΜL (ref 0.17–1.22)
MONOCYTES NFR BLD AUTO: 2 % (ref 4–12)
NEUTROPHILS # BLD AUTO: 13.13 THOUSANDS/ΜL (ref 1.85–7.62)
NEUTS SEG NFR BLD AUTO: 92 % (ref 43–75)
NRBC BLD AUTO-RTO: 0 /100 WBCS
PLATELET # BLD AUTO: 203 THOUSANDS/UL (ref 149–390)
PMV BLD AUTO: 10 FL (ref 8.9–12.7)
RBC # BLD AUTO: 4.38 MILLION/UL (ref 3.81–5.12)
WBC # BLD AUTO: 14.23 THOUSAND/UL (ref 4.31–10.16)

## 2021-05-23 PROCEDURE — 99232 SBSQ HOSP IP/OBS MODERATE 35: CPT | Performed by: PHYSICIAN ASSISTANT

## 2021-05-23 PROCEDURE — 85025 COMPLETE CBC W/AUTO DIFF WBC: CPT | Performed by: PHYSICIAN ASSISTANT

## 2021-05-23 PROCEDURE — 94760 N-INVAS EAR/PLS OXIMETRY 1: CPT

## 2021-05-23 PROCEDURE — 94640 AIRWAY INHALATION TREATMENT: CPT

## 2021-05-23 PROCEDURE — 99232 SBSQ HOSP IP/OBS MODERATE 35: CPT | Performed by: INTERNAL MEDICINE

## 2021-05-23 RX ADMIN — METHYLPREDNISOLONE SODIUM SUCCINATE 40 MG: 40 INJECTION, POWDER, FOR SOLUTION INTRAMUSCULAR; INTRAVENOUS at 23:29

## 2021-05-23 RX ADMIN — LEVALBUTEROL HYDROCHLORIDE 1.25 MG: 1.25 SOLUTION, CONCENTRATE RESPIRATORY (INHALATION) at 19:58

## 2021-05-23 RX ADMIN — IPRATROPIUM BROMIDE 0.5 MG: 0.5 SOLUTION RESPIRATORY (INHALATION) at 13:15

## 2021-05-23 RX ADMIN — METHYLPREDNISOLONE SODIUM SUCCINATE 40 MG: 40 INJECTION, POWDER, FOR SOLUTION INTRAMUSCULAR; INTRAVENOUS at 05:01

## 2021-05-23 RX ADMIN — BUDESONIDE 0.5 MG: 0.5 INHALANT ORAL at 07:22

## 2021-05-23 RX ADMIN — IPRATROPIUM BROMIDE 0.5 MG: 0.5 SOLUTION RESPIRATORY (INHALATION) at 07:22

## 2021-05-23 RX ADMIN — Medication 2000 UNITS: at 09:50

## 2021-05-23 RX ADMIN — LEVALBUTEROL HYDROCHLORIDE 1.25 MG: 1.25 SOLUTION, CONCENTRATE RESPIRATORY (INHALATION) at 13:15

## 2021-05-23 RX ADMIN — METHYLPREDNISOLONE SODIUM SUCCINATE 40 MG: 40 INJECTION, POWDER, FOR SOLUTION INTRAMUSCULAR; INTRAVENOUS at 12:56

## 2021-05-23 RX ADMIN — IPRATROPIUM BROMIDE 0.5 MG: 0.5 SOLUTION RESPIRATORY (INHALATION) at 19:58

## 2021-05-23 RX ADMIN — BUDESONIDE 0.5 MG: 0.5 INHALANT ORAL at 19:58

## 2021-05-23 RX ADMIN — FORMOTEROL FUMARATE DIHYDRATE 20 MCG: 20 SOLUTION RESPIRATORY (INHALATION) at 07:42

## 2021-05-23 RX ADMIN — FORMOTEROL FUMARATE DIHYDRATE 20 MCG: 20 SOLUTION RESPIRATORY (INHALATION) at 19:58

## 2021-05-23 RX ADMIN — METHYLPREDNISOLONE SODIUM SUCCINATE 40 MG: 40 INJECTION, POWDER, FOR SOLUTION INTRAMUSCULAR; INTRAVENOUS at 18:23

## 2021-05-23 RX ADMIN — LEVALBUTEROL HYDROCHLORIDE 1.25 MG: 1.25 SOLUTION, CONCENTRATE RESPIRATORY (INHALATION) at 07:22

## 2021-05-23 NOTE — PROGRESS NOTES
[facility]  Progress Note - Hernandoadwoa Brodie 1945, 76 y o  female MRN: [id number]  Unit/Bed#: S -61 Encounter: [id number]  Primary Care Provider: Ariane Lema MD   Date and time admitted to hospital: 5/20/2021 12:07 PM    * COPD, very severe, wtih acute excaerbation [facility])  Assessment & Plan  · POA, patient with worsening SOB with exertion minimally over the last month, worse over last week  Not moving much air on admission and very tight  Slight wheeze auscultated  Suggestive of COPD exacerbation  COVID and CXR negative   · Patient feeling slightly better today and is moving around easier  Moving more air, but still wheezing and dyspneic  Requiring O2, but able to wean to 1 L today     · Consult Pulmonary Disease, appreciate input    · Solumedrol 40 mg IV Q6   · Albuterol/Atrovent nebs  · Continue Perforomist and Pulmicort in place of Breo   · Add Mucinex  · Continue supplementary O2, wean as tolerated   · Likely will need home O2 eval      Acute respiratory failure with hypoxia ([address] )  Assessment & Plan  · Patient was satting in the 70s at her pulmonary disease provider's office  Does not wear O2 at home  · Continue supplementary O2  · Plan as above       VTE Pharmacologic Prophylaxis:   Pharmacologic: Enoxaparin (Lovenox)  Mechanical VTE Prophylaxis in Place: Yes    Patient Centered Rounds: I have performed bedside rounds with nursing staff today  Discussions with Specialists or Other Care Team Provider: Discussed with RN, CM    Education and Discussions with Family / Patient: Discussed with patient, called      Time Spent for Care: 30 minutes  More than 50% of total time spent on counseling and coordination of care as described above      Current Length of Stay: 3 day(s)    Current Patient Status: Inpatient   Certification Statement: The patient will continue to require additional inpatient hospital stay due to management of severe COPD exacerbation     Discharge Plan: Perhaps 24-48 hours pending further improvement and steroid wean     Code Status: Level 3 - DNAR and DNI      Subjective:   Patient reports that she is feeling better today  States that she is moving around more easily without SOB  States that her cough has improved  States that the Pulmicort and Perforomist seem to be helping  Denies chest pain or fevers or chills  Objective:     Vitals:   Temp (24hrs), Av 5 °F (36 9 °C), Min:98 2 °F (36 8 °C), Max:98 8 °F (37 1 °C)    Temp:  [98 2 °F (36 8 °C)-98 8 °F (37 1 °C)] 98 8 °F (37 1 °C)  HR:  [73-81] 73  Resp:  [18] 18  BP: (153-165)/(65-78) 153/74  SpO2:  [91 %-94 %] 91 %  Body mass index is 27 34 kg/m²  Input and Output Summary (last 24 hours):     No intake or output data in the 24 hours ending 21 1309    Physical Exam:     Physical Exam  Constitutional:       General: She is not in acute distress  Appearance: She is well-developed  She is not ill-appearing or diaphoretic  Interventions: Nasal cannula in place  HENT:      Head: Normocephalic and atraumatic  Mouth/Throat:      Mouth: Mucous membranes are moist    Eyes:      General: No scleral icterus  Conjunctiva/sclera: Conjunctivae normal       Pupils: Pupils are equal, round, and reactive to light  Neck:      Musculoskeletal: Neck supple  Cardiovascular:      Rate and Rhythm: Normal rate and regular rhythm  Heart sounds: Normal heart sounds, S1 normal and S2 normal  No murmur  No S3 or S4 sounds  Pulmonary:      Effort: Pulmonary effort is normal  No accessory muscle usage or respiratory distress  Breath sounds: No stridor  Examination of the right-upper field reveals decreased breath sounds and wheezing  Examination of the left-upper field reveals decreased breath sounds and wheezing  Examination of the right-lower field reveals decreased breath sounds and wheezing  Examination of the left-lower field reveals decreased breath sounds and wheezing   Decreased breath sounds and wheezing present  No rhonchi or rales  Chest:      Chest wall: No tenderness  Abdominal:      General: Bowel sounds are normal  There is no distension  Palpations: Abdomen is soft  There is no mass  Tenderness: There is no abdominal tenderness  There is no guarding or rebound  Skin:     General: Skin is warm and dry  Neurological:      General: No focal deficit present  Mental Status: She is alert and oriented to person, place, and time  Motor: No tremor or seizure activity  Additional Data:     Labs:    Results from last 7 days   Lab Units 05/23/21  0448   WBC Thousand/uL 14 23*   HEMOGLOBIN g/dL 12 8   HEMATOCRIT % 41 8   PLATELETS Thousands/uL 203   NEUTROS PCT % 92*   LYMPHS PCT % 5*   MONOS PCT % 2*   EOS PCT % 0     Results from last 7 days   Lab Units 05/20/21  1224   POTASSIUM mmol/L 4 2   CHLORIDE mmol/L 103   CO2 mmol/L 34*   BUN mg/dL 16   CREATININE mg/dL 0 50*   CALCIUM mg/dL 9 0   ALK PHOS U/L 60   ALT U/L 41   AST U/L 21           * I Have Reviewed All Lab Data Listed Above  * Additional Pertinent Lab Tests Reviewed:  [address] Admission Reviewed    Imaging:    Imaging Reports Reviewed Today Include: None  Imaging Personally Reviewed by Myself Includes:  None    Recent Cultures (last 7 days):           Last 24 Hours Medication List:   Current Facility-Administered Medications   Medication Dose Route Frequency Provider Last Rate    acetaminophen  650 mg Oral Q6H PRN Shanelle Coad, PA-C      budesonide  0 5 mg Nebulization Q12H Nadiya Santana, PA-C      cholecalciferol  2,000 Units Oral Daily Efrain Artur [place], PA-C      enoxaparin  40 mg Subcutaneous Daily Efrain Artur [place], PA-C      formoterol  20 mcg Nebulization Q12H Nadiya Santana, PA-C      guaiFENesin  1,200 mg Oral Q12H [address] Efrain Artur [place], PA-C      ipratropium  0 5 mg Nebulization TID Efrain Cristobalcky Dan[place], PA-C      levalbuterol  1 25 mg Nebulization TID Garo Cox MD  methylPREDNISolone sodium succinate  40 mg Intravenous Q6H [address] [facility], [place]      sodium chloride  1 spray Each Nare Q1H PRN Aline Lance PA-C          Today, Patient Was Seen By: Aline Lance PA-C    ** Please Note: Dictation voice to text software may have been used in the creation of this document   **

## 2021-05-23 NOTE — PROGRESS NOTES
Progress Note - Pulmonary   Lisset Mann 76 y o  female MRN: [id number]  Unit/Bed#: S -50 Encounter: [id number]  Code Status: Level 3 - DNAR and DNI    Jena Alonso is a 76 y o  Past Medical History:   Diagnosis Date    COPD (chronic obstructive pulmonary disease) [address] [address] )     History of screening mammography     last assessed: 10/31/2017    Osteoporosis      77-year-old female with very severe COPD FEV1/FVC Ratio is 39%   FEV1 is 27% predicted admitted for COPD exacerbation    Hospital day 2     Assessment/Plan:   Very severe COPD with acute exacerbation:  - FEV1/FVC Ratio is 39%   FEV1 is 27% predicted   -lung sounds very diminished  -continue Perforomist/Pulmicort  -continue 40 mg q 6 hours steroids    Acute hypoxemia:  -improved to RA   -continue to monitor  -titrate for oxygen saturations 89-92%    Leukocytosis:  -secondary to steroid demargination  -improved today   -afebrile with no signs or symptoms of acute infection  _________________________________________________    Subjective: Pt seen and examined at bedside    Chief Complaint:  I am still short of breath     Labs Reviewed:  Yes, improved white blood cell count  Imaging Reviewed:  Chest x-ray :  No acute disease  Tele Events:     Vitals:   Vitals:    21 2228 21 0722 21 0725 21 1315   BP: 165/78  153/74    BP Location: Right arm  Right arm    Pulse: 78  73    Resp: 18  18    Temp: 98 2 °F (36 8 °C)  98 8 °F (37 1 °C)    TempSrc: Oral  Oral    SpO2: 92% 91% 91% 93%   Weight:         Weight (last 2 days)     Date/Time   Weight    21 0107   63 5 (139 99)            Temp (24hrs), Av 5 °F (36 9 °C), Min:98 2 °F (36 8 °C), Max:98 8 °F (37 1 °C)  Current: Temperature: 98 8 °F (37 1 °C)          IV Infusions:       Nutrition:        Diet Orders   (From admission, onward)             Start     Ordered    21 0818  Room Service  Once     Question:  Type of Service  Answer:  Room Service - Appropriate with Assistance 05/21/21 0817    05/20/21 1540  Diet Regular; Regular House  Diet effective now     Question Answer Comment   Diet Type Regular    Regular Regular House    RD to adjust diet per protocol? Yes        05/20/21 1539                Ins/Outs:   I/O       05/20 0701 - 05/21 0700 05/21 0701 - 05/22 0700 05/22 0701 - 05/23 0700    P  O   600     Total Intake(mL/kg)  600 (9 4)     Net  +600            Unmeasured Urine Occurrence  1 x           Lines/Drains:  Invasive Devices     Peripheral Intravenous Line            Peripheral IV 05/20/21 Right Antecubital 3 days                 Active medications:  Scheduled Meds:  Current Facility-Administered Medications   Medication Dose Route Frequency Provider Last Rate    acetaminophen  650 mg Oral Q6H PRN Lonoscar Nayak PA-C      budesonide  0 5 mg Nebulization Q12H Kailee Farr PA-C      cholecalciferol  2,000 Units Oral Daily Efrain Gonzalez PA-C      enoxaparin  40 mg Subcutaneous Daily Efrain Gonzalez PA-C      formoterol  20 mcg Nebulization Q12H Kailee Farr PA-C      guaiFENesin  1,200 mg Oral Q12H [address] Efrain Gonzalez PA-C      ipratropium  0 5 mg Nebulization TID Efrain Andrade PA-C      levalbuterol  1 25 mg Nebulization TID Claire Valentine MD      methylPREDNISolone sodium succinate  40 mg Intravenous Q6H [facility]      sodium chloride  1 spray Each Nare Q1H PRN Efrain Andrade PA-C       PRN Meds:acetaminophen, 650 mg, Q6H PRN  sodium chloride, 1 spray, Q1H PRN      ____________________________________________________________________    Physical Exam  Constitutional:       Appearance: She is well-developed  HENT:      Head: Normocephalic and atraumatic  Eyes:      Conjunctiva/sclera: Conjunctivae normal       Pupils: Pupils are equal, round, and reactive to light  Neck:      Musculoskeletal: Normal range of motion and neck supple  Cardiovascular:      Rate and Rhythm: Normal rate and regular rhythm        Heart sounds: Normal heart sounds  Pulmonary:      Effort: Pulmonary effort is normal  No respiratory distress  Breath sounds: Examination of the right-upper field reveals decreased breath sounds and wheezing  Examination of the left-upper field reveals decreased breath sounds and wheezing  Examination of the right-middle field reveals decreased breath sounds and wheezing  Examination of the left-middle field reveals decreased breath sounds and wheezing  Examination of the right-lower field reveals decreased breath sounds and wheezing  Examination of the left-lower field reveals decreased breath sounds and wheezing  Decreased breath sounds and wheezing present  No rales  Comments: Moderately decreased breath sounds ( improved from yesterday )    RA 02     B/L expiratory wheezing now present   Chest:      Chest wall: No tenderness  Abdominal:      General: Bowel sounds are normal       Palpations: Abdomen is soft  Musculoskeletal: Normal range of motion  Skin:     General: Skin is warm and dry  Neurological:      Mental Status: She is alert and oriented to person, place, and time         ____________________________________________________________________    Invasive/non-invasive ventilation settings   Respiratory    Lab Data (Last 4 hours)    None         O2/Vent Data (Last 4 hours)    None                Laboratory and Diagnostics:  Results from last 7 days   Lab Units 05/23/21  0448 05/22/21  0459 05/21/21  0519 05/20/21  1224   WBC Thousand/uL 14 23* 18 04* 8 67 10 80*   HEMOGLOBIN g/dL 12 8 12 8 13 4 14 1   HEMATOCRIT % 41 8 41 3 43 7 46 0   PLATELETS Thousands/uL 203 220 212 235   NEUTROS PCT % 92*  --  86* 77*   MONOS PCT % 2*  --  3* 7     Results from last 7 days   Lab Units 05/20/21  1224   SODIUM mmol/L 143   POTASSIUM mmol/L 4 2   CHLORIDE mmol/L 103   CO2 mmol/L 34*   ANION GAP mmol/L 6   BUN mg/dL 16   CREATININE mg/dL 0 50*   CALCIUM mg/dL 9 0   GLUCOSE RANDOM mg/dL 103   ALT U/L 41   AST U/L 21   ALK PHOS U/L 60   ALBUMIN g/dL 3 4*   TOTAL BILIRUBIN mg/dL 0 33                       ABG:    VBG:    Results from last 7 days   Lab Units 05/22/21  0459 05/20/21  2343   PROCALCITONIN ng/ml <0 05 <0 05       Micro        Imaging:   XR chest 1 view portable   Final Result by Abraham Phan MD (05/20 1250)      No acute cardiopulmonary disease  Emphysema           Workstation performed: [id number]             Micro: No results found for: [facility], [facility] [place] [facility], [address], [address] [facility]         Invalid input(s): Esvin Guan

## 2021-05-23 NOTE — ASSESSMENT & PLAN NOTE
· POA, patient with worsening SOB with exertion minimally over the last month, worse over last week  Not moving much air on admission and very tight  Slight wheeze auscultated  Suggestive of COPD exacerbation  COVID and CXR negative   · Patient feeling slightly better today and is moving around easier  Moving more air, but still wheezing and dyspneic   Requiring O2, but able to wean to 1 L today     · Consult Pulmonary Disease, appreciate input    · Solumedrol 40 mg IV Q6   · Albuterol/Atrovent nebs  · Continue Perforomist and Pulmicort in place of Breo   · Add Mucinex  · Continue supplementary O2, wean as tolerated   · Likely will need home O2 eval

## 2021-05-24 PROCEDURE — 94640 AIRWAY INHALATION TREATMENT: CPT

## 2021-05-24 PROCEDURE — 99232 SBSQ HOSP IP/OBS MODERATE 35: CPT | Performed by: INTERNAL MEDICINE

## 2021-05-24 PROCEDURE — 99232 SBSQ HOSP IP/OBS MODERATE 35: CPT | Performed by: PHYSICIAN ASSISTANT

## 2021-05-24 PROCEDURE — 94760 N-INVAS EAR/PLS OXIMETRY 1: CPT

## 2021-05-24 RX ORDER — METHYLPREDNISOLONE SODIUM SUCCINATE 40 MG/ML
40 INJECTION, POWDER, LYOPHILIZED, FOR SOLUTION INTRAMUSCULAR; INTRAVENOUS EVERY 8 HOURS SCHEDULED
Status: DISCONTINUED | OUTPATIENT
Start: 2021-05-24 | End: 2021-05-24

## 2021-05-24 RX ORDER — METHYLPREDNISOLONE SODIUM SUCCINATE 40 MG/ML
40 INJECTION, POWDER, LYOPHILIZED, FOR SOLUTION INTRAMUSCULAR; INTRAVENOUS EVERY 12 HOURS SCHEDULED
Status: DISCONTINUED | OUTPATIENT
Start: 2021-05-24 | End: 2021-05-25

## 2021-05-24 RX ORDER — METHYLPREDNISOLONE SODIUM SUCCINATE 40 MG/ML
40 INJECTION, POWDER, LYOPHILIZED, FOR SOLUTION INTRAMUSCULAR; INTRAVENOUS EVERY 12 HOURS SCHEDULED
Status: DISCONTINUED | OUTPATIENT
Start: 2021-05-24 | End: 2021-05-24

## 2021-05-24 RX ADMIN — IPRATROPIUM BROMIDE 0.5 MG: 0.5 SOLUTION RESPIRATORY (INHALATION) at 07:17

## 2021-05-24 RX ADMIN — BUDESONIDE 0.5 MG: 0.5 INHALANT ORAL at 07:17

## 2021-05-24 RX ADMIN — Medication 2000 UNITS: at 09:57

## 2021-05-24 RX ADMIN — METHYLPREDNISOLONE SODIUM SUCCINATE 40 MG: 40 INJECTION, POWDER, FOR SOLUTION INTRAMUSCULAR; INTRAVENOUS at 05:19

## 2021-05-24 RX ADMIN — FORMOTEROL FUMARATE DIHYDRATE 20 MCG: 20 SOLUTION RESPIRATORY (INHALATION) at 20:16

## 2021-05-24 RX ADMIN — LEVALBUTEROL HYDROCHLORIDE 1.25 MG: 1.25 SOLUTION, CONCENTRATE RESPIRATORY (INHALATION) at 13:13

## 2021-05-24 RX ADMIN — IPRATROPIUM BROMIDE 0.5 MG: 0.5 SOLUTION RESPIRATORY (INHALATION) at 20:16

## 2021-05-24 RX ADMIN — LEVALBUTEROL HYDROCHLORIDE 1.25 MG: 1.25 SOLUTION, CONCENTRATE RESPIRATORY (INHALATION) at 20:16

## 2021-05-24 RX ADMIN — FORMOTEROL FUMARATE DIHYDRATE 20 MCG: 20 SOLUTION RESPIRATORY (INHALATION) at 07:18

## 2021-05-24 RX ADMIN — BUDESONIDE 0.5 MG: 0.5 INHALANT ORAL at 20:16

## 2021-05-24 RX ADMIN — IPRATROPIUM BROMIDE 0.5 MG: 0.5 SOLUTION RESPIRATORY (INHALATION) at 13:13

## 2021-05-24 RX ADMIN — METHYLPREDNISOLONE SODIUM SUCCINATE 40 MG: 40 INJECTION, POWDER, FOR SOLUTION INTRAMUSCULAR; INTRAVENOUS at 16:44

## 2021-05-24 RX ADMIN — LEVALBUTEROL HYDROCHLORIDE 1.25 MG: 1.25 SOLUTION, CONCENTRATE RESPIRATORY (INHALATION) at 07:17

## 2021-05-24 NOTE — ASSESSMENT & PLAN NOTE
· Patient was satting in the 70s at her pulmonary disease provider's office   Does not wear O2 at home  · Requiring 1-2L NC  · Continue supplementary O2  · Plan as above

## 2021-05-24 NOTE — PLAN OF CARE
Problem: Potential for Falls  Goal: Patient will remain free of falls  Description: INTERVENTIONS:  - Assess patient frequently for physical needs  -  Identify cognitive and physical deficits and behaviors that affect risk of falls    -  [place] fall precautions as indicated by assessment   - Educate patient/family on patient safety including physical limitations  - Instruct patient to call for assistance with activity based on assessment  - Modify environment to reduce risk of injury  - Consider OT/PT consult to assist with strengthening/mobility  Outcome: Progressing     Problem: RESPIRATORY - ADULT  Goal: Achieves optimal ventilation and oxygenation  Description: INTERVENTIONS:  - Assess for changes in respiratory status  - Assess for changes in mentation and behavior  - Position to facilitate oxygenation and minimize respiratory effort  - Oxygen administered by appropriate delivery if ordered  - Initiate smoking cessation education as indicated  - Encourage broncho-pulmonary hygiene including cough, deep breathe, Incentive Spirometry  - Assess the need for suctioning and aspirate as needed  - Assess and instruct to report SOB or any respiratory difficulty  - Respiratory Therapy support as indicated  Outcome: Progressing     Problem: METABOLIC, FLUID AND ELECTROLYTES - ADULT  Goal: Electrolytes maintained within normal limits  Description: INTERVENTIONS:  - Monitor labs and assess patient for signs and symptoms of electrolyte imbalances  - Administer electrolyte replacement as ordered  - Monitor response to electrolyte replacements, including repeat lab results as appropriate  - Instruct patient on fluid and nutrition as appropriate  Outcome: Progressing

## 2021-05-24 NOTE — PROGRESS NOTES
Progress Note - Pulmonary   Marilyn Care 76 y o  female MRN: [id number]  Unit/Bed#: S -01 Encounter: [id number]    Assessment/Plan:    1  Acute hypoxic respiratory failure likely secondary to COPD exacerbation        -   Patient currently on 2 L 91%,  Patient does not wear home O2        -   Continue saturations greater than 89%        -   Pulmonary toileting;  Deep breathing cough, IS Q 1 hr, OOB  As tolerated        -   Will need ambulatory pulse ox prior to discharge    2  Very severe COPD with acute exacerbation       -   Inpatient:  IV Solu-Medrol 40 mg b i d ,  Budesonide/Perforomist b i d , Xopenex/Atrovent t i d         -   Home regimen:  Advair 250/50 mcg 1 puff b i d , Combivent 1 puff q i d ,  Than on 2 puffs q i d  p r n         -   Will need close pulmonary follow-up upon discharge        Chief Complaint:    "I am  Starting to feel better"    Subjective: Marshall Chavez  Was comfortably sitting in her bed  She reports that she does feel somewhat short of breath however she overall feels better since admission  No significant overnight events reported  Patient currently denies any fever, chills, hemoptysis, headaches, night sweats, pleuritic chest pain, or palpitations  Objective:    Vitals: Blood pressure 159/75, pulse 100, temperature 97 8 °F (36 6 °C), temperature source Oral, resp  rate 20, weight 63 5 kg (139 lb 15 9 oz), SpO2 91 %  2L,Body mass index is 27 34 kg/m²  No intake or output data in the 24 hours ending 05/24/21 1334    Invasive Devices     Peripheral Intravenous Line            Peripheral IV 05/24/21 Right Antecubital less than 1 day                Physical Exam:   Physical Exam  Constitutional:       General: She is not in acute distress  Appearance: Normal appearance  She is normal weight  She is not ill-appearing  HENT:      Head: Normocephalic and atraumatic  Nose: Nose normal  No congestion or rhinorrhea  Mouth/Throat:      Mouth: Mucous membranes are dry  Pharynx: No oropharyngeal exudate or posterior oropharyngeal erythema  Neck:      Musculoskeletal: Normal range of motion and neck supple  No neck rigidity or muscular tenderness  Cardiovascular:      Rate and Rhythm: Normal rate and regular rhythm  Pulses: Normal pulses  Heart sounds: Normal heart sounds  No murmur  No friction rub  No gallop  Pulmonary:      Effort: Pulmonary effort is normal  No tachypnea, bradypnea, accessory muscle usage or respiratory distress  Breath sounds: Decreased air movement present  No stridor or transmitted upper airway sounds  Decreased breath sounds present  No wheezing, rhonchi or rales  Comments:  Significantly diminished  aeration  Chest:      Chest wall: No tenderness  Abdominal:      General: Abdomen is flat  Bowel sounds are normal  There is no distension  Palpations: Abdomen is soft  There is no mass  Musculoskeletal: Normal range of motion  General: No swelling or tenderness  Skin:     General: Skin is warm and dry  Coloration: Skin is not jaundiced or pale  Neurological:      General: No focal deficit present  Mental Status: She is alert and oriented to person, place, and time  Mental status is at baseline  Cranial Nerves: No cranial nerve deficit  Sensory: No sensory deficit  Psychiatric:         Mood and Affect: Mood normal          Behavior: Behavior normal          Labs:  I have personally reviewed pertinent lab results 5/23/2021    Imaging and other studies: I have personally reviewed pertinent films in PACS      chest x-ray 5/20/2021-  No acute cardiopulmonary disease

## 2021-05-24 NOTE — PROGRESS NOTES
[facility]  Progress Note - Lexis Mccarthy 1945, 76 y o  female MRN: [id number]  Unit/Bed#: S -62 Encounter: [id number]  Primary Care Provider: Obi Guaman MD   Date and time admitted to hospital: 5/20/2021 12:07 PM    * COPD, very severe, wtih acute excaerbation [facility])  Assessment & Plan  · POA, patient with worsening SOB with exertion minimally over the last month, worse over last week  Suggestive of COPD exacerbation  COVID and CXR negative   · Patient feeling better today, less SOB and is moving around easier  Patient continues w/ decreased breath sounds and w/ less wheezing today  Requiring 1-2L NC today  · Consult Pulmonary Disease, appreciate input    · Wean Solumedrol 40 mg IV Q6 to 40mg IV Q8  · Albuterol/Atrovent nebs  · Continue Perforomist and Pulmicort in place of Breo   · Continue Mucinex  · Continue supplementary O2, wean as tolerated   · Likely will need home O2 eval      Acute respiratory failure with hypoxia (HCC)  Assessment & Plan  · Patient was satting in the 70s at her pulmonary disease provider's office  Does not wear O2 at home  · Requiring 1-2L NC  · Continue supplementary O2  · Plan as above       VTE Pharmacologic Prophylaxis: VTE Score: 6 Moderate Risk (Score 3-4) - Pharmacological DVT Prophylaxis Ordered: enoxaparin (Lovenox)  Patient Centered Rounds: I performed bedside rounds with nursing staff today  Discussions with Specialists or Other Care Team Provider: YRIS, RN, Pulm    Education and Discussions with Family / Patient: Patient declined call to   Time Spent for Care: 30 minutes  More than 50% of total time spent on counseling and coordination of care as described above      Current Length of Stay: 4 day(s)  Current Patient Status: Inpatient   Certification Statement: The patient will continue to require additional inpatient hospital stay due to ongoing tx of COPD exacerbation  Discharge Plan: Anticipate discharge in 48-72 hrs to home  Code Status: Level 3 - DNAR and DNI    Subjective:   Patient reports feeling better this morning  She feels less SOB  She is walking around her room  She does note SOB when she is bending forward  Objective:     Vitals:   Temp (24hrs), Av °F (36 7 °C), Min:97 8 °F (36 6 °C), Max:98 3 °F (36 8 °C)    Temp:  [97 8 °F (36 6 °C)-98 3 °F (36 8 °C)] 97 8 °F (36 6 °C)  HR:  [] 100  Resp:  [20] 20  BP: (140-159)/(61-81) 159/75  SpO2:  [90 %-95 %] 91 %  Body mass index is 27 34 kg/m²  Input and Output Summary (last 24 hours):   No intake or output data in the 24 hours ending 21 1316    Physical Exam:   Physical Exam  Constitutional:       General: She is not in acute distress  Appearance: Normal appearance  She is not ill-appearing  HENT:      Head: Normocephalic  Mouth/Throat:      Mouth: Mucous membranes are moist    Eyes:      Pupils: Pupils are equal, round, and reactive to light  Cardiovascular:      Rate and Rhythm: Normal rate and regular rhythm  Heart sounds: No murmur  No friction rub  No gallop  Pulmonary:      Effort: Pulmonary effort is normal       Breath sounds: No wheezing  Comments: Decreased breath sounds throughout  Abdominal:      General: Abdomen is flat  Bowel sounds are normal       Palpations: Abdomen is soft  Tenderness: There is no abdominal tenderness  Musculoskeletal: Normal range of motion  Right lower leg: No edema  Left lower leg: No edema  Skin:     General: Skin is warm and dry  Neurological:      General: No focal deficit present  Mental Status: She is alert and oriented to person, place, and time  Mental status is at baseline     Psychiatric:         Mood and Affect: Mood normal          Additional Data:     Labs:  Results from last 7 days   Lab Units 21  0448   WBC Thousand/uL 14 23*   HEMOGLOBIN g/dL 12 8   HEMATOCRIT % 41 8   PLATELETS Thousands/uL 203   NEUTROS PCT % 92*   LYMPHS PCT % 5*   MONOS PCT % 2*   EOS PCT % 0     Results from last 7 days   Lab Units 05/20/21  1224   SODIUM mmol/L 143   POTASSIUM mmol/L 4 2   CHLORIDE mmol/L 103   CO2 mmol/L 34*   BUN mg/dL 16   CREATININE mg/dL 0 50*   ANION GAP mmol/L 6   CALCIUM mg/dL 9 0   ALBUMIN g/dL 3 4*   TOTAL BILIRUBIN mg/dL 0 33   ALK PHOS U/L 60   ALT U/L 41   AST U/L 21   GLUCOSE RANDOM mg/dL 103                 Results from last 7 days   Lab Units 05/22/21  0459 05/20/21  2343   PROCALCITONIN ng/ml <0 05 <0 05       Lines/Drains:  Invasive Devices     Peripheral Intravenous Line            Peripheral IV 05/24/21 Right Antecubital less than 1 day                      Imaging: Reviewed radiology reports from this admission including: chest xray    Recent Cultures (last 7 days):         Last 24 Hours Medication List:   Current Facility-Administered Medications   Medication Dose Route Frequency Provider Last Rate    acetaminophen  650 mg Oral Q6H PRN Trino Nayak PA-C      budesonide  0 5 mg Nebulization Q12H Kailee Farr PA-C      cholecalciferol  2,000 Units Oral Daily Efraincarolina Gonzalez PA-C      enoxaparin  40 mg Subcutaneous Daily Efraincarolina Gonzalez PA-C      formoterol  20 mcg Nebulization Q12H Kailee Farr PA-C      guaiFENesin  1,200 mg Oral Q12H [facility] Efraincarolina Gonzalez PA-C      ipratropium  0 5 mg Nebulization TID Efraincarolina Gonzalez PA-C      levalbuterol  1 25 mg Nebulization TID Claire Valentine MD      methylPREDNISolone sodium succinate  40 mg Intravenous Q8H [facility] Viktoriya Johnson PA-C      sodium chloride  1 spray Each Nare Q1H PRN Trino Nayak PA-C          Today, Patient Was Seen By: Kush Cerda PA-C    **Please Note: This note may have been constructed using a voice recognition system  **

## 2021-05-24 NOTE — CASE MANAGEMENT
LOS: 4 days  Readmission: No  Risk of readmission: Green  Bundle: No    CM met with pt and her  Zack King at bedside  CM introduced self/role with dcp  Pt resides with her  in a single story home with 3 CARMELO  Pt independent with ADLs and ambulation  Pt uses a SPC as needed  No other DME in the home  Drives  Retired  No hx of VNA, STR, MH, or drug/alcohol abuse  Pharmacy: [facility]   to transport home at d/c  No POA or LW - pt reports her , Zack King, would be her health care decision maker in the event she was unable to make her own  CM reviewed d/c planning process including the following: identifying help at home, patient preference for d/c planning needs, Discharge Lounge, [facility] Meds to Bed program, availability of treatment team to discuss questions or concerns patient and/or family may have regarding understanding medications and recognizing signs and symptoms once discharged  CM also encouraged patient to follow up with all recommended appointments after discharge  Patient advised of importance for patient and family to participate in managing patients medical well being

## 2021-05-24 NOTE — ASSESSMENT & PLAN NOTE
· POA, patient with worsening SOB with exertion minimally over the last month, worse over last week  Suggestive of COPD exacerbation  COVID and CXR negative   · Patient feeling better today, less SOB and is moving around easier  Patient continues w/ decreased breath sounds and w/ less wheezing today   Requiring 1-2L NC today  · Consult Pulmonary Disease, appreciate input    · Wean Solumedrol 40 mg IV Q6 to 40mg IV Q8  · Albuterol/Atrovent nebs  · Continue Perforomist and Pulmicort in place of Breo   · Continue Mucinex  · Continue supplementary O2, wean as tolerated   · Likely will need home O2 eval

## 2021-05-25 LAB
ANION GAP SERPL CALCULATED.3IONS-SCNC: 0 MMOL/L (ref 4–13)
BUN SERPL-MCNC: 18 MG/DL (ref 5–25)
CALCIUM SERPL-MCNC: 8.5 MG/DL (ref 8.3–10.1)
CHLORIDE SERPL-SCNC: 104 MMOL/L (ref 100–108)
CO2 SERPL-SCNC: 37 MMOL/L (ref 21–32)
CREAT SERPL-MCNC: 0.48 MG/DL (ref 0.6–1.3)
ERYTHROCYTE [DISTWIDTH] IN BLOOD BY AUTOMATED COUNT: 12 % (ref 11.6–15.1)
GFR SERPL CREATININE-BSD FRML MDRD: 96 ML/MIN/1.73SQ M
GLUCOSE SERPL-MCNC: 139 MG/DL (ref 65–140)
HCT VFR BLD AUTO: 41.4 % (ref 34.8–46.1)
HGB BLD-MCNC: 12.8 G/DL (ref 11.5–15.4)
MCH RBC QN AUTO: 29.6 PG (ref 26.8–34.3)
MCHC RBC AUTO-ENTMCNC: 30.9 G/DL (ref 31.4–37.4)
MCV RBC AUTO: 96 FL (ref 82–98)
PLATELET # BLD AUTO: 196 THOUSANDS/UL (ref 149–390)
PMV BLD AUTO: 9.9 FL (ref 8.9–12.7)
POTASSIUM SERPL-SCNC: 4.5 MMOL/L (ref 3.5–5.3)
RBC # BLD AUTO: 4.32 MILLION/UL (ref 3.81–5.12)
SODIUM SERPL-SCNC: 141 MMOL/L (ref 136–145)
WBC # BLD AUTO: 10.23 THOUSAND/UL (ref 4.31–10.16)

## 2021-05-25 PROCEDURE — 94640 AIRWAY INHALATION TREATMENT: CPT

## 2021-05-25 PROCEDURE — 80048 BASIC METABOLIC PNL TOTAL CA: CPT | Performed by: PHYSICIAN ASSISTANT

## 2021-05-25 PROCEDURE — 99232 SBSQ HOSP IP/OBS MODERATE 35: CPT | Performed by: NURSE PRACTITIONER

## 2021-05-25 PROCEDURE — 99232 SBSQ HOSP IP/OBS MODERATE 35: CPT | Performed by: PHYSICIAN ASSISTANT

## 2021-05-25 PROCEDURE — 85027 COMPLETE CBC AUTOMATED: CPT | Performed by: PHYSICIAN ASSISTANT

## 2021-05-25 PROCEDURE — 94760 N-INVAS EAR/PLS OXIMETRY 1: CPT

## 2021-05-25 RX ORDER — PREDNISONE 20 MG/1
40 TABLET ORAL DAILY
Status: DISCONTINUED | OUTPATIENT
Start: 2021-05-26 | End: 2021-05-26 | Stop reason: HOSPADM

## 2021-05-25 RX ADMIN — FORMOTEROL FUMARATE DIHYDRATE 20 MCG: 20 SOLUTION RESPIRATORY (INHALATION) at 20:08

## 2021-05-25 RX ADMIN — LEVALBUTEROL HYDROCHLORIDE 1.25 MG: 1.25 SOLUTION, CONCENTRATE RESPIRATORY (INHALATION) at 13:23

## 2021-05-25 RX ADMIN — IPRATROPIUM BROMIDE 0.5 MG: 0.5 SOLUTION RESPIRATORY (INHALATION) at 07:18

## 2021-05-25 RX ADMIN — IPRATROPIUM BROMIDE 0.5 MG: 0.5 SOLUTION RESPIRATORY (INHALATION) at 13:23

## 2021-05-25 RX ADMIN — METHYLPREDNISOLONE SODIUM SUCCINATE 40 MG: 40 INJECTION, POWDER, FOR SOLUTION INTRAMUSCULAR; INTRAVENOUS at 05:04

## 2021-05-25 RX ADMIN — METHYLPREDNISOLONE SODIUM SUCCINATE 40 MG: 40 INJECTION, POWDER, FOR SOLUTION INTRAMUSCULAR; INTRAVENOUS at 17:14

## 2021-05-25 RX ADMIN — LEVALBUTEROL HYDROCHLORIDE 1.25 MG: 1.25 SOLUTION, CONCENTRATE RESPIRATORY (INHALATION) at 19:46

## 2021-05-25 RX ADMIN — BUDESONIDE 0.5 MG: 0.5 INHALANT ORAL at 19:46

## 2021-05-25 RX ADMIN — FORMOTEROL FUMARATE DIHYDRATE 20 MCG: 20 SOLUTION RESPIRATORY (INHALATION) at 07:34

## 2021-05-25 RX ADMIN — LEVALBUTEROL HYDROCHLORIDE 1.25 MG: 1.25 SOLUTION, CONCENTRATE RESPIRATORY (INHALATION) at 07:18

## 2021-05-25 RX ADMIN — Medication 2000 UNITS: at 08:45

## 2021-05-25 RX ADMIN — BUDESONIDE 0.5 MG: 0.5 INHALANT ORAL at 07:18

## 2021-05-25 RX ADMIN — IPRATROPIUM BROMIDE 0.5 MG: 0.5 SOLUTION RESPIRATORY (INHALATION) at 19:46

## 2021-05-25 NOTE — PROGRESS NOTES
[facility]  Progress Note - Roger Knutson 1945, 76 y o  female MRN: [id number]  Unit/Bed#: S MS Ethel-62 Encounter: [id number]  Primary Care Provider: Karsten Mendenhall MD   Date and time admitted to hospital: 5/20/2021 12:07 PM    * COPD, very severe, wtih acute excaerbation [facility])  Assessment & Plan  · POA, patient with worsening SOB with exertion minimally over the last month, worse over last week  Suggestive of COPD exacerbation  COVID and CXR negative   · Patient feeling better today, less SOB and is moving around easier  Patient continues w/ decreased breath sounds and w/ less wheezing today  Requiring 1-2L NC today  · Consult Pulmonary Disease, appreciate input    · Continue Solumedrol 40 mg IV BID  · Albuterol/Atrovent nebs  · Continue Perforomist and Pulmicort in place of Breo   · Continue Mucinex  · Continue supplementary O2, wean as tolerated   · 98% on 2L this AM    Acute respiratory failure with hypoxia (HCC)  Assessment & Plan  · Patient was satting in the 70s at her pulmonary disease provider's office  Does not wear O2 at home  · Requiring 1-2L NC  · Continue supplementary O2 & wean as tolerated  · Plan as above         VTE Pharmacologic Prophylaxis: VTE Score: 6 High Risk (Score >/= 5) - Pharmacological DVT Prophylaxis Ordered: enoxaparin (Lovenox)  Sequential Compression Devices Ordered  Patient Centered Rounds: I performed bedside rounds with nursing staff today  Discussions with Specialists or Other Care Team Provider: CM, RN, Pulm    Education and Discussions with Family / Patient: Patient declined call to   Time Spent for Care: 30 minutes  More than 50% of total time spent on counseling and coordination of care as described above      Current Length of Stay: 5 day(s)  Current Patient Status: Inpatient   Certification Statement: The patient will continue to require additional inpatient hospital stay due to ongoing tx of COPD exacerbation  Discharge Plan: Anticipate discharge later today or tomorrow to home  Code Status: Level 3 - DNAR and DNI    Subjective:   Patient reports feeling better this AM  She denies any CP  SOB is improving  Objective:     Vitals:   Temp (24hrs), Av 7 °F (36 5 °C), Min:97 6 °F (36 4 °C), Max:97 9 °F (36 6 °C)    Temp:  [97 6 °F (36 4 °C)-97 9 °F (36 6 °C)] 97 9 °F (36 6 °C)  HR:  [66-76] 66  Resp:  [16-20] 16  BP: (136-169)/(67-91) 136/91  SpO2:  [91 %-98 %] 98 %  Body mass index is 27 34 kg/m²  Input and Output Summary (last 24 hours):   No intake or output data in the 24 hours ending 21 1106    Physical Exam:   Physical Exam  Constitutional:       Appearance: Normal appearance  HENT:      Head: Normocephalic and atraumatic  Eyes:      Pupils: Pupils are equal, round, and reactive to light  Cardiovascular:      Rate and Rhythm: Normal rate and regular rhythm  Heart sounds: No murmur  No friction rub  No gallop  Pulmonary:      Effort: Pulmonary effort is normal       Breath sounds: Wheezing present  Abdominal:      General: Abdomen is flat  Bowel sounds are normal       Palpations: Abdomen is soft  Tenderness: There is no abdominal tenderness  Musculoskeletal: Normal range of motion  Skin:     General: Skin is warm and dry  Neurological:      General: No focal deficit present  Mental Status: She is alert     Psychiatric:         Mood and Affect: Mood normal          Behavior: Behavior normal        Additional Data:     Labs:  Results from last 7 days   Lab Units 21  0450 21  0448   WBC Thousand/uL 10 23* 14 23*   HEMOGLOBIN g/dL 12 8 12 8   HEMATOCRIT % 41 4 41 8   PLATELETS Thousands/uL 196 203   NEUTROS PCT %  --  92*   LYMPHS PCT %  --  5*   MONOS PCT %  --  2*   EOS PCT %  --  0     Results from last 7 days   Lab Units 21  0450 21  1224   SODIUM mmol/L 141 143   POTASSIUM mmol/L 4 5 4 2   CHLORIDE mmol/L 104 103   CO2 mmol/L 37* 34*   BUN mg/dL 18 16 CREATININE mg/dL 0 48* 0 50*   ANION GAP mmol/L 0* 6   CALCIUM mg/dL 8 5 9 0   ALBUMIN g/dL  --  3 4*   TOTAL BILIRUBIN mg/dL  --  0 33   ALK PHOS U/L  --  60   ALT U/L  --  41   AST U/L  --  21   GLUCOSE RANDOM mg/dL 139 103                 Results from last 7 days   Lab Units 05/22/21  0459 05/20/21  2343   PROCALCITONIN ng/ml <0 05 <0 05       Lines/Drains:  Invasive Devices     Peripheral Intravenous Line            Peripheral IV 05/24/21 Right Antecubital less than 1 day                      Imaging: Reviewed radiology reports from this admission including: chest xray    Recent Cultures (last 7 days):         Last 24 Hours Medication List:   Current Facility-Administered Medications   Medication Dose Route Frequency Provider Last Rate    acetaminophen  650 mg Oral Q6H PRN Rebekah Burnette PA-C      budesonide  0 5 mg Nebulization Q12H Faylene MustKELBY      cholecalciferol  2,000 Units Oral Daily Efraincarolina Collins PA-C      enoxaparin  40 mg Subcutaneous Daily Efraincarolina Collins PA-C      formoterol  20 mcg Nebulization Q12H Faylene MustKELBY      guaiFENesin  1,200 mg Oral Q12H [address] Efraincarolina Collins PA-C      ipratropium  0 5 mg Nebulization TID Efraincarolina Collins PA-C      levalbuterol  1 25 mg Nebulization TID Mukesh Headley MD      methylPREDNISolone sodium succinate  40 mg Intravenous Q12H [address] Serina Mullen MD      sodium chloride  1 spray Each Nare Q1H PRN Rebekah Burnette PA-C          Today, Patient Was Seen By: Amber Dominguez PA-C    **Please Note: This note may have been constructed using a voice recognition system  **

## 2021-05-25 NOTE — PROGRESS NOTES
Progress Note - Pulmonary   Phyliss Henry 76 y o  female MRN: [id number]  Unit/Bed#: S -01 Encounter: [id number]    Assessment/Plan:    1  Acute hypoxic respiratory failure likely secondary to COPD exacerbation        -    Patient currently on 2 liters 98 percent patient does not wear home O2        -   Will continue to maintain saturations greater than 89 percent        -   Pulmonary toileting:  Deep breathing cough, OOB  As tolerated, IS Q 1 hr        -    Will need ambulatory pulse ox prior to discharge    2  Very severe COPD with acute exacerbation        -   Inpatient: Will transition to prednisone 40 milligrams q 3 days decreased by 10 milligrams q 3 days starting tomorrow,  Budesonide/Perforomist b i d  and Xopenex Atrovent t i d         -   Home regimen:  Advair 250/50 mcg 1 puff b i d , Combivent 1 puff q i d ,  Than on 2 puffs q i d  p r n         -   Will need close pulmonary follow-up upon discharge      -   Outpatient follow-up per discharge instructions    Chief Complaint:    "I think I am feeling better"    Subjective: Jena Blount was comfortably sitting in her bed  She reports that she feels her respiratory status is improving  She has not yet exerted herself inpatient however she would like to be discharged soon  No significant overnight events reported  Patient currently denies any fever, chills, hemoptysis, headaches, night sweats, pleuritic chest pain, or palpitations  Objective:    Vitals: Blood pressure 136/91, pulse 66, temperature 97 9 °F (36 6 °C), temperature source Oral, resp  rate 16, weight 63 5 kg (139 lb 15 9 oz), SpO2 98 %  2L,Body mass index is 27 34 kg/m²  No intake or output data in the 24 hours ending 05/25/21 1104    Invasive Devices     Peripheral Intravenous Line            Peripheral IV 05/24/21 Right Antecubital less than 1 day                Physical Exam:   Physical Exam  Constitutional:       General: She is not in acute distress       Appearance: She is well-developed and normal weight  She is not ill-appearing  HENT:      Head: Normocephalic and atraumatic  Nose: Nose normal  No congestion or rhinorrhea  Mouth/Throat:      Mouth: Mucous membranes are dry  Pharynx: No oropharyngeal exudate or posterior oropharyngeal erythema  Neck:      Musculoskeletal: Normal range of motion and neck supple  Thyroid: No thyromegaly  Vascular: No hepatojugular reflux or JVD  Trachea: No tracheal deviation  Cardiovascular:      Rate and Rhythm: Normal rate and regular rhythm  Pulses: Normal pulses  Heart sounds: Normal heart sounds  No murmur  No friction rub  No gallop  Pulmonary:      Effort: Pulmonary effort is normal  No tachypnea, bradypnea, accessory muscle usage or respiratory distress  Breath sounds: Decreased air movement present  No stridor or transmitted upper airway sounds  Decreased breath sounds present  No wheezing, rhonchi or rales  Chest:      Chest wall: No tenderness  Abdominal:      General: Abdomen is flat  Bowel sounds are normal  There is no distension  Palpations: Abdomen is soft  There is no mass  Musculoskeletal: Normal range of motion  General: No swelling or tenderness  Lymphadenopathy:      Cervical: No cervical adenopathy  Skin:     General: Skin is warm and dry  Coloration: Skin is not jaundiced or pale  Neurological:      General: No focal deficit present  Mental Status: She is alert and oriented to person, place, and time  Mental status is at baseline  Psychiatric:         Mood and Affect: Mood normal          Behavior: Behavior normal          Labs:    I have personally reviewed pertinent lab results, CBC:   Lab Results   Component Value Date    WBC 10 23 (H) 05/25/2021    HGB 12 8 05/25/2021    HCT 41 4 05/25/2021    MCV 96 05/25/2021     05/25/2021    MCH 29 6 05/25/2021    MCHC 30 9 (L) 05/25/2021    RDW 12 0 05/25/2021    MPV 9 9 05/25/2021   , CMP: Lab Results   Component Value Date    SODIUM 141 05/25/2021    K 4 5 05/25/2021     05/25/2021    CO2 37 (H) 05/25/2021    BUN 18 05/25/2021    CREATININE 0 48 (L) 05/25/2021    CALCIUM 8 5 05/25/2021    EGFR 96 05/25/2021       Imaging and other studies: I have personally reviewed pertinent films in PACS     Chest x-ray 5/20/2021-  No acute cardiopulmonary disease

## 2021-05-25 NOTE — ASSESSMENT & PLAN NOTE
· Patient was satting in the 70s at her pulmonary disease provider's office   Does not wear O2 at home  · Requiring 1-2L NC  · Continue supplementary O2 & wean as tolerated  · Plan as above

## 2021-05-25 NOTE — ASSESSMENT & PLAN NOTE
· POA, patient with worsening SOB with exertion minimally over the last month, worse over last week  Suggestive of COPD exacerbation  COVID and CXR negative   · Patient feeling better today, less SOB and is moving around easier  Patient continues w/ decreased breath sounds and w/ less wheezing today   Requiring 1-2L NC today  · Consult Pulmonary Disease, appreciate input    · Continue Solumedrol 40 mg IV BID  · Albuterol/Atrovent nebs  · Continue Perforomist and Pulmicort in place of Breo   · Continue Mucinex  · Continue supplementary O2, wean as tolerated   · 98% on 2L this AM

## 2021-05-26 VITALS
DIASTOLIC BLOOD PRESSURE: 90 MMHG | HEART RATE: 70 BPM | OXYGEN SATURATION: 99 % | BODY MASS INDEX: 27.34 KG/M2 | SYSTOLIC BLOOD PRESSURE: 172 MMHG | RESPIRATION RATE: 16 BRPM | WEIGHT: 139.99 LBS | TEMPERATURE: 97.5 F

## 2021-05-26 LAB

## 2021-05-26 PROCEDURE — 94761 N-INVAS EAR/PLS OXIMETRY MLT: CPT

## 2021-05-26 PROCEDURE — 99239 HOSP IP/OBS DSCHRG MGMT >30: CPT | Performed by: PHYSICIAN ASSISTANT

## 2021-05-26 PROCEDURE — 94760 N-INVAS EAR/PLS OXIMETRY 1: CPT

## 2021-05-26 PROCEDURE — 94640 AIRWAY INHALATION TREATMENT: CPT

## 2021-05-26 RX ORDER — PREDNISONE 10 MG/1
TABLET ORAL
Qty: 30 TABLET | Refills: 0 | Status: SHIPPED | OUTPATIENT
Start: 2021-05-26 | End: 2021-05-26

## 2021-05-26 RX ORDER — PREDNISONE 10 MG/1
TABLET ORAL
Qty: 30 TABLET | Refills: 0 | Status: SHIPPED | OUTPATIENT
Start: 2021-05-26 | End: 2021-06-18 | Stop reason: DRUGHIGH

## 2021-05-26 RX ADMIN — BUDESONIDE 0.5 MG: 0.5 INHALANT ORAL at 07:32

## 2021-05-26 RX ADMIN — FORMOTEROL FUMARATE DIHYDRATE 20 MCG: 20 SOLUTION RESPIRATORY (INHALATION) at 07:33

## 2021-05-26 RX ADMIN — LEVALBUTEROL HYDROCHLORIDE 1.25 MG: 1.25 SOLUTION, CONCENTRATE RESPIRATORY (INHALATION) at 07:32

## 2021-05-26 RX ADMIN — Medication 2000 UNITS: at 08:38

## 2021-05-26 RX ADMIN — IPRATROPIUM BROMIDE 0.5 MG: 0.5 SOLUTION RESPIRATORY (INHALATION) at 07:32

## 2021-05-26 RX ADMIN — PREDNISONE 40 MG: 20 TABLET ORAL at 08:38

## 2021-05-26 NOTE — RESPIRATORY THERAPY NOTE
Home Oxygen Qualifying Test       Patient name: Ignacia Valladares        : 1945   Date of Test:  May 26, 2021  Diagnosis:      Home Oxygen Test:    **Medicare Guidelines require item(s) 1-5 on all ambulatory patients or 1 and 2 on non-ambulatory patients  1   Baseline SPO2 on Room Air at rest 90 %  2   SPO2 during exercise on Room Air 84 %  During exercise monitor SpO2  If SPO2 increases >=89% with ambulation do not add supplemental             oxygen  If <= 88% on room air add O2 via NC and titrate patient  Patient must be ambulated with O2 and titrated to > 88% with exertion  3   SPO2 on Oxygen at rest  92  %  2  lpm     4   SPO2 during exercise on Oxygen 90  % a liter flow of 2 lpm     5   Exercise performed:          Pt ambulated in room on RA and low SPO2 was 84%  Placed on 2lpm          [x]  Supplemental Home Oxygen is indicated  []  Client does not qualify for home oxygen        Respiratory Additional Notes-pt qualifies for home 02 at 2lpm with exertion     Conor Tobin

## 2021-05-26 NOTE — ASSESSMENT & PLAN NOTE
· Patient was satting in the 70s at her pulmonary disease provider's office   Does not wear O2 at home  · Requiring 2 L at exertion as per Desaturation Screen   · Home O2 ordered

## 2021-05-26 NOTE — DISCHARGE INSTR - AVS FIRST PAGE
Dear Conrad Torres,     It was our pleasure to care for you here at [facility]  It is our hope that we were always able to exceed the expected standards for your care during your stay  You were hospitalized due to COPD exacerbation  You were cared for on the [facility] 4th floor by Leyla Lane PA-C under the service of Brennon Palomares,  with the [facility] Hospitalist [facility] who covers for your primary care physician (PCP), Edel Mckeon MD, while you were hospitalized  If you have any questions or concerns related to this hospitalization, you may contact us at [phone]  For follow up as well as any medication refills, we recommend that you follow up with your primary care physician  A registered nurse will reach out to you by phone within a few days after your discharge to answer any additional questions that you may have after going home  However, at this time we provide for you here, the most important instructions / recommendations at discharge:     · Notable Medication Adjustments -   · Start the Prednisone taper and take as directed  · Continue your home inhalers   · Testing Required after Discharge -   · None  · Important follow up information -   · Follow up with Lung Doctor  · Other Instructions -   · Continue to use home oxygen   · Please review this entire after visit summary as additional general instructions including medication list, appointments, activity, diet, any pertinent wound care, and other additional recommendations from your care team that may be provided for you        Sincerely,     Leyla Lane PA-C

## 2021-05-26 NOTE — CASE MANAGEMENT
CM informed by Efrain with [facility] that pt is medically stable for d/c and will require Home O2  Order for Home O2 made via [place]  CM met with pt at bedside how is aware d/c is pending delivery of Home O2  Pt aware this will happen today  Pt reports she has had home o2 in the past and huitron snot feel she needs VNA f/u  Pt [facility]rain aware  Pt approved for Home O2  Jared White with [facility] delivered portable tank to pt  Pt made aware to contact [facility] for delivery of concentrator  F/U providers updated with their contact information as well  CM updated pt nurse who confirmed pt already received her medication from [facility]

## 2021-05-26 NOTE — DISCHARGE SUMMARY
[facility]  Discharge- Emilio Lamb 1945, 76 y o  female MRN: [id number]  Unit/Bed#: S -01 Encounter: [id number]  Primary Care Provider: Allie Goss MD   Date and time admitted to hospital: 5/20/2021 12:07 PM    * COPD, very severe, wtih acute excaerbation [facility])  Assessment & Plan  · POA, patient with worsening SOB with exertion minimally over the last month, worse over last week  Suggestive of COPD exacerbation  COVID and CXR negative   · Patient feeling better today, less SOB and is moving around easier  Patient continues w/ decreased breath sounds and w/ less wheezing today  Requiring 2 L with exertion   · Stable for discharge   · Consult Pulmonary Disease, appreciate input    · Prednisone taper at discharge   · Continue home inhalers  · Home O2   · Follow up with Pulmonary disease     Acute respiratory failure with hypoxia [facility])  Assessment & Plan  · Patient was satting in the 70s at her pulmonary disease provider's office  Does not wear O2 at home  · Requiring 2 L at exertion as per Desaturation Screen   · Home O2 ordered       Discharging Physician / Practitioner: Edith Salmon PA-C  PCP: Allie Goss MD  Admission Date:   Admission Orders (From admission, onward)     Ordered        05/20/21 1348  Inpatient Admission  Once                   Discharge Date: 05/26/21    Resolved Problems  Date Reviewed: 5/26/2021    None          Consultations During Hospital Stay:  · Pulmonary Disease     Procedures Performed:   · CXR    Significant Findings / Test Results:   · CXR: No acute pulmonary disease  Emphysema     Incidental Findings:   · None     Test Results Pending at Discharge (will require follow up): · None     Outpatient Tests Requested:  · Follow up with Pulmonary disease     Complications:  None    Reason for Admission: COPD Exacerbation     Hospital Course:      Emilio Lamb is a 76 y o  female patient who originally presented to the hospital on 5/20/2021 due to SOB  She was found to be in an acute COPD exacerbation  Her CXR showed no acute disease  She was admitted and started on Q6 Solumedrol and nebulizers  She was slow to improve due to her underlying severe disease and required O2 for most of her hospitalization  She was qualified for home O2 at discharge and sent home with a Prednisone taper  She will follow up with Pulmonary Disease for further management         Please see above list of diagnoses and related plan for additional information  Condition at Discharge: stable     Discharge Day Visit / Exam:     Subjective:  Patient reports that she is feeling well today  Denies SOB  Denies wheezing  Excited to go home  Vitals: Blood Pressure: (!) 172/90(notified rn) (05/26/21 0740)  Pulse: 70 (05/26/21 0740)  Temperature: 97 5 °F (36 4 °C) (05/26/21 0740)  Temp Source: Axillary (05/26/21 0740)  Respirations: 16 (05/26/21 0740)  Weight - Scale: 63 5 kg (139 lb 15 9 oz) (05/21/21 0107)  SpO2: 99 % (05/26/21 0740)  Exam:   Physical Exam  Constitutional:       General: She is not in acute distress  Appearance: Normal appearance  She is normal weight  She is not ill-appearing or diaphoretic  HENT:      Head: Normocephalic and atraumatic  Mouth/Throat:      Mouth: Mucous membranes are moist    Eyes:      General: No scleral icterus  Pupils: Pupils are equal, round, and reactive to light  Cardiovascular:      Rate and Rhythm: Normal rate and regular rhythm  Pulses: Normal pulses  Heart sounds: Normal heart sounds, S1 normal and S2 normal  No murmur  No systolic murmur  No diastolic murmur  No gallop  No S3 or S4 sounds  Pulmonary:      Effort: Pulmonary effort is normal  No accessory muscle usage or respiratory distress  Breath sounds: Decreased air movement present  No stridor  Examination of the right-upper field reveals decreased breath sounds  Examination of the left-upper field reveals decreased breath sounds   Examination of the right-lower field reveals decreased breath sounds  Examination of the left-lower field reveals decreased breath sounds  Decreased breath sounds present  No wheezing, rhonchi or rales  Chest:      Chest wall: No tenderness  Abdominal:      General: Bowel sounds are normal  There is no distension  Palpations: Abdomen is soft  Tenderness: There is no abdominal tenderness  There is no guarding  Musculoskeletal:      Right lower leg: No edema  Left lower leg: No edema  Skin:     General: Skin is warm and dry  Coloration: Skin is not jaundiced  Neurological:      General: No focal deficit present  Mental Status: She is alert  Mental status is at baseline  Motor: No tremor or seizure activity  Psychiatric:         Behavior: Behavior is cooperative  Discussion with Family: Called      Discharge instructions/Information to patient and family:   See after visit summary for information provided to patient and family  Provisions for Follow-Up Care:  See after visit summary for information related to follow-up care and any pertinent home health orders  Disposition:     Home    For Discharges to [facility] SNF:   · Not Applicable to this Patient - Not Applicable to this Patient    Planned Readmission: None     Discharge Statement:  I spent 45 minutes discharging the patient  This time was spent on the day of discharge  I had direct contact with the patient on the day of discharge  Greater than 50% of the total time was spent examining patient, answering all patient questions, arranging and discussing plan of care with patient as well as directly providing post-discharge instructions  Additional time then spent on discharge activities  Discharge Medications:  See after visit summary for reconciled discharge medications provided to patient and family        ** Please Note: This note has been constructed using a voice recognition system **

## 2021-05-26 NOTE — ASSESSMENT & PLAN NOTE
· POA, patient with worsening SOB with exertion minimally over the last month, worse over last week  Suggestive of COPD exacerbation  COVID and CXR negative   · Patient feeling better today, less SOB and is moving around easier  Patient continues w/ decreased breath sounds and w/ less wheezing today   Requiring 2 L with exertion   · Stable for discharge   · Consult Pulmonary Disease, appreciate input    · Prednisone taper at discharge   · Continue home inhalers  · Home O2   · Follow up with Pulmonary disease

## 2021-05-27 ENCOUNTER — TRANSITIONAL CARE MANAGEMENT (OUTPATIENT)
Dept: FAMILY MEDICINE CLINIC | Facility: MEDICAL CENTER | Age: 76
End: 2021-05-27

## 2021-05-28 ENCOUNTER — PATIENT OUTREACH (OUTPATIENT)
Dept: CASE MANAGEMENT | Facility: HOSPITAL | Age: 76
End: 2021-05-28

## 2021-05-28 NOTE — PROGRESS NOTES
Medicare [facility] patient discharged from [facility] on 5/26  Vandana Juarez is doing well, recovering from the COPD exacerbation, taking medication as directed and following up with Pulmonary next week  She is on a Prednisone taper for 12 days  Vandana Juarez had previously completed Pulmonary rehab and continues to exercise as much as she is able  She wears O2 on exertion  She knows how to use her inhalers properly and also know when to call her MD with any signs/symptoms of a COPD flare-up  No concerns or questions at this time

## 2021-06-03 ENCOUNTER — OFFICE VISIT (OUTPATIENT)
Dept: FAMILY MEDICINE CLINIC | Facility: MEDICAL CENTER | Age: 76
End: 2021-06-03
Payer: MEDICARE

## 2021-06-03 ENCOUNTER — TELEPHONE (OUTPATIENT)
Dept: FAMILY MEDICINE CLINIC | Facility: MEDICAL CENTER | Age: 76
End: 2021-06-03

## 2021-06-03 VITALS
OXYGEN SATURATION: 97 % | RESPIRATION RATE: 22 BRPM | HEART RATE: 101 BPM | TEMPERATURE: 97.8 F | SYSTOLIC BLOOD PRESSURE: 146 MMHG | DIASTOLIC BLOOD PRESSURE: 88 MMHG

## 2021-06-03 DIAGNOSIS — R73.03 PREDIABETES: ICD-10-CM

## 2021-06-03 DIAGNOSIS — J45.21 MILD INTERMITTENT ASTHMA WITH ACUTE EXACERBATION: ICD-10-CM

## 2021-06-03 DIAGNOSIS — E55.9 VITAMIN D DEFICIENCY: ICD-10-CM

## 2021-06-03 DIAGNOSIS — J30.9 ALLERGIC RHINITIS, UNSPECIFIED SEASONALITY, UNSPECIFIED TRIGGER: ICD-10-CM

## 2021-06-03 DIAGNOSIS — J44.9 COPD, VERY SEVERE (HCC): Primary | Chronic | ICD-10-CM

## 2021-06-03 PROCEDURE — 99496 TRANSJ CARE MGMT HIGH F2F 7D: CPT | Performed by: NURSE PRACTITIONER

## 2021-06-03 PROCEDURE — 1111F DSCHRG MED/CURRENT MED MERGE: CPT | Performed by: NURSE PRACTITIONER

## 2021-06-03 RX ORDER — ALBUTEROL SULFATE 2.5 MG/3ML
2.5 SOLUTION RESPIRATORY (INHALATION) EVERY 6 HOURS PRN
Qty: 45 ML | Refills: 1 | Status: SHIPPED | OUTPATIENT
Start: 2021-06-03 | End: 2021-07-03

## 2021-06-03 RX ORDER — FLUTICASONE PROPIONATE 50 MCG
1 SPRAY, SUSPENSION (ML) NASAL DAILY
Qty: 9.9 ML | Refills: 1 | Status: SHIPPED | OUTPATIENT
Start: 2021-06-03 | End: 2021-07-22 | Stop reason: ALTCHOICE

## 2021-06-03 RX ORDER — MONTELUKAST SODIUM 10 MG/1
10 TABLET ORAL
Qty: 30 TABLET | Refills: 5 | Status: SHIPPED | OUTPATIENT
Start: 2021-06-03 | End: 2021-07-22 | Stop reason: ALTCHOICE

## 2021-06-03 NOTE — ASSESSMENT & PLAN NOTE
Patient reordered for albuterol nebulizer to use every 6 hours at home for severe SOB/Wheezing  Patient also placed on singular 10mg p o  daily  She has a appointment with pulmonary next week for follow-up

## 2021-06-03 NOTE — ASSESSMENT & PLAN NOTE
Patient instructed to use saline nasal drops as needed and ordered for Flonase nasal spray  as per EMR, patient is independent as baseline, owns walker    O2 on RA remained above 97% SPO2 throughout treatment session

## 2021-06-03 NOTE — PROGRESS NOTES
BMI Counseling: There is no height or weight on file to calculate BMI  The BMI is above normal  Nutrition recommendations include decreasing portion sizes, encouraging healthy choices of fruits and vegetables, decreasing fast food intake, consuming healthier snacks, limiting drinks that contain sugar, moderation in carbohydrate intake, increasing intake of lean protein, reducing intake of saturated and trans fat and reducing intake of cholesterol  Exercise recommendations include vigorous physical activity 75 minutes/week, exercising 3-5 times per week, obtaining a gym membership and strength training exercises  Depression Screening and Follow-up Plan: Clincally patient does not have depression  No treatment is required  Falls Plan of Care: balance, strength, and gait training instructions were provided  Medications that increase falls were reviewed  Vitamin D supplementation was recommended  Home safety education provided  Assessment/Plan:         Problem List Items Addressed This Visit        Respiratory    COPD, very severe, [facility] acute excaerbation ([facility] [address] ) - Primary (Chronic)     Patient to continue her routine COPD maintenance medications, complete post prednisone weaning dose and start singular 10mg p o  daily  O2 saturation - 98% resting on 2 liters nasal cannula oxygen  Patient has been instructed to obtain humidification  Asthma     Patient reordered for albuterol nebulizer to use every 6 hours at home for severe SOB/Wheezing  Patient also placed on singular 10mg p o  daily  She has a appointment with pulmonary next week for follow-up  Allergic rhinitis       Other    Prediabetes     Last hemoglobin A1c 5 3 and stable  Instructed on low carbohydrates, low starch, low sugar  Vitamin D deficiency     Prior Vitamin D level within normal limits, patient refuses at this time  Subjective:      Patient ID: Darryl Draper is a 76 y o  female      Patient is a 76 year old female present for transitional care management after discharge from the hospital after exacerbation of COPD  The following portions of the patient's history were reviewed and updated as appropriate:   Past Medical History:  She has a past medical history of COPD (chronic obstructive pulmonary disease) ([address] ), History of screening mammography, and Osteoporosis  ,  _______________________________________________________________________  Medical Problems:  does not have any pertinent problems on file ,  _______________________________________________________________________  Past Surgical History:   has a past surgical history that includes Appendectomy; Cataract extraction; and Tonsillectomy  ,  _______________________________________________________________________  Family History:  family history includes Arthritis in her mother; Breast cancer (age of onset: [de-identified]) in her mother; Diabetes in her father; Heart disease in her father and mother; Hypertension in her father and mother; No Known Problems in her maternal aunt, maternal aunt, maternal grandfather, maternal grandmother, paternal aunt, paternal aunt, paternal aunt, paternal grandfather, and paternal grandmother; Osteoporosis in her mother; Ovarian cancer (age of onset: 52) in her sister  ,  _______________________________________________________________________  Social History:   reports that she quit smoking about 16 years ago  Her smoking use included cigarettes  She has a 40 00 pack-year smoking history  She has never used smokeless tobacco  She reports current alcohol use  She reports that she does not use drugs  ,  _______________________________________________________________________  Allergies:  is allergic to clarinex [desloratadine]; bee venom; clarithromycin; food color red - food allergy; levofloxacin; and loratadine     _______________________________________________________________________  Current Outpatient Medications   Medication Sig Dispense Refill    albuterol (Ventolin HFA) 90 mcg/act inhaler Inhale 2 puffs every 4 (four) hours as needed for wheezing 3 Inhaler 3    budesonide (PULMICORT) 1 MG/2ML nebulizer solution Take 1 mg by nebulization 2 (two) times a day      calcium carbonate (OS-CRISTIANO) 600 MG tablet Take one with dinner      Cholecalciferol (VITAMIN D3) 50 MCG (2000 UT) capsule She takes 1 capsule daily  0    fluticasone-salmeterol (Advair Diskus) 250-50 mcg/dose inhaler Inhale 1 puff every 12 (twelve) hours 3 Inhaler 3    ipratropium-albuterol (COMBIVENT RESPIMAT) inhaler Inhale 1 puff 4 (four) times a day 4 g 5    predniSONE 10 mg tablet Take 4 tablet by mouth once daily for 3 days, then 3 tablet by mouth once daily for 3 days, then 2 tablet by mouth once daily for 3 days, then 1 tablet by mouth once daily for 3 days 30 tablet 0     No current facility-administered medications for this visit       _______________________________________________________________________  Review of Systems   Constitutional: Negative for activity change, appetite change, chills, fatigue, fever and unexpected weight change  HENT: Positive for ear pain  Negative for congestion, ear discharge, facial swelling, hearing loss, mouth sores, nosebleeds, postnasal drip, rhinorrhea, sinus pressure, sinus pain, sneezing, sore throat and voice change  Eyes: Negative for pain, redness and visual disturbance  Respiratory: Positive for shortness of breath  Negative for cough, chest tightness and wheezing  SOB with activity  Cardiovascular: Negative for chest pain and palpitations  Gastrointestinal: Positive for diarrhea  Negative for abdominal distention, abdominal pain, constipation, nausea and vomiting  Diarrhea yesterday x1  Currently resolved  Endocrine: Negative  Genitourinary: Negative for decreased urine volume, difficulty urinating, dysuria, flank pain, frequency, hematuria, pelvic pain and urgency     Musculoskeletal: Negative for arthralgias and myalgias  Skin: Negative  Neurological: Positive for light-headedness  Had lightheaded when ambulating to the bathroom x 1 yesterday  Hematological: Negative  Psychiatric/Behavioral: The patient is nervous/anxious  Objective:  Vitals:    06/03/21 0845   BP: 146/88   BP Location: Left arm   Patient Position: Sitting   Cuff Size: Adult   Pulse: 101   Resp: 22   Temp: 97 8 °F (36 6 °C)   SpO2: 97%     There is no height or weight on file to calculate BMI  Physical Exam  Vitals signs and nursing note reviewed  Constitutional:       Appearance: Normal appearance  She is well-developed and normal weight  HENT:      Head: Normocephalic and atraumatic  Right Ear: Tympanic membrane, ear canal and external ear normal  There is no impacted cerumen  Left Ear: Tympanic membrane, ear canal and external ear normal  There is no impacted cerumen  Nose: Rhinorrhea present  No congestion  Mouth/Throat:      Mouth: Mucous membranes are moist       Pharynx: No oropharyngeal exudate or posterior oropharyngeal erythema  Eyes:      Extraocular Movements: Extraocular movements intact  Conjunctiva/sclera: Conjunctivae normal       Pupils: Pupils are equal, round, and reactive to light  Neck:      Musculoskeletal: Normal range of motion and neck supple  Cardiovascular:      Rate and Rhythm: Normal rate and regular rhythm  Pulses: Normal pulses  Heart sounds: Normal heart sounds  No murmur  Pulmonary:      Effort: Pulmonary effort is normal       Breath sounds: Normal breath sounds  Abdominal:      General: Bowel sounds are normal       Palpations: Abdomen is soft  Musculoskeletal: Normal range of motion  Skin:     General: Skin is warm and dry  Capillary Refill: Capillary refill takes less than 2 seconds  Neurological:      General: No focal deficit present        Mental Status: She is alert and oriented to person, place, and time    Psychiatric:         Mood and Affect: Mood normal          Behavior: Behavior normal

## 2021-06-03 NOTE — PATIENT INSTRUCTIONS
Diabetes and Nutrition   WHAT YOU NEED TO KNOW:   Why are nutrition plans important? Nutrition plans help with healthy eating patterns that improve health  Nutrition plans and regular exercise help keep your blood sugar levels steady  They also help delay or prevent complications of diabetes, such as diabetic kidney disease  How do I create a nutrition plan? A dietitian will help you create a nutrition plan to meet your needs and your family's needs  The goal is for you to reach or maintain healthy weight, blood sugar, blood pressure, and lipid levels  You should meet with the dietitian at least 1 time each year  You will learn the following:  · How food affects your blood sugar levels    · How to create healthy eating habits    · How to make food choices based on your activity level, weight, and glucose levels    · How your favorite foods may fit into your plan    · Foods that contain carbohydrates (sugars and starches), including simple and complex carbohydrates    · How to keep track of all carbohydrates    · Correct portion sizes for each food    · Changes you can make to your plan if you get pregnant or are breastfeeding    What are some tips to do until I meet with the dietitian? · Do not skip meals  The goal is to keep your blood sugar level steady  Blood sugar levels may drop too low if you have received insulin and do not eat  · Eat more high-fiber foods, such as fresh or frozen fruits and vegetables, whole-grain breads, and beans  Fiber helps control or lower blood sugar and cholesterol levels  Choose whole fruits instead of fruit juice as much as possible  Sugar may be added to juice, and fiber may be removed  · Choose heart-healthy fats  Foods high in heart-healthy fats include olive oil, nuts, avocados, and fatty fish, such as salmon and tuna  Foods high in unhealthy fats include red meat, full-fat dairy products, and soft margarine   Unhealthy fats can increase your risk for heart disease, increase bad cholesterol, and lower good cholesterol  · Choose complex carbohydrates  Foods with complex carbohydrates include brown rice, whole-grain breads and cereals, and cooked beans  Foods with simple carbohydrates include white bread, white rice, most cold cereals, and snack foods  Your plan will include the amount of carbohydrate to have at one time or in a day  Your blood sugar level can get too high if you eat too much carbohydrate at one time  Blood sugar levels do not spike as high or drop as quickly with complex carbohydrates as with simple carbohydrates  Choose complex carbohydrates whenever possible  · Have less sodium (salt)  The risk for high blood pressure (BP) increases with high-sodium foods  Limit high-sodium foods, such as soy sauce, potato chips, and canned soup  Do not add salt to food you cook  Limit your use of table salt  Read labels to have no more than 2,300 milligrams of sodium in one day  · Limit artificial sweeteners  These may be found in food or drinks, such as diet soft drinks or other low-calorie beverages  Artificial sweeteners are low in calories  They may help you lower your overall calories and carbohydrates  It is important not to have more calories from other foods to make up for the calories saved  Artificial sweeteners do not have any nutrition  Eat whole foods and drink water as much as possible  Your plan may include beverages with artificial sweeteners for a short time  These can help you transition from high-sugar beverages to water  · Use the plate method for each meal   This method can help you eat the right amount of carbohydrates and keep your blood sugar levels under control  ? Draw an imaginary line down the middle of a 9-inch dinner plate  On one side, draw another line to divide that section in half  Your plate will have one large section and 2 small sections  ? Fill the largest section with non-starchy vegetables   These include broccoli, spinach, cucumbers, peppers, cauliflower, and tomatoes  ? Add a starch to one of the small sections  Starches include pasta, rice, whole-grain bread, tortillas, corn, potatoes, and beans  ? Add meat or another source of protein to the other small section  Examples include chicken or turkey without skin, fish, lean beef or pork, low-fat cheese, tofu, and eggs  ? Add dairy products or fruit next to your plate if your meal plan allows  Examples of dairy include skim or 1% milk and low-fat yogurt  If you do not drink milk or eat dairy products, you may be able to add another serving of starchy food instead  ? Have a low-calorie or calorie-free drink with your meal  Examples include water or unsweetened tea or coffee  What are the risks of alcohol? Alcohol can cause hypoglycemia (very low blood sugar level), especially if you use insulin  Alcohol can cause high blood sugar and BP levels, and weight gain if you drink too much  Women 21 years or older and men 72 years or older should limit alcohol to 1 drink a day  Men aged 24 to 59 years should limit alcohol to 2 drinks a day  A drink of alcohol is 12 ounces of beer, 5 ounces of wine, or 1½ ounces of liquor  Hypoglycemia can happen hours after you drink alcohol  Check your blood sugar level for several hours after you drink alcohol  Have a source of fast-acting carbohydrates with you in case your level goes too low  You need immediate care if you have signs or symptoms of hypoglycemia, such as sweating, confusion, or fainting  Why is it important to maintain a healthy weight? A healthy weight can help you control your diabetes  You can maintain a healthy weight with a nutrition plan and exercise  Ask your healthcare provider how much you should weigh  Ask him or her to help you create a weight loss plan if you are overweight  Together you can set weight loss and maintenance goals    Call your local emergency number (911 in the [address]) if: · You have any of the following signs of a heart attack:      ? Squeezing, pressure, or pain in your chest    ? You may  also have any of the following:     § Discomfort or pain in your back, neck, jaw, stomach, or arm    § Shortness of breath    § Nausea or vomiting    § Lightheadedness or a sudden cold sweat      When should I seek immediate care? · You have a low blood sugar level and it does not improve with treatment  Symptoms are trouble thinking, a pounding heartbeat, and sweating  · Your blood sugar level is above 240 mg/dL and does not come down within 15 minutes of treatment  · You have ketones in your blood or urine  · You have nausea or are vomiting and cannot keep any food or liquid down  · You have blurred or double vision  · Your breath has a fruity, sweet smell, or your breathing is shallow  When should I call my doctor or diabetes care team?   · Your blood sugar levels are higher than your target goals  · You often have low blood sugar levels  · You have trouble coping with diabetes, or you feel anxious or depressed  · You have questions or concerns about your condition or care  CARE AGREEMENT:   You have the right to help plan your care  Learn about your health condition and how it may be treated  Discuss treatment options with your healthcare providers to decide what care you want to receive  You always have the right to refuse treatment  The above information is an  only  It is not intended as medical advice for individual conditions or treatments  Talk to your doctor, nurse or pharmacist before following any medical regimen to see if it is safe and effective for you  © Copyright [address] Information is for End User's use only and may not be sold, redistributed or otherwise used for commercial purposes   All illustrations and images included in CareNotes® are the copyrighted property of A D A M , Inc  or [address] [place] [address] [facility]  Vitamin D (By mouth)   Vitamin D (VYE-ta-min D)  Maintains calcium and phosphorus in your body and makes your bones strong  This medicine is a vitamin  Brand Name(s): Baby Vitamin D, Basic's Natural Vitamin D-3, Calcidol, Joyner Baby Ddrops, Eun Graham, Bradner for [employer], Decara, Delta D3, Dialyvite Vitamin D3 Max, Drisdol, Enfamil D-Vi-Sol, Infants Aqueous Vitamin D, Jalil Natural , Nature's Blend Super Strength Vitamin D3, Thera-D 2000   There may be other brand names for this medicine  When This Medicine Should Not Be Used: You should not use this medicine if you have had an allergic reaction to vitamin D  How to Use This Medicine:   Tablet, Liquid, Liquid Filled Capsule  · Your doctor will tell you how much medicine to use  Do not use more than directed  · Follow the instructions on the medicine label if you are using this medicine without a prescription  · It is best to take this medicine with food or milk  · Carefully follow your doctor's instructions about any special diet  If a dose is missed:   · Take a dose as soon as you remember  If it is almost time for your next dose, wait until then and take a regular dose  Do not take extra medicine to make up for a missed dose  How to Store and Dispose of This Medicine:   · Store the medicine in a closed container at room temperature, away from heat, moisture, and direct light  · Ask your pharmacist, doctor, or health caregiver about the best way to dispose of any outdated medicine or medicine no longer needed  · Keep all medicine out of the reach of children  Never share your medicine with anyone  Drugs and Foods to Avoid:   Ask your doctor or pharmacist before using any other medicine, including over-the-counter medicines, vitamins, and herbal products  · Make sure your doctor knows about all other medicines you are using  Warnings While Using This Medicine:   · Make sure your doctor knows if you are pregnant or breast feeding    · Make sure your doctor knows if you have kidney stones, sarcoidosis, or overactive parathyroid gland  · You should not use this medicine if you are under 25years of age  Possible Side Effects While Using This Medicine:   Call your doctor right away if you notice any of these side effects:  · Allergic reaction: Itching or hives, swelling in your face or hands, swelling or tingling in your mouth or throat, chest tightness, trouble breathing  · Change in how much or how often you urinate  If you notice these less serious side effects, talk with your doctor:   · Diarrhea  · Headache  · Weight loss  If you notice other side effects that you think are caused by this medicine, tell your doctor  Call your doctor for medical advice about side effects  You may report side effects to FDA at 7-322-ZDX-2744  © Copyright [employer] 2021 Information is for End User's use only and may not be sold, redistributed or otherwise used for commercial purposes  The above information is an  only  It is not intended as medical advice for individual conditions or treatments  Talk to your doctor, nurse or pharmacist before following any medical regimen to see if it is safe and effective for you  Prediabetes   WHAT YOU NEED TO KNOW:   What is prediabetes? Prediabetes is a blood glucose (sugar) level that is higher than normal  It is not high enough to be considered diabetes  Prediabetes increases your risk for type 2 diabetes and heart disease  What increases my risk for prediabetes?    · Being overweight or obese, with a body mass index (BMI) of 25 or higher (23 or higher if you are  American)    · Lack of physical activity    · Older age    · Family history of diabetes (parent or sibling)    · A history of heart disease, gestational diabetes, or polycystic ovary syndrome (PCOS)    · High blood pressure or cholesterol levels    · Being [place] [address], , Native [address], [place], or [address] Island[place]    · In children, having a mother with diabetes or gestational diabetes mellitus (GDM) during the pregnancy    What are the signs and symptoms of prediabetes? Prediabetes may not cause any symptoms  You may be at risk for diabetes if you have darkened skin on your neck, armpits, and elbows  Any of the following symptoms mean you have moved from prediabetes to diabetes type 2:  · More hunger or thirst than usual    · Frequent urination    · Constant exhaustion    · Blurred vision    How is prediabetes diagnosed? Tests can find prediabetes even if no signs or symptoms have started  If tests are negative for prediabetes, they may be repeated every 3 years if the risk stays high  Adults are tested starting at age 39, or before 39 with a high risk for diabetes  Children who are overweight or obese are tested at the start of puberty, or as early as 8years of age  The following tests are used to check for prediabetes:  · An A1c test  shows the average amount of sugar in your blood over the past 2 to 3 months  ? An A1c of 5 6% or lower means you do not have diabetes  ? An A1c of 5 7% to 6 4% means you have prediabetes and are at risk for diabetes  ? An A1c of 6 5% or higher means you have diabetes  · A fasting plasma glucose test  is when your blood sugar level is tested after you have not eaten for 8 hours  · A 2-hour plasma glucose test  starts with a blood sugar level check after you have not eaten for 8 hours  You are then given a glucose drink  Your blood sugar level is checked after 2 hours  How do I prevent or delay type 2 diabetes? Healthy choices work best to delay or prevent type 2 diabetes  You can decrease your risk for type 2 diabetes by choosing the following:  · Get regular physical activity  Physical activity, such as exercise, can help decrease your blood sugar level  It can also help to decrease your risk for heart disease and help you lose weight   Adults should get at least 150 minutes (2 5 hours) of moderate physical activity every week  Spread the amount of activity over at least 3 days a week  Do not skip more than 2 days in a row  Children should get at least 60 minutes of moderate physical activity on most days of the week  Examples of moderate physical activity include brisk walking, running, and swimming  Do not sit for longer than 30 minutes at a time  Work with your healthcare provider to create a plan for physical activity  · Lose weight if you are overweight  A weight loss of 7% of your body weight can help to lower your blood sugar level  Your healthcare provider can tell you what weight is healthy for you  He or she can help you create a weight loss plan  · Eat healthy foods  Eat a variety of fruits and vegetables  Eat whole-grain foods more often  Choose dairy foods, meat, and other protein foods that are low in fat  Eat fewer sweets, such as candy, cookies, regular soda, and sweetened drinks  You can also decrease calories by eating smaller portion sizes  Work with your healthcare provider or dietitian to develop a meal plan that is right for you  · Take medicine as directed  Your healthcare provider may give diabetes medicine if you are at high risk for diabetes  You may also need medicines for high blood pressure and high cholesterol  · Follow up with your healthcare provider as directed  You will need to return every year to get tested for diabetes  · Do not smoke  Smoking may increase your risk for type 2 diabetes  Nicotine can damage blood vessels  Other health conditions, such as lung disease, can develop when you smoke  Do not use e-cigarettes or smokeless tobacco in place of cigarettes or to help you quit  They still contain nicotine  Ask your healthcare provider for information if you currently smoke and need help quitting  When should I call my doctor?    · You have more hunger or thirst than usual     · You are urinating more often than usual     · You are always exhausted  · You have blurred vision  · You have questions or concerns about your condition or care  CARE AGREEMENT:   You have the right to help plan your care  Learn about your health condition and how it may be treated  Discuss treatment options with your healthcare providers to decide what care you want to receive  You always have the right to refuse treatment  The above information is an  only  It is not intended as medical advice for individual conditions or treatments  Talk to your doctor, nurse or pharmacist before following any medical regimen to see if it is safe and effective for you  © Copyright [address] Information is for End User's use only and may not be sold, redistributed or otherwise used for commercial purposes  All illustrations and images included in CareNotes® are the copyrighted property of [employer]  or [facility] Quang Cole   Allergic Esophagitis   WHAT YOU NEED TO KNOW:   What is allergic esophagitis? Allergic esophagitis is a condition that causes your esophagus to swell and narrow when your body reacts to allergens  An allergen is anything you are allergic to, such as certain foods, dust, or pollen  What increases the risk for allergic esophagitis? Anyone can get allergic esophagitis  The following increase the risk:  · Asthma or seasonal allergies    · Being male    · A family history of allergic esophagitis    What are the signs and symptoms of allergic esophagitis? · Trouble swallowing    · Throat pain during swallowing    · Food stuck in the esophagus    · Heartburn or chest pain    How is allergic esophagitis diagnosed? Your healthcare provider will ask about your symptoms and when they began  Tell him or her if you know certain foods cause your symptoms  Tell him or her if you have any medical conditions or a family history of allergic esophagitis   You may also need any of the following:  · Allergy tests  are used to see how your body reacts to certain allergens  The tests may show what is causing your allergic esophagitis  · A barium swallow x-ray  is used to take pictures inside your esophagus  You will swallow barium in a thick liquid before you have the x-ray  The barium helps any injuries show up better on the x-rays  · Endoscopy  is used to find any tissue changes  A scope is used to see inside the esophagus  A scope is a long, bendable tube with a light on the end  The scope is placed in your mouth and passed down your throat and esophagus  A camera may be hooked to the scope to take pictures  · A biopsy  is used to take tissue samples from your esophagus to be tested  The samples may also be checked for any other problems with your esophagus  How is allergic esophagitis treated? Allergic esophagitis may not go away completely  Treatment may help relieve your symptoms  · Steroid medicine  may help decrease swelling in your esophagus  You will swallow the medicine so it coats your esophagus  · Stomach acid medicine  helps keep heartburn symptoms under control  · Dilatation  is a procedure used when the esophagus narrows from swelling  An endoscope is placed into your mouth and down your throat  Tools on the endoscope press against the tissues to widen your esophagus  Dilatation can improve your symptoms but will not stop allergic esophagitis from happening  What food changes may be needed for allergic esophagitis? You may need to stop eating certain foods for a while to see if your symptoms improve  Start eating these foods again one at a time as directed  If certain foods cause your symptoms, do not eat them  Some common examples are dairy, nuts, eggs, and seafood  You may need to change what you eat to relieve your symptoms  You may need to see a dietitian to help you get the right amount of nutrients    Call your local emergency number (911 in the [address]) if:   · Food is stuck in your throat  · You have chest pain  When should I seek immediate care? · You vomit blood  · Your bowel movements are black and sticky  · You feel weak or dizzy  When should I call my doctor? · You have a fever  · You have white patches on your tongue and inside your mouth  · You lose weight without trying  · It is hard to swallow or it hurts to swallow, even after treatment  · You have questions or concerns about your condition or care  CARE AGREEMENT:   You have the right to help plan your care  Learn about your health condition and how it may be treated  Discuss treatment options with your healthcare providers to decide what care you want to receive  You always have the right to refuse treatment  The above information is an  only  It is not intended as medical advice for individual conditions or treatments  Talk to your doctor, nurse or pharmacist before following any medical regimen to see if it is safe and effective for you  © Copyright [address] Information is for End User's use only and may not be sold, redistributed or otherwise used for commercial purposes  All illustrations and images included in CareNotes® are the copyrighted property of A D A M , Inc  or Sincerely  Asthma   WHAT YOU NEED TO KNOW:   What is asthma? Asthma is a lung disease that makes breathing difficult  Chronic inflammation and reactions to triggers narrow the airways in the lungs  Asthma can become life-threatening if it is not managed  What is cough-variant asthma? Cough-variant asthma is a type of asthma that causes a dry cough that keeps coming back  A dry cough may be your only symptom, or you may also have chest tightness  These symptoms may be caused by exercise or exposure to odors, allergens, or respiratory tract infections  Cough-variant asthma is treated the same way as typical asthma  What are the signs and symptoms of asthma? · Coughing    · Wheezing    · Shortness of breath    · Chest tightness    What may trigger an asthma attack? · A cold, the flu, or a sinus infection    · Exercise    · Weather changes, especially cold, dry air    · Smoking or secondhand smoke    · Fumes from chemicals, dust, air pollution, or other small particles in the air    · Pets, pollen, dust mites, or cockroaches    How is asthma diagnosed? Your healthcare provider will examine you and listen to your lungs  He or she will ask how often you have symptoms and what makes them worse  Tell him or her if you have trouble sleeping, exercising, or doing other activities because of shortness of breath  Your provider will ask about your allergies and past colds, and if anyone in your family has allergies or asthma  Tell your healthcare provider about medicines you take, including over-the-counter drugs and herbal supplements  You may need a chest x-ray to check for lung problems, or a lung function test  Lung function tests show how well you can breathe  How is asthma treated? · Medicines  decrease inflammation, open airways, and make it easier to breathe  Medicines may be inhaled, taken as a pill, or injected  Short-term medicines relieve your symptoms quickly  Long-term medicines are used to prevent future attacks  You may also need medicine to help control your allergies  · Allergy testing  may find allergies that trigger an asthma attack  You may need allergy shots or medicine to control allergies that make your asthma worse  How can I manage my symptoms and prevent future attacks? · Follow your Asthma Action Plan (AAP)  This is a written plan that you and your healthcare provider create  It explains which medicine you need and when to change doses if necessary  It also explains how you can monitor symptoms and use a peak flow meter  The meter measures how well your lungs are working      · Manage other health conditions , such as allergies, acid reflux, and sleep apnea  · Identify and avoid triggers  These may include pets, dust mites, mold, and cockroaches  · Do not smoke or be around others who smoke  Nicotine and other chemicals in cigarettes and cigars can cause lung damage  Ask your healthcare provider for information if you currently smoke and need help to quit  E-cigarettes or smokeless tobacco still contain nicotine  Talk to your healthcare provider before you use these products  · Ask about the flu vaccine  The flu can make your asthma worse  You may need a yearly flu shot  Call your local emergency number (911 in the [address]) if:   · You have severe shortness of breath  · Your lips or nails turn blue or gray  · The skin around your neck and ribs pulls in with each breath  When should I call my doctor? · You have shortness of breath, even after you take your short-term medicine as directed  · Your peak flow numbers are in the red zone of your AAP  · You run out of medicine before your next refill is due  · Your symptoms get worse  · You need to take more medicine than usual to control your symptoms  · You have questions or concerns about your condition or care  CARE AGREEMENT:   You have the right to help plan your care  Learn about your health condition and how it may be treated  Discuss treatment options with your healthcare providers to decide what care you want to receive  You always have the right to refuse treatment  The above information is an  only  It is not intended as medical advice for individual conditions or treatments  Talk to your doctor, nurse or pharmacist before following any medical regimen to see if it is safe and effective for you  © Copyright [address] Information is for End User's use only and may not be sold, redistributed or otherwise used for commercial purposes   All illustrations and images included in CareNotes® are the copyrighted property of [employer]  or [facility]  COPD (Chronic Obstructive Pulmonary Disease)   WHAT YOU NEED TO KNOW:   What is COPD? COPD (chronic obstructive pulmonary disease) is a lung disease that causes breathing problems  COPD usually develops from years of irritation and inflammation in your lungs  Obstructive means airflow is blocked  This limits airflow out of your lungs  Smoking, breathing in pollution, genetics, or a history of lung infections can increase your risk for COPD  What are the signs and symptoms of COPD? · Shortness of breath    · A dry cough    · Coughing fits that bring up mucus from your lungs    · Wheezing and chest tightness    How is COPD treated? Healthcare providers will work with you to create a care plan  The plan will contain goals defined by you and your providers  It will include steps to help you reach your goals within a specific time frame  The plan may change over time as your goals and needs change  The following may be included in your plan:  · Medicines  may be used to open your airway or decrease swelling and inflammation in your lungs  Medicines can relieve certain kinds of symptoms  A short-acting medicine relieves symptoms quickly  This may be called rescue medicine  A long-acting medicine controls or prevents symptoms  This may be called maintenance medicine  Your care plan will have directions for when to take each kind of medicine  Your healthcare provider will give you more information about the medicines you are given and how to use them safely  · Extra oxygen  may help you breathe easier and feel more alert if you have severe COPD  Do not increase your oxygen levels without speaking to your doctor first     · Surgery  is sometimes done if all other treatments have failed  A lung reduction is surgery to remove part of your damaged lung  A lung transplant is the replacement of your lung with a donor lung  What can I do to help make breathing easier?    · Use pursed-lip breathing any time you feel short of breath  Take a deep breath in through your nose  Slowly breathe out through your mouth with your lips pursed  Try to take 2 times as long to breathe out as to breathe in  This helps you get rid of as much air from your lungs as possible  You can also practice this breathing pattern while you bend, lift, climb stairs, or exercise  It slows down your breathing and helps move more air in and out of your lungs  · Avoid anything that makes your symptoms worse  Stay out of high altitudes and places with high humidity  Stay inside, or cover your mouth and nose with a scarf when you are outside in cold weather  Stay inside on days when air pollution or pollen counts are high  Do not use aerosol sprays such as deodorant, bug spray, and hairspray  · Exercise daily  Exercise for at least 20 minutes each day to help increase your energy and decrease shortness of breath  Talk to your healthcare provider about the best exercise plan for you  How can I manage COPD and help prevent exacerbations? COPD is a serious condition that gets worse over time  A COPD exacerbation means your symptoms suddenly get worse  It is important to prevent exacerbations  An exacerbation can cause more lung damage  COPD cannot be cured, but you can take action to feel better and prevent exacerbations:  · Do not smoke  Nicotine and other chemicals in cigarettes and cigars can cause lung damage and make your COPD worse  Ask your healthcare provider for information if you currently smoke and need help to quit  E-cigarettes or smokeless tobacco still contain nicotine  Talk to your healthcare provider before you use these products  · Avoid secondhand smoke  This is smoke another person exhales  Even if you have never smoked or have quit, it is important to avoid secondhand smoke  This smoke can also cause lung damage or trigger an exacerbation      · Go to pulmonary rehabilitation (rehab) if directed  Rehab is a program run by specialists who help you learn to manage COPD  Examples include a pulmonologist (lung specialist), dietitian, or exercise therapist  The specialists will help you make a plan to avoid triggers that cause an exacerbation  · Take your medicines as directed  Refill your medicines before you are out so that you do not miss a dose  Ask your healthcare provider if you have any questions on how to take your medicines  · Protect yourself from germs  Germs can get into your lungs and cause an infection  An infection in your lungs can create more mucus and make it harder to breathe  An infection can also create swelling in your airway and prevent air from getting in  You can decrease your risk for infection by doing the following:         ? Wash your hands often with soap and water  Carry germ-killing gel with you  You can use the gel to clean your hands when soap and water are not available  ? Do not touch your eyes, nose, or mouth unless you have washed your hands first     ? Always cover your mouth when you cough  Cough into a tissue or your shirtsleeve so you do not spread germs from your hands  ? Try to avoid people who have a cold or the flu  If you are sick, stay away from others as much as possible  ? Ask about vaccines  Influenza (the flu) and pneumonia can become life-threatening for a person who has COPD  Get a flu vaccine each year as soon as it becomes available  The pneumonia vaccine may be given every 5 years, or as directed  Ask about other vaccines you may need and when to get them  · Drink liquids as directed  You may need to drink more liquid than usual  Liquid will help to keep your air passages moist and help you cough up mucus  Ask how much liquid to drink each day and which liquids are best for you  Call your local emergency number (911 in the [address]) if:   · You feel lightheaded, short of breath, and have chest pain        When should I seek immediate care? · You cough up blood  · You are confused, dizzy, or feel faint  · Your arm or leg feels warm, tender, and painful  It may look swollen and red  When should I call my doctor? · You have increased shortness of breath  · You need more medicine than usual to control your symptoms  · You are coughing or wheezing more than usual     · You are coughing up more mucus, or it has a new color or odor  · You gain more than 3 pounds in a week  · You have a fever, a runny or stuffy nose, and a sore throat, or other cold or flu symptoms  · Your skin, lips, or nails start to turn blue  · You have swelling in your legs or ankles  · You are very tired or weak for more than a day  · You notice changes in your mood, or changes in your ability to think or concentrate  · You have questions or concerns about your condition or care  CARE AGREEMENT:   You have the right to help plan your care  Learn about your health condition and how it may be treated  Discuss treatment options with your healthcare providers to decide what care you want to receive  You always have the right to refuse treatment  The above information is an  only  It is not intended as medical advice for individual conditions or treatments  Talk to your doctor, nurse or pharmacist before following any medical regimen to see if it is safe and effective for you  © Copyright [address] Information is for End User's use only and may not be sold, redistributed or otherwise used for commercial purposes  All illustrations and images included in CareNotes® are the copyrighted property of [employer]  or [address] [place] [address] [place]   Montelukast (By mouth)   Montelukast ([id number])  Prevents and treats asthma  Also prevents exercise-induced bronchoconstriction and treats symptoms caused by allergic rhinitis (hay fever)  Brand Name(s): Singulair   There may be other brand names for this medicine    When This Medicine Should Not Be Used: This medicine is not right for everyone  Do not use it if you had an allergic reaction to montelukast   How to Use This Medicine:   Packet, Tablet, Chewable Tablet  · Your doctor will tell you how much medicine to use  Do not use more than directed  · Read and follow the patient instructions that come with this medicine  Talk to your doctor or pharmacist if you have any questions  · Oral granules: Do not open the packet until you are ready to use it  You can give the oral granules in one of three ways  ? Put the oral granules directly on a spoon, then into the patient's mouth  ? Mix the granules with baby formula or breast milk that is cold or room temperature  Do not mix the granules with any other liquid  ? Mix the granules with applesauce, mashed carrots, rice, or ice cream  Do not mix the granules with any other type of soft food  ? If the medicine is mixed with formula, breast milk, or food, use it right away  Do not save any mixed medicine to use later  · Missed dose: If you miss a dose of this medicine, skip the missed dose and go back to your regular dosing schedule  Do not double doses  · Store the medicine in the original container at room temperature, away from heat and direct light  Drugs and Foods to Avoid:   Ask your doctor or pharmacist before using any other medicine, including over-the-counter medicines, vitamins, and herbal products  · Some medicines can affect how montelukast works  Tell your doctor if you are using aspirin, phenobarbital, NSAIDs, or a steroid medicine  Warnings While Using This Medicine:   · Tell your doctor if you are pregnant or breastfeeding, or if you have liver disease or a condition called phenylketonuria (PKU)  Tell your doctor if you have a history of depression or mental health problems  · This medicine will not stop an asthma attack that has already started   Your doctor may prescribe another medicine for a sudden asthma attack  · This medicine may cause the following problems:  ? Changes in mood or behavior, including agitation, aggression, depression, sleep disturbances, suicidal thoughts or behavior  ? Increased certain white blood cells (eosinophils), which may cause Churg-Carrol syndrome (blood vessel disease)  · If you use a corticosteroid medicine to control your asthma, keep using it as instructed by your doctor  · If any of your asthma medicines do not seem to be working as well as usual, call your doctor right away  Do not change your doses or stop using your medicines without asking your doctor  · This medicine may make you dizzy or drowsy  Do not drive or do anything else that could be dangerous until you know how this medicine affects you  · Keep all medicine out of the reach of children  Never share your medicine with anyone  Possible Side Effects While Using This Medicine:   Call your doctor right away if you notice any of these side effects:  · Allergic reaction: Itching or hives, swelling in your face or hands, swelling or tingling in your mouth or throat, chest tightness, trouble breathing  · Blistering, peeling, red skin rash  · Chest pain, fast, pounding, or uneven heartbeat  · Fever, chills, cough, runny or stuffy nose, sore throat, body aches  · Numbness or tingling in the hands, arms, legs, or feet  · Pain and swelling in your sinuses  · Pain, redness, or swelling in the ear  · Restlessness, anxiety, irritability, mood or behavior changes, or thoughts of hurting yourself or others  · Sudden and severe stomach pain, nausea, vomiting, lightheadedness  · Unusual bleeding or bruising  If you notice these less serious side effects, talk with your doctor:   · Diarrhea  · Headache  If you notice other side effects that you think are caused by this medicine, tell your doctor  Call your doctor for medical advice about side effects   You may report side effects to FDA at 3-182-FAR-1675  © Copyright [employer] 2021 Information is for [employer] use only and may not be sold, redistributed or otherwise used for commercial purposes  The above information is an  only  It is not intended as medical advice for individual conditions or treatments  Talk to your doctor, nurse or pharmacist before following any medical regimen to see if it is safe and effective for you

## 2021-06-03 NOTE — ASSESSMENT & PLAN NOTE
Patient to continue her routine COPD maintenance medications, complete post prednisone weaning dose and start singular 10mg p o  daily  O2 saturation - 98% resting on 2 liters nasal cannula oxygen  Patient has been instructed to obtain humidification

## 2021-06-09 ENCOUNTER — TELEMEDICINE (OUTPATIENT)
Dept: PULMONOLOGY | Facility: CLINIC | Age: 76
End: 2021-06-09
Payer: MEDICARE

## 2021-06-09 DIAGNOSIS — J96.11 CHRONIC RESPIRATORY FAILURE WITH HYPOXIA (HCC): ICD-10-CM

## 2021-06-09 DIAGNOSIS — J44.9 COPD, VERY SEVERE (HCC): Primary | Chronic | ICD-10-CM

## 2021-06-09 PROCEDURE — 99213 OFFICE O/P EST LOW 20 MIN: CPT | Performed by: INTERNAL MEDICINE

## 2021-06-09 RX ORDER — PREDNISONE 10 MG/1
TABLET ORAL
Qty: 30 TABLET | Refills: 2 | Status: SHIPPED | OUTPATIENT
Start: 2021-06-09 | End: 2021-06-18 | Stop reason: DRUGHIGH

## 2021-06-09 NOTE — ASSESSMENT & PLAN NOTE
Patient had recent admission for a fairly severe COPD exacerbation  She felt well when on higher doses of prednisone, now declining again since being off steroids for 48 hours  I have prescribed another prednisone taper with plans for her to come see me when she is on 10 mg daily  We will then determine if we should keep her on low-dose prednisone longer-term, understanding there are long-term side effects that may be undesirable  She will continue with Advair and Combivent  She will try using the nebulizer 2 or 3 times per day  If she continues to have issues with utilizing the nebulizer, she should discontinue it

## 2021-06-09 NOTE — ASSESSMENT & PLAN NOTE
Patient is currently on 2 liters/minute and saturations are in the mid 90s  We will re-evaluate her saturations during her upcoming in-person visit

## 2021-06-09 NOTE — PROGRESS NOTES
Virtual Regular Visit - Pulmonary Follow up      Assessment/Plan:    Problem List Items Addressed This Visit        Respiratory    COPD, very severe, wtih acute excaerbation ([facility] [address] ) - Primary (Chronic)       Patient had recent admission for a fairly severe COPD exacerbation  She felt well when on higher doses of prednisone, now declining again since being off steroids for 48 hours  I have prescribed another prednisone taper with plans for her to come see me when she is on 10 mg daily  We will then determine if we should keep her on low-dose prednisone longer-term, understanding there are long-term side effects that may be undesirable  She will continue with Advair and Combivent  She will try using the nebulizer 2 or 3 times per day  If she continues to have issues with utilizing the nebulizer, she should discontinue it  Relevant Medications    predniSONE 10 mg tablet    Chronic respiratory failure with hypoxia ([facility] [address] )       Patient is currently on 2 liters/minute and saturations are in the mid 90s  We will re-evaluate her saturations during her upcoming in-person visit  Reason for visit is   Chief Complaint   Patient presents with    Virtual Regular Visit        Encounter provider Dania Wong DO    Provider located at [place] [address] [place] [id number]      Recent Visits  Date Type Provider Dept   06/03/21 Telephone Jimmy Sesay MD [facility]   Showing recent visits within past 7 days and meeting all other requirements     Today's Visits  Date Type Provider Dept   06/09/21 Telemedicine Dania Wong DO [facility]   Showing today's visits and meeting all other requirements     Future Appointments  No visits were found meeting these conditions     Showing future appointments within next 150 days and meeting all other requirements        The patient was identified by name and date of michael  Sathish Piña was informed that this is a telemedicine visit and that the visit is being conducted through [facility] and patient was informed that this is a secure, HIPAA-compliant platform  She agrees to proceed     My office door was closed  No one else was in the room  She acknowledged consent and understanding of privacy and security of the video platform  The patient has agreed to participate and understands they can discontinue the visit at any time  Patient is aware this is a billable service  Ayana Cloud is a 76 y o  female  With very severe COPD, recently hospitalized with COPD exacerbation and acute hypoxic respiratory failure  She was discharged home with supplemental oxygen at 2 liters/minute  She completed a prednisone taper 2 days ago  Patient was feeling extremely well when she first got home, on a higher dose of prednisone  Since coming off the steroids, her exercise capacity has significant declined and she has increased shortness of breath  She has been using Advair twice daily and Combivent 4 times daily  She has a nebulizer, but states that when she uses it, she finds that her oxygen saturations dropped  She says she may be using it incorrectly  She has saline nasal spray  She was prescribed Flonase and Singulair, but did not start those medications  She had some lower extremity swelling which has improved  No fevers or chills  No night sweats  She complains of generalized weakness  No nausea, vomiting or diarrhea  All other review of systems are negative        Past Medical History:   Diagnosis Date    COPD (chronic obstructive pulmonary disease) ([facility] [address] )     History of screening mammography     last assessed: 10/31/2017    Osteoporosis        Past Surgical History:   Procedure Laterality Date    APPENDECTOMY      onset:     CATARACT EXTRACTION      last assessed: 10/31/2017    TONSILLECTOMY      onset:        Current Outpatient Medications Medication Sig Dispense Refill    albuterol (2 5 mg/3 mL) 0 083 % nebulizer solution Take 1 vial (2 5 mg total) by nebulization every 6 (six) hours as needed for wheezing or shortness of breath Please dispense 25 vials  45 mL 1    albuterol (Ventolin HFA) 90 mcg/act inhaler Inhale 2 puffs every 4 (four) hours as needed for wheezing 3 Inhaler 3    fluticasone (FLONASE) 50 mcg/act nasal spray 1 spray into each nostril daily 9 9 mL 1    fluticasone-salmeterol (Advair Diskus) 250-50 mcg/dose inhaler Inhale 1 puff every 12 (twelve) hours 3 Inhaler 3    ipratropium-albuterol (COMBIVENT RESPIMAT) inhaler Inhale 1 puff 4 (four) times a day 4 g 5    montelukast (SINGULAIR) 10 mg tablet Take 1 tablet (10 mg total) by mouth daily at bedtime 30 tablet 5    budesonide (PULMICORT) 1 MG/2ML nebulizer solution Take 1 mg by nebulization 2 (two) times a day      calcium carbonate (OS-CRISTIANO) 600 MG tablet Take one with dinner (Patient not taking: Reported on 6/9/2021)      Cholecalciferol (VITAMIN D3) 50 MCG (2000 UT) capsule She takes 1 capsule daily (Patient not taking: Reported on 6/9/2021)  0    predniSONE 10 mg tablet Take 4 tablet by mouth once daily for 3 days, then 3 tablet by mouth once daily for 3 days, then 2 tablet by mouth once daily for 3 days, then 1 tablet by mouth once daily for 3 days (Patient not taking: Reported on 6/9/2021) 30 tablet 0    predniSONE 10 mg tablet 4 tabs daily x 3 D then 3 tabs daily x 3 D then 2 tabs daily x 3 D then 1 tab daily x 3 D 30 tablet 2     No current facility-administered medications for this visit           Allergies   Allergen Reactions    Clarinex [Desloratadine] Wheezing    Bee Venom Swelling and Facial Swelling    Clarithromycin Wheezing     Reaction Date: 29Apr2011;     Food Color Red - Food Allergy Itching    Levofloxacin Other (See Comments)     Blood clot in leg    Loratadine Wheezing     Reaction Date: 29Apr2011;        Review of Systems    Video Exam    VS Heart rate 91 beats per minute, blood pressure 117/60, temperature 97 7°  Physical Exam  Constitutional:       General: She is not in acute distress  Appearance: She is ill-appearing  She is not toxic-appearing  HENT:      Head: Normocephalic  Nose: No congestion  Mouth/Throat:      Mouth: Mucous membranes are moist    Eyes:      General: No scleral icterus  Pulmonary:      Effort: Pulmonary effort is normal       Comments: No respiratory distress or audible wheezing  Skin:     Coloration: Skin is not jaundiced  Neurological:      Mental Status: She is alert and oriented to person, place, and time  Psychiatric:         Mood and Affect: Mood normal          Behavior: Behavior normal           I spent 17 minutes directly with the patient during this visit      [id number] [facility] acknowledges that she has consented to an online visit or consultation  She understands that the online visit is based solely on information provided by her, and that, in the absence of a face-to-face physical evaluation by the physician, the diagnosis she receives is both limited and provisional in terms of accuracy and completeness  This is not intended to replace a full medical face-to-face evaluation by the physician  Jose Peterson understands and accepts these terms

## 2021-06-18 ENCOUNTER — DOCUMENTATION (OUTPATIENT)
Dept: PULMONOLOGY | Facility: CLINIC | Age: 76
End: 2021-06-18

## 2021-06-18 ENCOUNTER — LAB (OUTPATIENT)
Dept: LAB | Facility: CLINIC | Age: 76
End: 2021-06-18
Payer: MEDICARE

## 2021-06-18 ENCOUNTER — OFFICE VISIT (OUTPATIENT)
Dept: PULMONOLOGY | Facility: CLINIC | Age: 76
End: 2021-06-18
Payer: MEDICARE

## 2021-06-18 VITALS
HEART RATE: 77 BPM | WEIGHT: 139 LBS | BODY MASS INDEX: 27.29 KG/M2 | RESPIRATION RATE: 16 BRPM | OXYGEN SATURATION: 98 % | DIASTOLIC BLOOD PRESSURE: 74 MMHG | TEMPERATURE: 97.4 F | SYSTOLIC BLOOD PRESSURE: 136 MMHG | HEIGHT: 60 IN

## 2021-06-18 DIAGNOSIS — J44.9 COPD, VERY SEVERE (HCC): Chronic | ICD-10-CM

## 2021-06-18 DIAGNOSIS — J96.11 CHRONIC RESPIRATORY FAILURE WITH HYPOXIA (HCC): Primary | ICD-10-CM

## 2021-06-18 DIAGNOSIS — J96.11 CHRONIC RESPIRATORY FAILURE WITH HYPOXIA (HCC): ICD-10-CM

## 2021-06-18 PROBLEM — R06.02 SHORTNESS OF BREATH: Status: ACTIVE | Noted: 2021-06-18

## 2021-06-18 LAB — D DIMER PPP FEU-MCNC: 0.32 UG/ML FEU

## 2021-06-18 PROCEDURE — 85379 FIBRIN DEGRADATION QUANT: CPT

## 2021-06-18 PROCEDURE — 36415 COLL VENOUS BLD VENIPUNCTURE: CPT

## 2021-06-18 PROCEDURE — 99215 OFFICE O/P EST HI 40 MIN: CPT | Performed by: INTERNAL MEDICINE

## 2021-06-18 RX ORDER — PREDNISONE 1 MG/1
5 TABLET ORAL DAILY
Qty: 30 TABLET | Refills: 2 | Status: SHIPPED | OUTPATIENT
Start: 2021-06-18 | End: 2021-07-22 | Stop reason: ALTCHOICE

## 2021-06-18 NOTE — ASSESSMENT & PLAN NOTE
Patient continues to struggle with shortness of breath with even minimal exertion  She has not returned to her baseline from her recent hospitalization in May  We discussed that given the severity of her underlying lung disease, it will take quite a bit of time to regain that function and there are no guarantees that she will return to her previous baseline  She has found prednisone to be somewhat beneficial   We will try keeping her on low-dose, 5 mg daily for 1 week, then discontinue  If she finds that her shortness of breath declines, we may want to consider keeping her on low-dose indefinitely  She will continue with Advair twice daily and Combivent up to 4 times daily  We also discussed that given her advanced lung disease, it may be prudent to have palliative care get involved to help manage symptoms moving forward  We can readdress this at our next visit  We did have an in depth goals of care discussion  See separate advanced care planning note for details

## 2021-06-18 NOTE — PROGRESS NOTES
Pulmonary Follow Up Note   Ghassan Piper 76 y o  female MRN: [id number]  6/18/2021      Assessment/Plan:     Chronic respiratory failure with hypoxia [facility])  Patient has chronic hypoxic respiratory failure  Room air saturations today are 88%  When placed on supplemental oxygen, saturations remain 95% or greater  We will send an updated prescription to her [employer]  COPD, very severe, wtih acute excaerbation [facility])  Patient continues to struggle with shortness of breath with even minimal exertion  She has not returned to her baseline from her recent hospitalization in May  We discussed that given the severity of her underlying lung disease, it will take quite a bit of time to regain that function and there are no guarantees that she will return to her previous baseline  She has found prednisone to be somewhat beneficial   We will try keeping her on low-dose, 5 mg daily for 1 week, then discontinue  If she finds that her shortness of breath declines, we may want to consider keeping her on low-dose indefinitely  She will continue with Advair twice daily and Combivent up to 4 times daily  We also discussed that given her advanced lung disease, it may be prudent to have palliative care get involved to help manage symptoms moving forward  We can readdress this at our next visit  We did have an in depth goals of care discussion  See separate advanced care planning note for details  Shortness of breath  I suspect that her symptoms are due to underlying COPD, however with her prior history of DVT, I also wanted to exclude the possibility of pulmonary embolism  She underwent blood work today, including a D-dimer which was negative  Therefore, I feel confident that DVT/PE is not playing a part in her symptoms  Visit orders:    Diagnoses and all orders for this visit:    Chronic respiratory failure with hypoxia (HCC)  -     D-dimer, quantitative;  Future  -     Oxygen    COPD, very severe, wtih acute excaerbation ([facility] )  -     predniSONE 5 mg tablet; Take 1 tablet (5 mg total) by mouth daily      She will follow-up in 1 month or sooner if needed    History of Present Illness   HPI:  Alva Glynn is a 76 y o  female who is here today for follow-up regarding COPD exacerbation  She was hospitalized in May with acute on chronic hypoxic respiratory failure due to COPD exacerbation  She was treated with course of steroids  Once she came office steroids, her symptoms worsened  She has not returned to her previous baseline  She has shortness of breath with even minimal exertion  No significant cough, wheeze or sputum production  She has some lower extremity edema since being on the prednisone  She has no calf tenderness  She does have a prior history of DVT and takes an aspirin daily  She has been using Advair twice daily and Combivent approximately twice per day  She feels that the albuterol via nebulizer actually makes her symptoms worse at times  She is on using oxygen at 2 liters/minute continuously  Review of Systems   Constitutional: Positive for fatigue  Negative for chills, fever and unexpected weight change  HENT: Negative for postnasal drip and sore throat  Eyes: Negative for visual disturbance  Respiratory:        As noted in HPI   Cardiovascular: Positive for leg swelling  Negative for chest pain  Gastrointestinal: Negative for abdominal pain, diarrhea and vomiting  Musculoskeletal: Negative for arthralgias  Skin: Negative for rash  Neurological: Negative for headaches  Hematological: Negative for adenopathy  Psychiatric/Behavioral: Negative  All other systems reviewed and are negative          Medical, Family and Social history reviewed and updated as appropriate    Historical Information   Past Medical History:   Diagnosis Date    COPD (chronic obstructive pulmonary disease) ([facility] )     History of screening mammography     last assessed: 10/31/2017    Osteoporosis Past Surgical History:   Procedure Laterality Date    APPENDECTOMY      onset:     CATARACT EXTRACTION      last assessed: 10/31/2017    TONSILLECTOMY      onset: 12     Family History   Problem Relation Age of Onset    Arthritis Mother     Breast cancer Mother [de-identified]    Heart disease Mother     Hypertension Mother     Osteoporosis Mother     Diabetes Father     Heart disease Father     Hypertension Father     Ovarian cancer Sister 52    No Known Problems Maternal Grandmother     No Known Problems Maternal Grandfather     No Known Problems Paternal Grandmother     No Known Problems Paternal Grandfather     No Known Problems Maternal Aunt     No Known Problems Maternal Aunt     No Known Problems Paternal Aunt     No Known Problems Paternal Aunt     No Known Problems Paternal Aunt        Social History     Tobacco Use   Smoking Status Former Smoker    Packs/day: 1 00    Years: 40 00    Pack years: 40 00    Types: Cigarettes    Quit date:     Years since quittin 4   Smokeless Tobacco Never Used         Meds/Allergies     Current Outpatient Medications:     albuterol (2 5 mg/3 mL) 0 083 % nebulizer solution, Take 1 vial (2 5 mg total) by nebulization every 6 (six) hours as needed for wheezing or shortness of breath Please dispense 25 vials  , Disp: 45 mL, Rfl: 1    albuterol (Ventolin HFA) 90 mcg/act inhaler, Inhale 2 puffs every 4 (four) hours as needed for wheezing, Disp: 3 Inhaler, Rfl: 3    fluticasone (FLONASE) 50 mcg/act nasal spray, 1 spray into each nostril daily, Disp: 9 9 mL, Rfl: 1    fluticasone-salmeterol (Advair Diskus) 250-50 mcg/dose inhaler, Inhale 1 puff every 12 (twelve) hours, Disp: 3 Inhaler, Rfl: 3    ipratropium-albuterol (COMBIVENT RESPIMAT) inhaler, Inhale 1 puff 4 (four) times a day, Disp: 4 g, Rfl: 5    montelukast (SINGULAIR) 10 mg tablet, Take 1 tablet (10 mg total) by mouth daily at bedtime, Disp: 30 tablet, Rfl: 5    budesonide (PULMICORT) 1 MG/2ML nebulizer solution, Take 1 mg by nebulization 2 (two) times a day (Patient not taking: Reported on 6/18/2021), Disp: , Rfl:     calcium carbonate (OS-CRISTIANO) 600 MG tablet, Take one with dinner (Patient not taking: Reported on 6/9/2021), Disp: , Rfl:     Cholecalciferol (VITAMIN D3) 50 MCG (2000 UT) capsule, She takes 1 capsule daily (Patient not taking: Reported on 6/9/2021), Disp: , Rfl: 0    predniSONE 5 mg tablet, Take 1 tablet (5 mg total) by mouth daily, Disp: 30 tablet, Rfl: 2  Allergies   Allergen Reactions    Clarinex [Desloratadine] Wheezing    Bee Venom Swelling and Facial Swelling    Clarithromycin Wheezing     Reaction Date: 29Apr2011;     Food Color Red - Food Allergy Itching    Levofloxacin Other (See Comments)     Blood clot in leg    Loratadine Wheezing     Reaction Date: 29Apr2011;        Vitals: Blood pressure 136/74, pulse 77, temperature (!) 97 4 °F (36 3 °C), temperature source Tympanic, resp  rate 16, height 5' (1 524 m), weight 63 kg (139 lb), SpO2 98 %  Body mass index is 27 15 kg/m²  Oxygen Therapy  SpO2: 98 %  Oxygen Therapy: Supplemental oxygen  O2 Delivery Method: Nasal cannula  O2 Flow Rate (L/min): 2 L/min    Physical Exam   Physical Exam  Constitutional:       General: She is not in acute distress  HENT:      Head: Normocephalic  Mouth/Throat:      Pharynx: No oropharyngeal exudate  Eyes:      General: No scleral icterus  Pupils: Pupils are equal, round, and reactive to light  Neck:      Vascular: No JVD  Cardiovascular:      Rate and Rhythm: Normal rate and regular rhythm  Pulmonary:      Breath sounds: No wheezing or rhonchi  Comments: Markedly decreased breath sounds throughout  Abdominal:      Palpations: Abdomen is soft  Tenderness: There is no abdominal tenderness  Musculoskeletal:      Cervical back: Neck supple  Lymphadenopathy:      Cervical: No cervical adenopathy  Skin:     General: Skin is warm and dry     Neurological: Mental Status: She is alert and oriented to person, place, and time  Labs: I have personally reviewed pertinent lab results  Lab Results   Component Value Date    WBC 10 23 (H) 05/25/2021    HGB 12 8 05/25/2021    HCT 41 4 05/25/2021    MCV 96 05/25/2021     05/25/2021     Lab Results   Component Value Date    GLUCOSE 114 02/26/2020    CALCIUM 8 5 05/25/2021     06/25/2015    K 4 5 05/25/2021    CO2 37 (H) 05/25/2021     05/25/2021    BUN 18 05/25/2021    CREATININE 0 48 (L) 05/25/2021     No results found for: IGE  Lab Results   Component Value Date    ALT 41 05/20/2021    AST 21 05/20/2021    ALKPHOS 60 05/20/2021    BILITOT 0 51 06/25/2015       Imaging and other studies: I have personally reviewed pertinent reports  and I have personally reviewed pertinent films in PACS chest x-ray performed on 5/20/21 shows clear lungs    Pulmonary function testing:  Performed 1/22/19 and personally interpreted  FEV1/FVC ratio 39%   FEV1 27% predicted  FVC 51% predicted  No response to bronchodilators   % predicted   % predicted  DLCO corrected for hemoglobin 26 % predicted  PFTs show very severe obstruction, severe air trapping and severe diffusion impairment    Other Studies: I have personally reviewed pertinent reports      Echocardiogram from 1/10/20 shows an EF of 18%, grade 1 diastolic dysfunction

## 2021-06-18 NOTE — ASSESSMENT & PLAN NOTE
Patient has chronic hypoxic respiratory failure  Room air saturations today are 88%  When placed on supplemental oxygen, saturations remain 95% or greater  We will send an updated prescription to her homecare company

## 2021-06-18 NOTE — ASSESSMENT & PLAN NOTE
I suspect that her symptoms are due to underlying COPD, however with her prior history of DVT, I also wanted to exclude the possibility of pulmonary embolism  She underwent blood work today, including a D-dimer which was negative  Therefore, I feel confident that DVT/PE is not playing a part in her symptoms

## 2021-06-18 NOTE — ACP (ADVANCE CARE PLANNING)
Serious Illness Conversation    1  What is your understanding now of where you are with your illness? Prognostic Understanding: appropriate understanding of prognosis     2  How much information about what is likely to be ahead with your illness would you like to have? Not discussed    3  What did you (clinician) communicate to the patient? Prognostic Communication: Function - I hope that this is not the case, but Im worried that this may be as strong as you will feel, and things are likely to get more difficult  I am hopeful that you can gain additional strength, however if you do not, need to have a planned for worst case scenario     4  If your health situation worsens, what are your most important goals? Goals: be at home, not be a burden  Not been aware of discomfort or breathlessness     5  What are the biggest fears and worries about the future and your health? Fears/Worries: being an emotional and physical burden on      6  What abilities are so critical to your life that you cannot imagine living without them? Unacceptable Function: being in pain, short of breath or very uncomfortable     7  What gives you strength as you think about the future with your illness? Not discussed     8  If you become sicker, how much are you willing to go through for the possibility of gaining more time? Patient is willing to be in the hospital, but not be on life support for any duration of time  Living will is on file     9  How much does your proxy and family know about your priorities and wishes? Discussion: some discussion but incomplete   accompanies her to the visit today     Hao heard you say that being at home with family and not being uncomfortable related to COPD is really important to you  Keeping that in mind, and what we know about your illness, I recommend that we continue with current inhaler regimen and low-dose prednisone in an effort to stabilize your COPD   This will help us make sure that your treatment plans reflect whats important to you  How does this plan sound to you? I will do everything I can to help you through this    Patient verbalized understanding of the plan     I have spent 16 minutes speaking with my patient and her  on advanced care planning today or during this visit     Advanced directives

## 2021-07-22 ENCOUNTER — OFFICE VISIT (OUTPATIENT)
Dept: PULMONOLOGY | Facility: CLINIC | Age: 76
End: 2021-07-22
Payer: MEDICARE

## 2021-07-22 VITALS
SYSTOLIC BLOOD PRESSURE: 132 MMHG | OXYGEN SATURATION: 98 % | HEART RATE: 82 BPM | HEIGHT: 60 IN | DIASTOLIC BLOOD PRESSURE: 92 MMHG | BODY MASS INDEX: 27.29 KG/M2 | TEMPERATURE: 97.6 F | WEIGHT: 139 LBS

## 2021-07-22 DIAGNOSIS — J44.9 COPD, VERY SEVERE (HCC): ICD-10-CM

## 2021-07-22 DIAGNOSIS — J96.11 CHRONIC RESPIRATORY FAILURE WITH HYPOXIA (HCC): Primary | ICD-10-CM

## 2021-07-22 DIAGNOSIS — R06.02 SHORTNESS OF BREATH: ICD-10-CM

## 2021-07-22 PROBLEM — M79.89 SWELLING OF LOWER EXTREMITY: Status: ACTIVE | Noted: 2021-07-22

## 2021-07-22 PROCEDURE — 99214 OFFICE O/P EST MOD 30 MIN: CPT | Performed by: INTERNAL MEDICINE

## 2021-07-22 NOTE — PROGRESS NOTES
Pulmonary Follow Up Note   Sid Boyd 76 y o  female MRN: [id number]  7/22/2021      Assessment/Plan:     Chronic respiratory failure with hypoxia (HCC)    Oxygenation is adequate on 2 liters/minute  COPD, very severe [facility])    Patient has advanced COPD and has not regained her previous level of function since her admission in May  Steroids did not seem to be very beneficial and appear to have contributed to fluid retention  I hear no wheezing on exam today  She will continue with Advair and Combivent  Swelling of lower extremity   Patient believes that the edema is related to steroids  This may be the case, but would also entertain whether she could have developed any element of right-sided heart failure  I suggested that we schedule an echocardiogram to be sure it has not changed since January 2020  At that time, she had grade 1 diastolic dysfunction but no evidence of RV dysfunction  She will also continue to elevate the legs  She has compression socks at home, but finds them difficult to tolerate    Patient is having issues with overall mobility and is questioning whether she would qualify for a walker or wheelchair  I asked that she speak with her primary care physician at their office visit next week to see if they can help facilitate that easily  If they cannot, I told the patient to reach out to our office and  We can see if it is possible on our end  This is not something we do on a regular basis  Visit orders:    Diagnoses and all orders for this visit:    Chronic respiratory failure with hypoxia (HCC)    COPD, very severe ([facility] )  -     Discontinue: ipratropium-albuterol (COMBIVENT RESPIMAT) inhaler; Inhale 1 puff 4 (four) times a day  -     ipratropium-albuterol (COMBIVENT RESPIMAT) inhaler; Inhale 1 puff 4 (four) times a day    Shortness of breath  -     Echo complete with contrast if indicated; Future        No follow-ups on file      History of Present Illness   HPI:  Sid Boyd is a 76 y o  female who  Is here today for follow-up regarding advanced COPD and chronic hypoxic respiratory failure  Since her last visit, she feels slightly better  She still has fairly significant shortness of breath with even minimal exertion  She is using oxygen at 2 liters/minute continuously  Saturations have remained in the 90s for the most part  She has been compliant with Advair and Combivent  She uses the ventilator and on occasion  She has no significant sputum production  She denies wheezing  She denies fevers, chills or sweats  She continues to struggle with lower extremity edema and feels that is related to recent prednisone administration  She is sensitive on both legs, circumferentially  She has a remote history of DVT at least 10 years ago  At our last visit, we performed a D-dimer which was normal     Review of Systems    Patient complains of fatigue  Denies chest pain  Denies palpitations  Denies nausea, vomiting diarrhea  Weight has been stable  All other review of systems are negative      Medical, Family and Social history reviewed and updated as appropriate    Historical Information   Past Medical History:   Diagnosis Date    COPD (chronic obstructive pulmonary disease) ([facility] )     History of screening mammography     last assessed: 10/31/2017    Osteoporosis      Past Surgical History:   Procedure Laterality Date    APPENDECTOMY      onset: 1966    CATARACT EXTRACTION      last assessed: 10/31/2017    TONSILLECTOMY      onset: 1954     Family History   Problem Relation Age of Onset    Arthritis Mother     Breast cancer Mother [de-identified]    Heart disease Mother     Hypertension Mother     Osteoporosis Mother     Diabetes Father     Heart disease Father     Hypertension Father     Ovarian cancer Sister 52    No Known Problems Maternal Grandmother     No Known Problems Maternal Grandfather     No Known Problems Paternal Grandmother     No Known Problems Paternal Grandfather     No Known Problems Maternal Aunt     No Known Problems Maternal Aunt     No Known Problems Paternal Aunt     No Known Problems Paternal Aunt     No Known Problems Paternal Aunt        Social History     Tobacco Use   Smoking Status Former Smoker    Packs/day: 1 00    Years: 40 00    Pack years: 40 00    Types: Cigarettes    Quit date:     Years since quittin 5   Smokeless Tobacco Never Used         Meds/Allergies     Current Outpatient Medications:     albuterol (Ventolin HFA) 90 mcg/act inhaler, Inhale 2 puffs every 4 (four) hours as needed for wheezing, Disp: 3 Inhaler, Rfl: 3    fluticasone-salmeterol (Advair Diskus) 250-50 mcg/dose inhaler, Inhale 1 puff every 12 (twelve) hours, Disp: 3 Inhaler, Rfl: 3    ipratropium-albuterol (COMBIVENT RESPIMAT) inhaler, Inhale 1 puff 4 (four) times a day, Disp: 12 g, Rfl: 3  Allergies   Allergen Reactions    Clarinex [Desloratadine] Wheezing    Bee Venom Swelling and Facial Swelling    Clarithromycin Wheezing     Reaction Date: 2011;     Food Color Red - Food Allergy Itching    Levofloxacin Other (See Comments)     Blood clot in leg    Loratadine Wheezing     Reaction Date: 2011;        Vitals: Blood pressure 132/92, pulse 82, temperature 97 6 °F (36 4 °C), temperature source Temporal, height 5' (1 524 m), weight 63 kg (139 lb), SpO2 98 %  Body mass index is 27 15 kg/m²  Oxygen Therapy  SpO2: 98 %  Oxygen Therapy: Supplemental oxygen  O2 Delivery Method: Nasal cannula  O2 Flow Rate (L/min): 2 L/min    Physical Exam   Physical Exam  Constitutional:       General: She is not in acute distress  HENT:      Head: Normocephalic  Mouth/Throat:      Pharynx: No oropharyngeal exudate  Eyes:      General: No scleral icterus  Neck:      Vascular: No JVD  Cardiovascular:      Rate and Rhythm: Normal rate and regular rhythm  Pulmonary:      Breath sounds: No wheezing, rhonchi or rales        Comments: Decreased throughout  Abdominal:      Palpations: Abdomen is soft  Tenderness: There is no abdominal tenderness  Musculoskeletal:      Cervical back: Neck supple  Right lower leg: Edema present  Left lower leg: Edema present  Skin:     General: Skin is warm and dry  Neurological:      Mental Status: She is alert and oriented to person, place, and time  Psychiatric:         Mood and Affect: Mood normal          Behavior: Behavior normal          Labs: I have personally reviewed pertinent lab results  Lab Results   Component Value Date    WBC 10 23 (H) 05/25/2021    HGB 12 8 05/25/2021    HCT 41 4 05/25/2021    MCV 96 05/25/2021     05/25/2021     Lab Results   Component Value Date    GLUCOSE 114 02/26/2020    CALCIUM 8 5 05/25/2021     06/25/2015    K 4 5 05/25/2021    CO2 37 (H) 05/25/2021     05/25/2021    BUN 18 05/25/2021    CREATININE 0 48 (L) 05/25/2021     No results found for: IGE  Lab Results   Component Value Date    ALT 41 05/20/2021    AST 21 05/20/2021    ALKPHOS 60 05/20/2021    BILITOT 0 51 06/25/2015     Imaging and other studies: I have personally reviewed pertinent reports  and I have personally reviewed pertinent films in PACS   Chest x-ray from 5/20/21 shows clear lungs    Pulmonary function testing:  Performed  5/28/20 and personally interpreted  FEV1/FVC ratio 38%   FEV1 23% predicted  FVC 51% predicted  DLCO corrected for hemoglobin 23 % predicted  spirometry with very severe obstruction and reduced vital capacity on the basis of air trapping  Severe diffusion impairment

## 2021-07-28 ENCOUNTER — OFFICE VISIT (OUTPATIENT)
Dept: FAMILY MEDICINE CLINIC | Facility: MEDICAL CENTER | Age: 76
End: 2021-07-28
Payer: MEDICARE

## 2021-07-28 ENCOUNTER — TELEPHONE (OUTPATIENT)
Dept: FAMILY MEDICINE CLINIC | Facility: MEDICAL CENTER | Age: 76
End: 2021-07-28

## 2021-07-28 VITALS
HEART RATE: 86 BPM | HEIGHT: 60 IN | BODY MASS INDEX: 27.15 KG/M2 | RESPIRATION RATE: 20 BRPM | TEMPERATURE: 96.8 F | SYSTOLIC BLOOD PRESSURE: 134 MMHG | DIASTOLIC BLOOD PRESSURE: 78 MMHG

## 2021-07-28 DIAGNOSIS — M79.89 SWELLING OF LOWER EXTREMITY: ICD-10-CM

## 2021-07-28 DIAGNOSIS — L98.9 FACIAL LESION: ICD-10-CM

## 2021-07-28 DIAGNOSIS — J44.9 CHRONIC OBSTRUCTIVE PULMONARY DISEASE, UNSPECIFIED COPD TYPE (HCC): ICD-10-CM

## 2021-07-28 DIAGNOSIS — Z74.09 MOBILITY IMPAIRED: ICD-10-CM

## 2021-07-28 DIAGNOSIS — Z00.00 MEDICARE ANNUAL WELLNESS VISIT, SUBSEQUENT: ICD-10-CM

## 2021-07-28 DIAGNOSIS — R73.03 PREDIABETES: ICD-10-CM

## 2021-07-28 DIAGNOSIS — J96.11 CHRONIC RESPIRATORY FAILURE WITH HYPOXIA (HCC): Primary | ICD-10-CM

## 2021-07-28 PROCEDURE — 99214 OFFICE O/P EST MOD 30 MIN: CPT | Performed by: FAMILY MEDICINE

## 2021-07-28 PROCEDURE — 1123F ACP DISCUSS/DSCN MKR DOCD: CPT | Performed by: FAMILY MEDICINE

## 2021-07-28 PROCEDURE — G0439 PPPS, SUBSEQ VISIT: HCPCS | Performed by: FAMILY MEDICINE

## 2021-07-28 NOTE — TELEPHONE ENCOUNTER
Pt was calling the [employer] number  Provided local number and explained to her that orders for wheelchair can not be sent electronically because they are DME  I told her to call us if the local [employer] gives her a hard time

## 2021-07-28 NOTE — TELEPHONE ENCOUNTER
Pt called [facility] and they need the wheelchair order sent over electronically (not via fax)  I put [address] in pt's pharmacy info so you can send it

## 2021-07-28 NOTE — PROGRESS NOTES
Assessment and Plan:     Problem List Items Addressed This Visit     None           Preventive health issues were discussed with patient, and age appropriate screening tests were ordered as noted in patient's After Visit Summary  Personalized health advice and appropriate referrals for health education or preventive services given if needed, as noted in patient's After Visit Summary       History of Present Illness:     Patient presents for Medicare Annual Wellness visit    Patient Care Team:  Iona Matos MD as PCP - Fady Ware MD as PCP - Endocrinology (Endocrinology)     Problem List:     Patient Active Problem List   Diagnosis    [facility] emph[facility]sema ([facility]    Elevated glucose level    Prediabetes    Vitamin D deficiency    Osteoporosis    Elevated TSH    Other anaphylactic reaction    Asthma    Diabetes mellitus, latent    COPD, very severe ([facility] )    Pulmonary nodule    Non-seasonal allergic rhinitis due to pollen    Hypoxia    Need for influenza vaccination    Chronic respiratory failure with hypoxia ([facility] )    Allergic rhinitis    Shortness of breath    Swelling of lower extremity      Past Medical and Surgical History:     Past Medical History:   Diagnosis Date    COPD (chronic obstructive pulmonary disease) ([facility] )     History of screening mammography     last assessed: 10/31/2017    Osteoporosis      Past Surgical History:   Procedure Laterality Date    APPENDECTOMY      onset: 1966    CATARACT EXTRACTION      last assessed: 10/31/2017    TONSILLECTOMY      onset: 12      Family History:     Family History   Problem Relation Age of Onset    Arthritis Mother     Breast cancer Mother [de-identified]    Heart disease Mother     Hypertension Mother     Osteoporosis Mother     Diabetes Father     Heart disease Father     Hypertension Father     Ovarian cancer Sister 52    No Known Problems Maternal Grandmother     No Known Problems Maternal Grandfather     No Known Problems Paternal Grandmother     No Known Problems Paternal Grandfather     No Known Problems Maternal Aunt     No Known Problems Maternal Aunt     No Known Problems Paternal Aunt     No Known Problems Paternal Aunt     No Known Problems Paternal Aunt       Social History:     Social History     Socioeconomic History    Marital status: /Civil Union     Spouse name: None    Number of children: None    Years of education: None    Highest education level: None   Occupational History    None   Tobacco Use    Smoking status: Former Smoker     Packs/day: 1 00     Years: 40 00     Pack years: 40 00     Types: Cigarettes     Quit date:      Years since quittin 5    Smokeless tobacco: Never Used   Vaping Use    Vaping Use: Never used   Substance and Sexual Activity    Alcohol use: Yes    Drug use: Never    Sexual activity: Not Currently   Other Topics Concern    None   Social History Narrative    Caffeine use     Social Determinants of Health     Financial Resource Strain:     Difficulty of Paying Living Expenses:    Food Insecurity:     Worried About Running Out of Food in the Last Year:     [address] in the Last Year:    Transportation Needs:     Lack of Transportation (Medical):      Lack of Transportation (Non-Medical):    Physical Activity:     Days of Exercise per Week:     Minutes of Exercise per Session:    Stress:     Feeling of Stress :    Social Connections:     Frequency of Communication with Friends and Family:     Frequency of Social Gatherings with Friends and Family:     Attends Islam Services:     Active Member of Clubs or Organizations:     Attends Club or Organization Meetings:     Marital Status:    Intimate Partner Violence:     Fear of Current or Ex-Partner:     Emotionally Abused:     Physically Abused:     Sexually Abused:       Medications and Allergies:     Current Outpatient Medications   Medication Sig Dispense Refill    albuterol (Ventolin HFA) 90 mcg/act inhaler Inhale 2 puffs every 4 (four) hours as needed for wheezing 3 Inhaler 3    fluticasone-salmeterol (Advair Diskus) 250-50 mcg/dose inhaler Inhale 1 puff every 12 (twelve) hours 3 Inhaler 3    ipratropium-albuterol (COMBIVENT RESPIMAT) inhaler Inhale 1 puff 4 (four) times a day 12 g 3     No current facility-administered medications for this visit  Allergies   Allergen Reactions    Clarinex [Desloratadine] Wheezing    Bee Venom Swelling and Facial Swelling    Clarithromycin Wheezing     Reaction Date: [id number];     Food Color Red - Food Allergy Itching    Levofloxacin Other (See Comments)     Blood clot in leg    Loratadine Wheezing     Reaction Date: [id number];       Immunizations:     Immunization History   Administered Date(s) Administered    Influenza Split High Dose Preservative Free IM 10/24/2016, 10/31/2017, 11/21/2019    Influenza, high dose seasonal 0 7 mL 10/03/2018, 10/13/2020    Influenza, seasonal, injectable 11/13/2013    Pneumococcal Conjugate 13-Valent 04/25/2017    Pneumococcal Polysaccharide PPV23 01/01/2009, 12/22/2014    SARS-CoV-2 / COVID-19 mRNA IM (Suri Matos) 01/27/2021, 02/23/2021      Health Maintenance:         Topic Date Due    Hepatitis C Screening  Never done    Breast Cancer Screening: Mammogram  10/11/2021    DXA SCAN  10/11/2024    Colorectal Cancer Screening  09/22/2026         Topic Date Due    DTaP,Tdap,and Td Vaccines (1 - Tdap) Never done    Influenza Vaccine (1) 09/01/2021      Medicare Health Risk Assessment:     There were no vitals taken for this visit  Drake Swann is here for her Subsequent Wellness visit  Health Risk Assessment:   Patient rates overall health as poor  Patient feels that their physical health rating is much worse  Patient is dissatisfied with their life  Eyesight was rated as same  Hearing was rated as same  Patient feels that their emotional and mental health rating is slightly worse   Patients states they are never, rarely angry  Patient states they are often unusually tired/fatigued  Pain experienced in the last 7 days has been none  Patient states that she has experienced no weight loss or gain in last 6 months  Depression Screening:   PHQ-2 Score: 5      Fall Risk Screening: In the past year, patient has experienced: no history of falling in past year      Urinary Incontinence Screening:   Patient has not leaked urine accidently in the last six months  Home Safety:  Patient does not have trouble with stairs inside or outside of their home  Patient has working smoke alarms and has working carbon monoxide detector  Home safety hazards include: none  Nutrition:   Current diet is Regular and Low Carb  Medications:   Patient is currently taking over-the-counter supplements  OTC medications include: see medication list  Patient is able to manage medications  Activities of Daily Living (ADLs)/Instrumental Activities of Daily Living (IADLs):   Walk and transfer into and out of bed and chair?: Yes  Dress and groom yourself?: Yes    Bathe or shower yourself?: Yes    Feed yourself? Yes  Do your laundry/housekeeping?: No  Manage your money, pay your bills and track your expenses?: Yes  Make your own meals?: Yes    Do your own shopping?: No    Previous Hospitalizations:   Any hospitalizations or ED visits within the last 12 months?: Yes      Advance Care Planning:   Living will: Yes    Durable POA for healthcare:  Yes    Advanced directive: Yes      Cognitive Screening:   Provider or family/friend/caregiver concerned regarding cognition?: No    PREVENTIVE SCREENINGS      Cardiovascular Screening:    General: Screening Current      Diabetes Screening:     General: Screening Current      Colorectal Cancer Screening:     General: Screening Current      Breast Cancer Screening:     General: Screening Current and Patient Declines    Due for: Mammogram        Cervical Cancer Screening:    General: Screening Not Indicated      Osteoporosis Screening:    General: Screening Not Indicated, History Osteoporosis and Patient Declines    Due for: Bone Density Ultrasound      Abdominal Aortic Aneurysm (AAA) Screening:        General: Screening Not Indicated      Lung Cancer Screening:     General: Screening Not Indicated      Hepatitis C Screening:    General: Risks and Benefits Discussed    Hep C Screening Accepted: No       Preventive Screening Comments: Eye checkup   Immunizations up dated  Had COVID vaccine  Screening, Brief Intervention, and Referral to Treatment (SBIRT)    Screening      AUDIT-C Screenin) How often did you have a drink containing alcohol in the past year? 2 to 3 times a week  2) How many drinks did you have on a typical day when you were drinking in the past year? 1 to 2  3) How often did you have 6 or more drinks on one occasion in the past year? never    AUDIT-C Score: 3  Interpretation: Score 3-12 (female): POSITIVE screen for alcohol misuse    Single Item Drug Screening:  How often have you used an illegal drug (including marijuana) or a prescription medication for non-medical reasons in the past year? never    Single Item Drug Screen Score: 0  Interpretation: Negative screen for possible drug use disorder    Other Counseling Topics:   Regular weightbearing exercise and calcium and vitamin D intake  O: /78 (BP Location: Right arm, Patient Position: Sitting, Cuff Size: Adult)   Pulse 86   Temp (!) 96 8 °F (36 °C)   Resp 20   Ht 5' (1 524 m)   BMI 27 15 kg/m²       HEENT- TMs and canals are normal   Pharynx without erythema  Pupils agreed add extra light  Sclera clear  Neck no adenopathy thyromegaly   chest is clear however still very diffusely decreased breath sounds   Cardiac regular rate rhythm without murmur   extremities 2 to 3+ pitting edema    Assessment  Annual Medicare Wellness    Plan  Up to date with the above mentined screenings declined    Melanie Mohr MD

## 2021-07-28 NOTE — PATIENT INSTRUCTIONS
Medicare Preventive Visit Patient Instructions  Thank you for completing your Welcome to Medicare Visit or Medicare Annual Wellness Visit today  Your next wellness visit will be due in one year (7/29/2022)  The screening/preventive services that you may require over the next 5-10 years are detailed below  Some tests may not apply to you based off risk factors and/or age  Screening tests ordered at today's visit but not completed yet may show as past due  Also, please note that scanned in results may not display below  Preventive Screenings:  Service Recommendations Previous Testing/Comments   Colorectal Cancer Screening  * Colonoscopy    * Fecal Occult Blood Test (FOBT)/Fecal Immunochemical Test (FIT)  * Fecal DNA/Cologuard Test  * Flexible Sigmoidoscopy Age: 54-65 years old   Colonoscopy: every 10 years (may be performed more frequently if at higher risk)  OR  FOBT/FIT: every 1 year  OR  Cologuard: every 3 years  OR  Sigmoidoscopy: every 5 years  Screening may be recommended earlier than age 48 if at higher risk for colorectal cancer  Also, an individualized decision between you and your healthcare provider will decide whether screening between the ages of 74-80 would be appropriate  Colonoscopy: 09/22/2016  FOBT/FIT: Not on file  Cologuard: Not on file  Sigmoidoscopy: Not on file          Breast Cancer Screening Age: 36 years old  Frequency: every 1-2 years  Not required if history of left and right mastectomy Mammogram: 10/11/2019        Cervical Cancer Screening Between the ages of 21-29, pap smear recommended once every 3 years  Between the ages of 33-67, can perform pap smear with HPV co-testing every 5 years     Recommendations may differ for women with a history of total hysterectomy, cervical cancer, or abnormal pap smears in past  Pap Smear: Not on file        Hepatitis C Screening Once for adults born between [facility]  More frequently in patients at high risk for Hepatitis C Hep C Antibody: Not on file        Diabetes Screening 1-2 times per year if you're at risk for diabetes or have pre-diabetes Fasting glucose: 113 mg/dL   A1C: 5 3 %        Cholesterol Screening Once every 5 years if you don't have a lipid disorder  May order more often based on risk factors  Lipid panel: 02/03/2020          Other Preventive Screenings Covered by Medicare:  1  Abdominal Aortic Aneurysm (AAA) Screening: covered once if your at risk  You're considered to be at risk if you have a family history of AAA  2  Lung Cancer Screening: covers low dose CT scan once per year if you meet all of the following conditions: (1) Age 50-69; (2) No signs or symptoms of lung cancer; (3) Current smoker or have quit smoking within the last 15 years; (4) You have a tobacco smoking history of at least 30 pack years (packs per day multiplied by number of years you smoked); (5) You get a written order from a healthcare provider  3  Glaucoma Screening: covered annually if you're considered high risk: (1) You have diabetes OR (2) Family history of glaucoma OR (3)  aged 48 and older OR (3)  American aged 72 and older  3  Osteoporosis Screening: covered every 2 years if you meet one of the following conditions: (1) You're estrogen deficient and at risk for osteoporosis based off medical history and other findings; (2) Have a vertebral abnormality; (3) On glucocorticoid therapy for more than 3 months; (4) Have primary hyperparathyroidism; (5) On osteoporosis medications and need to assess response to drug therapy  · Last bone density test (DXA Scan): 10/11/2019   5  HIV Screening: covered annually if you're between the age of 15-65  Also covered annually if you are younger than 13 and older than 72 with risk factors for HIV infection  For pregnant patients, it is covered up to 3 times per pregnancy      Immunizations:  Immunization Recommendations   Influenza Vaccine Annual influenza vaccination during flu season is recommended for all persons aged >= 6 months who do not have contraindications   Pneumococcal Vaccine (Prevnar and Pneumovax)  * Prevnar = PCV13  * Pneumovax = PPSV23   Adults 25-60 years old: 1-3 doses may be recommended based on certain risk factors  Adults 72 years old: Prevnar (PCV13) vaccine recommended followed by Pneumovax (PPSV23) vaccine  If already received PPSV23 since turning 65, then PCV13 recommended at least one year after PPSV23 dose  Hepatitis B Vaccine 3 dose series if at intermediate or high risk (ex: diabetes, end stage renal disease, liver disease)   Tetanus (Td) Vaccine - COST NOT COVERED BY MEDICARE PART B Following completion of primary series, a booster dose should be given every 10 years to maintain immunity against tetanus  Td may also be given as tetanus wound prophylaxis  Tdap Vaccine - COST NOT COVERED BY MEDICARE PART B Recommended at least once for all adults  For pregnant patients, recommended with each pregnancy  Shingles Vaccine (Shingrix) - COST NOT COVERED BY MEDICARE PART B  2 shot series recommended in those aged 48 and above     Health Maintenance Due:      Topic Date Due    Hepatitis C Screening  Never done    Breast Cancer Screening: Mammogram  10/11/2021    DXA SCAN  10/11/2024    Colorectal Cancer Screening  09/22/2026     Immunizations Due:      Topic Date Due    DTaP,Tdap,and Td Vaccines (1 - Tdap) Never done    Influenza Vaccine (1) 09/01/2021     Advance Directives   What are advance directives? Advance directives are legal documents that state your wishes and plans for medical care  These plans are made ahead of time in case you lose your ability to make decisions for yourself  Advance directives can apply to any medical decision, such as the treatments you want, and if you want to donate organs  What are the types of advance directives? There are many types of advance directives, and each state has rules about how to use them   You may choose a combination of any of the following:  · Living will: This is a written record of the treatment you want  You can also choose which treatments you do not want, which to limit, and which to stop at a certain time  This includes surgery, medicine, IV fluid, and tube feedings  · Durable power of  for healthcare [facility]): This is a written record that states who you want to make healthcare choices for you when you are unable to make them for yourself  This person, called a proxy, is usually a family member or a friend  You may choose more than 1 proxy  · Do not resuscitate (DNR) order:  A DNR order is used in case your heart stops beating or you stop breathing  It is a request not to have certain forms of treatment, such as CPR  A DNR order may be included in other types of advance directives  · Medical directive: This covers the care that you want if you are in a coma, near death, or unable to make decisions for yourself  You can list the treatments you want for each condition  Treatment may include pain medicine, surgery, blood transfusions, dialysis, IV or tube feedings, and a ventilator (breathing machine)  · Values history: This document has questions about your views, beliefs, and how you feel and think about life  This information can help others choose the care that you would choose  Why are advance directives important? An advance directive helps you control your care  Although spoken wishes may be used, it is better to have your wishes written down  Spoken wishes can be misunderstood, or not followed  Treatments may be given even if you do not want them  An advance directive may make it easier for your family to make difficult choices about your care  Weight Management   Why it is important to manage your weight:  Being overweight increases your risk of health conditions such as heart disease, high blood pressure, type 2 diabetes, and certain types of cancer   It can also increase your risk for osteoarthritis, sleep apnea, and other respiratory problems  Aim for a slow, steady weight loss  Even a small amount of weight loss can lower your risk of health problems  How to lose weight safely:  A safe and healthy way to lose weight is to eat fewer calories and get regular exercise  You can lose up about 1 pound a week by decreasing the number of calories you eat by 500 calories each day  Healthy meal plan for weight management:  A healthy meal plan includes a variety of foods, contains fewer calories, and helps you stay healthy  A healthy meal plan includes the following:  · Eat whole-grain foods more often  A healthy meal plan should contain fiber  Fiber is the part of grains, fruits, and vegetables that is not broken down by your body  Whole-grain foods are healthy and provide extra fiber in your diet  Some examples of whole-grain foods are whole-wheat breads and pastas, oatmeal, brown rice, and bulgur  · Eat a variety of vegetables every day  Include dark, leafy greens such as spinach, kale, lacey greens, and mustard greens  Eat yellow and orange vegetables such as carrots, sweet potatoes, and winter squash  · Eat a variety of fruits every day  Choose fresh or canned fruit (canned in its own juice or light syrup) instead of juice  Fruit juice has very little or no fiber  · Eat low-fat dairy foods  Drink fat-free (skim) milk or 1% milk  Eat fat-free yogurt and low-fat cottage cheese  Try low-fat cheeses such as mozzarella and other reduced-fat cheeses  · Choose meat and other protein foods that are low in fat  Choose beans or other legumes such as split peas or lentils  Choose fish, skinless poultry (chicken or turkey), or lean cuts of red meat (beef or pork)  Before you cook meat or poultry, cut off any visible fat  · Use less fat and oil  Try baking foods instead of frying them  Add less fat, such as margarine, sour cream, regular salad dressing and mayonnaise to foods   Eat fewer high-fat foods  Some examples of high-fat foods include french fries, doughnuts, ice cream, and cakes  · Eat fewer sweets  Limit foods and drinks that are high in sugar  This includes candy, cookies, regular soda, and sweetened drinks  Exercise:  Exercise at least 30 minutes per day on most days of the week  Some examples of exercise include walking, biking, dancing, and swimming  You can also fit in more physical activity by taking the stairs instead of the elevator or parking farther away from stores  Ask your healthcare provider about the best exercise plan for you  © Copyright [employer] 2018 Information is for End User's use only and may not be sold, redistributed or otherwise used for commercial purposes   All illustrations and images included in CareNotes® are the copyrighted property of A D A M , Inc  or [address] [place] [address]

## 2021-07-29 NOTE — PROGRESS NOTES
Jones Abdullahi  Is here for 6 month follow-up  She is also here for annual Medicare wellness  See other note  She is concerned about the swelling in her legs  Seemed to start during her hospitalization in May for COPD exacerbation  She thought it was related to steroid use she has been off the steroids for several weeks  She says  Her legs do look better in the morning although it does not resolve  Her breathing is worse over the last several months  She said  The only thing she is able to do  Without getting short of breath is getting in and out of her chair  Her  assists hjer with the rest   She requests a wheelchair  She has tried compression stockings but has great difficulty putting them on  She saw pulmonology last week  Dr Tolentino  Is concerned about right heart failure and ordered an echo  However this is not scheduled until September 23rd  see notes from last visit  Where she was referred to dermatology for a lesion on her cheek  She said she never received a call back  From Dermatology  O: /78 (BP Location: Right arm, Patient Position: Sitting, Cuff Size: Adult)   Pulse 86   Temp (!) 96 8 °F (36 °C)   Resp 20   Ht 5' (1 524 m)   BMI 27 15 kg/m²     She is sitting in a wheelchair; mildy dyspnec  Skin Scaly bronw lesion right cheek approx 6 mm  ENT-TM's normal pharynx nl  Eyes clear  Neck no adenopathy,  Chest   Diffusely breath sounds but clear   cardiac regular rate rhythm without murmur   extremities 2+ pitting edema to the knees    Assessment   1  Peripheral edema- in conjunction with her worsening shortness of breath am  concerned as is pulmonology that this could be right heart failure  As she is unable to get an echocardiogram any time soon we are going to go ahead and refer her to Cardiology  Keep legs elevated  2  Lesion right cheek- she will attempt to  contact Dermatology again   3  Severe COPD-will assist her in ordering a wheelchair      4  Pre diabetes -last  A1c was normal    Plan   Cardiology referral   Wheelchair order  Dermatology referral Recheck 4 months

## 2021-07-29 NOTE — TELEPHONE ENCOUNTER
Patient called back  Need to look into this, when I spoke with [facility] earlier this week regarding a nebulizer for another patient they did mention that they only accept faxes on nebs and oxygen, but all other orders can no longer be faxed

## 2021-07-29 NOTE — TELEPHONE ENCOUNTER
According to Lety Varela is excluded from the new program they launched earlier this month  So WE can still fax over DME orders to the fax number reserved for [facility]'s  (Up front by fax machine)  Ministerio Kelly is aware

## 2021-07-30 ENCOUNTER — TELEPHONE (OUTPATIENT)
Dept: FAMILY MEDICINE CLINIC | Facility: MEDICAL CENTER | Age: 76
End: 2021-07-30

## 2021-07-30 NOTE — TELEPHONE ENCOUNTER
----- Message from [address] [place] [address]  Albert sent at 7/30/2021  7:17 AM EDT -----  Regarding: Visit Follow-Up Question  Contact: [phone]  wore compression stockings yesterday for 8 hours which resulted in swelling of knees and partway up thighs  I do not plan on wearing them is that result was not helpful  Is there anything else I can do while we wait 8 weeks for echo to confirm your and Dr Stormy Paul suspected cause? Is there medicine I can take?     Thanks,  Jose Roberto Child

## 2021-08-02 ENCOUNTER — TELEPHONE (OUTPATIENT)
Dept: FAMILY MEDICINE CLINIC | Facility: MEDICAL CENTER | Age: 76
End: 2021-08-02

## 2021-08-02 NOTE — TELEPHONE ENCOUNTER
----- Message from [address] [place] [address]  Elayne Munguia sent at 8/1/2021  8:39 PM EDT -----  Regarding: Visit Follow-Up Question  Contact: [phone]  I have an appointment for the echo at [facility] for this Thursday 8/5/21 at 3:45 PM   I don't know how soon after it is done results will be available but of course I will be anxious to know what it indicates      Thanks,  Jones Abdullahi

## 2021-08-02 NOTE — TELEPHONE ENCOUNTER
Yes I would not wear the stockings if it is making her worse; wait for cardiology consult   Let me know if edema does not get bettter in the am as we discussed

## 2021-08-02 NOTE — TELEPHONE ENCOUNTER
DME form for wheelchair received via fax for Dr Geraldina Frankel to complete and fax back for pt    Paperwork placed in Dr Mac Mckeon

## 2021-08-03 NOTE — TELEPHONE ENCOUNTER
Se other message- I spoke Sonali Huber and advised the same  She is aware we will watch for results

## 2021-08-03 NOTE — TELEPHONE ENCOUNTER
Let her know that is great that she got it done  I will watch for results however may not be available until she sees cardiology on the 10th

## 2021-08-10 ENCOUNTER — CONSULT (OUTPATIENT)
Dept: CARDIOLOGY CLINIC | Facility: CLINIC | Age: 76
End: 2021-08-10
Payer: MEDICARE

## 2021-08-10 VITALS
SYSTOLIC BLOOD PRESSURE: 122 MMHG | OXYGEN SATURATION: 98 % | DIASTOLIC BLOOD PRESSURE: 72 MMHG | BODY MASS INDEX: 27.29 KG/M2 | HEIGHT: 60 IN | HEART RATE: 73 BPM | WEIGHT: 139 LBS

## 2021-08-10 DIAGNOSIS — M79.89 SWELLING OF LOWER EXTREMITY: ICD-10-CM

## 2021-08-10 DIAGNOSIS — J96.11 CHRONIC RESPIRATORY FAILURE WITH HYPOXIA (HCC): ICD-10-CM

## 2021-08-10 DIAGNOSIS — J44.9 CHRONIC OBSTRUCTIVE PULMONARY DISEASE, UNSPECIFIED COPD TYPE (HCC): ICD-10-CM

## 2021-08-10 PROCEDURE — 93000 ELECTROCARDIOGRAM COMPLETE: CPT | Performed by: INTERNAL MEDICINE

## 2021-08-10 PROCEDURE — 99204 OFFICE O/P NEW MOD 45 MIN: CPT | Performed by: INTERNAL MEDICINE

## 2021-08-10 RX ORDER — FUROSEMIDE 20 MG/1
20 TABLET ORAL DAILY
Qty: 30 TABLET | Refills: 0 | Status: SHIPPED | OUTPATIENT
Start: 2021-08-10 | End: 2021-09-23 | Stop reason: SDUPTHER

## 2021-08-10 RX ORDER — ASPIRIN 81 MG/1
81 TABLET, CHEWABLE ORAL WEEKLY
COMMUNITY
End: 2021-08-10 | Stop reason: ALTCHOICE

## 2021-08-10 NOTE — PROGRESS NOTES
[address] [facility]  Office Consultation  Chel Wren 76 y o  female MRN: [id number]        Chief Complaint    Chief Complaint   Patient presents with    Edema     L>R up to thigh    Shortness of Breath    Palpitations     occasional       Referring Provider: Rob Adrian MD    Impression & Plan:    1  Swelling of lower extremity  Her ECG is without evidence of right ventricular right atrial pathology  Her echocardiogram, unavailable for image review, has reported overall normal parameters for the RV including estimated PASP and RV systolic function  This is unlikely due to right heart failure  She also has no signs or symptoms of left-sided heart failure  Suspect fluid retention due to the steroid use  We will do a trial of 3-4 days of Lasix 20 mg p o  With a recheck BMP on day 4    - POCT ECG  - furosemide (LASIX) 20 mg tablet; Take 1 tablet (20 mg total) by mouth daily  Dispense: 30 tablet; Refill: 0  - Basic metabolic panel    2  Chronic respiratory failure with hypoxia (HCC)    - POCT ECG  - furosemide (LASIX) 20 mg tablet; Take 1 tablet (20 mg total) by mouth daily  Dispense: 30 tablet; Refill: 0    3  Chronic obstructive pulmonary disease, unspecified COPD type ([facility] )        We will see Chel Wren back in 6 months for  routine follow-up  HPI: Chel Wren is a 76y o  year old female with severe COPD who presents with LE edema  She has previously seen a cardiologist in 2013 for fluttering and is unsure if specific diagnosis was made  She carries no chronic cardiac conditions  No history of CAD, MI, CHF  Her pulmonologist has had her on multiple prednisone tapers recently due to recurrent COPD symptoms  Since being on the steroids she has had worsening pitting edema of her legs  They feel tense and uncomfortable  She has tried compression stockings with minimal relief  She does get improvement with elevating her legs       Her last course of steroids was in June, and she has had the swelling since then  She has very limited exertion at home due to COPD  It has been improving over the last month despite her LE edema staying constant or worse  She has been sleeping on the couch but does report some symptoms of post nasal drip when lying flat  She does not think she is orthopneic  There was an echocardiogram done at [facility], unfortunately the images are not available for my review  I have reviewed the measurements taken later available through Care everywhere  Her LV systolic function was normal at 55 to 60% with grade 1 diastolic dysfunction  Regarding her right ventricle, which was of her pulmonologist concern, the right ventricular systolic pressure was estimated at only 29 mm Hg and the tissue Doppler showed a normal RV free wall S' velocity  Her TAPSE was 2 36 cm  This is suggestive of normal RV function  Review of Systems   Constitutional: Negative for activity change and fatigue  Eyes: Negative for visual disturbance  Respiratory: Positive for shortness of breath  Negative for chest tightness  Cardiovascular: Negative for chest pain, palpitations and leg swelling  Gastrointestinal: Negative for nausea and vomiting  Skin: Negative for pallor  Neurological: Negative for dizziness, syncope and light-headedness  Psychiatric/Behavioral: Negative for agitation and confusion  The patient is not nervous/anxious            Past Medical History:   Diagnosis Date    COPD (chronic obstructive pulmonary disease) ([facility] )     History of screening mammography     last assessed: 10/31/2017    Osteoporosis      Past Surgical History:   Procedure Laterality Date    APPENDECTOMY      onset: 1966    CATARACT EXTRACTION      last assessed: 10/31/2017    TONSILLECTOMY      onset: 12     Social History     Substance and Sexual Activity   Alcohol Use Yes     Social History     Substance and Sexual Activity   Drug Use Never     Social History     Tobacco Use   Smoking Status Former Smoker    Packs/day: 1 00    Years: 40 00    Pack years: 40 00    Types: Cigarettes    Quit date:     Years since quittin 6   Smokeless Tobacco Never Used     Family History   Problem Relation Age of Onset    Arthritis Mother     Breast cancer Mother [de-identified]    Heart disease Mother     Hypertension Mother     Osteoporosis Mother     Diabetes Father     Heart disease Father     Hypertension Father     Ovarian cancer Sister 52    No Known Problems Maternal Grandmother     No Known Problems Maternal Grandfather     No Known Problems Paternal Grandmother     No Known Problems Paternal Grandfather     No Known Problems Maternal Aunt     No Known Problems Maternal Aunt     No Known Problems Paternal Aunt     No Known Problems Paternal Aunt     No Known Problems Paternal Aunt        Allergies: Allergies   Allergen Reactions    Clarinex [Desloratadine] Wheezing    Bee Venom Swelling and Facial Swelling    Clarithromycin Wheezing     Reaction Date: 2011;     Food Color Red - Food Allergy Itching    Levofloxacin Other (See Comments)     Blood clot in leg    Loratadine Wheezing     Reaction Date: 2011;        Medications (as of START of this encounter): Outpatient Medications Prior to Visit   Medication Sig Dispense Refill    albuterol (Ventolin HFA) 90 mcg/act inhaler Inhale 2 puffs every 4 (four) hours as needed for wheezing 3 Inhaler 3    aspirin 81 mg chewable tablet Chew 81 mg once a week      fluticasone-salmeterol (Advair Diskus) 250-50 mcg/dose inhaler Inhale 1 puff every 12 (twelve) hours 3 Inhaler 3    ipratropium-albuterol (COMBIVENT RESPIMAT) inhaler Inhale 1 puff 4 (four) times a day 12 g 3     No facility-administered medications prior to visit           Vitals:    08/10/21 0943   BP: 122/72   Pulse: 73   SpO2: 98%     Weight (last 2 days)     Date/Time   Weight    08/10/21 0943   63 (139)              General: Gil Valdovinos appears comfortable, sitting in wheelchair on nasal cannula oxygen  HEENT: moist mucous membranes, EOMI  Neck:  No JVD, supple, trachea midline  Cardiovascular: distant S1/S2, regular rate and rhythm, no murmurs, rubs or gallops   Pulmonary: normal respiratory effort, diminished breath sound throughout  Abdomen: soft and nondistended  Extremities: +1 B/L LE edema, pitting  Warm and well perfused extremities  Neuro: no focal motor deficits, AAOx3 (person, place, time)  Psych: Normal mood and affect, cooperative      Laboratory Studies:    Laboratory studies personally reviewed    Cardiac testing:     EKG reviewed personally:   Her ECG shows normal sinus rhythm, 73 bpm, normal axis, normal intervals  No suggestion of RV strain  No suggestion of RV hypertrophy  No suggestion of right atrial enlargement  Normal study  Time Spent:  Total time (face-to-face and non-face-to-face) spent on today's visit was 45 minutes  This includes preparation for the visits (i e  reviewing test results), performance of a medically appropriate history and examination, and orders for medications, tests or other procedures  This time is exclusive of procedures performed and time spent teaching  Stella Cortez MD    This note was completed in part utilizing M*Modal fluency direct voice recognition software  Grammatical errors, random word insertion, spelling mistakes, occasional wrong word or "sound-alike" substitutions and incomplete sentences may be an occasional consequence of the system secondary to software limitations, ambient noise and hardware issues  At the time of dictation, efforts were made to edit, clarify and /or correct errors  Please read the chart carefully and recognize, using context, where substitutions have occurred  If you have any questions or concerns about the context, text or information contained within the body of this dictation, please contact myself, the provider, for further clarification

## 2021-08-14 ENCOUNTER — APPOINTMENT (OUTPATIENT)
Dept: LAB | Facility: MEDICAL CENTER | Age: 76
End: 2021-08-14
Payer: MEDICARE

## 2021-08-14 LAB
ANION GAP SERPL CALCULATED.3IONS-SCNC: 1 MMOL/L (ref 4–13)
BUN SERPL-MCNC: 11 MG/DL (ref 5–25)
CALCIUM SERPL-MCNC: 9.1 MG/DL (ref 8.3–10.1)
CHLORIDE SERPL-SCNC: 100 MMOL/L (ref 100–108)
CO2 SERPL-SCNC: 37 MMOL/L (ref 21–32)
CREAT SERPL-MCNC: 0.4 MG/DL (ref 0.6–1.3)
GFR SERPL CREATININE-BSD FRML MDRD: 102 ML/MIN/1.73SQ M
GLUCOSE P FAST SERPL-MCNC: 108 MG/DL (ref 65–99)
POTASSIUM SERPL-SCNC: 3.9 MMOL/L (ref 3.5–5.3)
SODIUM SERPL-SCNC: 138 MMOL/L (ref 136–145)

## 2021-08-14 PROCEDURE — 80048 BASIC METABOLIC PNL TOTAL CA: CPT | Performed by: INTERNAL MEDICINE

## 2021-08-14 PROCEDURE — 36415 COLL VENOUS BLD VENIPUNCTURE: CPT | Performed by: INTERNAL MEDICINE

## 2021-08-16 ENCOUNTER — TELEPHONE (OUTPATIENT)
Dept: CARDIOLOGY CLINIC | Facility: CLINIC | Age: 76
End: 2021-08-16

## 2021-08-16 NOTE — TELEPHONE ENCOUNTER
----- Message from Linda Rasmussen MD sent at 8/16/2021  8:33 AM EDT -----  We checked this lab to make sure her electrolytes were normal after a few days of diuretic  Can we please reach out to her and see if the few days of diuretic helped with her symptoms? Let her know electrolytes were normal  I am not concerned about the Co2  Thanks

## 2021-08-16 NOTE — TELEPHONE ENCOUNTER
Called pt  She states theres a "small improvement" in her swelling and S O B  Still has tender legs  She would like to know what you would like her to do in regards to her diuretic  She would also like to know if its normal to have a pulse of 110 after using the bathroom  It seems like she exerted her self a bit getting off the toilet  Her pulse went back to normal after she rested a while  Please advise

## 2021-08-17 NOTE — TELEPHONE ENCOUNTER
Spoke with pt  Relayed msg as given  Pt acknowledged understanding  Pt will be calling back on Friday to report how she is feeling

## 2021-08-20 ENCOUNTER — TELEPHONE (OUTPATIENT)
Dept: FAMILY MEDICINE CLINIC | Facility: MEDICAL CENTER | Age: 76
End: 2021-08-20

## 2021-08-20 NOTE — TELEPHONE ENCOUNTER
Pt called this morning  She stated she finally started noticing a big improvement yesterday  The swelling and tenderness started to noticeably go down  Its not to her knees now, its below them  Should she continue taking diuretic? Please advise   Thank You

## 2021-08-20 NOTE — TELEPHONE ENCOUNTER
Pt saw cardiology and it was determined that the swelling is not a cardiac issue  She wants to know if she should continue the diuretics  Cardiology wanted her to follow up with you on this matter  She has an appointment with you in Nov  Do you want to see her sooner  Please, advise

## 2021-08-27 NOTE — TELEPHONE ENCOUNTER
Yes do need to see her sooner  I read the cardiology phone calls--looks like the diuretic was starting to worK? Would like to see her next week

## 2021-09-01 ENCOUNTER — APPOINTMENT (OUTPATIENT)
Dept: LAB | Facility: MEDICAL CENTER | Age: 76
End: 2021-09-01
Payer: MEDICARE

## 2021-09-01 ENCOUNTER — OFFICE VISIT (OUTPATIENT)
Dept: FAMILY MEDICINE CLINIC | Facility: MEDICAL CENTER | Age: 76
End: 2021-09-01
Payer: MEDICARE

## 2021-09-01 VITALS
WEIGHT: 130.6 LBS | OXYGEN SATURATION: 98 % | BODY MASS INDEX: 25.51 KG/M2 | DIASTOLIC BLOOD PRESSURE: 74 MMHG | SYSTOLIC BLOOD PRESSURE: 130 MMHG | TEMPERATURE: 98 F | HEART RATE: 88 BPM

## 2021-09-01 DIAGNOSIS — J44.9 COPD, VERY SEVERE (HCC): ICD-10-CM

## 2021-09-01 DIAGNOSIS — R73.03 DIABETES MELLITUS, LATENT: Primary | ICD-10-CM

## 2021-09-01 DIAGNOSIS — M79.89 SWELLING OF LOWER EXTREMITY: ICD-10-CM

## 2021-09-01 DIAGNOSIS — J96.11 CHRONIC RESPIRATORY FAILURE WITH HYPOXIA (HCC): ICD-10-CM

## 2021-09-01 DIAGNOSIS — Z74.09 MOBILITY IMPAIRED: ICD-10-CM

## 2021-09-01 DIAGNOSIS — R73.03 DIABETES MELLITUS, LATENT: ICD-10-CM

## 2021-09-01 LAB
ANION GAP SERPL CALCULATED.3IONS-SCNC: -2 MMOL/L (ref 4–13)
BUN SERPL-MCNC: 16 MG/DL (ref 5–25)
CALCIUM SERPL-MCNC: 9.4 MG/DL (ref 8.3–10.1)
CHLORIDE SERPL-SCNC: 102 MMOL/L (ref 100–108)
CO2 SERPL-SCNC: 39 MMOL/L (ref 21–32)
CREAT SERPL-MCNC: 0.44 MG/DL (ref 0.6–1.3)
GFR SERPL CREATININE-BSD FRML MDRD: 99 ML/MIN/1.73SQ M
GLUCOSE SERPL-MCNC: 111 MG/DL (ref 65–140)
POTASSIUM SERPL-SCNC: 4.7 MMOL/L (ref 3.5–5.3)
SODIUM SERPL-SCNC: 139 MMOL/L (ref 136–145)

## 2021-09-01 PROCEDURE — 80048 BASIC METABOLIC PNL TOTAL CA: CPT

## 2021-09-01 PROCEDURE — 99214 OFFICE O/P EST MOD 30 MIN: CPT | Performed by: FAMILY MEDICINE

## 2021-09-01 PROCEDURE — 36415 COLL VENOUS BLD VENIPUNCTURE: CPT

## 2021-09-01 NOTE — PROGRESS NOTES
Nichelle Pleitez  is here for follow-up of her edema  See notes last visit  She was referred to cardiology for concerns of cardiac cause of her edema  See consult  Not felt to have heart failure  She was given a trial of Lasix with improvement in symptoms  When she stop the Lasix the edema recurred  She has not had any change in her shortness of breath  She has been trying to keep her legs elevated  She try compression stockings but caused edema above her knees  She has some discomfort in her upper back both sides but thinks related to her pillow  She has definitely been more immobile in requests a rolling walker  She is due for follow-up with pulmonology in October and cardiology after that  O: /74 (BP Location: Left arm, Patient Position: Sitting, Cuff Size: Adult)   Pulse 88   Temp 98 °F (36 7 °C)   Wt 59 2 kg (130 lb 9 6 oz)   SpO2 98%   BMI 25 51 kg/m²      Sitting in a wheelchair  Does not appear to be in any respiratory distress  Neck no adenopathy   cardiac regular rate and rhythm although distant heart sounds   abdomen soft nontender without hepatosplenomegaly or masses   extremities 1+ pitting edema on the left pre tibia and foot and trace edema on the right foot and pre tibia    Back shows pain localized to the bilateral thoracic paravertebral areas  No spinal tenderness  No masses  Assessment   1  Edema- attributed by pulmonology and Cardiology to steroid use  She is no longer taking steroids  Suspect combination of this with immobility and her venous stasis  Since she is responding well to Lasix will continue  Will check potassium to decide about potassium supplementation  2  COPD-we discussed timing of flu shot and COVID booster  She will discuss with pulmonology  3  Back pain-I think this is likely positional   If does not improve will let me know  Plan    Check BMP  As above

## 2021-09-07 ENCOUNTER — TELEPHONE (OUTPATIENT)
Dept: FAMILY MEDICINE CLINIC | Facility: MEDICAL CENTER | Age: 76
End: 2021-09-07

## 2021-09-07 NOTE — TELEPHONE ENCOUNTER
[facility] needs more information for the walker  Hold for tomorrow and we can call them to see what they need

## 2021-09-08 DIAGNOSIS — J44.9 COPD, VERY SEVERE (HCC): ICD-10-CM

## 2021-09-08 DIAGNOSIS — J43.2 CENTRILOBULAR EMPHYSEMA (HCC): Primary | ICD-10-CM

## 2021-09-08 DIAGNOSIS — J43.8 OTHER EMPHYSEMA (HCC): ICD-10-CM

## 2021-09-08 DIAGNOSIS — J96.11 CHRONIC RESPIRATORY FAILURE WITH HYPOXIA (HCC): ICD-10-CM

## 2021-09-08 NOTE — TELEPHONE ENCOUNTER
Spoke with [facility], they have nothing on file  So we need to reprint the order and send along office notes which I printed out and have up front  Can we redo/reprint the order for the walker? I can't get it to print properly

## 2021-09-09 ENCOUNTER — TELEPHONE (OUTPATIENT)
Dept: FAMILY MEDICINE CLINIC | Facility: MEDICAL CENTER | Age: 76
End: 2021-09-09

## 2021-09-09 DIAGNOSIS — E87.6 HYPOKALEMIA: Primary | ICD-10-CM

## 2021-09-09 DIAGNOSIS — M79.89 SWELLING OF LOWER EXTREMITY: ICD-10-CM

## 2021-09-09 NOTE — TELEPHONE ENCOUNTER
Pt states she is still swelling daily but seems to be less severe  Still bothersome at times  Elevates legs at night and this helps  By morning swelling is gone but like said previously does return  She is doing "ok" on the Lasix  She said edema does improve some  She said she urinates a lot which she knows is common for Lasix  She is currently taking 20mg-1 tab daily

## 2021-09-09 NOTE — TELEPHONE ENCOUNTER
Have her continue the Lasix and would still repeat another BMP in a week  Can she handle having a banana daily which would be a good potassium supplement?

## 2021-09-09 NOTE — TELEPHONE ENCOUNTER
----- Message from Ced Mora MD sent at 9/9/2021 10:35 AM EDT -----   Blood work shows her potassium is fine  How is she doing with the Lasix? How is the edema?   Verify Lasix dose

## 2021-09-16 ENCOUNTER — APPOINTMENT (OUTPATIENT)
Dept: LAB | Facility: CLINIC | Age: 76
End: 2021-09-16
Payer: MEDICARE

## 2021-09-16 DIAGNOSIS — M79.89 SWELLING OF LOWER EXTREMITY: ICD-10-CM

## 2021-09-16 LAB
ANION GAP SERPL CALCULATED.3IONS-SCNC: -2 MMOL/L (ref 4–13)
BUN SERPL-MCNC: 11 MG/DL (ref 5–25)
CALCIUM SERPL-MCNC: 9.6 MG/DL (ref 8.3–10.1)
CHLORIDE SERPL-SCNC: 102 MMOL/L (ref 100–108)
CO2 SERPL-SCNC: 37 MMOL/L (ref 21–32)
CREAT SERPL-MCNC: 0.44 MG/DL (ref 0.6–1.3)
GFR SERPL CREATININE-BSD FRML MDRD: 99 ML/MIN/1.73SQ M
GLUCOSE SERPL-MCNC: 120 MG/DL (ref 65–140)
POTASSIUM SERPL-SCNC: 5 MMOL/L (ref 3.5–5.3)
SODIUM SERPL-SCNC: 137 MMOL/L (ref 136–145)

## 2021-09-16 PROCEDURE — 80048 BASIC METABOLIC PNL TOTAL CA: CPT

## 2021-09-16 PROCEDURE — 36415 COLL VENOUS BLD VENIPUNCTURE: CPT

## 2021-09-21 ENCOUNTER — TELEPHONE (OUTPATIENT)
Dept: FAMILY MEDICINE CLINIC | Facility: MEDICAL CENTER | Age: 76
End: 2021-09-21

## 2021-09-21 DIAGNOSIS — E87.6 DEFICIENCY OF POTASSIUM: Primary | ICD-10-CM

## 2021-09-21 NOTE — TELEPHONE ENCOUNTER
Pt called to make Dr Karl Bauer aware she has finished her generic Lasix and is wondering what she is to do now  If Dr Karl Bauer would like her to continue with it, please send new Rx to [facility] in [place]    If not, please advise    Routed to Clinical

## 2021-09-23 NOTE — TELEPHONE ENCOUNTER
If she feels better with it and it  helps her edema I would stay on it  Her potassium level was fine  Rx was sent to her pharmacy    I would like her to do another BMP in 2 weeks to make sure she does not need potassium replacement

## 2021-10-07 ENCOUNTER — APPOINTMENT (OUTPATIENT)
Dept: LAB | Facility: CLINIC | Age: 76
End: 2021-10-07
Payer: MEDICARE

## 2021-10-07 DIAGNOSIS — E87.6 DEFICIENCY OF POTASSIUM: ICD-10-CM

## 2021-10-07 LAB
ANION GAP SERPL CALCULATED.3IONS-SCNC: 2 MMOL/L (ref 4–13)
BUN SERPL-MCNC: 10 MG/DL (ref 5–25)
CALCIUM SERPL-MCNC: 9.5 MG/DL (ref 8.3–10.1)
CHLORIDE SERPL-SCNC: 101 MMOL/L (ref 100–108)
CO2 SERPL-SCNC: 37 MMOL/L (ref 21–32)
CREAT SERPL-MCNC: 0.48 MG/DL (ref 0.6–1.3)
GFR SERPL CREATININE-BSD FRML MDRD: 96 ML/MIN/1.73SQ M
GLUCOSE P FAST SERPL-MCNC: 103 MG/DL (ref 65–99)
POTASSIUM SERPL-SCNC: 4.2 MMOL/L (ref 3.5–5.3)
SODIUM SERPL-SCNC: 140 MMOL/L (ref 136–145)

## 2021-10-07 PROCEDURE — 80048 BASIC METABOLIC PNL TOTAL CA: CPT

## 2021-10-07 PROCEDURE — 36415 COLL VENOUS BLD VENIPUNCTURE: CPT

## 2021-10-18 ENCOUNTER — TELEPHONE (OUTPATIENT)
Dept: FAMILY MEDICINE CLINIC | Facility: MEDICAL CENTER | Age: 76
End: 2021-10-18

## 2021-10-18 ENCOUNTER — HOSPITAL ENCOUNTER (OUTPATIENT)
Dept: RADIOLOGY | Facility: MEDICAL CENTER | Age: 76
Discharge: HOME | End: 2021-10-18
Payer: MEDICARE

## 2021-10-18 DIAGNOSIS — R91.1 PULMONARY NODULE: ICD-10-CM

## 2021-10-18 PROCEDURE — 71250 CT THORAX DX C-: CPT

## 2021-10-18 PROCEDURE — G1004 CDSM NDSC: HCPCS

## 2021-10-19 ENCOUNTER — OFFICE VISIT (OUTPATIENT)
Dept: DERMATOLOGY | Facility: CLINIC | Age: 76
End: 2021-10-19
Payer: MEDICARE

## 2021-10-19 VITALS — WEIGHT: 131.4 LBS | TEMPERATURE: 96.7 F | BODY MASS INDEX: 25.66 KG/M2

## 2021-10-19 DIAGNOSIS — L82.1 SEBORRHEIC KERATOSIS: ICD-10-CM

## 2021-10-19 DIAGNOSIS — L21.9 SEBORRHEIC DERMATITIS: Primary | ICD-10-CM

## 2021-10-19 DIAGNOSIS — D18.01 CHERRY ANGIOMA: ICD-10-CM

## 2021-10-19 PROCEDURE — 99203 OFFICE O/P NEW LOW 30 MIN: CPT | Performed by: DERMATOLOGY

## 2021-10-19 RX ORDER — KETOCONAZOLE 20 MG/ML
SHAMPOO TOPICAL
Qty: 120 ML | Refills: 6 | Status: SHIPPED | OUTPATIENT
Start: 2021-10-19

## 2021-10-25 ENCOUNTER — OFFICE VISIT (OUTPATIENT)
Dept: PULMONOLOGY | Facility: CLINIC | Age: 76
End: 2021-10-25
Payer: MEDICARE

## 2021-10-25 VITALS
TEMPERATURE: 96.7 F | BODY MASS INDEX: 25.91 KG/M2 | DIASTOLIC BLOOD PRESSURE: 70 MMHG | OXYGEN SATURATION: 96 % | HEIGHT: 60 IN | RESPIRATION RATE: 16 BRPM | SYSTOLIC BLOOD PRESSURE: 122 MMHG | HEART RATE: 70 BPM | WEIGHT: 132 LBS

## 2021-10-25 DIAGNOSIS — J44.9 COPD, SEVERE (HCC): ICD-10-CM

## 2021-10-25 DIAGNOSIS — J44.9 COPD, VERY SEVERE (HCC): Primary | ICD-10-CM

## 2021-10-25 DIAGNOSIS — R91.1 PULMONARY NODULE: ICD-10-CM

## 2021-10-25 DIAGNOSIS — J96.11 CHRONIC RESPIRATORY FAILURE WITH HYPOXIA (HCC): ICD-10-CM

## 2021-10-25 PROCEDURE — 99213 OFFICE O/P EST LOW 20 MIN: CPT | Performed by: INTERNAL MEDICINE

## 2021-10-25 RX ORDER — ALBUTEROL SULFATE 90 UG/1
2 AEROSOL, METERED RESPIRATORY (INHALATION) EVERY 4 HOURS PRN
Qty: 18 G | Refills: 5 | Status: SHIPPED | OUTPATIENT
Start: 2021-10-25

## 2021-11-29 ENCOUNTER — OFFICE VISIT (OUTPATIENT)
Dept: FAMILY MEDICINE CLINIC | Facility: MEDICAL CENTER | Age: 76
End: 2021-11-29
Payer: MEDICARE

## 2021-11-29 VITALS — BODY MASS INDEX: 25.52 KG/M2 | HEIGHT: 60 IN | TEMPERATURE: 97.4 F | WEIGHT: 130 LBS

## 2021-11-29 DIAGNOSIS — M79.89 SWELLING OF LOWER EXTREMITY: ICD-10-CM

## 2021-11-29 DIAGNOSIS — E87.6 HYPOKALEMIA: ICD-10-CM

## 2021-11-29 DIAGNOSIS — R79.89 ELEVATED TSH: ICD-10-CM

## 2021-11-29 DIAGNOSIS — J44.9 COPD, VERY SEVERE (HCC): Primary | ICD-10-CM

## 2021-11-29 DIAGNOSIS — R73.03 PREDIABETES: ICD-10-CM

## 2021-11-29 PROCEDURE — 99214 OFFICE O/P EST MOD 30 MIN: CPT | Performed by: FAMILY MEDICINE

## 2021-12-28 ENCOUNTER — APPOINTMENT (OUTPATIENT)
Dept: LAB | Facility: CLINIC | Age: 76
End: 2021-12-28
Payer: MEDICARE

## 2021-12-28 DIAGNOSIS — J44.9 CHRONIC OBSTRUCTIVE PULMONARY DISEASE, UNSPECIFIED COPD TYPE (HCC): ICD-10-CM

## 2021-12-28 DIAGNOSIS — J96.11 CHRONIC RESPIRATORY FAILURE WITH HYPOXIA (HCC): ICD-10-CM

## 2021-12-28 LAB
ALBUMIN SERPL BCP-MCNC: 3.7 G/DL (ref 3.5–5)
ALP SERPL-CCNC: 63 U/L (ref 46–116)
ALT SERPL W P-5'-P-CCNC: 26 U/L (ref 12–78)
ANION GAP SERPL CALCULATED.3IONS-SCNC: 1 MMOL/L (ref 4–13)
AST SERPL W P-5'-P-CCNC: 16 U/L (ref 5–45)
BILIRUB SERPL-MCNC: 0.55 MG/DL (ref 0.2–1)
BUN SERPL-MCNC: 17 MG/DL (ref 5–25)
CALCIUM SERPL-MCNC: 9.6 MG/DL (ref 8.3–10.1)
CHLORIDE SERPL-SCNC: 101 MMOL/L (ref 100–108)
CO2 SERPL-SCNC: 37 MMOL/L (ref 21–32)
CREAT SERPL-MCNC: 0.49 MG/DL (ref 0.6–1.3)
GFR SERPL CREATININE-BSD FRML MDRD: 94 ML/MIN/1.73SQ M
GLUCOSE P FAST SERPL-MCNC: 101 MG/DL (ref 65–99)
POTASSIUM SERPL-SCNC: 4.6 MMOL/L (ref 3.5–5.3)
PROT SERPL-MCNC: 7.2 G/DL (ref 6.4–8.2)
SODIUM SERPL-SCNC: 139 MMOL/L (ref 136–145)

## 2021-12-28 PROCEDURE — 80053 COMPREHEN METABOLIC PANEL: CPT

## 2021-12-28 PROCEDURE — 36415 COLL VENOUS BLD VENIPUNCTURE: CPT

## 2022-03-23 ENCOUNTER — APPOINTMENT (OUTPATIENT)
Dept: LAB | Facility: CLINIC | Age: 77
End: 2022-03-23
Payer: MEDICARE

## 2022-03-23 DIAGNOSIS — M79.89 SWELLING OF LOWER EXTREMITY: ICD-10-CM

## 2022-03-23 DIAGNOSIS — J44.9 COPD, VERY SEVERE (HCC): ICD-10-CM

## 2022-03-23 DIAGNOSIS — E87.6 HYPOKALEMIA: ICD-10-CM

## 2022-03-23 DIAGNOSIS — R73.03 PREDIABETES: ICD-10-CM

## 2022-03-23 DIAGNOSIS — R79.89 ELEVATED TSH: ICD-10-CM

## 2022-03-23 LAB
ALBUMIN SERPL BCP-MCNC: 3.8 G/DL (ref 3.5–5)
ALP SERPL-CCNC: 60 U/L (ref 46–116)
ALT SERPL W P-5'-P-CCNC: 23 U/L (ref 12–78)
ANION GAP SERPL CALCULATED.3IONS-SCNC: 2 MMOL/L (ref 4–13)
AST SERPL W P-5'-P-CCNC: 18 U/L (ref 5–45)
BILIRUB SERPL-MCNC: 0.45 MG/DL (ref 0.2–1)
BUN SERPL-MCNC: 16 MG/DL (ref 5–25)
CALCIUM SERPL-MCNC: 9.1 MG/DL (ref 8.3–10.1)
CHLORIDE SERPL-SCNC: 101 MMOL/L (ref 100–108)
CHOLEST SERPL-MCNC: 202 MG/DL
CO2 SERPL-SCNC: 36 MMOL/L (ref 21–32)
CREAT SERPL-MCNC: 0.62 MG/DL (ref 0.6–1.3)
ERYTHROCYTE [DISTWIDTH] IN BLOOD BY AUTOMATED COUNT: 12.5 % (ref 11.6–15.1)
EST. AVERAGE GLUCOSE BLD GHB EST-MCNC: 105 MG/DL
GFR SERPL CREATININE-BSD FRML MDRD: 87 ML/MIN/1.73SQ M
GLUCOSE P FAST SERPL-MCNC: 109 MG/DL (ref 65–99)
HBA1C MFR BLD: 5.3 %
HCT VFR BLD AUTO: 45.7 % (ref 34.8–46.1)
HDLC SERPL-MCNC: 76 MG/DL
HGB BLD-MCNC: 14 G/DL (ref 11.5–15.4)
LDLC SERPL CALC-MCNC: 107 MG/DL (ref 0–100)
MCH RBC QN AUTO: 29.2 PG (ref 26.8–34.3)
MCHC RBC AUTO-ENTMCNC: 30.6 G/DL (ref 31.4–37.4)
MCV RBC AUTO: 95 FL (ref 82–98)
NONHDLC SERPL-MCNC: 126 MG/DL
PLATELET # BLD AUTO: 152 THOUSANDS/UL (ref 149–390)
PMV BLD AUTO: 12.8 FL (ref 8.9–12.7)
POTASSIUM SERPL-SCNC: 4 MMOL/L (ref 3.5–5.3)
PROT SERPL-MCNC: 7.1 G/DL (ref 6.4–8.2)
RBC # BLD AUTO: 4.79 MILLION/UL (ref 3.81–5.12)
SODIUM SERPL-SCNC: 139 MMOL/L (ref 136–145)
TRIGL SERPL-MCNC: 94 MG/DL
TSH SERPL DL<=0.05 MIU/L-ACNC: 3.95 UIU/ML (ref 0.36–3.74)
WBC # BLD AUTO: 9.17 THOUSAND/UL (ref 4.31–10.16)

## 2022-03-23 PROCEDURE — 84443 ASSAY THYROID STIM HORMONE: CPT

## 2022-03-23 PROCEDURE — 85027 COMPLETE CBC AUTOMATED: CPT

## 2022-03-23 PROCEDURE — 36415 COLL VENOUS BLD VENIPUNCTURE: CPT

## 2022-03-23 PROCEDURE — 80053 COMPREHEN METABOLIC PANEL: CPT

## 2022-03-23 PROCEDURE — 83036 HEMOGLOBIN GLYCOSYLATED A1C: CPT

## 2022-03-23 PROCEDURE — 80061 LIPID PANEL: CPT

## 2022-03-24 ENCOUNTER — RA CDI HCC (OUTPATIENT)
Dept: OTHER | Facility: HOSPITAL | Age: 77
End: 2022-03-24

## 2022-03-24 NOTE — PROGRESS NOTES
[facility]  coding opportunities       Chart reviewed, no opportunity found:   [address]        Patients Insurance     Medicare Insurance: [employer]

## 2022-04-06 ENCOUNTER — OFFICE VISIT (OUTPATIENT)
Dept: FAMILY MEDICINE CLINIC | Facility: MEDICAL CENTER | Age: 77
End: 2022-04-06
Payer: MEDICARE

## 2022-04-06 VITALS
DIASTOLIC BLOOD PRESSURE: 70 MMHG | OXYGEN SATURATION: 94 % | HEART RATE: 78 BPM | HEIGHT: 60 IN | BODY MASS INDEX: 25.68 KG/M2 | SYSTOLIC BLOOD PRESSURE: 128 MMHG | WEIGHT: 130.8 LBS

## 2022-04-06 DIAGNOSIS — M79.89 SWELLING OF LOWER EXTREMITY: ICD-10-CM

## 2022-04-06 DIAGNOSIS — J44.9 COPD, VERY SEVERE (HCC): Primary | ICD-10-CM

## 2022-04-06 DIAGNOSIS — R79.89 ELEVATED TSH: ICD-10-CM

## 2022-04-06 PROCEDURE — 99214 OFFICE O/P EST MOD 30 MIN: CPT | Performed by: FAMILY MEDICINE

## 2022-04-06 NOTE — PROGRESS NOTES
Rima Cooley is here for followup  She saw Cardiology Dr Ab Huerta in Feb  Her Lasix was changeShe says hit also helps her edema  The Lasix was changed to torsemide due to pain in her feet  HAsn;t had any side effects  Feels better with it  She has not gotten any leg cramps   She still gets feet tingling  Not bothersome  She gets numbness in both arms  She sayts both hands   Palm side  Aggravated by computer  She said her COPD is about the same  She continues to wear oxygen  She saw pulmonology in October  She continues to use Advair and Combivent with albuterol as needed throughout the day  She continues on oxygen 2 liters/minute  O: /70 (BP Location: Left arm, Patient Position: Sitting, Cuff Size: Adult)   Pulse 78   Ht 5' (1 524 m)   Wt 59 3 kg (130 lb 12 8 oz)   SpO2 94%   BMI 25 55 kg/m²    She looks well  Appears mildly short of breath with talking; using her oxygen  Neck no adenopathy thyromegaly bruits  Chest diffusely decreased breath sounds but no wheezing or rhonchi or rale  Cardiac regular rate rhythm without murmur  Abdomen benign  Extremities   Negative Phalen's and Tinel sign bilaterally    Blood work 03/23/2022  TSH 3 95  Lipid profile cholesterol 202  HDL 76  CBC CMP wnl      Assessment  1  COPD-stable-management per pulmonology  2  History of pre diabetes-resolve  3  Mildly elevated TSH-intermittent-asymptomatic-will follow  4     Peripheral edema-well managed with torsemide; sees cardiology  No evidence for right heart failure  5   Mild hyperlipidemia-ASCVD risk is 18%  She declines statin for now  Advised to discuss with Cardiology     Continue to watch diet    6  Probable carpal tunnel syndrome-declines referral at this time  Avoid aggravating activities  Consider carpal tunnel wrist splint  Plan  As above    Recheck 6 months

## 2022-04-15 ENCOUNTER — OFFICE VISIT (OUTPATIENT)
Dept: PULMONOLOGY | Facility: CLINIC | Age: 77
End: 2022-04-15
Payer: MEDICARE

## 2022-04-15 VITALS
HEART RATE: 63 BPM | RESPIRATION RATE: 16 BRPM | SYSTOLIC BLOOD PRESSURE: 126 MMHG | WEIGHT: 130 LBS | HEIGHT: 60 IN | OXYGEN SATURATION: 99 % | DIASTOLIC BLOOD PRESSURE: 74 MMHG | BODY MASS INDEX: 25.52 KG/M2 | TEMPERATURE: 97.9 F

## 2022-04-15 DIAGNOSIS — J96.11 CHRONIC RESPIRATORY FAILURE WITH HYPOXIA (HCC): ICD-10-CM

## 2022-04-15 DIAGNOSIS — J44.9 COPD, VERY SEVERE (HCC): Primary | ICD-10-CM

## 2022-04-15 PROCEDURE — 99213 OFFICE O/P EST LOW 20 MIN: CPT | Performed by: INTERNAL MEDICINE

## 2022-04-15 RX ORDER — TORSEMIDE 10 MG/1
TABLET ORAL
COMMUNITY
Start: 2022-02-18

## 2022-04-15 NOTE — ASSESSMENT & PLAN NOTE
Patient has severe COPD, previously evaluated for bronchoscopic lung volume reduction (June 2020), but had evidence of collateral ventilation on chartis assessment  Therefore, valves were not placed  We have optimized her medical regimen with Advair and Combivent  She continues to remain as active as possible

## 2022-04-15 NOTE — PROGRESS NOTES
Pulmonary Follow Up Note   Rachel Rai 68 y o  female MRN: [id number]  4/15/2022      Assessment/Plan:     COPD, very severe [facility])  Patient has severe COPD, previously evaluated for bronchoscopic lung volume reduction (June 2020), but had evidence of collateral ventilation on chartis assessment  Therefore, valves were not placed  We have optimized her medical regimen with Advair and Combivent  She continues to remain as active as possible  Chronic respiratory failure with hypoxia (HCC)  Oxygenation is 99% on 2 L  We discussed that she can likely rest without supplemental oxygen and only use it with exertion  She has a pulse ox at home and understands saturations ideally should remain above 88-90% at all times  Visit orders:    Diagnoses and all orders for this visit:    COPD, very severe ([facility] )    Chronic respiratory failure with hypoxia ([facility] )    Other orders  -     torsemide (DEMADEX) 10 mg tablet        No follow-ups on file  History of Present Illness   HPI:  Rachel Rai is a 68 y o  female who today for follow-up regarding very severe COPD and chronic hypoxic respiratory failure  She is using Advair and Combivent  She feels she is doing slightly better than her last visit  She is able to do a little bit more activity  She does still becomes quite breathless  She is using oxygen at 2 liters/minute  No significant cough, wheeze or sputum production  No recent ER visits or hospital stays  Review of Systems   Constitutional: Negative for appetite change and fever  HENT: Positive for postnasal drip  Negative for ear pain, rhinorrhea, sneezing, sore throat and trouble swallowing  Cardiovascular: Negative for chest pain  Musculoskeletal: Negative for myalgias  Neurological: Negative for headaches       all others negative    Medical, Family and Social history reviewed and updated as appropriate    Historical Information   Past Medical History:   Diagnosis Date    Allergic 1961    Asthenia     Cataract     [facility]ch[facility]) [facility]     History of screening mammography     last assessed: 10/31/2017    Osteoporosis     Pneumonia 1950    Urinary tract infection      Past Surgical History:   Procedure Laterality Date    APPENDECTOMY      onset:     CATARACT EXTRACTION      last assessed: 10/31/2017    COLONOSCOPY  2015    EYE SURGERY  cataracks 2017    HEMORROIDECTOMY  2006    TONSILLECTOMY      onset: 12     Family History   Problem Relation Age of Onset    Arthritis Mother     Breast cancer Mother [de-identified]    Heart disease Mother     Hypertension Mother     Osteoporosis Mother     Dementia Mother     COPD Mother     Hearing loss Mother     Diabetes Father     Heart disease Father     Hypertension Father     COPD Father     Ovarian cancer Sister 52    No Known Problems Maternal Grandmother     Hearing loss Maternal Grandfather     No Known Problems Paternal Grandmother     No Known Problems Paternal Grandfather     No Known Problems Maternal Aunt     No Known Problems Maternal Aunt     No Known Problems Paternal Aunt     No Known Problems Paternal Aunt     No Known Problems Paternal Aunt        Social History     Tobacco Use   Smoking Status Former Smoker    Packs/day: 1 00    Years: 40 00    Pack years: 40 00    Types: Cigarettes    Quit date:     Years since quittin 2   Smokeless Tobacco Never Used         Meds/Allergies     Current Outpatient Medications:     albuterol (Ventolin HFA) 90 mcg/act inhaler, Inhale 2 puffs every 4 (four) hours as needed for wheezing, Disp: 18 g, Rfl: 5    ciclopirox (LOPROX) 0 77 % cream, Apply topically once daily to face until scaly red areas resolve, Disp: 90 g, Rfl: 4    fluticasone-salmeterol (Advair Diskus) 250-50 mcg/dose inhaler, Inhale 1 puff every 12 (twelve) hours, Disp: 60 blister, Rfl: 5    ipratropium-albuterol (COMBIVENT RESPIMAT) inhaler, Inhale 1 puff 4 (four) times a day, Disp: 12 g, Rfl: 3    ketoconazole (NIZORAL) 2 % shampoo, Daily for 2 weeks straight and then on "Mondays, Wednesdays and Fridays":  Lather into scalp and skin on face; leave on for 5 minutes and then rinse off completely  , Disp: 120 mL, Rfl: 6    Misc  Devices (Roller Walker) MISC, Use daily Rollator, Disp: 1 each, Rfl: 0    torsemide (DEMADEX) 10 mg tablet, , Disp: , Rfl:   Allergies   Allergen Reactions    Clarinex [Desloratadine] Wheezing    Bee Venom Swelling and Facial Swelling    Clarithromycin Wheezing     Reaction Date: 29Apr2011;     Food Color Red - Food Allergy Itching    Levofloxacin Other (See Comments)     Blood clot in leg    Loratadine Wheezing     Reaction Date: 29Apr2011;        Vitals: Blood pressure 126/74, pulse 63, temperature 97 9 °F (36 6 °C), temperature source Tympanic, resp  rate 16, height 5' (1 524 m), weight 59 kg (130 lb), SpO2 99 %  Body mass index is 25 39 kg/m²  Oxygen Therapy  SpO2: 99 %  Oxygen Therapy: Supplemental oxygen  O2 Delivery Method: Nasal cannula  O2 Flow Rate (L/min): 2 L/min (pulse)    Physical Exam   Physical Exam  Constitutional:       General: She is not in acute distress  HENT:      Head: Normocephalic  Eyes:      General: No scleral icterus  Neck:      Vascular: No JVD  Cardiovascular:      Rate and Rhythm: Normal rate and regular rhythm  Pulmonary:      Breath sounds: No wheezing, rhonchi or rales  Comments: Decreased throughout  Abdominal:      Palpations: Abdomen is soft  Tenderness: There is no abdominal tenderness  Musculoskeletal:         General: No swelling  Cervical back: Neck supple  Skin:     General: Skin is warm and dry  Neurological:      Mental Status: She is alert and oriented to person, place, and time  Psychiatric:         Mood and Affect: Mood normal          Labs: I have personally reviewed pertinent lab results    Lab Results   Component Value Date    WBC 9 17 03/23/2022    HGB 14 0 03/23/2022    HCT 45 7 03/23/2022    MCV 95 03/23/2022     03/23/2022     Lab Results   Component Value Date    GLUCOSE 114 02/26/2020    CALCIUM 9 1 03/23/2022     06/25/2015    K 4 0 03/23/2022    CO2 36 (H) 03/23/2022     03/23/2022    BUN 16 03/23/2022    CREATININE 0 62 03/23/2022     No results found for: IGE  Lab Results   Component Value Date    ALT 23 03/23/2022    AST 18 03/23/2022    ALKPHOS 60 03/23/2022    BILITOT 0 51 06/25/2015     Imaging and other studies: I have personally reviewed pertinent reports  and I have personally reviewed pertinent films in PACS CT of chest from 10/22/21 shows stable 1 3 cm nodule in the right upper lobe    Pulmonary function testing:  Performed  5/28/20 and personally interpreted  FEV1/FVC ratio 38%   FEV1 23% predicted  FVC 51% predicted  DLCO corrected for hemoglobin 23 % predicted  spirometry with very severe obstruction and reduced vital capacity on the basis of air trapping  Severe diffusion impairment

## 2022-04-15 NOTE — ASSESSMENT & PLAN NOTE
Oxygenation is 99% on 2 L  We discussed that she can likely rest without supplemental oxygen and only use it with exertion  She has a pulse ox at home and understands saturations ideally should remain above 88-90% at all times

## 2022-06-02 DIAGNOSIS — J44.9 COPD, VERY SEVERE (HCC): ICD-10-CM

## 2022-08-03 ENCOUNTER — APPOINTMENT (OUTPATIENT)
Dept: LAB | Facility: CLINIC | Age: 77
End: 2022-08-03
Payer: MEDICARE

## 2022-08-03 DIAGNOSIS — J96.11 CHRONIC HYPOXEMIC RESPIRATORY FAILURE (HCC): ICD-10-CM

## 2022-08-03 DIAGNOSIS — R60.0 LEG EDEMA: ICD-10-CM

## 2022-08-03 DIAGNOSIS — J44.9 SEVERE CHRONIC OBSTRUCTIVE PULMONARY DISEASE (HCC): ICD-10-CM

## 2022-08-03 LAB
ALBUMIN SERPL BCP-MCNC: 3.6 G/DL (ref 3.5–5)
ALP SERPL-CCNC: 60 U/L (ref 46–116)
ALT SERPL W P-5'-P-CCNC: 26 U/L (ref 12–78)
ANION GAP SERPL CALCULATED.3IONS-SCNC: 1 MMOL/L (ref 4–13)
AST SERPL W P-5'-P-CCNC: 18 U/L (ref 5–45)
BILIRUB SERPL-MCNC: 0.45 MG/DL (ref 0.2–1)
BUN SERPL-MCNC: 12 MG/DL (ref 5–25)
CALCIUM SERPL-MCNC: 9.3 MG/DL (ref 8.3–10.1)
CHLORIDE SERPL-SCNC: 100 MMOL/L (ref 96–108)
CO2 SERPL-SCNC: 37 MMOL/L (ref 21–32)
CREAT SERPL-MCNC: 0.52 MG/DL (ref 0.6–1.3)
GFR SERPL CREATININE-BSD FRML MDRD: 93 ML/MIN/1.73SQ M
GLUCOSE SERPL-MCNC: 104 MG/DL (ref 65–140)
POTASSIUM SERPL-SCNC: 4.4 MMOL/L (ref 3.5–5.3)
PROT SERPL-MCNC: 7.1 G/DL (ref 6.4–8.4)
SODIUM SERPL-SCNC: 138 MMOL/L (ref 135–147)

## 2022-08-03 PROCEDURE — 36415 COLL VENOUS BLD VENIPUNCTURE: CPT

## 2022-08-03 PROCEDURE — 80053 COMPREHEN METABOLIC PANEL: CPT

## 2022-08-25 ENCOUNTER — OFFICE VISIT (OUTPATIENT)
Dept: PULMONOLOGY | Facility: CLINIC | Age: 77
End: 2022-08-25
Payer: MEDICARE

## 2022-08-25 VITALS
HEART RATE: 75 BPM | OXYGEN SATURATION: 95 % | TEMPERATURE: 96.5 F | HEIGHT: 60 IN | DIASTOLIC BLOOD PRESSURE: 60 MMHG | WEIGHT: 129 LBS | RESPIRATION RATE: 16 BRPM | BODY MASS INDEX: 25.32 KG/M2 | SYSTOLIC BLOOD PRESSURE: 124 MMHG

## 2022-08-25 DIAGNOSIS — J96.11 CHRONIC RESPIRATORY FAILURE WITH HYPOXIA (HCC): Primary | ICD-10-CM

## 2022-08-25 DIAGNOSIS — R91.1 PULMONARY NODULE: ICD-10-CM

## 2022-08-25 DIAGNOSIS — J44.9 COPD, VERY SEVERE (HCC): ICD-10-CM

## 2022-08-25 PROCEDURE — 99214 OFFICE O/P EST MOD 30 MIN: CPT | Performed by: INTERNAL MEDICINE

## 2022-08-25 NOTE — ASSESSMENT & PLAN NOTE
Patient has very severe COPD with an FEV1 of 23% of predicted when measured in 2020  We explored bronchoscopic lung volume reduction as an option, however she was noted to be collateral ventilation positive when we evaluated her  Therefore, we are left with medical management  She will continue with Advair and Combivent

## 2022-08-25 NOTE — PROGRESS NOTES
Pulmonary Follow Up Note   Brooke Montana 68 y o  female MRN: [id number]  8/25/2022      Assessment/Plan:     COPD, very severe [facility]  Patient has very severe COPD with an FEV1 of 23% of predicted when measured in 2020  We explored bronchoscopic lung volume reduction as an option, however she was noted to be collateral ventilation positive when we evaluated her  Therefore, we are left with medical management  She will continue with Advair and Combivent  Chronic respiratory failure with hypoxia (HCC)  Oxygenation is stable on 2 liters/minute  Pulmonary nodule  Patient demonstrated 5 year stability of right upper lobe pulmonary nodule  No further imaging is necessary  She will follow-up in 4 months or sooner if needed  Visit orders:    Diagnoses and all orders for this visit:    Chronic respiratory failure with hypoxia (HCC)    COPD, very severe ([address] )  -     ipratropium-albuterol (COMBIVENT RESPIMAT) inhaler; Inhale 1 puff 4 (four) times a day    Pulmonary nodule        No follow-ups on file  History of Present Illness   HPI:  Brooke Montana is a 68 y o  female who is here today for follow-up regarding severe COPD and chronic hypoxic respiratory failure  Her overall pulmonary status has been stable  She continues to struggle with shortness of breath most significant on hot and humid days  She uses the Combivent 4 times daily and Advair twice daily  She does not have significant cough, wheeze or sputum production  No recent ER visits or hospital stays  She is on oxygen at 2 liters/minute continuously  Review of Systems   Constitutional: Negative for appetite change and fever  HENT: Positive for postnasal drip  Negative for ear pain, rhinorrhea, sneezing, sore throat and trouble swallowing  Respiratory: Positive for shortness of breath  Cardiovascular: Negative for chest pain  Musculoskeletal: Negative for myalgias  Skin: Negative for rash  Neurological: Negative for headaches  Hematological: Negative for adenopathy  Psychiatric/Behavioral: Negative  All other systems reviewed and are negative          Medical, Family and Social history reviewed and updated as appropriate    Historical Information   Past Medical History:   Diagnosis Date    Allergic     Asthenia     Cataract     COPD [facility]chronic obstructive [facility] disease) ([facility] [address]     History of screening mammography     last assessed: 10/31/2017    Osteoporosis     Pneumonia 1950    Urinary tract infection      Past Surgical History:   Procedure Laterality Date    APPENDECTOMY      onset:     CATARACT EXTRACTION  2017    last assessed: 10/31/2017    COLONOSCOPY  2015    EYE SURGERY  cataracks 2017    HEMORROIDECTOMY  2006    TONSILLECTOMY      onset: 12     Family History   Problem Relation Age of Onset    Arthritis Mother     Breast cancer Mother [de-identified]    Heart disease Mother     Hypertension Mother     Osteoporosis Mother     Dementia Mother     COPD Mother     Hearing loss Mother     Diabetes Father     Heart disease Father     Hypertension Father     COPD Father     Ovarian cancer Sister 52    No Known Problems Maternal Grandmother     Hearing loss Maternal Grandfather     No Known Problems Paternal Grandmother     No Known Problems Paternal Grandfather     No Known Problems Maternal Aunt     No Known Problems Maternal Aunt     No Known Problems Paternal Aunt     No Known Problems Paternal Aunt     No Known Problems Paternal Aunt        Social History     Tobacco Use   Smoking Status Former Smoker    Packs/day: 1 00    Years: 40 00    Pack years: 40 00    Types: Cigarettes    Quit date:     Years since quittin 6   Smokeless Tobacco Never Used       Meds/Allergies     Current Outpatient Medications:     albuterol (Ventolin HFA) 90 mcg/act inhaler, Inhale 2 puffs every 4 (four) hours as needed for wheezing, Disp: 18 g, Rfl: 5    ciclopirox (LOPROX) 0 77 % cream, Apply topically once daily to face until scaly red areas resolve, Disp: 90 g, Rfl: 4    fluticasone-salmeterol (Advair) 250-50 mcg/dose inhaler, Inhale 1 puff every 12 (twelve) hours, Disp: 60 blister, Rfl: 5    ipratropium-albuterol (COMBIVENT RESPIMAT) inhaler, Inhale 1 puff 4 (four) times a day, Disp: 12 g, Rfl: 3    ketoconazole (NIZORAL) 2 % shampoo, Daily for 2 weeks straight and then on "Mondays, Wednesdays and Fridays":  Lather into scalp and skin on face; leave on for 5 minutes and then rinse off completely  , Disp: 120 mL, Rfl: 6    Misc  Devices (Roller Walker) MISC, Use daily Rollator, Disp: 1 each, Rfl: 0    torsemide (DEMADEX) 10 mg tablet, , Disp: , Rfl:   Allergies   Allergen Reactions    Clarinex [Desloratadine] Wheezing    Bee Venom Swelling and Facial Swelling    Clarithromycin Wheezing     Reaction Date: 29Apr2011;     Food Color Red - Food Allergy Itching    Levofloxacin Other (See Comments)     Blood clot in leg    Loratadine Wheezing     Reaction Date: 29Apr2011;        Vitals: Blood pressure 124/60, pulse 75, temperature (!) 96 5 °F (35 8 °C), temperature source Tympanic, resp  rate 16, height 5' (1 524 m), weight 58 5 kg (129 lb), SpO2 95 %  Body mass index is 25 19 kg/m²  Oxygen Therapy  SpO2: 95 %  Oxygen Therapy: Supplemental oxygen  O2 Delivery Method: Nasal cannula  O2 Flow Rate (L/min): 2 L/min    Physical Exam   Physical Exam  Vitals and nursing note reviewed  Constitutional:       General: She is not in acute distress  Appearance: She is well-developed  HENT:      Head: Normocephalic and atraumatic  Eyes:      General: No scleral icterus  Conjunctiva/sclera: Conjunctivae normal    Cardiovascular:      Rate and Rhythm: Normal rate and regular rhythm  Heart sounds: No murmur heard  Pulmonary:      Effort: Pulmonary effort is normal  No respiratory distress  Breath sounds: Normal breath sounds  No wheezing or rales     Abdominal:      Palpations: Abdomen is soft       Tenderness: There is no abdominal tenderness  Musculoskeletal:         General: No swelling  Cervical back: Neck supple  Skin:     General: Skin is warm and dry  Neurological:      Mental Status: She is alert  Psychiatric:         Mood and Affect: Mood normal          Labs: I have personally reviewed pertinent lab results  Lab Results   Component Value Date    WBC 9 17 03/23/2022    HGB 14 0 03/23/2022    HCT 45 7 03/23/2022    MCV 95 03/23/2022     03/23/2022     Lab Results   Component Value Date    GLUCOSE 114 02/26/2020    CALCIUM 9 3 08/03/2022     06/25/2015    K 4 4 08/03/2022    CO2 37 (H) 08/03/2022     08/03/2022    BUN 12 08/03/2022    CREATININE 0 52 (L) 08/03/2022     No results found for: IGE  Lab Results   Component Value Date    ALT 26 08/03/2022    AST 18 08/03/2022    ALKPHOS 60 08/03/2022    BILITOT 0 51 06/25/2015     Imaging and other studies: I have personally reviewed pertinent reports  and I have personally reviewed pertinent films in PACS CT of the chest from 10/22/21 shows stable 1 3 cm pulmonary nodule going back to November 2016    Pulmonary function testing:  Performed 5/28/20 and personally interpreted  FEV1/FVC ratio 38%   FEV1 23% predicted  FVC 51% predicted    Spirometry with severe obstruction and reduced vital capacity likely due to air trapping

## 2022-08-25 NOTE — ASSESSMENT & PLAN NOTE
Patient demonstrated 5 year stability of right upper lobe pulmonary nodule  No further imaging is necessary

## 2022-08-25 NOTE — LETTER
August 25, 2022     Trudy Scott, [address] [address]    Patient: Sisi Lu   YOB: 1945   Date of Visit: 8/25/2022       Dear Dr Marzena Celis: Thank you for referring Roger Candelario to me for evaluation  Below are my notes for this consultation  If you have questions, please do not hesitate to call me  I look forward to following your patient along with you  Sincerely,        Angelo Rodriguez,         CC: MD Angelo Dunbar DO  8/25/2022  3:40 PM  Sign when Signing Visit  Pulmonary Follow Up Note   Sisi Lu 68 y o  female MRN: [id number]  8/25/2022      Assessment/Plan:     COPD, very severe ([facility] [address] )  Patient has very severe COPD with an FEV1 of 23% of predicted when measured in 2020  We explored bronchoscopic lung volume reduction as an option, however she was noted to be collateral ventilation positive when we evaluated her  Therefore, we are left with medical management  She will continue with Advair and Combivent  Chronic respiratory failure with hypoxia (HCC)  Oxygenation is stable on 2 liters/minute  Pulmonary nodule  Patient demonstrated 5 year stability of right upper lobe pulmonary nodule  No further imaging is necessary  She will follow-up in 4 months or sooner if needed  Visit orders:    Diagnoses and all orders for this visit:    Chronic respiratory failure with hypoxia (HCC)    COPD, very severe ([address] )  -     ipratropium-albuterol (COMBIVENT RESPIMAT) inhaler; Inhale 1 puff 4 (four) times a day    Pulmonary nodule        No follow-ups on file  History of Present Illness   HPI:  Sisi Lu is a 68 y o  female who is here today for follow-up regarding severe COPD and chronic hypoxic respiratory failure  Her overall pulmonary status has been stable  She continues to struggle with shortness of breath most significant on hot and humid days    She uses the Combivent 4 times daily and Advair twice daily   She does not have significant cough, wheeze or sputum production  No recent ER visits or hospital stays  She is on oxygen at 2 liters/minute continuously  Review of Systems   Constitutional: Negative for appetite change and fever  HENT: Positive for postnasal drip  Negative for ear pain, rhinorrhea, sneezing, sore throat and trouble swallowing  Respiratory: Positive for shortness of breath  Cardiovascular: Negative for chest pain  Musculoskeletal: Negative for myalgias  Skin: Negative for rash  Neurological: Negative for headaches  Hematological: Negative for adenopathy  Psychiatric/Behavioral: Negative  All other systems reviewed and are negative          Medical, Family and Social history reviewed and updated as appropriate    Historical Information   Past Medical History:   Diagnosis Date    Allergic 1961    Asthenia     Cataract     COPD [facility]chronic obstructive [facility] disease) [facility] [address]     History of screening mammography     last assessed: 10/31/2017    Osteoporosis     Pneumonia 1950    Urinary tract infection      Past Surgical History:   Procedure Laterality Date    APPENDECTOMY      onset: 1966    CATARACT EXTRACTION  2017    last assessed: 10/31/2017    COLONOSCOPY  2015    EYE SURGERY  cataracks 2017    HEMORROIDECTOMY  2006   [id number] [facility]    onset: 12     Family History   Problem Relation Age of Onset    Arthritis Mother     Breast cancer Mother [de-identified]    Heart disease Mother     Hypertension Mother     Osteoporosis Mother    Unique Pall Dementia Mother    Unique Pall COPD Mother     Hearing loss Mother     Diabetes Father     Heart disease Father     Hypertension Father     COPD Father     Ovarian cancer Sister 52    No Known Problems Maternal Grandmother     Hearing loss Maternal Grandfather     No Known Problems Paternal Grandmother     No Known Problems Paternal Grandfather     No Known Problems Maternal Aunt     No Known Problems Maternal Aunt     No Known Problems Paternal Aunt     No Known Problems Paternal Aunt     No Known Problems Paternal Aunt        Social History     Tobacco Use   Smoking Status Former Smoker    Packs/day: 1 00    Years: 40 00    Pack years: 40 00    Types: Cigarettes    Quit date:     Years since quittin 6   Smokeless Tobacco Never Used       Meds/Allergies     Current Outpatient Medications:     albuterol (Ventolin HFA) 90 mcg/act inhaler, Inhale 2 puffs every 4 (four) hours as needed for wheezing, Disp: 18 g, Rfl: 5    ciclopirox (LOPROX) 0 77 % cream, Apply topically once daily to face until scaly red areas resolve, Disp: 90 g, Rfl: 4    fluticasone-salmeterol (Advair) 250-50 mcg/dose inhaler, Inhale 1 puff every 12 (twelve) hours, Disp: 60 blister, Rfl: 5    ipratropium-albuterol (COMBIVENT RESPIMAT) inhaler, Inhale 1 puff 4 (four) times a day, Disp: 12 g, Rfl: 3    ketoconazole (NIZORAL) 2 % shampoo, Daily for 2 weeks straight and then on ",  and ":  Lather into scalp and skin on face; leave on for 5 minutes and then rinse off completely  , Disp: 120 mL, Rfl: 6    Misc  Devices (Roller Walker) MISC, Use daily Rollator, Disp: 1 each, Rfl: 0    torsemide (DEMADEX) 10 mg tablet, , Disp: , Rfl:   Allergies   Allergen Reactions    Clarinex [Desloratadine] Wheezing    Bee Venom Swelling and Facial Swelling    Clarithromycin Wheezing     Reaction Date: 2011;     Food Color Red - Food Allergy Itching    Levofloxacin Other (See Comments)     Blood clot in leg    Loratadine Wheezing     Reaction Date: 2011;        Vitals: Blood pressure 124/60, pulse 75, temperature (!) 96 5 °F (35 8 °C), temperature source Tympanic, resp  rate 16, height 5' (1 524 m), weight 58 5 kg (129 lb), SpO2 95 %  Body mass index is 25 19 kg/m²   Oxygen Therapy  SpO2: 95 %  Oxygen Therapy: Supplemental oxygen  O2 Delivery Method: Nasal cannula  O2 Flow Rate (L/min): 2 L/min    Physical Exam   Physical Exam  Vitals and nursing note reviewed  Constitutional:       General: She is not in acute distress  Appearance: She is well-developed  HENT:      Head: Normocephalic and atraumatic  Eyes:      General: No scleral icterus  Conjunctiva/sclera: Conjunctivae normal    Cardiovascular:      Rate and Rhythm: Normal rate and regular rhythm  Heart sounds: No murmur heard  Pulmonary:      Effort: Pulmonary effort is normal  No respiratory distress  Breath sounds: Normal breath sounds  No wheezing or rales  Abdominal:      Palpations: Abdomen is soft  Tenderness: There is no abdominal tenderness  Musculoskeletal:         General: No swelling  Cervical back: Neck supple  Skin:     General: Skin is warm and dry  Neurological:      Mental Status: She is alert  Psychiatric:         Mood and Affect: Mood normal          Labs: I have personally reviewed pertinent lab results  Lab Results   Component Value Date    WBC 9 17 03/23/2022    HGB 14 0 03/23/2022    HCT 45 7 03/23/2022    MCV 95 03/23/2022     03/23/2022     Lab Results   Component Value Date    GLUCOSE 114 02/26/2020    CALCIUM 9 3 08/03/2022     06/25/2015    K 4 4 08/03/2022    CO2 37 (H) 08/03/2022     08/03/2022    BUN 12 08/03/2022    CREATININE 0 52 (L) 08/03/2022     No results found for: IGE  Lab Results   Component Value Date    ALT 26 08/03/2022    AST 18 08/03/2022    ALKPHOS 60 08/03/2022    BILITOT 0 51 06/25/2015     Imaging and other studies: I have personally reviewed pertinent reports  and I have personally reviewed pertinent films in PACS CT of the chest from 10/22/21 shows stable 1 3 cm pulmonary nodule going back to November 2016    Pulmonary function testing:  Performed 5/28/20 and personally interpreted  FEV1/FVC ratio 38%   FEV1 23% predicted  FVC 51% predicted    Spirometry with severe obstruction and reduced vital capacity likely due to air trapping

## 2022-09-14 ENCOUNTER — HOSPITAL ENCOUNTER (EMERGENCY)
Facility: HOSPITAL | Age: 77
Discharge: HOME | End: 2022-09-14
Payer: MEDICARE

## 2022-09-14 ENCOUNTER — APPOINTMENT (EMERGENCY)
Dept: CT IMAGING | Facility: HOSPITAL | Age: 77
End: 2022-09-14
Payer: MEDICARE

## 2022-09-14 VITALS
HEART RATE: 79 BPM | TEMPERATURE: 98 F | OXYGEN SATURATION: 96 % | SYSTOLIC BLOOD PRESSURE: 127 MMHG | RESPIRATION RATE: 28 BRPM | DIASTOLIC BLOOD PRESSURE: 60 MMHG

## 2022-09-14 DIAGNOSIS — J18.9 RIGHT LOWER LOBE PNEUMONIA: Primary | ICD-10-CM

## 2022-09-14 DIAGNOSIS — R94.31 ABNORMAL EKG: ICD-10-CM

## 2022-09-14 DIAGNOSIS — M54.6 THORACIC BACK PAIN: ICD-10-CM

## 2022-09-14 LAB
2HR DELTA HS TROPONIN: -2 NG/L
ANION GAP SERPL CALCULATED.3IONS-SCNC: 7 MMOL/L (ref 4–13)
ATRIAL RATE: 80 BPM
BACTERIA UR QL AUTO: NORMAL /HPF
BASOPHILS # BLD AUTO: 0.04 THOUSANDS/ΜL (ref 0–0.1)
BASOPHILS NFR BLD AUTO: 0 % (ref 0–1)
BILIRUB UR QL STRIP: NEGATIVE
BUN SERPL-MCNC: 11 MG/DL (ref 5–25)
CALCIUM SERPL-MCNC: 9.1 MG/DL (ref 8.3–10.1)
CARDIAC TROPONIN I PNL SERPL HS: 26 NG/L
CARDIAC TROPONIN I PNL SERPL HS: 28 NG/L
CHLORIDE SERPL-SCNC: 100 MMOL/L (ref 96–108)
CLARITY UR: CLEAR
CO2 SERPL-SCNC: 36 MMOL/L (ref 21–32)
COLOR UR: YELLOW
CREAT SERPL-MCNC: 0.49 MG/DL (ref 0.6–1.3)
EOSINOPHIL # BLD AUTO: 0.05 THOUSAND/ΜL (ref 0–0.61)
EOSINOPHIL NFR BLD AUTO: 0 % (ref 0–6)
ERYTHROCYTE [DISTWIDTH] IN BLOOD BY AUTOMATED COUNT: 12.4 % (ref 11.6–15.1)
GFR SERPL CREATININE-BSD FRML MDRD: 94 ML/MIN/1.73SQ M
GLUCOSE SERPL-MCNC: 98 MG/DL (ref 65–140)
GLUCOSE UR STRIP-MCNC: NEGATIVE MG/DL
HCT VFR BLD AUTO: 44.4 % (ref 34.8–46.1)
HGB BLD-MCNC: 13.8 G/DL (ref 11.5–15.4)
HGB UR QL STRIP.AUTO: ABNORMAL
IMM GRANULOCYTES # BLD AUTO: 0.03 THOUSAND/UL (ref 0–0.2)
IMM GRANULOCYTES NFR BLD AUTO: 0 % (ref 0–2)
KETONES UR STRIP-MCNC: ABNORMAL MG/DL
LEUKOCYTE ESTERASE UR QL STRIP: NEGATIVE
LIPASE SERPL-CCNC: 68 U/L (ref 73–393)
LYMPHOCYTES # BLD AUTO: 0.89 THOUSANDS/ΜL (ref 0.6–4.47)
LYMPHOCYTES NFR BLD AUTO: 7 % (ref 14–44)
MCH RBC QN AUTO: 29.6 PG (ref 26.8–34.3)
MCHC RBC AUTO-ENTMCNC: 31.1 G/DL (ref 31.4–37.4)
MCV RBC AUTO: 95 FL (ref 82–98)
MONOCYTES # BLD AUTO: 0.72 THOUSAND/ΜL (ref 0.17–1.22)
MONOCYTES NFR BLD AUTO: 6 % (ref 4–12)
NEUTROPHILS # BLD AUTO: 10.48 THOUSANDS/ΜL (ref 1.85–7.62)
NEUTS SEG NFR BLD AUTO: 87 % (ref 43–75)
NITRITE UR QL STRIP: NEGATIVE
NON-SQ EPI CELLS URNS QL MICRO: NORMAL /HPF
NRBC BLD AUTO-RTO: 0 /100 WBCS
P AXIS: 82 DEGREES
PH UR STRIP.AUTO: 5 [PH]
PLATELET # BLD AUTO: 141 THOUSANDS/UL (ref 149–390)
PMV BLD AUTO: 11.4 FL (ref 8.9–12.7)
POTASSIUM SERPL-SCNC: 4.4 MMOL/L (ref 3.5–5.3)
PR INTERVAL: 154 MS
PROT UR STRIP-MCNC: NEGATIVE MG/DL
QRS AXIS: -47 DEGREES
QRSD INTERVAL: 72 MS
QT INTERVAL: 386 MS
QTC INTERVAL: 445 MS
RBC # BLD AUTO: 4.66 MILLION/UL (ref 3.81–5.12)
RBC #/AREA URNS AUTO: NORMAL /HPF
SODIUM SERPL-SCNC: 143 MMOL/L (ref 135–147)
SP GR UR STRIP.AUTO: 1.01 (ref 1–1.03)
T WAVE AXIS: 259 DEGREES
UROBILINOGEN UR QL STRIP.AUTO: 0.2 E.U./DL
VENTRICULAR RATE: 80 BPM
WBC # BLD AUTO: 12.21 THOUSAND/UL (ref 4.31–10.16)
WBC #/AREA URNS AUTO: NORMAL /HPF

## 2022-09-14 PROCEDURE — 36415 COLL VENOUS BLD VENIPUNCTURE: CPT | Performed by: PHYSICIAN ASSISTANT

## 2022-09-14 PROCEDURE — 93005 ELECTROCARDIOGRAM TRACING: CPT

## 2022-09-14 PROCEDURE — 93010 ELECTROCARDIOGRAM REPORT: CPT | Performed by: INTERNAL MEDICINE

## 2022-09-14 PROCEDURE — G1004 CDSM NDSC: HCPCS

## 2022-09-14 PROCEDURE — 81001 URINALYSIS AUTO W/SCOPE: CPT | Performed by: PHYSICIAN ASSISTANT

## 2022-09-14 PROCEDURE — 96375 TX/PRO/DX INJ NEW DRUG ADDON: CPT

## 2022-09-14 PROCEDURE — 74177 CT ABD & PELVIS W/CONTRAST: CPT

## 2022-09-14 PROCEDURE — 99284 EMERGENCY DEPT VISIT MOD MDM: CPT

## 2022-09-14 PROCEDURE — 84484 ASSAY OF TROPONIN QUANT: CPT | Performed by: PHYSICIAN ASSISTANT

## 2022-09-14 PROCEDURE — 99285 EMERGENCY DEPT VISIT HI MDM: CPT | Performed by: PHYSICIAN ASSISTANT

## 2022-09-14 PROCEDURE — 80048 BASIC METABOLIC PNL TOTAL CA: CPT | Performed by: PHYSICIAN ASSISTANT

## 2022-09-14 PROCEDURE — 96367 TX/PROPH/DG ADDL SEQ IV INF: CPT

## 2022-09-14 PROCEDURE — 71275 CT ANGIOGRAPHY CHEST: CPT

## 2022-09-14 PROCEDURE — 83690 ASSAY OF LIPASE: CPT | Performed by: PHYSICIAN ASSISTANT

## 2022-09-14 PROCEDURE — 96365 THER/PROPH/DIAG IV INF INIT: CPT

## 2022-09-14 PROCEDURE — 85025 COMPLETE CBC W/AUTO DIFF WBC: CPT | Performed by: PHYSICIAN ASSISTANT

## 2022-09-14 RX ORDER — MORPHINE SULFATE 4 MG/ML
4 INJECTION, SOLUTION INTRAMUSCULAR; INTRAVENOUS ONCE
Status: COMPLETED | OUTPATIENT
Start: 2022-09-14 | End: 2022-09-14

## 2022-09-14 RX ORDER — ONDANSETRON 2 MG/ML
4 INJECTION INTRAMUSCULAR; INTRAVENOUS ONCE
Status: COMPLETED | OUTPATIENT
Start: 2022-09-14 | End: 2022-09-14

## 2022-09-14 RX ORDER — SODIUM CHLORIDE 9 MG/ML
3 INJECTION INTRAVENOUS
Status: DISCONTINUED | OUTPATIENT
Start: 2022-09-14 | End: 2022-09-14 | Stop reason: HOSPADM

## 2022-09-14 RX ORDER — OXYCODONE HYDROCHLORIDE AND ACETAMINOPHEN 5; 325 MG/1; MG/1
1 TABLET ORAL EVERY 6 HOURS PRN
Qty: 12 TABLET | Refills: 0 | Status: SHIPPED | OUTPATIENT
Start: 2022-09-14 | End: 2022-09-17

## 2022-09-14 RX ORDER — AZITHROMYCIN 250 MG/1
TABLET, FILM COATED ORAL
Qty: 4 TABLET | Refills: 0 | Status: SHIPPED | OUTPATIENT
Start: 2022-09-15 | End: 2022-09-18

## 2022-09-14 RX ADMIN — MORPHINE SULFATE 4 MG: 4 INJECTION INTRAVENOUS at 10:50

## 2022-09-14 RX ADMIN — IOHEXOL 100 ML: 350 INJECTION, SOLUTION INTRAVENOUS at 12:18

## 2022-09-14 RX ADMIN — Medication 500 MG: at 16:25

## 2022-09-14 RX ADMIN — ONDANSETRON 4 MG: 2 INJECTION INTRAMUSCULAR; INTRAVENOUS at 10:51

## 2022-09-14 RX ADMIN — CEFTRIAXONE SODIUM 2000 MG: 10 INJECTION, POWDER, FOR SOLUTION INTRAVENOUS at 15:56

## 2022-09-14 NOTE — ED PROVIDER NOTES
History  Chief Complaint   Patient presents with    Back Pain     Pt presents with new onset upper R back pain that began yesterday, denies injury states she woke up with the pain  68 y o  female with past medical history significant for COPD and chronic respiratory failure on 2 L oxygen presents to ED with chief complaint of right-sided thoracic subscapular back pain  Onset of symptoms reported as 1 day ago  Location of symptoms reported as right upper back  Quality is reported as sharp pain  Severity is reported as moderate  Associated symptoms:  Positive shortness of breath  Denies fevers  Denies rash  Denies chest pain  Denies dizziness or syncope  Modifying factors:  Deep inspiration and movement exacerbates pain    Context:  Denies acute fall injury or trauma  Patient states pain started gradually yesterday and continue to increase  She denies prior similar episodes in the past   Today she woke up with pain taking a deep inspiration which prompted her to come to ED  Reformed smoker  Quit 2005  History of osteoporosis  Reviewed past medical history and visits via [facility]:  No prior visits to this ED  History provided by:  Patient   used: No    Back Pain  Associated symptoms: no abdominal pain, no chest pain and no fever        Prior to Admission Medications   Prescriptions Last Dose Informant Patient Reported? Taking? Misc   Devices (Roller Walker) MISC   No No   Sig: Use daily Rollator   albuterol (Ventolin HFA) 90 mcg/act inhaler   No No   Sig: Inhale 2 puffs every 4 (four) hours as needed for wheezing   ciclopirox (LOPROX) 0 77 % cream   No No   Sig: Apply topically once daily to face until scaly red areas resolve   fluticasone-salmeterol (Advair) 250-50 mcg/dose inhaler   No No   Sig: Inhale 1 puff every 12 (twelve) hours   ipratropium-albuterol (COMBIVENT RESPIMAT) inhaler   No No   Sig: Inhale 1 puff 4 (four) times a day   ketoconazole (NIZORAL) 2 % shampoo No No   Sig: Daily for 2 weeks straight and then on "Mondays, Wednesdays and Fridays":  Lather into scalp and skin on face; leave on for 5 minutes and then rinse off completely  torsemide (DEMADEX) 10 mg tablet   Yes No      Facility-Administered Medications: None       Past Medical History:   Diagnosis Date    Allergic 1961    Asthenia     Cataract     COPD (chronic obstructive pulmonary disease) ([facility] [address] )     History of screening mammography     last assessed: 10/31/2017    Osteoporosis     Pneumonia 1950    Urinary tract infection        Past Surgical History:   Procedure Laterality Date    APPENDECTOMY      onset: 1966    CATARACT EXTRACTION  2017    last assessed: 10/31/2017    COLONOSCOPY  2015    EYE SURGERY  cataracks 2017    HEMORROIDECTOMY  2006    TONSILLECTOMY  1954    onset: 12       Family History   Problem Relation Age of Onset    Arthritis Mother     Breast cancer Mother [de-identified]    Heart disease Mother     Hypertension Mother     Osteoporosis Mother     Dementia Mother     COPD Mother     Hearing loss Mother     Diabetes Father     Heart disease Father     Hypertension Father     COPD Father     Ovarian cancer Sister 52    No Known Problems Maternal Grandmother     Hearing loss Maternal Grandfather     No Known Problems Paternal Grandmother     No Known Problems Paternal Grandfather     No Known Problems Maternal Aunt     No Known Problems Maternal Aunt     No Known Problems Paternal Aunt     No Known Problems Paternal Aunt     No Known Problems Paternal Aunt      I have reviewed and agree with the history as documented      E-Cigarette/Vaping    E-Cigarette Use Never User      E-Cigarette/Vaping Substances    Nicotine No     THC No     CBD No     Flavoring No     Other No     Unknown No      Social History     Tobacco Use    Smoking status: Former Smoker     Packs/day: 1 00     Years: 40 00     Pack years: 40 00     Types: Cigarettes     Quit date: 2005 Years since quittin 7    Smokeless tobacco: Never Used   Vaping Use    Vaping Use: Never used   Substance Use Topics    Alcohol use: Yes     Alcohol/week: 3 0 standard drinks     Types: 3 Glasses of wine per week    Drug use: Never       Review of Systems   Constitutional: Negative for appetite change, chills, diaphoresis and fever  Respiratory: Positive for cough and shortness of breath  Negative for chest tightness and wheezing  Cardiovascular: Positive for leg swelling  Negative for chest pain and palpitations  Gastrointestinal: Negative for abdominal pain, diarrhea, nausea and vomiting  Genitourinary: Negative for decreased urine volume, difficulty urinating, flank pain and urgency  Musculoskeletal: Positive for back pain  Skin: Negative for rash  All other systems reviewed and are negative  Physical Exam  Physical Exam  Vitals and nursing note reviewed  Constitutional:       General: She is not in acute distress  Appearance: Normal appearance  She is well-developed  She is not ill-appearing  Comments: /59   Pulse 75   Temp 98 °F (36 7 °C) (Oral)   Resp 19   SpO2 94%      HENT:      Head: Normocephalic and atraumatic  Right Ear: External ear normal       Left Ear: External ear normal       Nose: Nose normal       Mouth/Throat:      Mouth: Mucous membranes are moist    Eyes:      General: No scleral icterus  Right eye: No discharge  Left eye: No discharge  Extraocular Movements: Extraocular movements intact  Conjunctiva/sclera: Conjunctivae normal    Cardiovascular:      Rate and Rhythm: Normal rate  Pulses: Normal pulses  Pulmonary:      Effort: Pulmonary effort is normal  No respiratory distress  Breath sounds: Rhonchi present  Abdominal:      Tenderness: There is no abdominal tenderness  There is no guarding or rebound  Hernia: No hernia is present     Musculoskeletal:         General: No swelling, tenderness, deformity or signs of injury  Normal range of motion  Cervical back: Normal range of motion and neck supple  No rigidity or tenderness  No muscular tenderness  Comments: There is no midline thoracic or lumbar spinal tenderness to palpation  Right thoracic paraspinal muscles nontender to palpation  Skin:     Capillary Refill: Capillary refill takes less than 2 seconds  Coloration: Skin is not jaundiced or pale  Findings: No erythema or rash  Neurological:      General: No focal deficit present  Mental Status: She is alert and oriented to person, place, and time  Mental status is at baseline  Cranial Nerves: No cranial nerve deficit  Sensory: No sensory deficit  Gait: Gait normal    Psychiatric:         Mood and Affect: Mood normal          Behavior: Behavior normal          Thought Content:  Thought content normal          Judgment: Judgment normal          Vital Signs  ED Triage Vitals   Temperature Pulse Respirations Blood Pressure SpO2   09/14/22 1022 09/14/22 1022 09/14/22 1022 09/14/22 1022 09/14/22 1022   98 °F (36 7 °C) 80 20 (!) 184/79 97 %      Temp Source Heart Rate Source Patient Position - Orthostatic VS BP Location FiO2 (%)   09/14/22 1022 09/14/22 1022 09/14/22 1022 09/14/22 1022 --   Oral Monitor Lying Right arm       Pain Score       09/14/22 1050       4           Vitals:    09/14/22 1430 09/14/22 1500 09/14/22 1700 09/14/22 1730   BP: 139/59 114/50 129/58 127/60   Pulse: 75 72 78 79   Patient Position - Orthostatic VS:             Visual Acuity      ED Medications  Medications   ondansetron (ZOFRAN) injection 4 mg (4 mg Intravenous Given 9/14/22 1051)   morphine injection 4 mg (4 mg Intravenous Given 9/14/22 1050)   iohexol (OMNIPAQUE) 350 MG/ML injection (MULTI-DOSE) 100 mL (100 mL Intravenous Given 9/14/22 1218)   ceftriaxone (ROCEPHIN) 2 g/50 mL in dextrose IVPB (0 mg Intravenous Stopped 9/14/22 1626)   azithromycin (ZITHROMAX) 500 mg in sodium chloride 0 9% 250mL IVPB 500 mg (0 mg Intravenous Stopped 9/14/22 1725)       Diagnostic Studies  Results Reviewed     Procedure Component Value Units Date/Time    Urine Microscopic [[id number]]  (Normal) Collected: 09/14/22 1312    Lab Status: Final result Specimen: Urine, Other Updated: 09/14/22 1343     RBC, UA None Seen /hpf      WBC, UA None Seen /hpf      Epithelial Cells None Seen /hpf      Bacteria, UA None Seen /hpf     UA w Reflex to Microscopic w Reflex to Culture [[id number]]  (Abnormal) Collected: 09/14/22 1312    Lab Status: Final result Specimen: Urine, Other Updated: 09/14/22 1334     Color, UA Yellow     Clarity, UA Clear     Specific Gravity, UA 1 010     pH, UA 5 0     Leukocytes, UA Negative     Nitrite, UA Negative     Protein, UA Negative mg/dl      Glucose, UA Negative mg/dl      Ketones, UA 40 (2+) mg/dl      Urobilinogen, UA 0 2 E U /dl      Bilirubin, UA Negative     Occult Blood, UA Trace-Intact    HS Troponin I 2hr [[id number]]  (Normal) Collected: 09/14/22 1246    Lab Status: Final result Specimen: Blood from Arm, Left Updated: 09/14/22 1316     hs TnI 2hr 26 ng/L      Delta 2hr hsTnI -2 ng/L     HS Troponin 0hr (reflex protocol) [[id number]]  (Normal) Collected: 09/14/22 1050    Lab Status: Final result Specimen: Blood from Arm, Left Updated: 09/14/22 1137     hs TnI 0hr 28 ng/L     Basic metabolic panel [[id number]]  (Abnormal) Collected: 09/14/22 1050    Lab Status: Final result Specimen: Blood from Arm, Left Updated: 09/14/22 1119     Sodium 143 mmol/L      Potassium 4 4 mmol/L      Chloride 100 mmol/L      CO2 36 mmol/L      ANION GAP 7 mmol/L      BUN 11 mg/dL      Creatinine 0 49 mg/dL      Glucose 98 mg/dL      Calcium 9 1 mg/dL      eGFR 94 ml/min/1 73sq m     Narrative:      [place] guidelines for Chronic Kidney Disease (CKD):     Stage 1 with normal or high GFR (GFR > 90 mL/min/1 73 square meters)    Stage 2 Mild CKD (GFR = 60-89 mL/min/1 73 square meters)   Stage 3A Moderate CKD (GFR = 45-59 mL/min/1 73 square meters)    Stage 3B Moderate CKD (GFR = 30-44 mL/min/1 73 square meters)    Stage 4 Severe CKD (GFR = 15-29 mL/min/1 73 square meters)    Stage 5 End Stage CKD (GFR <15 mL/min/1 73 square meters)  Note: GFR calculation is accurate only with a steady state creatinine    Lipase [[id number]]  (Abnormal) Collected: 09/14/22 1050    Lab Status: Final result Specimen: Blood from Arm, Left Updated: 09/14/22 1119     Lipase 68 u/L     CBC and differential [[id number]]  (Abnormal) Collected: 09/14/22 1050    Lab Status: Final result Specimen: Blood from Arm, Left Updated: 09/14/22 1101     WBC 12 21 Thousand/uL      RBC 4 66 Million/uL      Hemoglobin 13 8 g/dL      Hematocrit 44 4 %      MCV 95 fL      MCH 29 6 pg      MCHC 31 1 g/dL      RDW 12 4 %      MPV 11 4 fL      Platelets 934 Thousands/uL      nRBC 0 /100 WBCs      Neutrophils Relative 87 %      Immat GRANS % 0 %      Lymphocytes Relative 7 %      Monocytes Relative 6 %      Eosinophils Relative 0 %      Basophils Relative 0 %      Neutrophils Absolute 10 48 Thousands/µL      Immature Grans Absolute 0 03 Thousand/uL      Lymphocytes Absolute 0 89 Thousands/µL      Monocytes Absolute 0 72 Thousand/µL      Eosinophils Absolute 0 05 Thousand/µL      Basophils Absolute 0 04 Thousands/µL                  PE Study with CT Abdomen and Pelvis with contrast   Final Result by Lula Graham MD (09/14 1311)         1  No pulmonary embolism  2   Right lower lobe pneumonia with reactive adenopathy which should be followed to complete resolution  3   Stable semisolid nodule in the right lower lobe seen on multiple prior exams  The study was marked in [facility] for immediate notification                       Workstation performed: [id number]                    Procedures  ECG 12 Lead Documentation Only    Date/Time: 9/14/2022 10:27 AM  Performed by: See Enriquez PA-C  Authorized by: See Enriquez PA-C Indications / Diagnosis:  Back pain  ECG reviewed by me, the ED Provider: yes    Patient location:  ED  Previous ECG:     Previous ECG:  Compared to current    Comparison ECG info:  June 5, 2020    Similarity:  Changes noted    Comparison to cardiac monitor: Yes    Interpretation:     Interpretation: normal    Rate:     ECG rate:  66    ECG rate assessment: normal    Rhythm:     Rhythm: sinus rhythm    Ectopy:     Ectopy: PAC    QRS:     QRS axis:  Left    QRS intervals:  Normal  Conduction:     Conduction: normal    ST segments:     ST segments:  Normal  T waves:     T waves: inverted      Inverted:  II, III, aVF, V5 and V6             ED Course             HEART Risk Score    Flowsheet Row Most Recent Value   Heart Score Risk Calculator    History 1 Filed at: 09/14/2022 1524   ECG 2 Filed at: 09/14/2022 1524   Age 2 Filed at: 09/14/2022 1524   Risk Factors 1 Filed at: 09/14/2022 1524   Troponin 1 Filed at: 09/14/2022 1524   HEART Score 7 Filed at: 09/14/2022 1524                        SBIRT 22yo+    Flowsheet Row Most Recent Value   SBIRT (25 yo +)    In order to provide better care to our patients, we are screening all of our patients for alcohol and drug use  Would it be okay to ask you these screening questions? Yes Filed at: 09/14/2022 1057   Initial Alcohol Screen: US AUDIT-C     1  How often do you have a drink containing alcohol? 0 Filed at: 09/14/2022 1057   2  How many drinks containing alcohol do you have on a typical day you are drinking? 0 Filed at: 09/14/2022 1057   3a  Male UNDER 65: How often do you have five or more drinks on one occasion? 0 Filed at: 09/14/2022 1057   3b  FEMALE Any Age, or MALE 65+: How often do you have 4 or more drinks on one occassion? 0 Filed at: 09/14/2022 1057   Audit-C Score 0 Filed at: 09/14/2022 1057   JEFFREY: How many times in the past year have you    Used an illegal drug or used a prescription medication for non-medical reasons?  Never Filed at: 09/14/2022 1057 MDM  Number of Diagnoses or Management Options  Abnormal EKG: new and requires workup  Right lower lobe pneumonia: new and requires workup  Thoracic back pain: new and requires workup  Diagnosis management comments: MDM:   ddx includes but is not limited to: consider pneumonia, pneumothorax, COPD exacerbation, pleurisy, kidney stone, PE, referred pain from ACS,   Myofascial pain, diskogenic pain, radicular pain, muscle spasm, vertebral compression fracture, spinal stenosis, spondylosis, cancer, osteoporosis, OA, RA, consider but doubt epidural abscess, cauda equina  ED plan:  Check troponin and EKG to evaluate for ACS, labs including lipase and CT of the abdomen pelvis to evaluate for pancreatitis  CTA of the chest to evaluate for pulmonary embolism, pneumonia or pneumothorax  Given the large differential diagnosis for this patient, the decision making in this case is of high complexity  ED results:   I have reviewed the patient's vital signs, nursing notes, and other relevant ancillary testing/information  I have had a detailed discussion with the patient regarding the historical points, examination findings, and any diagnostic results    Relevant lab results include initial troponin 28  2 hour troponin 26, Delta - 2  Mild leukocytosis on CBC, white blood cell count 12 2  Nonspecific  BUN 11, creatinine 0 49  No renal failure  Lipase 68  No pancreatitis  CTA chest with CT abdomen and pelvis results remarkable for 1   No pulmonary embolism  2   Right lower lobe pneumonia with reactive adenopathy which should be followed to complete resolution  3   Stable semisolid nodule in the right lower lobe seen on multiple prior exams  Medical decision-making: based on my review of the history, physical exam, laboratory testing and diagnostic x-rays; I believe the patient has potential coronary artery disease at this time   Patient will require serial enzymes and diagnostic cardiac testing  The cardiac monitor revealed sinus rhythm as interpreted by me  The cardiac monitor was ordered secondary to history of back pain/SOB and to monitor patient for potential dysrhythmia  ED course:  70-year-old female presents to emergency department with right-sided thoracic, subscapular back pain  Sudden onset  Atraumatic  Started gradually yesterday but got worse today  Associated with shortness of breath  No syncope  No chest pain or diaphoresis  CTA chest demonstrated no pulmonary embolism but a new right lower lobe pneumonia with reactive adenopathy which is likely source of patient's back pain symptoms and shortness of breath  No hypoxia here in the emergency department  Patient's symptoms are in context of a history of severe COPD with chronic respiratory failure  Additionally patient's EKG demonstrated T-wave inversions both inferior lateral distribution  Initial troponin was 28, 2 hour troponin was 26, delta -2, however EKG changes concerning for underlying ACS  Recommended admission for treatment of pneumonia in patient with severe COPD and chronic respiratory failure in addition to need for evaluation of new EKG changes  Patient requested that I contact her primary cardiologist, Dr Sony Patterson in [address] prior to making disposition decision  I called office number and  sent text to Dr Sony Patterson to return call to discuss patient's care  I had a long discussion with patient and  at bedside regarding my concern for her EKG changes and her pneumonia given severe COPD  Admission offered, risks/benefits/alternative reviewed and patient requested discharge home  Will give rocephin/zithromax iv doses here in ED prior to d/c and d/c home on azithromycin  Patient confirmed that despite her listed allergy to clarithromycin she has tolerated a Zithromax in the past without difficulty   patient received morphine for back pain in ED - suspect back pain secondary to pleural irritation with reactive adnopathy on CT scan  Will treat at home with percocet for pain control  Standard narcotic precautions given  Patient will continue current pulmonary medications and home oxygen  Instructed patient to monitor symptoms closely and contact her established cardiologist and pulmonologist for follow up ASAP  I discussed diagnosis and treatment plan with patient at bedside  Extended discussion with patient regarding the diagnosis, pathophysiology, expectant coarse and treatment plan  Instructed to follow up with pcp and recommended specialist in 2-3 days  Reviewed reasons to return to ed  Patient verbalized understanding of diagnosis and agreement with discharge plan of care as well as understanding of reasons to return to ed  Amount and/or Complexity of Data Reviewed  Clinical lab tests: reviewed and ordered  Tests in the radiology section of CPT®: ordered and reviewed  Tests in the medicine section of CPT®: ordered and reviewed  Discussion of test results with the performing providers: yes  Obtain history from someone other than the patient: yes (Spouse at bedside  )  Review and summarize past medical records: yes  Independent visualization of images, tracings, or specimens: yes    Risk of Complications, Morbidity, and/or Mortality  General comments: I discussed diagnosis and treatment plan with patient at bedside  Extended discussion with patient regarding the diagnosis, pathophysiology, expectant coarse and treatment plan  Instructed to follow up with pcp and recommended specialist in 3-5 days  Reviewed reasons to return to ed  Patient verbalized understanding of diagnosis and agreement with discharge plan of care as well as understanding of reasons to return to ed            Disposition  Final diagnoses:   Right lower lobe pneumonia   Abnormal EKG   Thoracic back pain     Time reflects when diagnosis was documented in both MDM as applicable and the Disposition within this note     Time User Action Codes Description Comment    9/14/2022  3:25 PM Brigido John Add [J18 9] Right lower lobe pneumonia     9/14/2022  3:25 PM Brigido John Add [R94 31] Abnormal EKG     9/14/2022  3:25 PM Brigido John Add [M54 6] Thoracic back pain       ED Disposition     ED Disposition   Discharge    Condition   Stable    Date/Time   Wed Sep 14, 2022  4:03 PM    Comment   Clary Johnson discharge to home/self care  Follow-up Information     Follow up With Specialties Details Why Contact Info Additional Information    Pawel Chopra MD Family Medicine, Occupational Medicine Call in 2 days for further evaluation of symptoms [address] [facility] 53 [address] [facility] [address] Emergency Department Emergency Medicine Go to  If symptoms worsen 34 [facility] [address] [facility] Emergency Department, [address] [place] [address], [facility],  Pulmonary Disease, Pulmonology, Critical Care Medicine, Intensive Care Call in 2 days for further evaluation of symptoms [address] Cardiology Call in 1 day for further evaluation of symptoms [address] [place] [address]  [phone]             Discharge Medication List as of 9/14/2022  4:07 PM      START taking these medications    Details   azithromycin (Zithromax Z-Reji) 250 mg tablet 1 tablet by mouth daily x 4 days  start 9/15/22  Initial dose given in Emergency Department , Normal      oxyCODONE-acetaminophen (PERCOCET) 5-325 mg per tablet Take 1 tablet by mouth every 6 (six) hours as needed for severe pain (back pain/initial rx) for up to 3 days Label no driving no etoh  Initial rx    Dx: Max Daily Amount: 4 tablets, Starting Wed 9/14/2022, Until Sat 9/17/2022 at 2359, Normal CONTINUE these medications which have NOT CHANGED    Details   albuterol (Ventolin HFA) 90 mcg/act inhaler Inhale 2 puffs every 4 (four) hours as needed for wheezing, Starting Mon 10/25/2021, Print      ciclopirox (LOPROX) 0 77 % cream Apply topically once daily to face until scaly red areas resolve, Normal      fluticasone-salmeterol (Advair) 250-50 mcg/dose inhaler Inhale 1 puff every 12 (twelve) hours, Starting Thu 6/2/2022, Normal      ipratropium-albuterol (COMBIVENT RESPIMAT) inhaler Inhale 1 puff 4 (four) times a day, Starting Thu 8/25/2022, Print      ketoconazole (NIZORAL) 2 % shampoo Daily for 2 weeks straight and then on "Mondays, Wednesdays and Fridays":  Lather into scalp and skin on face; leave on for 5 minutes and then rinse off completely  , Normal      Misc  Devices (Roller [place]) MISC Use daily Rollator, Starting Wed 9/1/2021, Print      torsemide (DEMADEX) 10 mg tablet Starting Fri 2/18/2022, Historical Med             No discharge procedures on file      PDMP Review     None          ED Provider  Electronically Signed by           Jaiden Arizmendi PA-C  09/16/22 [id number]

## 2022-09-16 ENCOUNTER — TELEPHONE (OUTPATIENT)
Dept: FAMILY MEDICINE CLINIC | Facility: MEDICAL CENTER | Age: 77
End: 2022-09-16

## 2022-09-16 LAB
ATRIAL RATE: 85 BPM
P AXIS: 80 DEGREES
PR INTERVAL: 160 MS
QRS AXIS: -63 DEGREES
QRSD INTERVAL: 70 MS
QT INTERVAL: 424 MS
QTC INTERVAL: 504 MS
T WAVE AXIS: -70 DEGREES
VENTRICULAR RATE: 85 BPM

## 2022-09-16 PROCEDURE — 93010 ELECTROCARDIOGRAM REPORT: CPT | Performed by: INTERNAL MEDICINE

## 2022-09-16 NOTE — TELEPHONE ENCOUNTER
Called pt, she is going to f/u with cardiology and pulmonology, does not want apt here at this time  She will contact the office if she needs anything  Patient also noted that she took meds this morning with toast and that helped for the GI upset   She has no other concerns

## 2022-09-16 NOTE — TELEPHONE ENCOUNTER
Pt said she was told to call for an ER fup but she doesn't have transportation so she can't  She says the pain medication she is taking is making her throw up  She has flank pain when the meds wear off  She is going to call her pulmonologist also  Please, advise pt  If patient doesn't answer, please try again  She won't get a message that is left

## 2022-09-16 NOTE — TELEPHONE ENCOUNTER
Attempted to call twice no answer-will continue to try  There is a mychart message out to pulmonology as well

## 2022-09-20 DIAGNOSIS — J18.9 PNEUMONIA DUE TO INFECTIOUS ORGANISM, UNSPECIFIED LATERALITY, UNSPECIFIED PART OF LUNG: Primary | ICD-10-CM

## 2022-09-30 ENCOUNTER — RA CDI HCC (OUTPATIENT)
Dept: OTHER | Facility: HOSPITAL | Age: 77
End: 2022-09-30

## 2022-09-30 NOTE — PROGRESS NOTES
[facility] [address]  coding opportunities       Chart reviewed, no opportunity found: CHART REVIEWED, NO OPPORTUNITY FOUND        Patients Insurance     Medicare Insurance: Medicare

## 2022-10-07 ENCOUNTER — OFFICE VISIT (OUTPATIENT)
Dept: FAMILY MEDICINE CLINIC | Facility: MEDICAL CENTER | Age: 77
End: 2022-10-07
Payer: MEDICARE

## 2022-10-07 VITALS
DIASTOLIC BLOOD PRESSURE: 64 MMHG | HEIGHT: 60 IN | SYSTOLIC BLOOD PRESSURE: 126 MMHG | WEIGHT: 123 LBS | BODY MASS INDEX: 24.15 KG/M2 | HEART RATE: 74 BPM | OXYGEN SATURATION: 93 %

## 2022-10-07 DIAGNOSIS — Z23 IMMUNIZATION DUE: ICD-10-CM

## 2022-10-07 DIAGNOSIS — Z00.00 MEDICARE ANNUAL WELLNESS VISIT, SUBSEQUENT: Primary | ICD-10-CM

## 2022-10-07 PROCEDURE — 90662 IIV NO PRSV INCREASED AG IM: CPT

## 2022-10-07 PROCEDURE — G0439 PPPS, SUBSEQ VISIT: HCPCS | Performed by: STUDENT IN AN ORGANIZED HEALTH CARE EDUCATION/TRAINING PROGRAM

## 2022-10-07 PROCEDURE — G0008 ADMIN INFLUENZA VIRUS VAC: HCPCS

## 2022-10-07 NOTE — PROGRESS NOTES
Assessment and Plan:     Problem List Items Addressed This Visit        Other    Medicare annual wellness visit, subsequent - Primary     · Patient will consider PCV 20 in future, counseled   · Advised about 2nd COVID-19 booster  · Received influenza vaccine   · Declines shingles vaccines            Other Visit Diagnoses     Immunization due        Relevant Orders    influenza vaccine, high-dose, PF 0 7 mL (FLUZONE HIGH-DOSE) (Completed)        Preventive health issues were discussed with patient, and age appropriate screening tests were ordered as noted in patient's After Visit Summary  Personalized health advice and appropriate referrals for health education or preventive services given if needed, as noted in patient's After Visit Summary       History of Present Illness:     Patient presents for a Medicare Wellness Visit    HPI   Patient Care Team:  Candelaria Samaniego DO as PCP - General (Family Medicine)  Kathrine Jarvis MD as PCP - Endocrinology (Endocrinology)       Problem List:     Patient Active Problem List   Diagnosis    Pulmonary emphysema ([facility] [address] )    Elevated glucose level    Prediabetes    Vitamin D deficiency    Osteoporosis    Elevated TSH    Other anaphylactic reaction    Asthma    Diabetes mellitus, latent    COPD, very severe ([facility] [address] )    Pulmonary nodule    Non-seasonal allergic rhinitis due to pollen    Hypoxia    Need for influenza vaccination    Chronic respiratory failure with hypoxia (HCC)    Allergic rhinitis    Shortness of breath    Swelling of lower extremity    Medicare annual wellness visit, subsequent      Past Medical and Surgical History:     Past Medical History:   Diagnosis Date    Allergic 1961    Asthenia     Asthma     Cataract     COPD (chronic obstructive pulmonary disease) ([facility] [address] )     History of screening mammography     last assessed: 10/31/2017    Osteoporosis     Pneumonia 1950    Urinary tract infection      Past Surgical History:   Procedure Laterality Date    APPENDECTOMY      onset: 1966    CATARACT EXTRACTION      last assessed: 10/31/2017    COLONOSCOPY  2015    EYE SURGERY  cataracks 2017    HEMORROIDECTOMY  2006    TONSILLECTOMY  1954    onset: 12      Family History:     Family History   Problem Relation Age of Onset    Arthritis Mother     Breast cancer Mother [de-identified]    Heart disease Mother     Hypertension Mother     Osteoporosis Mother     Dementia Mother     COPD Mother     Hearing loss Mother     Diabetes Father     Heart disease Father     Hypertension Father     COPD Father     Ovarian cancer Sister 52    No Known Problems Maternal Grandmother     Hearing loss Maternal Grandfather     No Known Problems Paternal Grandmother     No Known Problems Paternal Grandfather     No Known Problems Maternal Aunt     No Known Problems Maternal Aunt     No Known Problems Paternal Aunt     No Known Problems Paternal Aunt     No Known Problems Paternal Aunt     Diabetes Brother     Diabetes Brother     Diabetes Brother     Hearing loss Brother    Dora Diones Cancer Sister         uterine      Social History:     Social History     Socioeconomic History    Marital status: /Civil Union     Spouse name: None    Number of children: None    Years of education: None    Highest education level: None   Occupational History    None   Tobacco Use    Smoking status: Former Smoker     Packs/day: 1 00     Years: 40 00     Pack years: 40 00     Types: Cigarettes     Quit date: 3/7/2005     Years since quittin 5    Smokeless tobacco: Never Used   Vaping Use    Vaping Use: Never used   Substance and Sexual Activity    Alcohol use:  Yes     Alcohol/week: 2 0 standard drinks     Types: 2 Glasses of wine per week    Drug use: Never    Sexual activity: Not Currently     Partners: Male     Birth control/protection: Post-menopausal, None   Other Topics Concern    None   Social History Narrative    Caffeine use     Social Determinants of Health     Financial Resource Strain: Not on file   Food Insecurity: Not on file   Transportation Needs: Not on file   Physical Activity: Not on file   Stress: Not on file   Social Connections: Not on file   Intimate Partner Violence: Not on file   Housing Stability: Not on file      Medications and Allergies:     Current Outpatient Medications   Medication Sig Dispense Refill    albuterol (Ventolin HFA) 90 mcg/act inhaler Inhale 2 puffs every 4 (four) hours as needed for wheezing 18 g 5    ciclopirox (LOPROX) 0 77 % cream Apply topically once daily to face until scaly red areas resolve 90 g 4    fluticasone-salmeterol (Advair) 250-50 mcg/dose inhaler Inhale 1 puff every 12 (twelve) hours 60 blister 5    ipratropium-albuterol (COMBIVENT RESPIMAT) inhaler Inhale 1 puff 4 (four) times a day 12 g 3    ketoconazole (NIZORAL) 2 % shampoo Daily for 2 weeks straight and then on "Mondays, Wednesdays and Fridays":  Lather into scalp and skin on face; leave on for 5 minutes and then rinse off completely  120 mL 6    Misc  Devices (Roller Walker) MISC Use daily Rollator 1 each 0    torsemide (DEMADEX) 10 mg tablet        No current facility-administered medications for this visit       Allergies   Allergen Reactions    Clarinex [Desloratadine] Wheezing    Bee Venom Swelling and Facial Swelling    Clarithromycin Wheezing     Reaction Date: 29Apr2011;     Food Color Red - Food Allergy Itching    Levofloxacin Other (See Comments)     Blood clot in leg    Loratadine Wheezing     Reaction Date: 29Apr2011;       Immunizations:     Immunization History   Administered Date(s) Administered    COVID-19 MODERNA VACC 0 5 ML IM 01/27/2021, 02/23/2021, 11/12/2021    INFLUENZA 10/28/2021    Influenza Split High Dose Preservative Free IM 10/24/2016, 10/31/2017, 11/21/2019    Influenza, high dose seasonal 0 7 mL 10/03/2018, 10/13/2020, 10/07/2022    Influenza, seasonal, injectable 11/13/2013    Pneumococcal Conjugate 13-Valent 04/25/2017    Pneumococcal Polysaccharide PPV23 01/01/2009, 12/22/2014      Health Maintenance:         Topic Date Due    Hepatitis C Screening  10/07/2023 (Originally 1945)    DXA SCAN  10/11/2024         Topic Date Due    COVID-19 Vaccine (4 - Booster for Moderna series) 03/12/2022      Medicare Screening Tests and Risk Assessments: Boston Williamson is here for her Subsequent Wellness visit  Health Risk Assessment:   Patient rates overall health as fair  Patient feels that their physical health rating is same  Patient is satisfied with their life  Eyesight was rated as same  Hearing was rated as same  Patient feels that their emotional and mental health rating is slightly worse  Patients states they are never, rarely angry  Patient states they are never, rarely unusually tired/fatigued  Pain experienced in the last 7 days has been none  Patient states that she has experienced weight loss or gain in last 6 months  Patient mentions emotional health slightly worse unfortunately has been diagnosed with lung cancer  Depression Screening:   PHQ-2 Score: 0      Fall Risk Screening: In the past year, patient has experienced: no history of falling in past year      Urinary Incontinence Screening:   Patient has leaked urine accidently in the last six months  Home Safety:  Patient has trouble with stairs inside or outside of their home  Patient has working smoke alarms and has working carbon monoxide detector  Home safety hazards include: none  Has ranch home, 4 steps from garage  Going down stairs OK, going up stairs she proceeds cautiously  When goes out, uses walker/cane  Nutrition:   Current diet is Regular  Medications:   Patient is currently taking over-the-counter supplements  OTC medications include: vitamins b,c, and d   magnesium  Patient is able to manage medications       Activities of Daily Living (ADLs)/Instrumental Activities of Daily Living (IADLs):   Walk and transfer into and out of bed and chair?: Yes  Dress and groom yourself?: Yes    Bathe or shower yourself?: Yes    Feed yourself? Yes  Do your laundry/housekeeping?: No  Manage your money, pay your bills and track your expenses?: Yes  Make your own meals?: Yes    Do your own shopping?: No    ADL comments: Patient has hired assistance for housekeeping  Durable Medical Equipment Suppliers  [employer]    Previous Hospitalizations:   Any hospitalizations or ED visits within the last 12 months?: Yes    How many hospitalizations have you had in the last year?: 1-2    Hospitalization Comments: Was seen in ER for back pain  Advance Care Planning:   Living will: Yes    Durable POA for healthcare: Yes    Advanced directive: Yes      Cognitive Screening:   Provider or family/friend/caregiver concerned regarding cognition?: No    PREVENTIVE SCREENINGS      Cardiovascular Screening:    General: Screening Current      Diabetes Screening:     General: Screening Current      Colorectal Cancer Screening:     General: Risks and Benefits Discussed and Patient Declines      Breast Cancer Screening:     General: Risks and Benefits Discussed and Patient Declines      Cervical Cancer Screening:    General: Screening Not Indicated      Osteoporosis Screening:    General: Screening Not Indicated and History Osteoporosis      Abdominal Aortic Aneurysm (AAA) Screening:        General: Screening Not Indicated      Lung Cancer Screening:     General: Screening Not Indicated      Hepatitis C Screening:    General: Risks and Benefits Discussed and Patient Declines      Preventive Screening Comments: Patient does have CT chest scheduled, follows with pulmonology  CT abdomen revealed no aneurysm  Screening, Brief Intervention, and Referral to Treatment (SBIRT)    Screening  Typical number of drinks in a day: 0  Typical number of drinks in a week: 2  Interpretation: Low risk drinking behavior      AUDIT-C Screenin) How often did you have a drink containing alcohol in the past year? monthly or less  2) How many drinks did you have on a typical day when you were drinking in the past year? 0  3) How often did you have 6 or more drinks on one occasion in the past year? never    AUDIT-C Score: 1  Interpretation: Score 0-2 (female): Negative screen for alcohol misuse    Single Item Drug Screening:  How often have you used an illegal drug (including marijuana) or a prescription medication for non-medical reasons in the past year? never    Single Item Drug Screen Score: 0  Interpretation: Negative screen for possible drug use disorder    Brief Intervention  Healthy alcohol use/limits discussed  Other Counseling Topics:   Car/seat belt/driving safety, skin self-exam and calcium and vitamin D intake  Patient aware of ABCDEs of melanoma      /64 (BP Location: Left arm, Patient Position: Sitting, Cuff Size: Adult)   Pulse 74   Ht 5' (1 524 m)   Wt 55 8 kg (123 lb)   SpO2 93%   BMI 24 02 kg/m²       Steven Cerda, DO

## 2022-10-07 NOTE — PATIENT INSTRUCTIONS
Medicare Preventive Visit Patient Instructions  Thank you for completing your Welcome to Medicare Visit or Medicare Annual Wellness Visit today  Your next wellness visit will be due in one year (10/8/2023)  The screening/preventive services that you may require over the next 5-10 years are detailed below  Some tests may not apply to you based off risk factors and/or age  Screening tests ordered at today's visit but not completed yet may show as past due  Also, please note that scanned in results may not display below  Preventive Screenings:  Service Recommendations Previous Testing/Comments   Colorectal Cancer Screening  * Colonoscopy    * Fecal Occult Blood Test (FOBT)/Fecal Immunochemical Test (FIT)  * Fecal DNA/Cologuard Test  * Flexible Sigmoidoscopy Age: 39-70 years old   Colonoscopy: every 10 years (may be performed more frequently if at higher risk)  OR  FOBT/FIT: every 1 year  OR  Cologuard: every 3 years  OR  Sigmoidoscopy: every 5 years  Screening may be recommended earlier than age 39 if at higher risk for colorectal cancer  Also, an individualized decision between you and your healthcare provider will decide whether screening between the ages of 74-80 would be appropriate  Colonoscopy: 09/22/2016  FOBT/FIT: Not on file  Cologuard: Not on file  Sigmoidoscopy: Not on file          Breast Cancer Screening Age: 36 years old  Frequency: every 1-2 years  Not required if history of left and right mastectomy Mammogram: 10/11/2019        Cervical Cancer Screening Between the ages of 21-29, pap smear recommended once every 3 years  Between the ages of 33-67, can perform pap smear with HPV co-testing every 5 years     Recommendations may differ for women with a history of total hysterectomy, cervical cancer, or abnormal pap smears in past  Pap Smear: Not on file    Screening Not Indicated   Hepatitis C Screening Once for adults born between [facility]  More frequently in patients at high risk for Hepatitis C Hep C Antibody: Not on file        Diabetes Screening 1-2 times per year if you're at risk for diabetes or have pre-diabetes Fasting glucose: 109 mg/dL (3/23/2022)  A1C: 5 3 % (3/23/2022)  Screening Current   Cholesterol Screening Once every 5 years if you don't have a lipid disorder  May order more often based on risk factors  Lipid panel: 03/23/2022    Screening Current     Other Preventive Screenings Covered by Medicare:  1  Abdominal Aortic Aneurysm (AAA) Screening: covered once if your at risk  You're considered to be at risk if you have a family history of AAA  2  Lung Cancer Screening: covers low dose CT scan once per year if you meet all of the following conditions: (1) Age 50-69; (2) No signs or symptoms of lung cancer; (3) Current smoker or have quit smoking within the last 15 years; (4) You have a tobacco smoking history of at least 20 pack years (packs per day multiplied by number of years you smoked); (5) You get a written order from a healthcare provider  3  Glaucoma Screening: covered annually if you're considered high risk: (1) You have diabetes OR (2) Family history of glaucoma OR (3)  aged 48 and older OR (3)  American aged 72 and older  3  Osteoporosis Screening: covered every 2 years if you meet one of the following conditions: (1) You're estrogen deficient and at risk for osteoporosis based off medical history and other findings; (2) Have a vertebral abnormality; (3) On glucocorticoid therapy for more than 3 months; (4) Have primary hyperparathyroidism; (5) On osteoporosis medications and need to assess response to drug therapy  · Last bone density test (DXA Scan): 10/11/2019   5  HIV Screening: covered annually if you're between the age of 15-65  Also covered annually if you are younger than 13 and older than 72 with risk factors for HIV infection  For pregnant patients, it is covered up to 3 times per pregnancy      Immunizations:  Immunization Recommendations Influenza Vaccine Annual influenza vaccination during flu season is recommended for all persons aged >= 6 months who do not have contraindications   Pneumococcal Vaccine   * Pneumococcal conjugate vaccine = PCV13 (Prevnar 13), PCV15 (Vaxneuvance), PCV20 (Prevnar 20)  * Pneumococcal polysaccharide vaccine = PPSV23 (Pneumovax) Adults 25-60 years old: 1-3 doses may be recommended based on certain risk factors  Adults 72 years old: 1-2 doses may be recommended based off what pneumonia vaccine you previously received   Hepatitis B Vaccine 3 dose series if at intermediate or high risk (ex: diabetes, end stage renal disease, liver disease)   Tetanus (Td) Vaccine - COST NOT COVERED BY MEDICARE PART B Following completion of primary series, a booster dose should be given every 10 years to maintain immunity against tetanus  Td may also be given as tetanus wound prophylaxis  Tdap Vaccine - COST NOT COVERED BY MEDICARE PART B Recommended at least once for all adults  For pregnant patients, recommended with each pregnancy  Shingles Vaccine (Shingrix) - COST NOT COVERED BY MEDICARE PART B  2 shot series recommended in those aged 48 and above     Health Maintenance Due:      Topic Date Due    Hepatitis C Screening  10/07/2023 (Originally 1945)    DXA SCAN  10/11/2024     Immunizations Due:      Topic Date Due    COVID-19 Vaccine (4 - Booster for Clydene Deric series) 03/12/2022     Advance Directives   What are advance directives? Advance directives are legal documents that state your wishes and plans for medical care  These plans are made ahead of time in case you lose your ability to make decisions for yourself  Advance directives can apply to any medical decision, such as the treatments you want, and if you want to donate organs  What are the types of advance directives? There are many types of advance directives, and each state has rules about how to use them   You may choose a combination of any of the following:  · Living will: This is a written record of the treatment you want  You can also choose which treatments you do not want, which to limit, and which to stop at a certain time  This includes surgery, medicine, IV fluid, and tube feedings  · Durable power of  for healthcare [facility]): This is a written record that states who you want to make healthcare choices for you when you are unable to make them for yourself  This person, called a proxy, is usually a family member or a friend  You may choose more than 1 proxy  · Do not resuscitate (DNR) order:  A DNR order is used in case your heart stops beating or you stop breathing  It is a request not to have certain forms of treatment, such as CPR  A DNR order may be included in other types of advance directives  · Medical directive: This covers the care that you want if you are in a coma, near death, or unable to make decisions for yourself  You can list the treatments you want for each condition  Treatment may include pain medicine, surgery, blood transfusions, dialysis, IV or tube feedings, and a ventilator (breathing machine)  · Values history: This document has questions about your views, beliefs, and how you feel and think about life  This information can help others choose the care that you would choose  Why are advance directives important? An advance directive helps you control your care  Although spoken wishes may be used, it is better to have your wishes written down  Spoken wishes can be misunderstood, or not followed  Treatments may be given even if you do not want them  An advance directive may make it easier for your family to make difficult choices about your care  Urinary Incontinence   Urinary incontinence (UI)  is when you lose control of your bladder  UI develops because your bladder cannot store or empty urine properly  The 3 most common types of UI are stress incontinence, urge incontinence, or both    Medicines:   · May be given to help strengthen your bladder control  Report any side effects of medication to your healthcare provider  Do pelvic muscle exercises often:  Your pelvic muscles help you stop urinating  Squeeze these muscles tight for 5 seconds, then relax for 5 seconds  Gradually work up to squeezing for 10 seconds  Do 3 sets of 15 repetitions a day, or as directed  This will help strengthen your pelvic muscles and improve bladder control  Train your bladder:  Go to the bathroom at set times, such as every 2 hours, even if you do not feel the urge to go  You can also try to hold your urine when you feel the urge to go  For example, hold your urine for 5 minutes when you feel the urge to go  As that becomes easier, hold your urine for 10 minutes  Self-care:   · Keep a UI record  Write down how often you leak urine and how much you leak  Make a note of what you were doing when you leaked urine  · Drink liquids as directed  You may need to limit the amount of liquid you drink to help control your urine leakage  Do not drink any liquid right before you go to bed  Limit or do not have drinks that contain caffeine or alcohol  · Prevent constipation  Eat a variety of high-fiber foods  Good examples are high-fiber cereals, beans, vegetables, and whole-grain breads  Walking is the best way to trigger your intestines to have a bowel movement  · Exercise regularly and maintain a healthy weight  Weight loss and exercise will decrease pressure on your bladder and help you control your leakage  · Use a catheter as directed  to help empty your bladder  A catheter is a tiny, plastic tube that is put into your bladder to drain your urine  · Go to behavior therapy as directed  Behavior therapy may be used to help you learn to control your urge to urinate  © Copyright [employer] 2018 Information is for End User's use only and may not be sold, redistributed or otherwise used for commercial purposes   All illustrations and images included in CareNotes® are the copyrighted property of A [employer] M [employer]  or Inderjit Tellez

## 2022-11-08 ENCOUNTER — HOSPITAL ENCOUNTER (OUTPATIENT)
Dept: RADIOLOGY | Facility: MEDICAL CENTER | Age: 77
Discharge: HOME | End: 2022-11-08

## 2022-11-08 DIAGNOSIS — J18.9 PNEUMONIA DUE TO INFECTIOUS ORGANISM, UNSPECIFIED LATERALITY, UNSPECIFIED PART OF LUNG: ICD-10-CM

## 2022-12-05 ENCOUNTER — OFFICE VISIT (OUTPATIENT)
Dept: PULMONOLOGY | Facility: CLINIC | Age: 77
End: 2022-12-05

## 2022-12-05 VITALS
SYSTOLIC BLOOD PRESSURE: 116 MMHG | OXYGEN SATURATION: 99 % | HEART RATE: 67 BPM | DIASTOLIC BLOOD PRESSURE: 68 MMHG | BODY MASS INDEX: 24.35 KG/M2 | WEIGHT: 124 LBS | HEIGHT: 60 IN | TEMPERATURE: 98 F

## 2022-12-05 DIAGNOSIS — J44.9 COPD, VERY SEVERE (HCC): Primary | ICD-10-CM

## 2022-12-05 DIAGNOSIS — J96.11 CHRONIC RESPIRATORY FAILURE WITH HYPOXIA (HCC): ICD-10-CM

## 2022-12-05 RX ORDER — ALBUTEROL SULFATE 90 UG/1
2 AEROSOL, METERED RESPIRATORY (INHALATION) EVERY 4 HOURS PRN
Qty: 18 G | Refills: 5 | Status: SHIPPED | OUTPATIENT
Start: 2022-12-05 | End: 2022-12-05 | Stop reason: SDUPTHER

## 2022-12-05 RX ORDER — ALBUTEROL SULFATE 90 UG/1
2 AEROSOL, METERED RESPIRATORY (INHALATION) EVERY 4 HOURS PRN
Qty: 54 G | Refills: 3 | Status: SHIPPED | OUTPATIENT
Start: 2022-12-05 | End: 2022-12-08 | Stop reason: SDUPTHER

## 2022-12-05 RX ORDER — VITAMIN B COMPLEX
1 CAPSULE ORAL DAILY
COMMUNITY

## 2022-12-05 RX ORDER — MULTIVIT WITH MINERALS/LUTEIN
250 TABLET ORAL DAILY
COMMUNITY

## 2022-12-05 RX ORDER — FLUTICASONE PROPIONATE AND SALMETEROL 250; 50 UG/1; UG/1
1 POWDER RESPIRATORY (INHALATION) 2 TIMES DAILY
Qty: 60 BLISTER | Refills: 0 | Status: SHIPPED | OUTPATIENT
Start: 2022-12-05 | End: 2022-12-05 | Stop reason: SDUPTHER

## 2022-12-05 RX ORDER — FLUTICASONE PROPIONATE AND SALMETEROL 250; 50 UG/1; UG/1
1 POWDER RESPIRATORY (INHALATION) 2 TIMES DAILY
Qty: 180 BLISTER | Refills: 3 | Status: SHIPPED | OUTPATIENT
Start: 2022-12-05 | End: 2022-12-08 | Stop reason: SDUPTHER

## 2022-12-05 RX ORDER — MELATONIN
1000 DAILY
COMMUNITY

## 2022-12-05 NOTE — PROGRESS NOTES
Assessment:    1  COPD, very severe  2  Chronic respiratory failure with hypoxia     CT of chest performed on 22 without contrast revealed emphysema  Partial clearing RLL pneumonia since a CTA from 22  Small groundglass opacity in the RUL, unchanged since CTs dating back to 16  Etiology indeterminant but can be considered benign  Pulmonary function tests from 18: FEV1:27 % predicted, FVC:  1 24 (51 % predicted), FEV1/FVC:  39%, T % predicted,  % predicted, DLCO: 26 % predicted      Plan:   Continue current regimen of Advair BID, Combivent, and Albuterol  Oxygenation is stable on 2 liters/min via NC  Follow-up in 4 months or sooner if needed  Subjective:     Patient ID: Lisset Mann is a 68 y o  female  Chief Complaint:   Justa Tinoco is a 68year old female who is here today for follow-up regarding severe COPD and chronic hypoxic respiratory failure  From a pulmonary standpoint, she remains stable  She admits to shortness of breath, specifically while doing activities, however, this is at her baseline  She uses Combivent 2-3 times daily, and Albuterol 1-2 times daily  She continues Advair twice daily  She denies cough, wheezes, or sputum production  She went to the ED on 22 for back pain which imaging showed a pneumonia  Patient was discharged home on Azithromycin and feels back to her baseline  CT on 22 showed partial clearing of RLL pneumonia  She wears continuous oxygen at 2 lpm          primary symptoms  Pertinent negatives include no chest pain, coughing, fever, headaches or myalgias  The following portions of the patient's history were reviewed in this encounter and updated as appropriate:   Review of Systems   Constitutional: Negative for appetite change and fever  HENT: Positive for postnasal drip  Negative for ear pain  Respiratory: Positive for shortness of breath  Negative for cough, chest tightness and wheezing      Cardiovascular: Positive for leg swelling  Negative for chest pain and palpitations  Musculoskeletal: Negative for myalgias  Neurological: Negative for headaches  All other systems reviewed and are negative  Objective:    Physical Exam  Vitals and nursing note reviewed  Constitutional:       General: She is not in acute distress  Appearance: Normal appearance  She is well-developed  She is not ill-appearing  HENT:      Head: Normocephalic and atraumatic  Nose: Nose normal    Eyes:      Conjunctiva/sclera: Conjunctivae normal    Cardiovascular:      Rate and Rhythm: Normal rate and regular rhythm  Heart sounds: Normal heart sounds  No murmur heard  Pulmonary:      Effort: Pulmonary effort is normal  No respiratory distress  Breath sounds: Normal breath sounds  Abdominal:      Palpations: Abdomen is soft  Tenderness: There is no abdominal tenderness  Musculoskeletal:      Cervical back: Neck supple  Right lower leg: Edema (1+) present  Left lower leg: Edema (1+) present  Skin:     General: Skin is warm and dry  Capillary Refill: Capillary refill takes less than 2 seconds  Neurological:      General: No focal deficit present  Mental Status: She is alert and oriented to person, place, and time     Psychiatric:         Mood and Affect: Mood normal          Behavior: Behavior normal          Lab Review:   Lab Results   Component Value Date    WBC 12 21 (H) 09/14/2022    WBC 8 93 11/02/2015    HGB 13 8 09/14/2022    HGB 14 5 11/02/2015    HCT 44 4 09/14/2022    HCT 44 6 11/02/2015    MCV 95 09/14/2022    MCV 90 11/02/2015     (L) 09/14/2022     11/02/2015     Answers for HPI/ROS submitted by the patient on 12/4/2022  Chronicity: chronic  When did you first notice your symptoms?: more than 1 year ago  How often do your symptoms occur?: constantly  Do you have shortness of breath that occurs with effort or exertion?: Yes  Do you have ear congestion?: Yes  Do you have heartburn?: No  Do you have fatigue?: Yes  Do you have nasal congestion?: No  Which of the following makes your symptoms worse?: climbing stairs, emotional stress, exercise, exposure to fumes, exposure to smoke, minimal activity

## 2022-12-06 PROBLEM — Z00.00 MEDICARE ANNUAL WELLNESS VISIT, SUBSEQUENT: Status: RESOLVED | Noted: 2022-10-07 | Resolved: 2022-12-06

## 2022-12-08 ENCOUNTER — TELEPHONE (OUTPATIENT)
Dept: PULMONOLOGY | Facility: CLINIC | Age: 77
End: 2022-12-08

## 2022-12-08 DIAGNOSIS — J44.9 COPD, VERY SEVERE (HCC): ICD-10-CM

## 2022-12-08 RX ORDER — FLUTICASONE PROPIONATE AND SALMETEROL 250; 50 UG/1; UG/1
1 POWDER RESPIRATORY (INHALATION) 2 TIMES DAILY
Qty: 180 BLISTER | Refills: 3 | Status: SHIPPED | OUTPATIENT
Start: 2022-12-08 | End: 2023-12-03

## 2022-12-08 RX ORDER — ALBUTEROL SULFATE 90 UG/1
2 AEROSOL, METERED RESPIRATORY (INHALATION) EVERY 4 HOURS PRN
Qty: 54 G | Refills: 3 | Status: SHIPPED | OUTPATIENT
Start: 2022-12-08

## 2022-12-08 NOTE — TELEPHONE ENCOUNTER
Patients Dawna called, she went to  her medications as we were going to send in refills at her 12/5 visit but they were never sent to [facility]  She hopes we can get these sent over as soon as possible   Please advise    (Advair Discus & Albuterol Ventolin inhalers)

## 2022-12-11 RX ORDER — ALBUTEROL SULFATE 90 UG/1
AEROSOL, METERED RESPIRATORY (INHALATION)
Qty: 18 G | Refills: 0 | Status: SHIPPED | OUTPATIENT
Start: 2022-12-11

## 2023-02-16 ENCOUNTER — TELEPHONE (OUTPATIENT)
Dept: DERMATOLOGY | Facility: CLINIC | Age: 78
End: 2023-02-16

## 2023-02-16 NOTE — TELEPHONE ENCOUNTER
Patient called and left a message asking for a refill on the ketoconazole 2% shampoo, but patient has not been seen since October of 2021

## 2023-02-16 NOTE — TELEPHONE ENCOUNTER
Sergio Mcallister correct in pointing out that we need to see her first since it has been more than a year before we can do refills  Once an appt is made, I can put in refills to help until she comes       Thanks,  [facility]

## 2023-02-22 ENCOUNTER — OFFICE VISIT (OUTPATIENT)
Dept: DERMATOLOGY | Facility: CLINIC | Age: 78
End: 2023-02-22

## 2023-02-22 VITALS — HEIGHT: 60 IN | WEIGHT: 125 LBS | TEMPERATURE: 96.4 F | BODY MASS INDEX: 24.54 KG/M2

## 2023-02-22 DIAGNOSIS — L21.9 SEBORRHEIC DERMATITIS: Primary | ICD-10-CM

## 2023-02-22 DIAGNOSIS — L82.1 SEBORRHEIC KERATOSIS: ICD-10-CM

## 2023-02-22 RX ORDER — KETOCONAZOLE 20 MG/ML
SHAMPOO TOPICAL
Qty: 120 ML | Refills: 11 | Status: SHIPPED | OUTPATIENT
Start: 2023-02-22

## 2023-02-22 RX ORDER — FLUOCINONIDE TOPICAL SOLUTION USP, 0.05% 0.5 MG/ML
SOLUTION TOPICAL 2 TIMES DAILY
Qty: 60 ML | Refills: 2 | Status: SHIPPED | OUTPATIENT
Start: 2023-02-22

## 2023-02-22 NOTE — PATIENT INSTRUCTIONS
SEBORRHEIC DERMATITIS    Assessment and Plan:  Based on a thorough discussion of this condition and the management approach to it (including a comprehensive discussion of the known risks, side effects and potential benefits of treatment), the patient (family) agrees to implement the following specific plan:  Ketoconazole 2% Shampoo: Daily for 2 weeks straight and then on "Mondays, Wednesdays and Fridays":  Lather into scalp and skin on face; leave on for 5 minutes and then rinse off completely  Lidex 0 05% Solution: Apply topically twice a day as needed to scalp for itch  Seborrheic Dermatitis   Seborrheic dermatitis is a common, chronic or relapsing form of eczema/dermatitis that mainly affects the sebaceous, gland-rich regions of the scalp, face, and trunk  There are infantile and adult forms of seborrhoeic dermatitis  It is sometimes associated with psoriasis and, in that clinical scenario, may be referred to as "sebo-psoriasis "  Seborrheic dermatitis is also known as "seborrheic eczema "  Dandruff (also called "pityriasis capitis") is an uninflamed form of seborrhoeic dermatitis  Dandruff presents as bran-like scaly patches scattered within hair-bearing areas of the scalp  In an infant, this condition may be referred to as "cradle cap "  The cause of seborrheic dermatitis is not completely understood  It is associated with proliferation of various species of the skin commensal Malassezia, in its yeast (non-pathogenic) form  Its metabolites (such as the fatty acids oleic acid, malssezin, and indole-3-carbaldehyde) may cause an inflammatory reaction  Differences in skin barrier lipid content and function may account for individual presentations  Infantile Seborrheic Dermatitis  Infantile seborrheic dermatitis affects babies under the age of 1 months and usually resolves by 1012 months of age  Infantile seborrheic dermatitis causes "cradle cap" (diffuse, greasy scaling on scalp)   The rash may spread to affect armpit and groin folds (a type of "napkin dermatitis")  There may be associated salmon-pink colored patches that may flake or peel  The rash in this case is usually not especially itchy, so the baby often appears undisturbed by the rash, even when more generalized  Adult Seborrheic Dermatitis  Adult seborrheic dermatitis tends to begin in late adolescence; prevalence is greatest in young adults and in the elderly  It is more common in males than in females  The following factors are sometimes associated with severe adult seborrheic dermatitis:  Oily skin  Familial tendency to seborrhoeic dermatitis or a family history of psoriasis  Immunosuppression: organ transplant recipient, human immunodeficiency virus (HIV) infection and patients with lymphoma  Neurological and psychiatric diseases: Parkinson disease, tardive dyskinesia, depression, epilepsy, facial nerve palsy, spinal cord injury and congenital disorders such as Down syndrome  Treatment for psoriasis with psoralen and ultraviolet A (PUVA) therapy  Lack of sleep  Stressful events  In adults, seborrheic dermatitis may typically affect the scalp, face (creases around the nose, behind ears, within eyebrows) and upper trunk  Typical clinical features include: Winter flares, improving in summer following sun exposure  Minimal itch most of the time  Combination oily and dry mid-facial skin  Ill-defined localized scaly patches or diffuse scale in the scalp  Blepharitis; scaly red eyelid margins  [place]-pink, thin, scaly, and ill-defined plaques in skin folds on both sides of the face  Petal or ring-shaped flaky patches on hair-line and on anterior chest  Rash in armpits, under the breasts, in the groin folds and genital creases  Superficial folliculitis (inflamed hair follicles) on cheeks and upper trunk    Seborrheic dermatitis is diagnosed by its clinical appearance and behavior   Skin biopsy may be helpful but is rarely necessary to make this diagnosis  SEBORRHEIC KERATOSIS; NON-INFLAMED    Assessment and Plan:  Based on a thorough discussion of this condition and the management approach to it (including a comprehensive discussion of the known risks, side effects and potential benefits of treatment), the patient (family) agrees to implement the following specific plan:  Benign, assurance provided    Seborrheic Keratosis  A seborrheic keratosis is a harmless warty spot that appears during adult life as a common sign of skin aging  Seborrheic keratoses can arise on any area of skin, covered or uncovered, with the usual exception of the palms and soles  They do not arise from mucous membranes  Seborrheic keratoses can have highly variable appearance  Seborrheic keratoses are extremely common  It has been estimated that over 90% of adults over the age of 61 years have one or more of them  They occur in males and females of all races, typically beginning to erupt in the 35s or 45s  They are uncommon under the age of 21 years  The precise cause of seborrhoeic keratoses is not known  Seborrhoeic keratoses are considered degenerative in nature  As time goes by, seborrheic keratoses tend to become more numerous  Some people inherit a tendency to develop a very large number of them; some people may have hundreds of them  The name "seborrheic keratosis" is misleading, because these lesions are not limited to a seborrhoeic distribution (scalp, mid-face, chest, upper back), nor are they formed from sebaceous glands, nor are they associated with sebum -- which is greasy    Seborrheic keratosis may also be called "SK," "Seb K," "basal cell papilloma," "senile wart," or "barnacle "      Researchers have noted:  Eruptive seborrhoeic keratoses can follow sunburn or dermatitis  Skin friction may be the reason they appear in body folds  Viral cause (e g , human papillomavirus) seems unlikely  Stable and clonal mutations or activation of FRFR3, PIK3CA, JANES, AKT1 and EGFR genes are found in seborrhoeic keratoses  Seborrhoeic keratosis can arise from solar lentigo  FRFR3 mutations also arise in solar lentigines  These mutations are associated with increased age and location on the head and neck, suggesting a role of ultraviolet radiation in these lesions  Seborrheic keratoses do not harbour tumour suppressor gene mutations  Epidermal growth factor receptor inhibitors, which are used to treat some cancers, often result in an increase in verrucal (warty) keratoses  There is no easy way to remove multiple lesions on a single occasion  Unless a specific lesion is "inflamed" and is causing pain or stinging/burning or is bleeding, most insurance companies do not authorize treatment

## 2023-02-22 NOTE — PROGRESS NOTES
[facility] [address] [facility]     Patient Name: Renetta Tristan  Encounter Date: 2/22/2023     Have you been cared for by a [place] [address] Dermatologist in the last 3 years and, if so, which description applies to you? Yes  I have been here within the last 3 years, and my medical history has NOT changed since that time  I am FEMALE/of child-bearing potential     REVIEW OF SYSTEMS:  Have you recently had or currently have any of the following? · No changes in my recent health  PAST MEDICAL HISTORY:  Have you personally ever had or currently have any of the following? If "YES," then please provide more detail  · No changes in my medical history  FAMILY HISTORY:  Any "first degree relatives" (parent, brother, sister, or child) with the following? • No changes in my family's known health  PATIENT EXPERIENCE:    • Do you want the Dermatologist to perform a COMPLETE skin exam today including a clinical examination under the "bra and underwear" areas? NO  • If necessary, do we have your permission to call and leave a detailed message on your Preferred Phone number that includes your specific medical information?   Yes      Allergies   Allergen Reactions   • Clarinex [Desloratadine] Wheezing   • Bee Venom Swelling and Facial Swelling   • Clarithromycin Wheezing     Reaction Date: 29Apr2011;    • Food Color Red - Food Allergy Itching   • Levofloxacin Other (See Comments)     Blood clot in leg   • Loratadine Wheezing     Reaction Date: 29Apr2011;       Current Outpatient Medications:   •  albuterol (PROVENTIL HFA,VENTOLIN HFA) 90 mcg/act inhaler, INHALE TWO PUFFS BY MOUTH EVERY FOUR HOURS AS NEEDED FOR WHEEZING, Disp: 18 g, Rfl: 0  •  albuterol (Ventolin HFA) 90 mcg/act inhaler, Inhale 2 puffs every 4 (four) hours as needed for wheezing, Disp: 54 g, Rfl: 3  •  ascorbic acid (VITAMIN C) 250 mg tablet, Take 250 mg by mouth daily, Disp: , Rfl:   •  b complex vitamins capsule, Take 1 capsule by mouth daily, Disp: , Rfl:   •  cholecalciferol (VITAMIN D3) 1,000 units tablet, Take 1,000 Units by mouth daily, Disp: , Rfl:   •  ciclopirox (LOPROX) 0 77 % cream, Apply topically once daily to face until scaly red areas resolve, Disp: 90 g, Rfl: 4  •  Fluticasone-Salmeterol (Advair Diskus) 250-50 mcg/dose inhaler, Inhale 1 puff 2 (two) times a day Rinse mouth after use , Disp: 180 blister, Rfl: 3  •  ipratropium-albuterol (COMBIVENT RESPIMAT) inhaler, Inhale 1 puff 4 (four) times a day, Disp: 12 g, Rfl: 3  •  ketoconazole (NIZORAL) 2 % shampoo, Daily for 2 weeks straight and then on "Mondays, Wednesdays and Fridays":  Lather into scalp and skin on face; leave on for 5 minutes and then rinse off completely  , Disp: 120 mL, Rfl: 6  •  Misc  Devices (Roller Lincolnfavio) MISC, Use daily Rollator (Patient not taking: Reported on 12/5/2022), Disp: 1 each, Rfl: 0  •  torsemide (DEMADEX) 10 mg tablet, , Disp: , Rfl:           • Whom besides the patient is providing clinical information about today's encounter?   o NO ADDITIONAL HISTORIAN (patient alone provided history)    Physical Exam and Assessment/Plan by Diagnosis:    SEBORRHEIC DERMATITIS    Physical Exam:  • Anatomic Location Affected:  Scalp  • Morphological Description:  Mild scale and erythema  • Pertinent Positives:  • Pertinent Negatives: Additional History of Present Condition:  Patient here today for refills of Ketoconazole Shampoo  Patient uses for her scalp and reports good relief  Was also prescribed Ciclopirox 0 77% cream for face and does not need refills at this time       Assessment and Plan:  Based on a thorough discussion of this condition and the management approach to it (including a comprehensive discussion of the known risks, side effects and potential benefits of treatment), the patient (family) agrees to implement the following specific plan:  • Ketoconazole 2% Shampoo: Daily for 2 weeks straight and then on "Mondays, Wednesdays and Fridays":  Lather into scalp and skin on face; leave on for 5 minutes and then rinse off completely  • Lidex 0 05% Solution: Apply topically twice a day as needed to scalp for itch  Seborrheic Dermatitis   Seborrheic dermatitis is a common, chronic or relapsing form of eczema/dermatitis that mainly affects the sebaceous, gland-rich regions of the scalp, face, and trunk  There are infantile and adult forms of seborrhoeic dermatitis  It is sometimes associated with psoriasis and, in that clinical scenario, may be referred to as "sebo-psoriasis "  Seborrheic dermatitis is also known as "seborrheic eczema "  Dandruff (also called "pityriasis capitis") is an uninflamed form of seborrhoeic dermatitis  Dandruff presents as bran-like scaly patches scattered within hair-bearing areas of the scalp  In an infant, this condition may be referred to as "cradle cap "  The cause of seborrheic dermatitis is not completely understood  It is associated with proliferation of various species of the skin commensal Malassezia, in its yeast (non-pathogenic) form  Its metabolites (such as the fatty acids oleic acid, malssezin, and indole-3-carbaldehyde) may cause an inflammatory reaction  Differences in skin barrier lipid content and function may account for individual presentations  Infantile Seborrheic Dermatitis  Infantile seborrheic dermatitis affects babies under the age of 1 months and usually resolves by 1012 months of age  Infantile seborrheic dermatitis causes "cradle cap" (diffuse, greasy scaling on scalp)  The rash may spread to affect armpit and groin folds (a type of "napkin dermatitis")  There may be associated salmon-pink colored patches that may flake or peel  The rash in this case is usually not especially itchy, so the baby often appears undisturbed by the rash, even when more generalized  Adult Seborrheic Dermatitis  Adult seborrheic dermatitis tends to begin in late adolescence; prevalence is greatest in young adults and in the elderly  It is more common in males than in females  The following factors are sometimes associated with severe adult seborrheic dermatitis:  • Oily skin  • Familial tendency to seborrhoeic dermatitis or a family history of psoriasis  • Immunosuppression: organ transplant recipient, human immunodeficiency virus (HIV) infection and patients with lymphoma  • Neurological and psychiatric diseases: Parkinson disease, tardive dyskinesia, depression, epilepsy, facial nerve palsy, spinal cord injury and congenital disorders such as Down syndrome  • Treatment for psoriasis with psoralen and ultraviolet A (PUVA) therapy  • Lack of sleep  • Stressful events  In adults, seborrheic dermatitis may typically affect the scalp, face (creases around the nose, behind ears, within eyebrows) and upper trunk  Typical clinical features include:  • Winter flares, improving in summer following sun exposure  • Minimal itch most of the time  • Combination oily and dry mid-facial skin  • Ill-defined localized scaly patches or diffuse scale in the scalp  • Blepharitis; scaly red eyelid margins  • [place]-pink, thin, scaly, and ill-defined plaques in skin folds on both sides of the face  • Petal or ring-shaped flaky patches on hair-line and on anterior chest  • Rash in armpits, under the breasts, in the groin folds and genital creases  • Superficial folliculitis (inflamed hair follicles) on cheeks and upper trunk    Seborrheic dermatitis is diagnosed by its clinical appearance and behavior  Skin biopsy may be helpful but is rarely necessary to make this diagnosis  SEBORRHEIC KERATOSIS; NON-INFLAMED    Physical Exam:  • Anatomic Location Affected:  Left eyebrow, right cheek  • Morphological Description:  Flat and raised, waxy, smooth to warty textured, yellow to brownish-grey to dark brown to blackish, discrete, "stuck-on" appearing papules  • Pertinent Positives:  • Pertinent Negatives:     Additional History of Present Condition:  Patient reports new bumps on the skin  Denies itch, burn, pain, bleeding or ulceration  Present constantly; nothing seems to make it worse or better  No prior treatment  Assessment and Plan:  Based on a thorough discussion of this condition and the management approach to it (including a comprehensive discussion of the known risks, side effects and potential benefits of treatment), the patient (family) agrees to implement the following specific plan:  • Benign, assurance provided    Seborrheic Keratosis  A seborrheic keratosis is a harmless warty spot that appears during adult life as a common sign of skin aging  Seborrheic keratoses can arise on any area of skin, covered or uncovered, with the usual exception of the palms and soles  They do not arise from mucous membranes  Seborrheic keratoses can have highly variable appearance  Seborrheic keratoses are extremely common  It has been estimated that over 90% of adults over the age of 61 years have one or more of them  They occur in males and females of all races, typically beginning to erupt in the 35s or 45s  They are uncommon under the age of 21 years  The precise cause of seborrhoeic keratoses is not known  Seborrhoeic keratoses are considered degenerative in nature  As time goes by, seborrheic keratoses tend to become more numerous  Some people inherit a tendency to develop a very large number of them; some people may have hundreds of them  The name "seborrheic keratosis" is misleading, because these lesions are not limited to a seborrhoeic distribution (scalp, mid-face, chest, upper back), nor are they formed from sebaceous glands, nor are they associated with sebum -- which is greasy    Seborrheic keratosis may also be called "SK," "Seb K," "basal cell papilloma," "senile wart," or "barnacle "      Researchers have noted:  • Eruptive seborrhoeic keratoses can follow sunburn or dermatitis  • Skin friction may be the reason they appear in body folds  • Viral cause (e g , human papillomavirus) seems unlikely  • Stable and clonal mutations or activation of FRFR3, PIK3CA, JANES, AKT1 and EGFR genes are found in seborrhoeic keratoses  • Seborrhoeic keratosis can arise from solar lentigo  • FRFR3 mutations also arise in solar lentigines  These mutations are associated with increased age and location on the head and neck, suggesting a role of ultraviolet radiation in these lesions  • Seborrheic keratoses do not harbour tumour suppressor gene mutations  • Epidermal growth factor receptor inhibitors, which are used to treat some cancers, often result in an increase in verrucal (warty) keratoses  There is no easy way to remove multiple lesions on a single occasion  Unless a specific lesion is "inflamed" and is causing pain or stinging/burning or is bleeding, most insurance companies do not authorize treatment      Scribe Attestation    I,:  Nehemias Carrasco am acting as a scribe while in the presence of the attending physician :       I,:  Vik Valadez MD personally performed the services described in this documentation    as scribed in my presence :

## 2023-05-22 NOTE — ASSESSMENT & PLAN NOTE
Patient has very severe /advanced COPD /  Emphysema and despite maximal medical therapy, she continues to struggle with daily symptoms of shortness of breath and exercise intolerance  She is compliant with her inhaler regimen  She has completed pulmonary rehabilitation  She is not a lung transplant candidate  She is seeking more advanced therapies, specifically bronchoscopic lung volume reduction  Based on testing to date, she appears to be a candidate, however requires additional testing including chest CT to evaluate for emphysema score and fissure completeness, 2D echocardiogram to rule out pulmonary hypertension and arterial blood gas to rule out hypercapnia  We will also update pulmonary function testing to be sure that her FEV1 is still within the range of 15-45% of predicted, as indicated for the valves  Once testing is complete, I will call the patient to determine if she is a candidate  [facility] - Pediatric Acute Care  Pediatric Initial Discharge Assessment       Primary Care Provider: Leticia Mcmahan MD    Expected Discharge Date: 5/23/2023    Initial Assessment (most recent)       Pediatric Discharge Planning Assessment - 05/22/23 1120          Pediatric Discharge Planning Assessment    Assessment Type Discharge Planning Assessment     Source of Information family     Verified Demographic and Insurance Information Yes     Insurance Medicaid     Medicaid [facility]     Medicaid Insurance Primary     Lives With father;mother;brother     Number people in home 4     School/ home with parent (P)      Family Involvement High (P)      Hearing Difficulty or Deaf no (P)      Visual Difficulty or Blind no (P)      Difficulty Concentrating, Remembering or Making Decisions no (P)      Communication Difficulty no (P)      Eating/Swallowing Difficulty no (P)      Transportation Anticipated family or friend will provide (P)      Communicated ENRIQUE with patient/caregiver Date not available/Unable to determine (P)      Prior to hospitalization functional status: Infant/Toddler/Child Appropriate (P)      Prior to hospitilization cognitive status: Infant/Toddler (P)      Current Functional Status: Infant/Toddler/Child Appropriate (P)      Current cognitive status: Infant/Toddler (P)      Do you expect to return to your current living situation? Yes (P)      Do you currently have service(s) that help you manage your care at home? No (P)      DCFS No indications (Indicators for Report) (P)      Discharge Plan A Home with family (P)      Discharge Plan B Home with family (P)      Equipment Currently Used at Home none (P)      DME Needed Upon Discharge  other (see comments) (P)    TBD                    ADMIT DATE:  5/21/2023    ADMIT DIAGNOSIS:  Tachycardia [R00.0]  Fever, unspecified fever cause [R50.9]  Pneumonia of left lower lobe due to infectious organism [J18.9]    Met with patient's parentsSamuel  Reese and Ammon Holguin, at the bedside to complete discharge assessment. Explained role of .  Both verbalized understanding.   Patient lives at home with his parent and brother (7 yo). Patient's parents can provide transportation home with family. Patient has Medicaid [facility] for insurance. Will follow for discharge needs.     DESTINEE Dyer, CSW (they/them/theirs)   - Case Management   [facility]  Phone: [phone]

## 2023-05-30 ENCOUNTER — OFFICE VISIT (OUTPATIENT)
Dept: PULMONOLOGY | Facility: CLINIC | Age: 78
End: 2023-05-30

## 2023-05-30 VITALS
HEART RATE: 76 BPM | RESPIRATION RATE: 18 BRPM | HEIGHT: 60 IN | DIASTOLIC BLOOD PRESSURE: 70 MMHG | OXYGEN SATURATION: 96 % | SYSTOLIC BLOOD PRESSURE: 100 MMHG | BODY MASS INDEX: 22.78 KG/M2 | TEMPERATURE: 97.2 F | WEIGHT: 116 LBS

## 2023-05-30 DIAGNOSIS — J96.11 CHRONIC RESPIRATORY FAILURE WITH HYPOXIA (HCC): Primary | ICD-10-CM

## 2023-05-30 DIAGNOSIS — J44.9 COPD, VERY SEVERE (HCC): ICD-10-CM

## 2023-05-30 DIAGNOSIS — J30.1 NON-SEASONAL ALLERGIC RHINITIS DUE TO POLLEN: ICD-10-CM

## 2023-05-30 NOTE — ASSESSMENT & PLAN NOTE
Patient has advanced COPD with ongoing symptoms despite inhaler therapy with Advair and Combivent  She underwent bronchoscopy with chartis assessment in August 2020 and was noted to be CV positive, thus was not a candidate for BLVR  She will continue with inhaler therapy  She recently updated her living will, stating she would not want mechanical ventilation  Given her advanced lung disease, this is very appropriate

## 2023-06-15 NOTE — PROGRESS NOTES
Pulmonary Follow Up Note   Mary Davies 68 y o  female MRN: [id number]  5/30/2023      Assessment/Plan:     COPD, very severe [facility]  Patient has advanced COPD with ongoing symptoms despite inhaler therapy with Advair and Combivent  She underwent bronchoscopy with chartis assessment in August 2020 and was noted to be CV positive, thus was not a candidate for BLVR  She will continue with inhaler therapy  She recently updated her living will, stating she would not want mechanical ventilation  Given her advanced lung disease, this is very appropriate  Chronic respiratory failure with hypoxia (HCC)  Oxygenation is adequate on 2 L/min    She will follow-up with me in 6 months or sooner if needed  Patient is contemplating moving out of the area  I would be happy to see her virtually in 6 months if needed, until she is able to transfer her care to a local pulmonologist   Alternatively, I am happy to continue being her pulmonologist but would favor seeing her in person at least once per year  Visit orders:    Diagnoses and all orders for this visit:    Chronic respiratory failure with hypoxia (HCC)    COPD, very severe ([address] )    Non-seasonal allergic rhinitis due to pollen        No follow-ups on file  History of Present Illness   HPI:  Mary Davies is a 68 y o  female who is here today for follow-up regarding very severe COPD and chronic hypoxic respiratory failure  She has been overall stable from pulmonary perspective  She is maintained on Advair twice daily and uses Combivent 2-3 times per day  She is using oxygen at 2 L/min continuously  She lost her  to metastatic lung cancer in February  Shortly after that, she developed an upper respiratory infection with mild exacerbation  She was treated conservatively and is still not fully recovered  She has no significant wheezing  She has an occasional cough without much sputum production      Review of Systems   Constitutional: Negative for chills, fever and unexpected weight change  HENT: Negative for postnasal drip and sore throat  Eyes: Negative for visual disturbance  Respiratory:        As noted in HPI   Cardiovascular: Negative for chest pain  Gastrointestinal: Negative for abdominal pain, diarrhea and vomiting  Musculoskeletal: Negative for arthralgias  Skin: Negative for rash  Neurological: Negative for headaches  Hematological: Negative for adenopathy  Psychiatric/Behavioral: Negative  All other systems reviewed and are negative          Medical, Family and Social history reviewed and updated as appropriate    Historical Information   Past Medical History:   Diagnosis Date   • Allergic 1961   • Asthenia    • Asthma    • Cataract    • COPD (chronic obstructive pulmonary disease) ([facility] [address] )    • History of screening mammography     last assessed: 10/31/2017   • Osteoporosis    • Pneumonia 1950   • Urinary tract infection      Past Surgical History:   Procedure Laterality Date   • APPENDECTOMY      onset: 1966   • CATARACT EXTRACTION  2017    last assessed: 10/31/2017   • COLONOSCOPY  2015   • EYE SURGERY  cataracks 2017   • HEMORROIDECTOMY  2006   • TONSILLECTOMY  1954    onset: 12     Family History   Problem Relation Age of Onset   • Arthritis Mother    • Breast cancer Mother [de-identified]   • Heart disease Mother    • Hypertension Mother    • Osteoporosis Mother    • Dementia Mother    • COPD Mother    • Hearing loss Mother    • Diabetes Father    • Heart disease Father    • Hypertension Father    • COPD Father    • Ovarian cancer Sister 52   • No Known Problems Maternal Grandmother    • Hearing loss Maternal Grandfather    • No Known Problems Paternal Grandmother    • No Known Problems Paternal Grandfather    • No Known Problems Maternal Aunt    • No Known Problems Maternal Aunt    • No Known Problems Paternal Aunt    • No Known Problems Paternal Aunt    • No Known Problems Paternal Aunt    • Diabetes Brother    • Diabetes Brother    • "Diabetes Brother    • Hearing loss Brother    • Cancer Sister         uterine       Social History     Tobacco Use   Smoking Status Former   • Packs/day: 1 00   • Years: 40 00   • Total pack years: 40 00   • Types: Cigarettes   • Quit date: 3/7/2005   • Years since quittin 2   Smokeless Tobacco Never         Meds/Allergies     Current Outpatient Medications:   •  albuterol (PROVENTIL HFA,VENTOLIN HFA) 90 mcg/act inhaler, INHALE TWO PUFFS BY MOUTH EVERY FOUR HOURS AS NEEDED FOR WHEEZING, Disp: 18 g, Rfl: 0  •  ascorbic acid (VITAMIN C) 250 mg tablet, Take 250 mg by mouth daily, Disp: , Rfl:   •  b complex vitamins capsule, Take 1 capsule by mouth daily, Disp: , Rfl:   •  cholecalciferol (VITAMIN D3) 1,000 units tablet, Take 1,000 Units by mouth daily, Disp: , Rfl:   •  ciclopirox (LOPROX) 0 77 % cream, Apply topically once daily to face until scaly red areas resolve, Disp: 90 g, Rfl: 4  •  fluocinonide (LIDEX) 0 05 % external solution, Apply topically 2 (two) times a day As needed for itch on scalp , Disp: 60 mL, Rfl: 2  •  Fluticasone-Salmeterol (Advair Diskus) 250-50 mcg/dose inhaler, Inhale 1 puff 2 (two) times a day Rinse mouth after use , Disp: 180 blister, Rfl: 3  •  ipratropium-albuterol (COMBIVENT RESPIMAT) inhaler, Inhale 1 puff 4 (four) times a day, Disp: 12 g, Rfl: 3  •  ketoconazole (NIZORAL) 2 % shampoo, Daily for 2 weeks straight and then on \",  and \":  Lather into scalp and skin on face; leave on for 5 minutes and then rinse off completely  , Disp: 120 mL, Rfl: 11  •  Misc   Devices (Roller Walker) MISC, Use daily Rollator, Disp: 1 each, Rfl: 0  •  torsemide (DEMADEX) 10 mg tablet, , Disp: , Rfl:   •  albuterol (Ventolin HFA) 90 mcg/act inhaler, Inhale 2 puffs every 4 (four) hours as needed for wheezing, Disp: 54 g, Rfl: 3  Allergies   Allergen Reactions   • Clarinex [Desloratadine] Wheezing   • Bee Venom Swelling and Facial Swelling   • Clarithromycin Wheezing     Reaction " "Date: 29Apr2011;    • Food Color Red - Food Allergy Itching   • Levofloxacin Other (See Comments)     Blood clot in leg   • Loratadine Wheezing     Reaction Date: 29Apr2011;        Vitals: Blood pressure 100/70, pulse 76, temperature (!) 97 2 °F (36 2 °C), resp  rate 18, height 5' (1 524 m), weight 52 6 kg (116 lb), SpO2 96 %  Body mass index is 22 65 kg/m²  Oxygen Therapy  SpO2: 96 %  Oxygen Therapy: Supplemental oxygen  O2 Flow Rate (L/min): 2 L/min    Physical Exam   Physical Exam  Constitutional:       General: She is not in acute distress  HENT:      Head: Normocephalic  Mouth/Throat:      Pharynx: No oropharyngeal exudate  Eyes:      General: No scleral icterus  Neck:      Vascular: No JVD  Cardiovascular:      Rate and Rhythm: Normal rate and regular rhythm  Pulmonary:      Breath sounds: Wheezing (Scant right base) present  No rhonchi or rales  Abdominal:      Palpations: Abdomen is soft  Tenderness: There is no abdominal tenderness  Musculoskeletal:      Cervical back: Neck supple  Skin:     General: Skin is warm and dry  Neurological:      Mental Status: She is alert and oriented to person, place, and time  Psychiatric:         Mood and Affect: Mood normal          Labs: I have personally reviewed pertinent lab results  Lab Results   Component Value Date    HCT 44 4 09/14/2022    HGB 13 8 09/14/2022    MCV 95 09/14/2022     (L) 09/14/2022    WBC 12 21 (H) 09/14/2022     Lab Results   Component Value Date    BUN 11 09/14/2022    CALCIUM 9 1 09/14/2022     09/14/2022    CO2 36 (H) 09/14/2022    CREATININE 0 49 (L) 09/14/2022    GLUCOSE 114 02/26/2020    K 4 4 09/14/2022     06/25/2015     No results found for: \"IGE\"  Lab Results   Component Value Date    ALKPHOS 60 08/03/2022    ALT 26 08/03/2022    AST 18 08/03/2022    BILITOT 0 51 06/25/2015       Imaging and other studies: I have personally reviewed pertinent reports     and I have personally reviewed " pertinent films in PACS CT of the chest performed in December 2022 shows underlying emphysema  There is improvement in right lower lobe infiltrate and small groundglass opacity in the right upper lobe, stable going back to 2016    Pulmonary function testing:  Performed 5/28/20 and personally interpreted  FEV1/FVC ratio 38%   FEV1 23% predicted  FVC 51% predicted    Spirometry with severe obstruction and reduced vital capacity likely due to air trapping [FreeTextEntry1] : Rossi is a 4 year old girl who presents today with mother for follow up left elbow fracture sustained on 05/22/2023. She  is left-hand-dominant . She was at the top of the couch when she fell awkwardly landing on her left elbow resulting in moderate pain and swelling .  She was initially evaluated at [facility] emergency room where x-rays confirmed a left elbow fracture placing her into a long-arm cast resulting in moderate pain relief.  \par \par She was last seen on 06/01/2023 and recommended for continuation of LAC . Today she reports that  she is tolerating the cast well. Denies any discomfort or pain, denies any radiating pain or tingling sensation. She is not taking any pain medication.  Here for further orthopedic evaluation.

## 2023-08-10 ENCOUNTER — APPOINTMENT (OUTPATIENT)
Dept: LAB | Facility: CLINIC | Age: 78
End: 2023-08-10
Payer: MEDICARE

## 2023-08-10 DIAGNOSIS — R60.0 EDEMA LEG: ICD-10-CM

## 2023-08-10 DIAGNOSIS — J44.9 OBSTRUCTIVE CHRONIC BRONCHITIS WITHOUT EXACERBATION (HCC): ICD-10-CM

## 2023-08-10 DIAGNOSIS — J96.11 CHRONIC HYPOXEMIC RESPIRATORY FAILURE (HCC): ICD-10-CM

## 2023-08-10 LAB
ALBUMIN SERPL BCP-MCNC: 3.7 G/DL (ref 3.5–5)
ALP SERPL-CCNC: 55 U/L (ref 46–116)
ALT SERPL W P-5'-P-CCNC: 31 U/L (ref 12–78)
ANION GAP SERPL CALCULATED.3IONS-SCNC: 1 MMOL/L
AST SERPL W P-5'-P-CCNC: 20 U/L (ref 5–45)
BILIRUB SERPL-MCNC: 0.47 MG/DL (ref 0.2–1)
BUN SERPL-MCNC: 12 MG/DL (ref 5–25)
CALCIUM SERPL-MCNC: 9.4 MG/DL (ref 8.3–10.1)
CHLORIDE SERPL-SCNC: 104 MMOL/L (ref 96–108)
CO2 SERPL-SCNC: 35 MMOL/L (ref 21–32)
CREAT SERPL-MCNC: 0.51 MG/DL (ref 0.6–1.3)
GFR SERPL CREATININE-BSD FRML MDRD: 93 ML/MIN/1.73SQ M
GLUCOSE SERPL-MCNC: 102 MG/DL (ref 65–140)
POTASSIUM SERPL-SCNC: 4.2 MMOL/L (ref 3.5–5.3)
PROT SERPL-MCNC: 7.1 G/DL (ref 6.4–8.4)
SODIUM SERPL-SCNC: 140 MMOL/L (ref 135–147)

## 2023-08-10 PROCEDURE — 36415 COLL VENOUS BLD VENIPUNCTURE: CPT

## 2023-08-10 PROCEDURE — 80053 COMPREHEN METABOLIC PANEL: CPT

## 2023-11-10 ENCOUNTER — OFFICE VISIT (OUTPATIENT)
Dept: FAMILY MEDICINE CLINIC | Facility: MEDICAL CENTER | Age: 78
End: 2023-11-10
Payer: MEDICARE

## 2023-11-10 VITALS
RESPIRATION RATE: 20 BRPM | HEIGHT: 60 IN | BODY MASS INDEX: 23.75 KG/M2 | HEART RATE: 85 BPM | SYSTOLIC BLOOD PRESSURE: 128 MMHG | OXYGEN SATURATION: 97 % | WEIGHT: 121 LBS | DIASTOLIC BLOOD PRESSURE: 84 MMHG | TEMPERATURE: 97 F

## 2023-11-10 DIAGNOSIS — Z78.9 NEED FOR FOLLOW-UP BY SOCIAL WORKER: ICD-10-CM

## 2023-11-10 DIAGNOSIS — Z00.00 MEDICARE ANNUAL WELLNESS VISIT, SUBSEQUENT: Primary | ICD-10-CM

## 2023-11-10 DIAGNOSIS — L98.9 SKIN LESION OF FACE: ICD-10-CM

## 2023-11-10 DIAGNOSIS — R94.6 ABNORMAL RESULTS OF THYROID FUNCTION STUDIES: ICD-10-CM

## 2023-11-10 PROCEDURE — G0439 PPPS, SUBSEQ VISIT: HCPCS | Performed by: STUDENT IN AN ORGANIZED HEALTH CARE EDUCATION/TRAINING PROGRAM

## 2023-11-10 PROCEDURE — 99213 OFFICE O/P EST LOW 20 MIN: CPT | Performed by: STUDENT IN AN ORGANIZED HEALTH CARE EDUCATION/TRAINING PROGRAM

## 2023-11-10 NOTE — ASSESSMENT & PLAN NOTE
Appear consistent with actinic keratoses, offered cryotherapy today, declines, would like to follow-up with her Dermatologist, certainly advised that she do so

## 2023-11-10 NOTE — ASSESSMENT & PLAN NOTE
Immunizations reviewed  Declines further COVID-19 vaccines  Received influenza vaccine last week at pharmacy, will obtain record  Strongly advised about PCV 20 vaccine, defers today but, states she may consider at pharmacy  Declines Shingrix vaccination series  Discussed tetanus booster due  Informed about RSV vaccine, she will discuss this also with her Pulmonologist

## 2023-11-10 NOTE — PATIENT INSTRUCTIONS
Medicare Preventive Visit Patient Instructions  Thank you for completing your Welcome to Medicare Visit or Medicare Annual Wellness Visit today. Your next wellness visit will be due in one year (11/10/2024). The screening/preventive services that you may require over the next 5-10 years are detailed below. Some tests may not apply to you based off risk factors and/or age. Screening tests ordered at today's visit but not completed yet may show as past due. Also, please note that scanned in results may not display below. Preventive Screenings:  Service Recommendations Previous Testing/Comments   Colorectal Cancer Screening  * Colonoscopy    * Fecal Occult Blood Test (FOBT)/Fecal Immunochemical Test (FIT)  * Fecal DNA/Cologuard Test  * Flexible Sigmoidoscopy Age: 43-73 years old   Colonoscopy: every 10 years (may be performed more frequently if at higher risk)  OR  FOBT/FIT: every 1 year  OR  Cologuard: every 3 years  OR  Sigmoidoscopy: every 5 years  Screening may be recommended earlier than age 39 if at higher risk for colorectal cancer. Also, an individualized decision between you and your healthcare provider will decide whether screening between the ages of 77-80 would be appropriate. Colonoscopy: 09/22/2016  FOBT/FIT: Not on file  Cologuard: Not on file  Sigmoidoscopy: Not on file          Breast Cancer Screening Age: 36 years old  Frequency: every 1-2 years  Not required if history of left and right mastectomy Mammogram: 10/11/2019        Cervical Cancer Screening Between the ages of 21-29, pap smear recommended once every 3 years. Between the ages of 32-69, can perform pap smear with HPV co-testing every 5 years.    Recommendations may differ for women with a history of total hysterectomy, cervical cancer, or abnormal pap smears in past. Pap Smear: Not on file    Screening Not Indicated   Hepatitis C Screening Once for adults born between [address] [place] [address]  More frequently in patients at high risk for Hepatitis C Hep C Antibody: Not on file        Diabetes Screening 1-2 times per year if you're at risk for diabetes or have pre-diabetes Fasting glucose: 109 mg/dL (3/23/2022)  A1C: 5.3 % (3/23/2022)  Screening Current   Cholesterol Screening Once every 5 years if you don't have a lipid disorder. May order more often based on risk factors. Lipid panel: 03/23/2022    Screening Current     Other Preventive Screenings Covered by Medicare:  Abdominal Aortic Aneurysm (AAA) Screening: covered once if your at risk. You're considered to be at risk if you have a family history of AAA. Lung Cancer Screening: covers low dose CT scan once per year if you meet all of the following conditions: (1) Age 48-67; (2) No signs or symptoms of lung cancer; (3) Current smoker or have quit smoking within the last 15 years; (4) You have a tobacco smoking history of at least 20 pack years (packs per day multiplied by number of years you smoked); (5) You get a written order from a healthcare provider. Glaucoma Screening: covered annually if you're considered high risk: (1) You have diabetes OR (2) Family history of glaucoma OR (3)  aged 48 and older OR (3)  American aged 72 and older  Osteoporosis Screening: covered every 2 years if you meet one of the following conditions: (1) You're estrogen deficient and at risk for osteoporosis based off medical history and other findings; (2) Have a vertebral abnormality; (3) On glucocorticoid therapy for more than 3 months; (4) Have primary hyperparathyroidism; (5) On osteoporosis medications and need to assess response to drug therapy. Last bone density test (DXA Scan): 10/11/2019. HIV Screening: covered annually if you're between the age of 14-79. Also covered annually if you are younger than 13 and older than 72 with risk factors for HIV infection. For pregnant patients, it is covered up to 3 times per pregnancy.     Immunizations:  Immunization Recommendations   Influenza Vaccine Annual influenza vaccination during flu season is recommended for all persons aged >= 6 months who do not have contraindications   Pneumococcal Vaccine   * Pneumococcal conjugate vaccine = PCV13 (Prevnar 13), PCV15 (Vaxneuvance), PCV20 (Prevnar 20)  * Pneumococcal polysaccharide vaccine = PPSV23 (Pneumovax) Adults 15-90 yo with certain risk factors or if 69+ yo  If never received any pneumonia vaccine: recommend Prevnar 20 (PCV20)  Give PCV20 if previously received 1 dose of PCV13 or PPSV23   Hepatitis B Vaccine 3 dose series if at intermediate or high risk (ex: diabetes, end stage renal disease, liver disease)   Respiratory syncytial virus (RSV) Vaccine - COVERED BY MEDICARE PART D  * RSVPreF3 (Arexvy) CDC recommends that adults 61years of age and older may receive a single dose of RSV vaccine using shared clinical decision-making (SCDM)   Tetanus (Td) Vaccine - COST NOT COVERED BY MEDICARE PART B Following completion of primary series, a booster dose should be given every 10 years to maintain immunity against tetanus. Td may also be given as tetanus wound prophylaxis. Tdap Vaccine - COST NOT COVERED BY MEDICARE PART B Recommended at least once for all adults. For pregnant patients, recommended with each pregnancy. Shingles Vaccine (Shingrix) - COST NOT COVERED BY MEDICARE PART B  2 shot series recommended in those 19 years and older who have or will have weakened immune systems or those 50 years and older     Health Maintenance Due:      Topic Date Due   • Hepatitis C Screening  11/10/2024 (Originally 1945)   • DXA SCAN  10/11/2024   • Colorectal Cancer Screening  Discontinued     Immunizations Due:      Topic Date Due   • COVID-19 Vaccine (4 - Moderna series) 01/07/2022   • Influenza Vaccine (1) 09/01/2023     Advance Directives   What are advance directives? Advance directives are legal documents that state your wishes and plans for medical care.  These plans are made ahead of time in case you lose your ability to make decisions for yourself. Advance directives can apply to any medical decision, such as the treatments you want, and if you want to donate organs. What are the types of advance directives? There are many types of advance directives, and each state has rules about how to use them. You may choose a combination of any of the following:  Living will: This is a written record of the treatment you want. You can also choose which treatments you do not want, which to limit, and which to stop at a certain time. This includes surgery, medicine, IV fluid, and tube feedings. Durable power of  for [facility]): This is a written record that states who you want to make healthcare choices for you when you are unable to make them for yourself. This person, called a proxy, is usually a family member or a friend. You may choose more than 1 proxy. Do not resuscitate (DNR) order:  A DNR order is used in case your heart stops beating or you stop breathing. It is a request not to have certain forms of treatment, such as CPR. A DNR order may be included in other types of advance directives. Medical directive: This covers the care that you want if you are in a coma, near death, or unable to make decisions for yourself. You can list the treatments you want for each condition. Treatment may include pain medicine, surgery, blood transfusions, dialysis, IV or tube feedings, and a ventilator (breathing machine). Values history: This document has questions about your views, beliefs, and how you feel and think about life. This information can help others choose the care that you would choose. Why are advance directives important? An advance directive helps you control your care. Although spoken wishes may be used, it is better to have your wishes written down. Spoken wishes can be misunderstood, or not followed. Treatments may be given even if you do not want them.  An advance directive may make it easier for your family to make difficult choices about your care. Urinary Incontinence   Urinary incontinence (UI)  is when you lose control of your bladder. UI develops because your bladder cannot store or empty urine properly. The 3 most common types of UI are stress incontinence, urge incontinence, or both. Medicines:   May be given to help strengthen your bladder control. Report any side effects of medication to your healthcare provider. Do pelvic muscle exercises often:  Your pelvic muscles help you stop urinating. Squeeze these muscles tight for 5 seconds, then relax for 5 seconds. Gradually work up to squeezing for 10 seconds. Do 3 sets of 15 repetitions a day, or as directed. This will help strengthen your pelvic muscles and improve bladder control. Train your bladder:  Go to the bathroom at set times, such as every 2 hours, even if you do not feel the urge to go. You can also try to hold your urine when you feel the urge to go. For example, hold your urine for 5 minutes when you feel the urge to go. As that becomes easier, hold your urine for 10 minutes. Self-care:   Keep a UI record. Write down how often you leak urine and how much you leak. Make a note of what you were doing when you leaked urine. Drink liquids as directed. You may need to limit the amount of liquid you drink to help control your urine leakage. Do not drink any liquid right before you go to bed. Limit or do not have drinks that contain caffeine or alcohol. Prevent constipation. Eat a variety of high-fiber foods. Good examples are high-fiber cereals, beans, vegetables, and whole-grain breads. Walking is the best way to trigger your intestines to have a bowel movement. Exercise regularly and maintain a healthy weight. Weight loss and exercise will decrease pressure on your bladder and help you control your leakage. Use a catheter as directed  to help empty your bladder.  A catheter is a tiny, plastic tube that is put into your bladder to drain your urine. Go to behavior therapy as directed. Behavior therapy may be used to help you learn to control your urge to urinate. Alcohol Use and Your Health    Drinking too much can harm your health. Excessive alcohol use leads to about 88,000 death in [facility] each year, and shortens the life of those who diet by almost 30 years. Further, excessive drinking cost the economy $249 billion in 2010. Most excessive drinkers are not alcohol dependent. Excessive alcohol use has immediate effects that increase the risk of many harmful health conditions. These are most often the result of binge drinking. Over time, excessive alcohol use can lead to the development of chronic diseases and other series health problems. What is considered a "drink"? Excessive alcohol use includes:  Binge Drinking: For women, 4 or more drinks consumed on one occasion. For men, 5 or more drinks consumed on one occasion. Heavy Drinking: For women, 8 or more drinks per week. For men, 15 or more drinks per week  Any alcohol used by pregnant women  Any alcohol used by those under the age of 21 years    If you choose to drink, do so in moderation:  Do not drink at all if you are under the age of 24, or if you are or may be pregnant, or have health problems that could be made worse by drinking.   For women, up to 1 drink per day  For men, up to 2 drinks a day    No one should begin drinking or drink more frequently based on potential health benefits    Short-Term Health Risks:  Injuries: motor vehicle crashes, falls, drownings, burns  Violence: homicide, suicide, sexual assault, intimate partner violence  Alcohol poisoning  Reproductive health: risky sexual behaviors, unintended prengnacy, sexually transmitted diseases, miscarriage, stillbirth, fetal alcohol syndrome    Long-Term Health Risks:  Chronic diseases: high blood pressure, heart disease, stroke, liver disease, digestive problems  Cancers: breast, mouth and throat, liver, colon  Learning and memory problems: dementia, poor school performance  Mental health: depression, anxiety, insomnia  Social problems: lost productivity, family problems, unemployment  Alcohol dependence    For support and more information:  Substance Abuse and [address]  Web Address: https://[employer]hit/    Alcoholics Anonymous        Web Address: http://www.[employer].info/    https://www.cdc.gov/alcohol/fact-sheets/alcohol-use.htm     © [employer] 2018 Information is for End User's use only and may not be sold, redistributed or otherwise used for commercial purposes.  All illustrations and images included in CareNotes® are the copyrighted property of [employer] or [address] [place] [address]

## 2023-11-10 NOTE — PROGRESS NOTES
Assessment and Plan:     Problem List Items Addressed This Visit          Musculoskeletal and Integument    Skin lesion of face     Appear consistent with actinic keratoses, offered cryotherapy today, declines, would like to follow-up with her Dermatologist, certainly advised that she do so            Other    RESOLVED: Medicare annual wellness visit, subsequent - Primary     Immunizations reviewed  Declines further COVID-19 vaccines  Received influenza vaccine last week at pharmacy, will obtain record  Strongly advised about PCV 20 vaccine, defers today but, states she may consider at pharmacy  Declines Shingrix vaccination series  Discussed tetanus booster due  Informed about RSV vaccine, she will discuss this also with her Pulmonologist          Other Visit Diagnoses       Need for follow-up by         Relevant Orders    Ambulatory Referral to Social Work Care Management Program    Abnormal results of thyroid function studies        Relevant Orders    TSH, 3rd generation with Free T4 reflex            Depression Screening and Follow-up Plan: Patient was screened for depression during today's encounter. They screened negative with a PHQ-2 score of 0. Preventive health issues were discussed with patient, and age appropriate screening tests were ordered as noted in patient's After Visit Summary. Personalized health advice and appropriate referrals for health education or preventive services given if needed, as noted in patient's After Visit Summary. Follow-up in 1 year for next Medicare annual wellness visit.   Continue follow-up with her Cardiologist and Pulmonologist.   History of Present Illness:     Patient presents for a Medicare Wellness Visit    HPI   Patient Care Team:  Sapna Espinoza DO as PCP - General (Family Medicine)  Viktoriya France MD as PCP - Endocrinology (Endocrinology)     Review of Systems:     Review of Systems    As noted in HPI      Problem List:     Patient Active Problem List   Diagnosis    Pulmonary emphysema (HCC)    Elevated glucose level    Prediabetes    Vitamin D deficiency    Osteoporosis    Elevated TSH    Other anaphylactic reaction    Asthma    Diabetes mellitus, latent    COPD, very severe ([address])    Pulmonary nodule    Non-seasonal allergic rhinitis due to pollen    Hypoxia    Need for influenza vaccination    Chronic respiratory failure with hypoxia (HCC)    Allergic rhinitis    Shortness of breath    Swelling of lower extremity    Skin lesion of face      Past Medical and Surgical History:     Past Medical History:   Diagnosis Date    Allergic 1961    Asthenia     Asthma     Cataract     COPD (chronic obstructive pulmonary disease) ([address])     History of screening mammography     last assessed: 10/31/2017    Osteoporosis     Pneumonia 1950    Urinary tract infection      Past Surgical History:   Procedure Laterality Date    APPENDECTOMY      onset: 1966    CATARACT EXTRACTION  2017    last assessed: 10/31/2017    COLONOSCOPY  2015    EYE SURGERY  cataracks 2017    HEMORROIDECTOMY  2006    TONSILLECTOMY  1954    onset: 12      Family History:     Family History   Problem Relation Age of Onset    Arthritis Mother     Breast cancer Mother 80    Heart disease Mother     Hypertension Mother     Osteoporosis Mother     Dementia Mother     COPD Mother     Hearing loss Mother     Diabetes Father     Heart disease Father     Hypertension Father     COPD Father     Ovarian cancer Sister 52    No Known Problems Maternal Grandmother     Hearing loss Maternal Grandfather     No Known Problems Paternal Grandmother     No Known Problems Paternal Grandfather     No Known Problems Maternal Aunt     No Known Problems Maternal Aunt     No Known Problems Paternal Aunt     No Known Problems Paternal Aunt     No Known Problems Paternal Aunt     Diabetes Brother     Diabetes Brother     Diabetes Brother     Hearing loss Brother     Cancer Sister         uterine      Social History: Social History     Socioeconomic History    Marital status:      Spouse name: None    Number of children: None    Years of education: None    Highest education level: None   Occupational History    None   Tobacco Use    Smoking status: Former     Packs/day: 1.00     Years: 40.00     Total pack years: 40.00     Types: Cigarettes     Quit date: 3/7/2005     Years since quittin.6    Smokeless tobacco: Never   Vaping Use    Vaping Use: Never used   Substance and Sexual Activity    Alcohol use: Yes     Alcohol/week: 2.0 standard drinks of alcohol     Types: 2 Glasses of wine per week    Drug use: Never    Sexual activity: Not Currently     Partners: Male     Birth control/protection: Post-menopausal, None   Other Topics Concern    None   Social History Narrative    Caffeine use     Social Determinants of Health     Financial Resource Strain: Low Risk  (2023)    Overall Financial Resource Strain (CARDIA)     Difficulty of Paying Living Expenses: Not very hard   Food Insecurity: Not on file   Transportation Needs: No Transportation Needs (2023)    PRAPARE - Transportation     Lack of Transportation (Medical): No     Lack of Transportation (Non-Medical):  No   Physical Activity: Not on file   Stress: Not on file   Social Connections: Not on file   Intimate Partner Violence: Not on file   Housing Stability: Not on file      Medications and Allergies:     Current Outpatient Medications   Medication Sig Dispense Refill    albuterol (PROVENTIL HFA,VENTOLIN HFA) 90 mcg/act inhaler INHALE TWO PUFFS BY MOUTH EVERY FOUR HOURS AS NEEDED FOR WHEEZING 18 g 0    ascorbic acid (VITAMIN C) 250 mg tablet Take 250 mg by mouth daily      b complex vitamins capsule Take 1 capsule by mouth daily      cholecalciferol (VITAMIN D3) 1,000 units tablet Take 1,000 Units by mouth daily      ciclopirox (LOPROX) 0.77 % cream Apply topically once daily to face until scaly red areas resolve 90 g 4    fluocinonide (LIDEX) 0.05 % external solution Apply topically 2 (two) times a day As needed for itch on scalp. 60 mL 2    Fluticasone-Salmeterol (Advair Diskus) 250-50 mcg/dose inhaler Inhale 1 puff 2 (two) times a day Rinse mouth after use. 180 blister 3    ipratropium-albuterol (COMBIVENT RESPIMAT) inhaler Inhale 1 puff 4 (four) times a day 12 g 3    ketoconazole (NIZORAL) 2 % shampoo Daily for 2 weeks straight and then on "Mondays, Wednesdays and Fridays":  Lather into scalp and skin on face; leave on for 5 minutes and then rinse off completely. 120 mL 11    Misc. Devices (Roller Walker) MISC Use daily Rollator 1 each 0    torsemide (DEMADEX) 10 mg tablet        No current facility-administered medications for this visit. Allergies   Allergen Reactions    Clarinex [Desloratadine] Wheezing    Bee Venom Swelling and Facial Swelling    Clarithromycin Wheezing     Reaction Date: [id number];     Food Color Red - Food Allergy Itching    Levofloxacin Other (See Comments)     Blood clot in leg    Loratadine Wheezing     Reaction Date: 29Apr2011;       Immunizations:     Immunization History   Administered Date(s) Administered    COVID-19 MODERNA VACC 0.5 ML IM 01/27/2021, 02/23/2021, 11/12/2021    INFLUENZA 10/28/2021    Influenza Split High Dose Preservative Free IM 10/24/2016, 10/31/2017, 11/21/2019    Influenza, high dose seasonal 0.7 mL 10/03/2018, 10/13/2020, 10/07/2022    Influenza, seasonal, injectable 11/13/2013    Pneumococcal Conjugate 13-Valent 04/25/2017    Pneumococcal Polysaccharide PPV23 01/01/2009, 12/22/2014      Health Maintenance:         Topic Date Due    Hepatitis C Screening  11/10/2024 (Originally 1945)    DXA SCAN  10/11/2024    Colorectal Cancer Screening  Discontinued         Topic Date Due    COVID-19 Vaccine (4 - Moderna series) 01/07/2022    Influenza Vaccine (1) 09/01/2023      Medicare Screening Tests and Risk Assessments: Angela Clemente is here for her Subsequent Wellness visit.      Health Risk Assessment: Patient rates overall health as fair. Patient feels that their physical health rating is same. Patient is satisfied with their life. Eyesight was rated as same. Hearing was rated as slightly worse. Patient feels that their emotional and mental health rating is slightly worse. Patients states they are never, rarely angry. Patient states they are often unusually tired/fatigued. Pain experienced in the last 7 days has been none. Patient states that she has experienced no weight loss or gain in last 6 months. Patient mentions at times she does have to blow nose ear pops. Advised about intranasal steroid. Patient's mood worse compared to last year unfortunately because her  passed away February 2023. She has neighbors that help her. She is looking into living in another location, appreciate social work assistance. Depression Screening:   PHQ-2 Score: 0      Fall Risk Screening: In the past year, patient has experienced: no history of falling in past year      Urinary Incontinence Screening:   Patient has leaked urine accidently in the last six months. Home Safety:  Patient has trouble with stairs inside or outside of their home. Patient has working smoke alarms and has working carbon monoxide detector. Home safety hazards include: none. Nutrition:   Current diet is Regular. Medications:   Patient is currently taking over-the-counter supplements. OTC medications include: see medication list. Patient is able to manage medications. Activities of Daily Living (ADLs)/Instrumental Activities of Daily Living (IADLs):   Walk and transfer into and out of bed and chair?: Yes  Dress and groom yourself?: Yes    Bathe or shower yourself?: Yes    Feed yourself?  Yes  Do your laundry/housekeeping?: No  Manage your money, pay your bills and track your expenses?: Yes  Make your own meals?: Yes    Do your own shopping?: Yes    ADL comments: need help with shopping    Durable Medical Equipment Suppliers  [facility]    Previous Hospitalizations:   Any hospitalizations or ED visits within the last 12 months?: No      Advance Care Planning:   Living will: Yes    Durable POA for healthcare: Yes    Advanced directive: Yes      Cognitive Screening:   Provider or family/friend/caregiver concerned regarding cognition?: No    PREVENTIVE SCREENINGS      Cardiovascular Screening:    General: Screening Current      Diabetes Screening:     General: Screening Current      Colorectal Cancer Screening:     General: Risks and Benefits Discussed and Patient Declines      Breast Cancer Screening:     General: Risks and Benefits Discussed and Patient Declines      Cervical Cancer Screening:    General: Screening Not Indicated      Osteoporosis Screening:    General: History Osteoporosis, Risks and Benefits Discussed and Patient Declines      Abdominal Aortic Aneurysm (AAA) Screening:        General: Screening Not Indicated      Lung Cancer Screening:     General: Screening Not Indicated      Hepatitis C Screening:    General: Risks and Benefits Discussed and Patient Declines    Screening, Brief Intervention, and Referral to Treatment (SBIRT)    Screening  Typical number of drinks in a day: 0  Typical number of drinks in a week: 3  Interpretation: Low risk drinking behavior. AUDIT-C Screenin) How often did you have a drink containing alcohol in the past year? 2 to 3 times a week  2) How many drinks did you have on a typical day when you were drinking in the past year? 1 to 2  3) How often did you have 6 or more drinks on one occasion in the past year? never    AUDIT-C Score: 3  Interpretation: Score 3-12 (female): POSITIVE screen for alcohol misuse    AUDIT Screenin) How often during the last year have you found that you were not able to stop drinking once you had started?  0 - never  5) How often during the last year have you failed to do what was normally expected from you because of drinking? 0 - never  6) How often during the last year have you needed a first drink in the morning to get yourself going after a heavy drinking session? 0 - never  7) How often during the last year have you had a feeling of guilt or remorse after drinking? 0 - never  8) How often during the last year have you been unable to remember what happened the night before because you had been drinking? 0 - never  9) Have you or someone else been injured as a result of your drinking? 0 - no  10) Has a relative or friend or a doctor or another health worker been concerned about your drinking or suggested you cut down? 0 - no    AUDIT Score: 3  Interpretation: Low risk alcohol consumption    Single Item Drug Screening:  How often have you used an illegal drug (including marijuana) or a prescription medication for non-medical reasons in the past year? never    Single Item Drug Screen Score: 0  Interpretation: Negative screen for possible drug use disorder    Other Counseling Topics:   Car/seat belt/driving safety, sunscreen and calcium and vitamin D intake and regular weightbearing exercise. Physical Exam:     /84 (BP Location: Left arm, Patient Position: Sitting, Cuff Size: Adult)   Pulse 85   Temp (!) 97 °F (36.1 °C)   Resp 20   Ht 5' (1.524 m)   Wt 54.9 kg (121 lb)   SpO2 97%   BMI 23.63 kg/m²     Physical Exam  Vitals reviewed. Constitutional:       General: She is not in acute distress. Appearance: Normal appearance. She is not ill-appearing or toxic-appearing. Interventions: Nasal cannula in place. HENT:      Head: Normocephalic and atraumatic. Right Ear: Tympanic membrane, ear canal and external ear normal.      Left Ear: Tympanic membrane, ear canal and external ear normal.      Nose: Nose normal.      Mouth/Throat:      Mouth: Mucous membranes are moist.      Pharynx: Oropharynx is clear. Eyes:      Extraocular Movements: Extraocular movements intact.       Conjunctiva/sclera: Conjunctivae normal.      Pupils: Pupils are equal, round, and reactive to light. Cardiovascular:      Rate and Rhythm: Normal rate and regular rhythm. Pulses: Normal pulses. Heart sounds: Normal heart sounds. Pulmonary:      Effort: Pulmonary effort is normal.      Breath sounds: Wheezing (End expiratory wheezing throughout) present. Abdominal:      General: Bowel sounds are normal.      Palpations: Abdomen is soft. Tenderness: There is no abdominal tenderness. Musculoskeletal:      Cervical back: Neck supple. Right lower leg: No edema. Left lower leg: No edema. Lymphadenopathy:      Cervical: No cervical adenopathy. Skin:     General: Skin is warm and dry. Comments: Dry scaly small patches nose and forehead   Neurological:      Mental Status: She is alert and oriented to person, place, and time. Psychiatric:         Mood and Affect: Mood normal.         Behavior: Behavior normal.         Thought Content:  Thought content normal.         Judgment: Judgment normal.          Jennyfer Preciado DO

## 2023-11-13 ENCOUNTER — TELEPHONE (OUTPATIENT)
Dept: ADMINISTRATIVE | Facility: OTHER | Age: 78
End: 2023-11-13

## 2023-11-13 ENCOUNTER — PATIENT OUTREACH (OUTPATIENT)
Dept: FAMILY MEDICINE CLINIC | Facility: MEDICAL CENTER | Age: 78
End: 2023-11-13

## 2023-11-13 DIAGNOSIS — J44.9 COPD, VERY SEVERE (HCC): ICD-10-CM

## 2023-11-13 NOTE — TELEPHONE ENCOUNTER
Upon review of the In Basket request and the patient's chart, initial outreach has been made via fax to facility. Please see Contacts section for details.      Thank you  Myra Hector

## 2023-11-13 NOTE — LETTER
Vaccination Request Form: Influenza      Date Requested: 23  Patient: Milli Mireles  Patient : 1945   Referring Provider: Laura Biswas DO       The above patient has informed us that they have had their   most recent Influenza administered at your facility. Please   complete this form and attach all corresponding documentation. Date of Vaccine(s) Given  ______________________________    Lot Number(s) _______________________________________    Manufacture(s) ______________________________________    Dose Amount (s) _____________________________________    Expiration Date(s) ____________________________________    Comments __________________________________________________________  ____________________________________________________________________  ____________________________________________________________________  ____________________________________________________________________    Administering Facility  ________________________________________________    Vaccine Administered By (print name) ___________________________________      Form Completed By (print name) _______________________________________      Signature ___________________________________________________________      These reports are needed for  compliance. Please fax this completed form and a copy of the Vaccine Document(s) to our office located at [address] [place] [address] as soon as possible to Fax [phone] hilario Ocampo Asa: Phone [phone]    We thank you for your assistance in treating our mutual patient.

## 2023-11-13 NOTE — TELEPHONE ENCOUNTER
----- Message from Klaus Guevara sent at 11/10/2023 12:07 PM EST -----  Regarding: Flu vaccine  11/10/23 12:08 PM    Hello, our patient Jemal Sutton has had Immunization(FLU) completed/performed. Please assist in updating the patient chart by making an External outreach to [facility] located in [place]. The date of service is 2023.     Thank you,  Klaus AZUL

## 2023-11-13 NOTE — LETTER
Vaccination Request Form: Influenza      Date Requested: 23  Patient: Renato Pierre  Patient : 1945   Referring Provider: Desmond Jacques DO       The above patient has informed us that they have had their   most recent Influenza administered at your facility. Please   complete this form and attach all corresponding documentation. Date of Vaccine(s) Given  ______________________________    Lot Number(s) _______________________________________    Manufacture(s) ______________________________________    Dose Amount (s) _____________________________________    Expiration Date(s) ____________________________________    Comments __________________________________________________________  ____________________________________________________________________  ____________________________________________________________________  ____________________________________________________________________    Administering Facility  ________________________________________________    Vaccine Administered By (print name) ___________________________________      Form Completed By (print name) _______________________________________      Signature ___________________________________________________________      These reports are needed for  compliance. Please fax this completed form and a copy of the Vaccine Document(s) to our office located at [address] [place] [address] as soon as possible to Fax [phone] hilario Baires: Phone [phone]    We thank you for your assistance in treating our mutual patient.

## 2023-11-13 NOTE — PROGRESS NOTES
JARAD received referral from PCP office. Patient is asking for alternative living situations. Chart review completed. Patient's  passed away in February 2023. Patient does have neighbors that assist her. Patient would like information on other living situations. Patient is not able to do her own laundry or housekeeping. Patient is ambulatory, bathes, dresses, grooms, and feeds self. Patient is able to manage her own finances and make her meals. Patient does need help with shopping. Patient is on home oxygen through [facility]. Patient reports havin a living will, healthcare POA, and advanced directive. Patient has no emergency contacts. SW placed phone call to patient. Patient reports that  passed away and patient is working through the grieving process. Patient is looking into moving into senior living. Patient has no children. Patient has brothers who live in the [facility] and a brother in [place]. Patient also has a sister in [place]. Patient resides in a ranch home alone. Patient is independent with ADLs and IADLs. Patient is on home oxygen for COPD. Patient has an in home concentrator and and [employer] portable concentrator. Patient reports that his is heavy for her to carry around. Patient reports being on oxygen since 2021.  was doing most of the shopping and would drop patient off at the doors to buildings so that patient would not have to walk far. Patient has been visiting multiple [facility] in and around the [facility]. Patient reports that they all have waiting lists. She is at the point now where she must decide which facilities she would like put on waiting lists. Patient will be touring one other facility, [facility] in [place].   This facility is about 45 min to and hour from her brothers but meets her needs of having everything in one building; the apartment, dining pastor, etc.      Patient has been meeting with a financial counselor and will meet again this week to discuss paying for these facilities. Patient states that she has the funds to maintain living in an care home. Patient is already aware of an organization that will help her clean out her home and sell belongings that she does not want to keep with her. Patient will call the organization when ready. Patient declined needing additional assistance in locating facilities and taking tours. Patient has this SW contact information if there are future needs. Referral closed at this time.

## 2023-11-14 RX ORDER — FLUTICASONE PROPIONATE AND SALMETEROL 250; 50 UG/1; UG/1
1 POWDER RESPIRATORY (INHALATION) 2 TIMES DAILY
Qty: 60 BLISTER | Refills: 1 | Status: SHIPPED | OUTPATIENT
Start: 2023-11-14 | End: 2024-01-13

## 2023-11-20 NOTE — TELEPHONE ENCOUNTER
As a follow-up, a second attempt has been made for outreach via fax to facility. Please see Contacts section for details.     Thank you  Charity Rehman

## 2023-11-29 NOTE — TELEPHONE ENCOUNTER
Upon review of the In Basket request we were able to locate, review, and update the patient chart as requested for Immunization(s) Flu Vaccine. Any additional questions or concerns should be emailed to the Practice Liaisons via the appropriate education email address, please do not reply via In Basket.     Thank you  Jazmyn Rhoades

## 2023-11-29 NOTE — TELEPHONE ENCOUNTER
As a final attempt, a third outreach has been made via telephone call to facility. Please see Contacts section for details. This encounter will be closed and completed by end of day. Should we receive the requested information because of previous outreach attempts, the requested patient's chart will be updated appropriately. Spoke to pharmacy requested a refax of letter.    Thank you  Prashanth Xiao

## 2023-12-08 ENCOUNTER — OFFICE VISIT (OUTPATIENT)
Dept: PULMONOLOGY | Facility: CLINIC | Age: 78
End: 2023-12-08
Payer: MEDICARE

## 2023-12-08 VITALS
HEIGHT: 60 IN | BODY MASS INDEX: 23.16 KG/M2 | OXYGEN SATURATION: 99 % | RESPIRATION RATE: 18 BRPM | HEART RATE: 74 BPM | TEMPERATURE: 97.4 F | WEIGHT: 118 LBS | DIASTOLIC BLOOD PRESSURE: 70 MMHG | SYSTOLIC BLOOD PRESSURE: 110 MMHG

## 2023-12-08 DIAGNOSIS — J96.11 CHRONIC RESPIRATORY FAILURE WITH HYPOXIA (HCC): ICD-10-CM

## 2023-12-08 DIAGNOSIS — J44.9 COPD, VERY SEVERE (HCC): Primary | ICD-10-CM

## 2023-12-08 DIAGNOSIS — J30.9 ALLERGIC RHINITIS, UNSPECIFIED SEASONALITY, UNSPECIFIED TRIGGER: ICD-10-CM

## 2023-12-08 PROCEDURE — 99214 OFFICE O/P EST MOD 30 MIN: CPT | Performed by: INTERNAL MEDICINE

## 2023-12-08 RX ORDER — FLUTICASONE PROPIONATE AND SALMETEROL 250; 50 UG/1; UG/1
1 POWDER RESPIRATORY (INHALATION) 2 TIMES DAILY
Qty: 180 BLISTER | Refills: 3 | Status: SHIPPED | OUTPATIENT
Start: 2023-12-08 | End: 2024-12-02

## 2023-12-08 RX ORDER — AZITHROMYCIN 250 MG/1
TABLET, FILM COATED ORAL
Qty: 6 TABLET | Refills: 0 | Status: SHIPPED | OUTPATIENT
Start: 2023-12-08 | End: 2023-12-12

## 2023-12-08 NOTE — PROGRESS NOTES
Pulmonary Follow Up Note   Mason Huntley 66 y.o. female MRN: [id number]  12/8/2023      Assessment/Plan:     COPD, very severe ([address])  She will continue with Advair twice daily and Combivent up to 4 times daily. Chronic respiratory failure with hypoxia (HCC)  Oxygenation is stable on 2 L/min      Visit orders:    Diagnoses and all orders for this visit:    COPD, very severe (HCC)  -     Fluticasone-Salmeterol (Advair Diskus) 250-50 mcg/dose inhaler; Inhale 1 puff 2 (two) times a day Rinse mouth after use. -     ipratropium-albuterol (COMBIVENT RESPIMAT) inhaler; Inhale 1 puff 4 (four) times a day  -     azithromycin (ZITHROMAX) 250 mg tablet; Take 2 tablets today then 1 tablet daily x 4 days    Chronic respiratory failure with hypoxia (HCC)    Allergic rhinitis, unspecified seasonality, unspecified trigger          History of Present Illness   HPI:  Mason Huntley is a 66 y.o. female who is here today for follow-up regarding chronic hypoxic respiratory failure and severe COPD. She is overall stable from pulmonary perspective. She has been compliant with Advair twice daily and Combivent anywhere between 1 and 4 times daily. She has episodes of postnasal drip which seem to be mostly controlled with Flonase. However, with certain exposures, the postnasal drip seems to be more notable. She has no cough or sputum production. She is using oxygen at 2 L/min around-the-clock, with the exception of while eating. She will then take the oxygen off and saturations typically will stay 89% or greater. No recent ER visits or hospitalizations. Review of Systems   Constitutional:  Negative for appetite change and fever. HENT:  Positive for postnasal drip and rhinorrhea. Negative for ear pain, sneezing, sore throat and trouble swallowing. Respiratory:  Positive for shortness of breath. Cardiovascular:  Negative for chest pain. Musculoskeletal:  Negative for myalgias. Neurological:  Negative for headaches.    All others negative    Medical, Family and Social history reviewed and updated as appropriate    Historical Information   Past Medical History:   Diagnosis Date    Allergic     Asthenia     Asthma     Cataract     COPD (chronic obstructive pulmonary disease) ([address])     History of screening mammography     last assessed: 10/31/2017    Osteoporosis     Pneumonia 1950    Urinary tract infection      Past Surgical History:   Procedure Laterality Date    APPENDECTOMY      onset:     CATARACT EXTRACTION      last assessed: 10/31/2017    COLONOSCOPY  2015    EYE SURGERY  cataracks 2017    HEMORROIDECTOMY  2006    TONSILLECTOMY      onset: 12     Family History   Problem Relation Age of Onset    Arthritis Mother     Breast cancer Mother 80    Heart disease Mother     Hypertension Mother     Osteoporosis Mother     Dementia Mother     COPD Mother     Hearing loss Mother     Diabetes Father     Heart disease Father     Hypertension Father     COPD Father     Ovarian cancer Sister 52    No Known Problems Maternal Grandmother     Hearing loss Maternal Grandfather     No Known Problems Paternal Grandmother     No Known Problems Paternal Grandfather     No Known Problems Maternal Aunt     No Known Problems Maternal Aunt     No Known Problems Paternal Aunt     No Known Problems Paternal Aunt     No Known Problems Paternal Aunt     Diabetes Brother     Diabetes Brother     Diabetes Brother     Hearing loss Brother     Cancer Sister         uterine       Social History     Tobacco Use   Smoking Status Former    Packs/day: 1.00    Years: 40.00    Total pack years: 40.00    Types: Cigarettes    Quit date: 3/7/2005    Years since quittin.7   Smokeless Tobacco Never     Meds/Allergies     Current Outpatient Medications:     albuterol (PROVENTIL HFA,VENTOLIN HFA) 90 mcg/act inhaler, INHALE TWO PUFFS BY MOUTH EVERY FOUR HOURS AS NEEDED FOR WHEEZING, Disp: 18 g, Rfl: 0    ascorbic acid (VITAMIN C) 250 mg tablet, Take 250 mg by mouth daily, Disp: , Rfl:     azithromycin (ZITHROMAX) 250 mg tablet, Take 2 tablets today then 1 tablet daily x 4 days, Disp: 6 tablet, Rfl: 0    b complex vitamins capsule, Take 1 capsule by mouth daily, Disp: , Rfl:     cholecalciferol (VITAMIN D3) 1,000 units tablet, Take 1,000 Units by mouth daily, Disp: , Rfl:     ciclopirox (LOPROX) 0.77 % cream, Apply topically once daily to face until scaly red areas resolve, Disp: 90 g, Rfl: 4    fluocinonide (LIDEX) 0.05 % external solution, Apply topically 2 (two) times a day As needed for itch on scalp., Disp: 60 mL, Rfl: 2    Fluticasone-Salmeterol (Advair Diskus) 250-50 mcg/dose inhaler, Inhale 1 puff 2 (two) times a day Rinse mouth after use., Disp: 180 blister, Rfl: 3    ipratropium-albuterol (COMBIVENT RESPIMAT) inhaler, Inhale 1 puff 4 (four) times a day, Disp: 12 g, Rfl: 3    ketoconazole (NIZORAL) 2 % shampoo, Daily for 2 weeks straight and then on "Mondays, Wednesdays and Fridays":  Lather into scalp and skin on face; leave on for 5 minutes and then rinse off completely. , Disp: 120 mL, Rfl: 11    Misc. Devices (Roller Walker) MISC, Use daily Rollator, Disp: 1 each, Rfl: 0    torsemide (DEMADEX) 10 mg tablet, , Disp: , Rfl:   Allergies   Allergen Reactions    Clarinex [Desloratadine] Wheezing    Bee Venom Swelling and Facial Swelling    Clarithromycin Wheezing     Reaction Date: 29Apr2011;     Food Color Red - Food Allergy Itching    Levofloxacin Other (See Comments)     Blood clot in leg    Loratadine Wheezing     Reaction Date: 29Apr2011;        Vitals: Blood pressure 110/70, pulse 74, temperature (!) 97.4 °F (36.3 °C), resp. rate 18, height 5' (1.524 m), weight 53.5 kg (118 lb), SpO2 99 %. Body mass index is 23.05 kg/m². Oxygen Therapy  SpO2: 99 %  Oxygen Therapy: Supplemental oxygen  O2 Delivery Method: Nasal cannula  O2 Flow Rate (L/min): 2 L/min    Physical Exam   Physical Exam  Constitutional:       General: She is not in acute distress. Appearance: Normal appearance. HENT:      Head: Normocephalic. Mouth/Throat:      Pharynx: No oropharyngeal exudate. Eyes:      General: No scleral icterus. Neck:      Vascular: No JVD. Cardiovascular:      Rate and Rhythm: Normal rate and regular rhythm. Pulmonary:      Breath sounds: No wheezing, rhonchi or rales. Abdominal:      Palpations: Abdomen is soft. Tenderness: There is no abdominal tenderness. Musculoskeletal:         General: No deformity. Cervical back: Neck supple. Lymphadenopathy:      Cervical: No cervical adenopathy. Skin:     General: Skin is warm and dry. Neurological:      Mental Status: She is alert and oriented to person, place, and time. Psychiatric:         Mood and Affect: Mood normal.         Labs: I have personally reviewed pertinent lab results. Lab Results   Component Value Date    WBC 12.21 (H) 09/14/2022    HGB 13.8 09/14/2022    HCT 44.4 09/14/2022    MCV 95 09/14/2022     (L) 09/14/2022     Lab Results   Component Value Date    GLUCOSE 114 02/26/2020    CALCIUM 9.4 08/10/2023     06/25/2015    K 4.2 08/10/2023    CO2 35 (H) 08/10/2023     08/10/2023    BUN 12 08/10/2023    CREATININE 0.51 (L) 08/10/2023     No results found for: "IGE"  Lab Results   Component Value Date    ALT 31 08/10/2023    AST 20 08/10/2023    ALKPHOS 55 08/10/2023    BILITOT 0.51 06/25/2015     Pulmonary function testing:  Performed 5/28/20 and personally interpreted  FEV1/FVC ratio 38%   FEV1 23% predicted  FVC 51% predicted.   Spirometry with severe obstruction and reduced vital capacity likely due to air trapping     Answers submitted by the patient for this visit:  Pulmonology Questionnaire (Submitted on 12/6/2023)  Chief Complaint: Primary symptoms  Chronicity: chronic  When did you first notice your symptoms?: more than 1 year ago  How often do your symptoms occur?: constantly  Since you first noticed this problem, how has it changed?: gradually worsening  Do you have shortness of breath that occurs with effort or exertion?: Yes  Do you have ear congestion?: Yes  Do you have heartburn?: No  Do you have fatigue?: Yes  Do you have nasal congestion?: No  Do you have shortness of breath when lying flat?: No  Do you have shortness of breath when you wake up?: No  Do you have sweats?: No  Have you experienced weight loss?: No  Which of the following makes your symptoms worse?: any activity, change in weather, climbing stairs, exercise, exposure to fumes, exposure to smoke, lying down, minimal activity, strenuous activity, URI

## 2024-02-26 DIAGNOSIS — J44.9 COPD, VERY SEVERE (HCC): ICD-10-CM

## 2024-02-26 RX ORDER — ALBUTEROL SULFATE 90 UG/1
2 AEROSOL, METERED RESPIRATORY (INHALATION) EVERY 4 HOURS PRN
Qty: 18 G | Refills: 0 | Status: SHIPPED | OUTPATIENT
Start: 2024-02-26

## 2024-03-19 ENCOUNTER — OFFICE VISIT (OUTPATIENT)
Dept: DERMATOLOGY | Facility: CLINIC | Age: 79
End: 2024-03-19
Payer: MEDICARE

## 2024-03-19 VITALS — HEIGHT: 60 IN | TEMPERATURE: 97.8 F | WEIGHT: 118 LBS | BODY MASS INDEX: 23.16 KG/M2

## 2024-03-19 DIAGNOSIS — L21.9 SEBORRHEIC DERMATITIS: ICD-10-CM

## 2024-03-19 DIAGNOSIS — D48.9 NEOPLASM OF UNCERTAIN BEHAVIOR: Primary | ICD-10-CM

## 2024-03-19 PROCEDURE — 88305 TISSUE EXAM BY PATHOLOGIST: CPT | Performed by: STUDENT IN AN ORGANIZED HEALTH CARE EDUCATION/TRAINING PROGRAM

## 2024-03-19 PROCEDURE — 88342 IMHCHEM/IMCYTCHM 1ST ANTB: CPT | Performed by: STUDENT IN AN ORGANIZED HEALTH CARE EDUCATION/TRAINING PROGRAM

## 2024-03-19 PROCEDURE — 17000 DESTRUCT PREMALG LESION: CPT | Performed by: DERMATOLOGY

## 2024-03-19 PROCEDURE — 17003 DESTRUCT PREMALG LES 2-14: CPT | Performed by: DERMATOLOGY

## 2024-03-19 PROCEDURE — 99214 OFFICE O/P EST MOD 30 MIN: CPT | Performed by: DERMATOLOGY

## 2024-03-19 PROCEDURE — 11103 TANGNTL BX SKIN EA SEP/ADDL: CPT | Performed by: DERMATOLOGY

## 2024-03-19 PROCEDURE — 88341 IMHCHEM/IMCYTCHM EA ADD ANTB: CPT | Performed by: STUDENT IN AN ORGANIZED HEALTH CARE EDUCATION/TRAINING PROGRAM

## 2024-03-19 PROCEDURE — 11102 TANGNTL BX SKIN SINGLE LES: CPT | Performed by: DERMATOLOGY

## 2024-03-19 RX ORDER — KETOCONAZOLE 20 MG/ML
SHAMPOO TOPICAL
Qty: 120 ML | Refills: 11 | Status: SHIPPED | OUTPATIENT
Start: 2024-03-19

## 2024-03-19 NOTE — PROGRESS NOTES
"[facility] Note     Patient Name: Dianelys Price  Encounter Date: 03/19/2024     Have you been cared for by a [facility] Dermatologist in the last 3 years and, if so, which description applies to you?    Yes.  I have been here within the last 3 years, and my medical history has NOT changed since that time.  I am FEMALE/of child-bearing potential.    REVIEW OF SYSTEMS:  Have you recently had or currently have any of the following? No changes in my recent health.   PAST MEDICAL HISTORY:  Have you personally ever had or currently have any of the following?  If \"YES,\" then please provide more detail. No changes in my medical history.   HISTORY OF IMMUNOSUPPRESSION: Do you have a history of any of the following:  Systemic Immunosuppression such as Diabetes, Biologic or Immunotherapy, Chemotherapy, Organ Transplantation, Bone Marrow Transplantation?  No     Answering \"YES\" requires the addition of the dotphrase \"IMMUNOSUPPRESSED\" as the first diagnosis of the patient's visit.   FAMILY HISTORY:  Any \"first degree relatives\" (parent, brother, sister, or child) with the following?    No changes in my family's known health.   PATIENT EXPERIENCE:    Do you want the Dermatologist to perform a COMPLETE skin exam today including a clinical examination under the \"bra and underwear\" areas?  NO  If necessary, do we have your permission to call and leave a detailed message on your Preferred Phone number that includes your specific medical information?  Yes      Allergies   Allergen Reactions    Clarinex [Desloratadine] Wheezing    Bee Venom Swelling and Facial Swelling    Clarithromycin Wheezing     Reaction Date: 29Apr2011;     Food Color Red - Food Allergy Itching    Levofloxacin Other (See Comments)     Blood clot in leg    Loratadine Wheezing     Reaction Date: 29Apr2011;       Current Outpatient Medications:     albuterol (PROVENTIL HFA,VENTOLIN HFA) 90 mcg/act inhaler, Inhale 2 puffs every 4 (four) hours as " "needed for wheezing, Disp: 18 g, Rfl: 0    ascorbic acid (VITAMIN C) 250 mg tablet, Take 250 mg by mouth daily, Disp: , Rfl:     b complex vitamins capsule, Take 1 capsule by mouth daily, Disp: , Rfl:     cholecalciferol (VITAMIN D3) 1,000 units tablet, Take 1,000 Units by mouth daily, Disp: , Rfl:     ciclopirox (LOPROX) 0.77 % cream, Apply topically once daily to face until scaly red areas resolve, Disp: 90 g, Rfl: 4    fluocinonide (LIDEX) 0.05 % external solution, Apply topically 2 (two) times a day As needed for itch on scalp., Disp: 60 mL, Rfl: 2    Fluticasone-Salmeterol (Advair Diskus) 250-50 mcg/dose inhaler, Inhale 1 puff 2 (two) times a day Rinse mouth after use., Disp: 180 blister, Rfl: 3    ipratropium-albuterol (COMBIVENT RESPIMAT) inhaler, Inhale 1 puff 4 (four) times a day, Disp: 12 g, Rfl: 3    ketoconazole (NIZORAL) 2 % shampoo, Daily for 2 weeks straight and then on \"Mondays, Wednesdays and Fridays\":  Lather into scalp and skin on face; leave on for 5 minutes and then rinse off completely., Disp: 120 mL, Rfl: 11    Misc. Devices (Roller Walker) MISC, Use daily Rollator, Disp: 1 each, Rfl: 0    torsemide (DEMADEX) 10 mg tablet, , Disp: , Rfl:           Whom besides the patient is providing clinical information about today's encounter?   NO ADDITIONAL HISTORIAN (patient alone provided history)    Physical Exam and Assessment/Plan by Diagnosis:  Chief Complaint   Patient presents with    Follow-up     Spot of concerns on the tip of the nose, onset for a year and, it come and goes.      SEBORRHEIC DERMATITIS    Physical Exam:  Anatomic Location Affected:  scalp   Morphological Description:  Mild scale and erythema   Pertinent Positives:  Pertinent Negatives:    Additional History of Present Condition:  Patient here today for refills of Ketoconazole Shampoo. Patient uses for her scalp and reports good relief. Was also prescribed Ciclopirox 0.77% cream for face and does not need refills at this time.  " "    Assessment and Plan:  Based on a thorough discussion of this condition and the management approach to it (including a comprehensive discussion of the known risks, side effects and potential benefits of treatment), the patient (family) agrees to implement the following specific plan:  Ketoconazole 2% Shampoo: Daily for 2 weeks straight and then on \"Mondays, Wednesdays and Fridays\":  Lather into scalp and skin on face; leave on for 5 minutes and then rinse off completely.  Lidex 0.05% Solution: Apply topically twice a day as needed to scalp for itch.         Seborrheic Dermatitis   Seborrheic dermatitis is a common, chronic or relapsing form of eczema/dermatitis that mainly affects the sebaceous, gland-rich regions of the scalp, face, and trunk.  There are infantile and adult forms of seborrhoeic dermatitis. It is sometimes associated with psoriasis and, in that clinical scenario, may be referred to as \"sebo-psoriasis.\"  Seborrheic dermatitis is also known as \"seborrheic eczema.\"  Dandruff (also called \"pityriasis capitis\") is an uninflamed form of seborrhoeic dermatitis. Dandruff presents as bran-like scaly patches scattered within hair-bearing areas of the scalp.  In an infant, this condition may be referred to as \"cradle cap.\"  The cause of seborrheic dermatitis is not completely understood. It is associated with proliferation of various species of the skin commensal Malassezia, in its yeast (non-pathogenic) form. Its metabolites (such as the fatty acids oleic acid, malssezin, and indole-3-carbaldehyde) may cause an inflammatory reaction. Differences in skin barrier lipid content and function may account for individual presentations.    Infantile Seborrheic Dermatitis  Infantile seborrheic dermatitis affects babies under the age of 3 months and usually resolves by 6-12 months of age.  Infantile seborrheic dermatitis causes \"cradle cap\" (diffuse, greasy scaling on scalp). The rash may spread to affect armpit and " "groin folds (a type of \"napkin dermatitis\").  There may be associated salmon-pink colored patches that may flake or peel.  The rash in this case is usually not especially itchy, so the baby often appears undisturbed by the rash, even when more generalized.    Adult Seborrheic Dermatitis  Adult seborrheic dermatitis tends to begin in late adolescence; prevalence is greatest in young adults and in the elderly. It is more common in males than in females.    The following factors are sometimes associated with severe adult seborrheic dermatitis:  Oily skin  Familial tendency to seborrhoeic dermatitis or a family history of psoriasis  Immunosuppression: organ transplant recipient, human immunodeficiency virus (HIV) infection and patients with lymphoma  Neurological and psychiatric diseases: Parkinson disease, tardive dyskinesia, depression, epilepsy, facial nerve palsy, spinal cord injury and congenital disorders such as Down syndrome  Treatment for psoriasis with psoralen and ultraviolet A (PUVA) therapy  Lack of sleep  Stressful events.    In adults, seborrheic dermatitis may typically affect the scalp, face (creases around the nose, behind ears, within eyebrows) and upper trunk. Typical clinical features include:  Winter flares, improving in summer following sun exposure  Minimal itch most of the time  Combination oily and dry mid-facial skin  Ill-defined localized scaly patches or diffuse scale in the scalp  Blepharitis; scaly red eyelid margins  Brookneal-pink, thin, scaly, and ill-defined plaques in skin folds on both sides of the face  Petal or ring-shaped flaky patches on hair-line and on anterior chest  Rash in armpits, under the breasts, in the groin folds and genital creases  Superficial folliculitis (inflamed hair follicles) on cheeks and upper trunk    Seborrheic dermatitis is diagnosed by its clinical appearance and behavior. Skin biopsy may be helpful but is rarely necessary to make this diagnosis.     ACTINIC " KERATOSIS    Physical Exam:  Anatomic Location Affected:  left brow and left cheek   Morphological Description:  pink papule     Additional History of Present Condition:  present on exam     Assessment and Plan:  Based on a thorough discussion of this condition and the management approach to it (including a comprehensive discussion of the known risks, side effects and potential benefits of treatment), the patient (family) agrees to implement the following specific plan:     Cryotherapy     Actinic keratoses are very common on sites repeatedly exposed to the sun, especially the backs of the hands and the face.  They are considered precancers and have a low risk of turning into squamous cell carcinoma. It is rare for a solitary actinic keratosis to evolve into a squamous cell carcinoma (SCC), but the risk is 10-15% when more than 10 actinic keratoses are present. A tender, thickened, ulcerated or enlarging actinic keratosis is suspicious of SCC.    Actinic keratoses may be prevented by strict sun protection. If already present, keratoses may improve with a very high sun protection factor (50+) broad-spectrum sunscreen applied at least daily to affected areas, year-round.  We recommend that sun protective clothing and hats and sunglasses be worn whenever possible.  Note that you can make you own UPF 30 rate clothing using just your own washing machine with a product called sun guard    There are several different options for treating actinic keratoses    Topical “medications such as 5-fluorouracil or Aldara  - good for field treatment ie treats what's seen and not seen    Cryotherapy - good for single spots but treats “only what we see” versus a field treatment    Photodynamic therapy - involves application of a light sensitizing medicine and then exposure to a special light, also a good field treatment       PROCEDURE:  DESTRUCTION OF PRE-MALIGNANT LESIONS  After a thorough discussion of treatment options and  "risk/benefits/alternatives (including but not limited to local pain, scarring, dyspigmentation, blistering, and possible superinfection), verbal and written consent were obtained and the aforementioned lesions were treated on with cryotherapy using liquid nitrogen x 1 cycle for 5-10 seconds.    TOTAL NUMBER of 2 pre-malignant lesions were treated today on the ANATOMIC LOCATION: left brow and left cheek .     The patient tolerated the procedure well, and after-care instructions were provided.    NEOPLASM OF UNCERTAIN BEHAVIOR OF SKIN    Physical Exam:  (Anatomic Location); (Size and Morphological Description); (Differential Diagnosis):  A; left nasal wall, 0.5 cm pearly pink papule basal cell carcinoma   B, right tip of nose, 0.3 cm clear papule , basal cell carcinoma   Pertinent Positives:  Pertinent Negatives:    Additional History of Present Condition:  present on exam     Assessment and Plan:  I have discussed with the patient that a sample of skin via a \"skin biopsy” would be potentially helpful to further make a specific diagnosis under the microscope.  Based on a thorough discussion of this condition and the management approach to it (including a comprehensive discussion of the known risks, side effects and potential benefits of treatment), the patient (family) agrees to implement the following specific plan:    Procedure:  Skin Biopsy.  After a thorough discussion of treatment options and risk/benefits/alternatives (including but not limited to local pain, scarring, dyspigmentation, blistering, possible superinfection, and inability to confirm a diagnosis via histopathology), verbal and written consent were obtained and portion of the rash was biopsied for tissue sample.  See below for consent that was obtained from patient and subsequent Procedure Note.          PROCEDURE TANGENTIAL (SHAVE) BIOPSY NOTE:    Performing Physician:    Anatomic Location; Clinical Description with size (cm); Pre-Op " "Diagnosis:   Specimen A; left nasal wall, 0.5 cm pearly pink papule basal cell carcinoma   Specimen B, right tip of nose, 0.3 cm clear papule , basal cell carcinoma   Post-op diagnosis: Same     Local anesthesia: 1% xylocaine with epi      Topical anesthesia: None    Hemostasis: Aluminum chloride       After obtaining informed consent  at which time there was a discussion about the purpose of biopsy  and low risks of infection and bleeding.  The area was prepped and draped in the usual fashion. Anesthesia was obtained with 1% lidocaine with epinephrine. A shave biopsy to an appropriate sampling depth was obtained by Shave (Dermablade or 15 blade) The resulting wound was covered with surgical ointment and bandaged appropriately.     The patient tolerated the procedure well without complications and was without signs of functional compromise.      Specimen has been sent for review by Dermatopathology.    Standard post-procedure care has been explained and has been included in written form within the patient's copy of Informed Consent.    INFORMED CONSENT DISCUSSION AND POST-OPERATIVE INSTRUCTIONS FOR PATIENT    I.  RATIONALE FOR PROCEDURE  I understand that a skin biopsy allows the Dermatologist to test a lesion or rash under the microscope to obtain a diagnosis.  It usually involves numbing the area with numbing medication and removing a small piece of skin; sometimes the area will be closed with sutures. In this specific procedure, sutures are not usually needed.  If any sutures are placed, then they are usually need to be removed in 2 weeks or less.    I understand that my Dermatologist recommends that a skin \"shave\" biopsy be performed today.  A local anesthetic, similar to the kind that a dentist uses when filling a cavity, will be injected with a very small needle into the skin area to be sampled.  The injected skin and tissue underneath \"will go to sleep” and become numb so no pain should be felt afterwards.  " "An instrument shaped like a tiny \"razor blade\" (shave biopsy instrument) will be used to cut a small piece of tissue and skin from the area so that a sample of tissue can be taken and examined more closely under the microscope.  A slight amount of bleeding will occur, but it will be stopped with direct pressure and a pressure bandage and any other appropriate methods.  I understands that a scar will form where the wound was created.  Surgical ointment will be applied to help protect the wound.  Sutures are not usually needed.    II.  RISKS AND POTENTIAL COMPLICATIONS   I understand the risks and potential complications of a skin biopsy include but are not limited to the following:  Bleeding  Infection  Pain  Scar/keloid  Skin discoloration  Incomplete Removal  Recurrence  Nerve Damage/Numbness/Loss of Function  Allergic Reaction to Anesthesia  Biopsies are diagnostic procedures and based on findings additional treatment or evaluation may be required  Loss or destruction of specimen resulting in no additional findings    My Dermatologist has explained to me the nature of the condition, the nature of the procedure, and the benefits to be reasonably expected compared with alternative approaches.  My Dermatologist has discussed the likelihood of major risks or complications of this procedure including the specific risks listed above, such as bleeding, infection, and scarring/keloid.  I understand that a scar is expected after this procedure.  I understand that my physician cannot predict if the scar will form a \"keloid,\" which extends beyond the borders of the wound that is created.  A keloid is a thick, painful, and bumpy scar.  A keloid can be difficult to treat, as it does not always respond well to therapy, which includes injecting cortisone directly into the keloid every few weeks.  While this usually reduces the pain and size of the scar, it does not eliminate it.      I understand that photographs may be taken " "before and after the procedure.  These will be maintained as part of the medical providers confidential records and may not be made available to me.  I further authorize the medical provider to use the photographs for teaching purposes or to illustrate scientific papers, books, or lectures if in his/her judgment, medical research, education, or science may benefit from its use.    I have had an opportunity to fully inquire about the risks and benefits of this procedure and its alternatives.   I have been given ample time and opportunity to ask questions and to seek a second opinion if I wished to do so.  I acknowledge that there have specifically been no guarantees as to the cosmetic results from the procedure.  I am aware that with any procedure there is always the possibility of an unexpected complication.    III. POST-PROCEDURAL CARE (WHAT YOU WILL NEED TO DO \"AFTER THE BIOPSY\" TO OPTIMIZE HEALING)    Keep the area clean and dry.  Try NOT to remove the bandage or get it wet for the first 24 hours.    Gently clean the area and apply surgical ointment (such as Vaseline petrolatum ointment, which is available \"over the counter\" and not a prescription) to the biopsy site for up to 2 weeks straight.  This acts to protect the wound from the outside world.      Sutures are not usually placed in this procedure.  If any sutures were placed, return for suture removal as instructed (generally 1 week for the face, 2 weeks for the body).      Take Acetaminophen (Tylenol) for discomfort, if no contraindications.  Ibuprofen or aspirin could make bleeding worse.    Call our office immediately for signs of infection: fever, chills, increased redness, warmth, tenderness, discomfort/pain, or pus or foul smell coming from the wound.    WHAT TO DO IF THERE IS ANY BLEEDING?  If a small amount of bleeding is noticed, place a clean cloth over the area and apply firm pressure for ten minutes.  Check the wound after 10 minutes of direct " pressure.  If bleeding persists, try one more time for an additional 10 minutes of direct pressure on the area.  If the bleeding becomes heavier or does not stop after the second attempt, or if you have any other questions about this procedure, then please call your [facility] Dermatologist by calling [phone] ([phone]).     I hereby acknowledge that I have reviewed and verified the site with my Dermatologist and have requested and authorized my Dermatologist to proceed with the procedure.    Scribe Attestation      I,:  Radha Claros am acting as a scribe while in the presence of the attending physician.:       I,:  Olamide Roche MD personally performed the services described in this documentation    as scribed in my presence.:

## 2024-03-19 NOTE — PATIENT INSTRUCTIONS
"Dr. Castellano Shave/Punch Biopsy After Care Instructions      Remove bandage the next day. Keep bandage dry.    Shower or Bathe as usual the next day.    Cleanse the area once daily with saline or hydrogen peroxide.    Apply Vaseline.  WE ADVISE YOU NOT TO USE NEOSPORIN OR ANY TOPICAL ANTIBIOTIC UNLESS INSTRUCTED BY THE DOCTOR.    Cover area with a dressing or Band-Aid if possible.      Continue treatment until completely healed. (Skin appears in pink).    Try to avoid scab formation.      Slight bleeding may occur after the Band-Aid/Dressing is removed or the first few days after the procedure was done.      Don't panic!!  Apply continuous, direct pressure on the dressing over the wound for 15-20 minutes. DO NOT remove dressing.    HINT:  Set a timer for 15-20 minutes to make sure you press on the wound long enough  SOAKING: the dressing before you remove it should decrease chances of bleeding.  If the wound is on the legs or arms, swelling may occur. Elevating the arm or leg above the level of the heart as much as possible will also decrease swelling, promote healing and decrease chances of bleeding.    Treatment with Cryotherapy    The doctor has treated your skin with nitrogen, which is 320 degrees Fahrenheit below zero.  He has given the treated area \"frostbite.\"    Stinging should subside within a few hours.  You can take Tylenol for pain, if needed.    Over the next few days, it is normal if the area becomes reddened, a blood blister, or swollen with fluid.  If the lesion treated was near the eye - you could get a swollen eye over the next few days.  Do not panic!  This is all temporary, and will resolve with time.    There is no special treatment - just keep the area clean.  Makeup and BandAids can be used, if preferred.    When the area starts to dry up and peel off, using Vaseline can help healing.    It usually takes up to a month for it to heal.  Some lesions are recurrent and may require repeat " treatments.  If a lesion has not healed in one month, please don't hesitate to contact us.      If you have any further questions that are not answered here, please call us.  [phone].    Thank you for allowing us to care for you.     ANY QUESTION PLEASE CALL OUR OFFICE AT [phone]).  IF AFTER HOURS, THE ANSWERING SERVICE WILL GET A HOLD OF THE DOCTOR.    PLEASE BE ADVISED THAT BIOPSY RESULTS CAN TAKE UP TO 1 TO 2 WEEKS. YOU WILL RECEIVE THE RESULTS IN [facility] FIRST, BUT PLEASE WAIT FOR THE DOCTOR OR STAFF TO NOTIFY YOU.      THANK YOU!!

## 2024-03-22 PROCEDURE — 88341 IMHCHEM/IMCYTCHM EA ADD ANTB: CPT | Performed by: STUDENT IN AN ORGANIZED HEALTH CARE EDUCATION/TRAINING PROGRAM

## 2024-03-22 PROCEDURE — 88305 TISSUE EXAM BY PATHOLOGIST: CPT | Performed by: STUDENT IN AN ORGANIZED HEALTH CARE EDUCATION/TRAINING PROGRAM

## 2024-03-22 PROCEDURE — 88342 IMHCHEM/IMCYTCHM 1ST ANTB: CPT | Performed by: STUDENT IN AN ORGANIZED HEALTH CARE EDUCATION/TRAINING PROGRAM

## 2024-03-23 NOTE — RESULT ENCOUNTER NOTE
DERMATOPATHOLOGY RESULT NOTE    Results reviewed by ordering physician.  RESIDENTS: Please call patient and review results. Offer mohs for A      Result & Plan by Specimen:    Specimen A: malignant  Plan: MOHs      Specimen B: benign  Plan: reassured, benign

## 2024-03-25 DIAGNOSIS — D04.9 BASAL CELL CARCINOMA (BCC) IN SITU OF SKIN: Primary | ICD-10-CM

## 2024-03-26 ENCOUNTER — TELEPHONE (OUTPATIENT)
Dept: DERMATOLOGY | Facility: CLINIC | Age: 79
End: 2024-03-26

## 2024-03-26 NOTE — LETTER
Dianelys Price     1945    [facility] [address] [place] [address]    Dear Dianelys Price,    You are scheduled to have the MOHS procedure on June 11, 2024 at 11 am for nasal wall with Dr.Nadia Rick. Our office is located in [facility] our address is [address] [place] [address]. Once you arrive please check in with our front staff in [address] and they will escort you to the MOHS waiting room.  If you have someone bringing you to your appointment they may wait in the waiting room or accompany you in your visit.      Below you will find some pre-op instructions along with some information regarding the MOHS procedure.     If you have any questions please call our office at [phone].       Thank you,    [facility]         PRE-OPERATIVE INSTRUCTIONS - MOHS    Before your scheduled surgery, there are a number of important precautions and positive steps you should take to help prepare yourself for a successful treatment and speedy recovery.    Some of the steps, which are listed below, may seem unnecessary and inconvenient, but they are important. For example, when you stop smoking, you increase your ability to heal. Occasionally, there may be valid reasons, personal or medical, why you can't comply. In such cases, please call the office so we can discuss possible ways to overcome any obstacles you may be encountering.    If you have any questions about the surgery, or remember additional medical information that you forgot to mention to our staff, please contact the office prior to your surgery.    GENERAL INFORMATION REGARDING MOHS MICROGRAPHIC SURGERY    Mohs surgery is a specialized technique for the removal of skin cancer developed by Dr. Frederick Mohs over 50 years ago to improve the cure rates of skin cancer. Traditionally, skin cancers are treated by destructive methods (radiation, freezing, scraping, and burning) or  excision (cutting out the tissue with standards margins and sending it to an outside laboratory for testing). These methods all yield cure rates between 65%-94%. However, for cancers located in cosmetically sensitive areas, large tumors, or tumors unsuccessfully treated by other means, Mohs surgery offers a higher cure rate. In most cases, Mohs surgery provides you with a 99% cure rate for primary (previously untreated) basal cell cancer and a 95% cure rate for primary squamous cell cancer. In Mohs surgery, tissue is removed and processed in a way that we are able to check 100% of the margins, giving the highest cure rate for any method of treating skin cancers while providing maximal preservation of normal skin. This allows the surgeon to produce an optimal cosmetic result for the patient by maximizing the amount of tissue removed yielding as small a scar as possible    On the day of surgery, you will be given local anesthesia only (similar to what was given to you during your initial biopsy). You will remain awake. You will verify the location of the skin cancer prior to the onset of the surgery. Once the area is numb, the tissue containing the skin cancer will be removed, taking a small safety margin. This margin is usually smaller than what would be taken with a standard excision. Once the tissue is removed, it is marked and oriented. The first layer (“Stage I”) will be processed in our laboratory. The wound will be treated for bleeding and a bandage will be placed to keep you comfortable while you wait an approximate 45 minutes-1 hour (for the processing of the tissue) in your room. Your Mohs surgeon will examine the pathology in the lab, checking all the margins. If any tumor remains, you will need to take a second layer of skin (“Stage 2”). The area will be re-anesthetized and your Mohs surgeon will remove more skin only in the area where the tumor exists. This process will continue until all the skin cancer  is removed. Unfortunately, there is no method to predict how many layers or stages will be taken.    Once the tumor has been removed completely, we will discuss the best ways to close the defect. Most wounds may be closed with stitches. A larger wound may require a skin graft or a flap. In rare instances, especially for cancers around the eye or for larger cancers, we may work with another surgeon (oculoplastic, ENT, plastics) with special skills to assist with reconstruction.    Medications: Please take all your normal medications the morning of your surgery. If you are a diabetic, please bring your insulin or medications with you, as well as a snack to avoid having low blood sugar during your day with us.     Blood Thinners    VERY IMPORTANT: We do NOT stop or hold prescribed blood thinners (such as Coumadin/Warfarin, Plavix, Eliquis, Pradaxa, Brilinta, Apixaban, Xarelto, Lovonox, Rivaroxaban, or Aggrenox) before Mohs surgery. Additionally, If you take aspirin because you have had a stroke, heart attack, heart disease, other condition, or your physician has prescribed you to take it, please continue your aspirin.    Although there is a risk of increased bruising and bleeding, we are still able to safely perform surgery while continuing these medications. Please NEVER stop your prescribed blood thinner without the managing doctor's (the doctor that prescribed the medication) permission or knowledge. If you have any concerns about not holding your blood thinner, please address these with your Mohs surgeon.    Most people should stop all non-steroidal anti-inflammatory medications (Motrin, Naproxen, Advil, Midol, Aleve, etc.) for 7 days prior to your scheduled surgery and 2 days after (unless instructed otherwise after surgery).  You may take Tylenol for pain.     Antibiotics  If you usually require antibiotics prior to dental work, please let the office know at least 24 hours prior to your surgery. Medical  conditions that sometimes require preoperative antibiotics include artificial heart valves, heart murmurs, artificial joints, and related problems. We may give you a different medication than the dentist, so please contact us for the correct antibiotic.  If you were prescribed pre-operative antibiotics by our office, please take the medication 2 hours prior to your procedure.    Vitamins and Supplements  Avoid taking any supplements with Vitamin E, Fish Oil, Gingko, Ginseng, and Garlic for 2 weeks before and 2 days after your surgery. These thin your blood.    Alcohol: Avoid drinking alcohol for 2 days prior to your surgery, and for 2 days afterwards (it thins the blood and causes more bruising and swelling).    Smoking: Try to STOP or reduce smoking significantly the week before your surgery, and especially the week afterwards (it greatly improves how well you heal). Tobacco smoke deprives the blood of oxygen, which is urgently needed by the wound during the healing process.    Contact Lenses: Do not wear them on the day of the surgery. Instead, wear glasses and bring your case, in case we need to remove them.    Clothing: Do not wear your nicest clothing on your surgery day. We recommend wearing a button down shirt that will not disrupt your post-operative dressing when changing later that night.    Bathing: On the morning of your surgery, you may bathe or shower normally. If you get your hair done on a weekly basis, remember to get your hair washed the day before surgery.   - You will need to keep your surgical site dry for a minimum of 48 hours after surgery.    Makeup: If your surgery is on the face, please do not wear any makeup on the day of the surgery.    Jewelry: Please try to avoid wearing jewelry on the day of surgery.    Food: On the morning of surgery, have breakfast but limit your intake of caffeinated beverages. They are diuretic and may inconvenience you during surgery. If you are following up with  another surgeon the same day as your Mohs surgery, you must receive permission to eat breakfast from that surgeon.      What to bring with you on the day of your surgery:  Bring snacks - Since you could be at the office long, you may bring snacks and/or lunch with you. Some snacks and drinks are available at the office as well.   Bring a sweater - Bring a sweater or jacket that buttons or zips down the front and will not disturb your wound dressing during removal.  Bring something to do - You will be spending much of the day in our office. There will be 45-60 minute waiting periods  between layers/stages, and while there is a television with cable in every room, it is nice to have something to keep you occupied such as books, magazines, knitting, music, or work.     Planning Ahead:  Other Appointments - It is important to realize that no matter how small the skin cancer appears to be, looks can be deceiving. Since your surgery may last the entire day, you should not schedule any other appointments that day.  Special Occasions - Surgery often creates swelling and bruising. Also, the post-op dressing may be rather large and obvious. Keep this in mind as you arrange your social/work schedule. If an important event is already planned, please check with your referring physician or your Mohs surgeon to see if the surgery can be postponed.  Activity Limits after Surgery - If surgery was performed on your face, we recommend that you keep your activity level to a minimum for 2-3 days (the blood pressure elevation related to exercise can lead to bleeding). If you have stitches in an area that will be under tension or significant movement (neck, back, arms, legs), you will need to avoid heavy lifting (anything over 5 lbs) or exercise for at least 2 weeks and possibly longer. We also advise that you limit out of town travel for the first 7 days after surgery. You should also wait at least 7 days before going into a pool or the  ocean.  Housework - Since you will need to minimize activity after surgery, plan to do your groceries, laundry, gardening, and other heavy household chores prior to your surgery. Please make arrangements for assistance during the post-op period. If surgery is around your mouth area, you may need to eat soft foods, such as soup, milkshakes, or yogurt for 48 hours.    Purchasing bandage supplies: Prior to surgery, please purchase the following items to care for your surgical wound properly.  Cotton swabs (Q-tips)  Vaseline or Aquaphor  Telfa pads (or any non-stick dressing)  Paper tape or Hypafix tape  Gauze pads (3x3)      We will provide you with some bandage supplies after surgery to get you started.    Transportation: It is often reassuring and comforting to have a  drive you to and from the surgery. He or she is welcome to wait in the office during the surgery. Please note that for safety reasons, only the patient is allowed in the procedure room during surgery. Thank you for your cooperation.    Rescheduling: If you need to reschedule your surgery, please notify the office as soon as possible.

## 2024-03-26 NOTE — TELEPHONE ENCOUNTER
Pre- operative Mohs Telephone Scheduling Note    Do you have a pacemaker, defibrillator, spinal or brain stimulator? no    Do you take antibiotics before skin or dental procedures? no  If yes, will likely require pre-operative antibiotics. Ask  the patient why they take the antibiotics (usually because of joint replacement).    Do you have a history of a joint replacements within the past 2 years? no   If yes, will likely require pre-operative antibiotics. Ask if orthopaedic surgeon has prescribed pre-operative antibiotics to take before procedures/dental work?    Do you take any OTC medications that thin your blood (Aspirin, Aleve, Ibuprofen) or supplements that thin your blood (fish oil, garlic, vitamin E, Ginko Biloba)? no    Do you take any prescribed medications that thin your blood (Coumadin, Plavix, Xarelto, Eliquis or another prescribed blood thinner)? no    Do you have an allergy to lidocaine or epinephrine? no    Do you have allergies to Iodine? no    Do you wear a lidocaine patch? no    Have you ever been diagnosed with HIV, AIDS, Hep B and Hep C? no    Do you use a cane, walker or wheelchair? yes: walker/cane    Is the patient from a nursing home? no If yes, Is there any special accommodations that is needed for patient n/a    Do you smoke? no      If yes,  patient to try and stop 2 days before surgery and 7 days after the surgery. Minimizing smoking as much as possible during this time will improve healing and the cosmetic result after surgery.    Do you use supplemental oxygen? If so, how many liters and can you be off it for a short period of time? 2 L/ ok off for short amount of time     Date scheduled: June 11, 2024 at 11 am with Dr. Rick    Coordination of Care with other provider (Oculoplastics, Plastics, ENT) required? no   IF YES, PLEASE FORWARD TO APPROPRIATE PERSONNEL TO HELP COORDINATE.    Are there remaining tumors to be scheduled? no    Was Prior Authorization obtained? No (please  use [email or web address] to document prior auth)

## 2024-06-11 ENCOUNTER — PROCEDURE VISIT (OUTPATIENT)
Dept: DERMATOLOGY | Facility: CLINIC | Age: 79
End: 2024-06-11
Payer: MEDICARE

## 2024-06-11 VITALS
DIASTOLIC BLOOD PRESSURE: 62 MMHG | HEART RATE: 68 BPM | HEIGHT: 60 IN | WEIGHT: 119.2 LBS | OXYGEN SATURATION: 95 % | BODY MASS INDEX: 23.4 KG/M2 | SYSTOLIC BLOOD PRESSURE: 110 MMHG | TEMPERATURE: 98.3 F

## 2024-06-11 DIAGNOSIS — D04.9 BASAL CELL CARCINOMA (BCC) IN SITU OF SKIN: ICD-10-CM

## 2024-06-11 PROCEDURE — 17311 MOHS 1 STAGE H/N/HF/G: CPT | Performed by: STUDENT IN AN ORGANIZED HEALTH CARE EDUCATION/TRAINING PROGRAM

## 2024-06-11 PROCEDURE — 14060 TIS TRNFR E/N/E/L 10 SQ CM/<: CPT | Performed by: STUDENT IN AN ORGANIZED HEALTH CARE EDUCATION/TRAINING PROGRAM

## 2024-06-11 NOTE — PATIENT INSTRUCTIONS
"Mohs Microscopic Surgery After Care    WOUND CARE AFTER SURGERY:    Do NOT to remove the pressure bandage for 48 hours. Keep the area clean and dry while this bandage is on.    After removing the bandage for the first time, gently clean the area with soap and water. If the bandage is difficult to remove, getting the bandage wet in the shower will sometimes help soften the adhesive and allow it to be removed more easily.     You will now need to cleanse this area daily in the shower with gentle soap. There is no need to scrub the area. You will need to apply plain Vaseline ointment (this is over the counter and not a prescription) to the site for up to 2 weeks followed by a clean appropriately sized bandage to area.  Non stick dressing and paper tape (or Hypafix) are recommended for sensitive skin but a bandaid is fine if it covers the area well.    All your stitches will dissolve over the next two weeks. You will need to keep these moist with Vaseline and covered with a bandage over the next 2 weeks for them to dissolve appropriately.    RESTRICTIONS:     For two DAYS:   - You will need to take it very easy as this time is highest risk for bleeding. Being a \"couch potato\" during these two days is generally recommended.   - For surgeries on the face/neck/scalp: Avoid leaning down to pick things up off the floor as this brings blood up to your head. Instead, squat down to pick things up.     For two WEEKS:   - No heavy lifting (anything greater than 10 pounds)   - You can start to do slow, gentle activities such as slow walking but nothing to increase your heart rate and blood pressure too much (such as cardiovascular exercise). It is important to take it easy as there is still a risk for bleeding and a high risk popping of stitches open during this time.     If we did surgery near the eyes (including the nose, forehead, front part of your scalp, cheeks): It is VERY common to get a large amount of swelling around your " eyes (puffy eyes). Although less frequent, this can be enough to swell your eyes shut and can also come along with bruising. This should not hurt and is very expected and normal. It is typically worst at ~ 3 days out from your surgery and dramatically better 1 week post-operatively.     If we did surgery around your nose: No blowing your nose as this puts you at higher risk of popping stitches durign this time. Instead dab under your nose with a tissue or use a Q-tip inside your nose.    MANAGING YOUR PAIN AFTER SURGERY     You can expect to have some pain after surgery. This is normal. The pain is typically worse the first two days after surgery, and quickly begins to get better.     The best strategy for controlling your pain after surgery is around the clock pain control. You can take over the counter Acetaminophen (Tylenol) for discomfort, if no contraindications.     If you are taking this at the maximum dose, you can alternate this with Motrin (ibuprofen or Advil) as well. Alternating these medications with each other allows you to maximize your pain control. In addition to Tylenol and Motrin, you can use heating pads or ice packs on your incisions to help reduce your pain.     How will I alternate your regular strength over-the-counter pain medication?  You will take a dose of pain medication every three hours.   Start by taking 650 mg of Tylenol (2 pills of 325 mg)   3 hours later take 600 mg of Motrin (3 pills of 200 mg)   3 hours after taking the Motrin take 650 mg of Tylenol   3 hours after that take 600 mg of Motrin.    See example - if your first dose of Tylenol is at 12:00 PM     12:00 PM  Tylenol 650 mg (2 pills of 325 mg)    3:00 PM  Motrin 600 mg (3 pills of 200 mg)    6:00 PM  Tylenol 650 mg (2 pills of 325 mg)    9:00 PM  Motrin 600 mg (3 pills of 200 mg)    Continue alternating every 3 hours      Important:   Do not take more than 4000mg of Tylenol or 3200mg of Motrin in a 24-hour period.      What if I still have pain?   If you have pain that is not controlled with the over-the-counter pain medications (Tylenol and Motrin or Advil), don't hesitate to call our staff using the number provided. We will help make sure you are managing your pain in the best way possible, and if necessary, we can provide a prescription for additional pain medication.       CALL OUR OFFICE IMMEDIATELY FOR ANY SIGNS OF INFECTION:    This includes fever, chills, increased redness, warmth, tenderness, severe discomfort/pain, or pus or foul smell coming from the wound. If you are experiencing any of the above, please call [facility] directly at [phone].    IF BLEEDING IS NOTICED:    Place a clean cloth over the area and apply firm pressure for thirty minutes.  Check the wound ONLY after 30 minutes of direct pressure; do not cheat and sneak a peak, as that does not count.  If bleeding persists after 30 minutes of legitimate direct pressure, then try one more round of direct pressure to the area.  Should the bleeding become heavier or not stop after the second attempt, call [facility] directly at [phone]. Your call will get routed to the dermatology surgeon on call even after hours.

## 2024-06-11 NOTE — PROGRESS NOTES
MOHS Procedure Note    Patient: Dianelys Price  : 1945  MRN: [id number]  Date: 2024    History of Present Illness: The patient is a 78 y.o. female who presents with complaints of Basal cell carcinoma of the left nasal wall.     Past Medical History:   Diagnosis Date    Allergic     Asthenia     Asthma     Basal cell carcinoma 2024    left nasal wall, mohs    Cataract     COPD (chronic obstructive pulmonary disease) (HCC)     History of screening mammography     last assessed: 10/31/2017    Osteoporosis     Pneumonia 1950    Urinary tract infection        Past Surgical History:   Procedure Laterality Date    APPENDECTOMY      onset:     CATARACT EXTRACTION      last assessed: 10/31/2017    COLONOSCOPY  2015    EYE SURGERY  cataracks 2017    HEMORROIDECTOMY  2006    MOHS SURGERY Left 2024    BCC left nasal wall, Dr Rick    TONSILLECTOMY      onset:          Current Outpatient Medications:     albuterol (PROVENTIL HFA,VENTOLIN HFA) 90 mcg/act inhaler, Inhale 2 puffs every 4 (four) hours as needed for wheezing, Disp: 18 g, Rfl: 0    ascorbic acid (VITAMIN C) 250 mg tablet, Take 250 mg by mouth daily, Disp: , Rfl:     b complex vitamins capsule, Take 1 capsule by mouth daily, Disp: , Rfl:     cholecalciferol (VITAMIN D3) 1,000 units tablet, Take 1,000 Units by mouth daily, Disp: , Rfl:     ciclopirox (LOPROX) 0.77 % cream, Apply topically once daily to face until scaly red areas resolve, Disp: 90 g, Rfl: 11    fluocinonide (LIDEX) 0.05 % external solution, Apply topically 2 (two) times a day As needed for itch on scalp., Disp: 60 mL, Rfl: 2    Fluticasone-Salmeterol (Advair Diskus) 250-50 mcg/dose inhaler, Inhale 1 puff 2 (two) times a day Rinse mouth after use., Disp: 180 blister, Rfl: 3    ipratropium-albuterol (COMBIVENT RESPIMAT) inhaler, Inhale 1 puff 4 (four) times a day, Disp: 12 g, Rfl: 3    ketoconazole (NIZORAL) 2 % shampoo, Daily for 2 weeks straight and then on  "\"Mondays, Wednesdays and Fridays\":  Lather into scalp and skin on face; leave on for 5 minutes and then rinse off completely., Disp: 120 mL, Rfl: 11    Misc. Devices (Roller Walker) MISC, Use daily Rollator, Disp: 1 each, Rfl: 0    torsemide (DEMADEX) 10 mg tablet, , Disp: , Rfl:     Allergies   Allergen Reactions    Clarinex [Desloratadine] Wheezing    Bee Venom Swelling and Facial Swelling    Clarithromycin Wheezing     Reaction Date: 29Apr2011;     Food Color Red - Food Allergy Itching    Levofloxacin Other (See Comments)     Blood clot in leg    Loratadine Wheezing     Reaction Date: 29Apr2011;        Physical Exam:   Vitals:    06/11/24 1112   BP: 110/62   Pulse: 68   Temp: 98.3 °F (36.8 °C)   SpO2: 95%     General: Awake, Alert, Oriented x 3, Mood and affect appropriate  Respiratory: Respirations even and unlabored  Cardiovascular: Peripheral pulses intact; no edema  Musculoskeletal Exam: n/a    Skin: 0.9 cm x 0.8 cm pink pearly macule on the left nasal tip    Assessment: Biopsy confirmed basal cell carcinoma infiltrative type of the left \"nasal wall\".     Plan: Mohs    Time of H&P Completion:1129    MOHS Procedure Timeout      Flowsheet Row Most Recent Value   Timeout: 1149   Patient Identity Verified: Yes   Correct Site Verified: Yes   Correct Procedure Verified: Yes            MOHS Diagnosis/Indication/Location/ID      Flowsheet Row Most Recent Value   Pathology Type Basal cell carcinoma   Anatomic Site --  [left nasal wall]   Indications for MOHS tumor location   MOHS ID [id number]            MOHS Site/Accession/Pre-Post      Flowsheet Row Most Recent Value   Original Site Identified (as submitted by referring clinician) Photo, Referral   Biopsy Accession/Specimen # (as submitted by referring clincian) [id number]   Pre-MOHS Size Length (cm) 0.9   Pre-MOHS Size Width (cm) 0.8   Post-MOHS Size-Length (cm) 1   Post MOHS Size-Width (cm) 1   Repair Type Advancement flap   Suture Type Monocryl plus, Fast " absorbing gut   Fast Absorbing Suture Size 5   Monocryl Plus Suture Size 5   Flap/Graft area (cm2) 2.8   Anesthetic Used 1% Lidocaine with epinephrine  [2.8]            MOHS Tumor Stage 1 Information      Flowsheet Row Most Recent Value   Tissue Sections (blocks) 2   Microscopic Exam Section 1: No tumor identified in section.   Microscopic Exam Section 2: No tumor identified in section.   Tumor Clear After Stage I? Yes               Patient identified, procedure verified, site identified and verified. Time out completed. Surgical removal of the lesion discussed with the patient (risks and benefits, including possibility of scarring, infection, recurrence or potential for further treatment)  I have specifically identified the site with the patient. I have discussed the fact that the patient will have a scar after the procedure regardless of granulation or repair with sutures. I have discussed that the repair options can range from granulation in some cases to linear or curvilinear closures to larger flaps or grafts.  There are sometimes flaps or grafts used that require multiples stages of surgery and will not be completed today, rather be completed over a series of appointments. I have discussed that occasionally due to location, size or depth of the lesion I may recommend consultation with and transfer of care for further removal or the reconstruction to another provider such as ophthalmology surgery, plastic surgery, ENT surgery, or surgical oncology. There are cases in which other testing such as imaging with MRI or CT scan or testing of lymph nodes is recommended because of the nature/depth/location of tumor seen during the removal. There is a risk of injury to nerves causing temporary or permanent numbness or the inability to move muscles full such as the inability to lift eyebrows. Questions answered and verbal and written consent was obtained.    The tumor qualifies for Mohs based on AUC criteria. Dr. Rick  served as the surgeon and pathologist during the procedure.    With the patient in the supine position and under adequate local anesthesia with 1% lidocaine with epinephrine 1:100,000, the defect was scrubbed with Chlorhexidine. Sterile drapes were placed from the sterile tray.      Because of the location of the surgical defect, a subcutaneous island pedicle flap was judged to give the best possible cosmetic and functional result.  The triangular ellipse was drawn from one end of the defect in the most favorable skin tension line. The triangular island pedicle was cut with a #15 blade.  Care was taken not to aggressively undermine this island pedicle, though the surrounding tissue was undermined widely.  A tension bearing suture was placed at the outermost end of the triangular island pedicle flap.  The flap was secured in place by a dermal layer of sutures and the cutaneous margins were approximated and closed by superficial sutures, as noted above.      The risks, complications, and alternatives to the procedure were explained in detail and informed consent was obtained. The described risks include but are not limited to scarring, bleeding, infection, pain, discoloration, numbness, nerve damage leading to loss of muscle movement, recurrence, and spread of the lesion.    The patient tolerated the procedure well.  The wound was dressed with petrolatum, a non-stick pad, and a compression dressing.     Caitlyn Rick MD served as the surgeon and pathologist during the procedure.    Postoperative care: Wound care discussed at length.  I urged the patient to call us if any problems or question should arise.     Complications: none  Post-op medications: none  Patient condition after procedure: stable  Discharge plans: Plan for follow up as planned with general dermatologist for skin checks or sooner if needed for healing surgical site.     Scribe Attestation      I,:  Alondra Christian am acting as a scribe while in  the presence of the attending physician.:       I,:  Caitlyn Rick MD personally performed the services described in this documentation    as scribed in my presence.:

## 2024-06-12 ENCOUNTER — NURSE TRIAGE (OUTPATIENT)
Age: 79
End: 2024-06-12

## 2024-06-12 ENCOUNTER — OFFICE VISIT (OUTPATIENT)
Dept: DERMATOLOGY | Facility: CLINIC | Age: 79
End: 2024-06-12

## 2024-06-12 ENCOUNTER — TELEPHONE (OUTPATIENT)
Age: 79
End: 2024-06-12

## 2024-06-12 DIAGNOSIS — Z48.89 ENCOUNTER FOR POST SURGICAL WOUND CHECK: Primary | ICD-10-CM

## 2024-06-12 PROCEDURE — 99024 POSTOP FOLLOW-UP VISIT: CPT | Performed by: STUDENT IN AN ORGANIZED HEALTH CARE EDUCATION/TRAINING PROGRAM

## 2024-06-12 NOTE — PROGRESS NOTES
WOUND CHECK FOR ACTIVE BLEEDING    Physical Exam:  Anatomic Location Affected & Description of wound: left nasal tip with viable V to Y advancement flap and slow oozing form wound edge   Closure Type: V to Y advancement    Additional History of Present Condition:  Pt notes oozing that she notes through the bandage last night. Mohs was done yesterday with repair. Not on any thinners. Has been active moving her walker and oxygen.    Assessment and Plan:  Based on a thorough discussion of this condition and the management approach to it (including a comprehensive discussion of the known risks, side effects and potential benefits of treatment), the patient (family) agrees to implement the following specific plan:  After numbing with lidocaine 1% (plain), two interrupted horizontal mattress sutures were placed. Wound was watched for 20 minutes with no further bleeding and another pressure bandage was placed.   Scheduled to f/up pt tomorrow to re-evaluate and re-bandage.

## 2024-06-12 NOTE — TELEPHONE ENCOUNTER
"Call transferred from [place], pt with MOHs to nose with Dr. Rick yesterday. Pt relays she noticed some bleeding to nose around 4am this morning, was unsure if nose bleed internally vs mohs site. States she held 30 min of pressure to internal nose area but bleeding persisted. States she did additional 30 min of pressure to mohs site but reported area is tender so she didn't press hard because of that and additionally wanted to be able to breath out of nose. Reiterated instructions per AVS to apply firm direct pressure for 30 min to assist with bleeding, and add cloth over area to help. Explained pressure needs to be firm in order to be effective. Encouraged pt to call back after this to check in. Will route for review in interim.     Answer Assessment - Initial Assessment Questions  1. SYMPTOM: \"What's the main symptom you're concerned about?\" (e.g., redness, pain, drainage)      Blood running out from behind dressing on nose, thought it was on inside of nose so did 30 min of pressure to inside of nose, but now did 30 minutes of pressure to site and still having blood dripping down  2. ONSET: \"When did bleeding  start?\"      4am this am  3. SURGERY: \"What surgery was performed?\"      mohs  4. DATE of SURGERY: \"When was surgery performed?\"       6/11  5. INCISION SITE: \"Where is the incision located?\"       nose  6. REDNESS: \"Is there any redness at the incision site?\" If yes, ask: \"How wide across is the redness?\" (Inches, centimeters)       joseluis  7. PAIN: \"Is there any pain?\" If Yes, ask: \"How bad is it?\"  (Scale 1-10; or mild, moderate, severe)      No only with pressure, didn't press really hard becasuse it is tender  8. BLEEDING: \"Is there any bleeding?\" If Yes, ask: \"How much?\" and \"Where?\"      yes  9. DRAINAGE: \"Is there any drainage from the incision site?\" If yes, ask: \"What color and how much?\" (e.g., red, cloudy, pus; drops, teaspoon)      None just blood  10. FEVER: \"Do you have a fever?\" If Yes, ask: \"What " "is your temperature, how was it measured, and when did it start?\"        none  11. OTHER SYMPTOMS: \"Do you have any other symptoms?\" (e.g., shaking chills, weakness, rash elsewhere on body)        none    Protocols used: Post-Op Incision Symptoms and Questions-ADULT-[facility]    "

## 2024-06-13 ENCOUNTER — OFFICE VISIT (OUTPATIENT)
Dept: DERMATOLOGY | Facility: CLINIC | Age: 79
End: 2024-06-13

## 2024-06-13 DIAGNOSIS — Z48.89 ENCOUNTER FOR POST SURGICAL WOUND CHECK: Primary | ICD-10-CM

## 2024-06-13 PROCEDURE — 99024 POSTOP FOLLOW-UP VISIT: CPT | Performed by: STUDENT IN AN ORGANIZED HEALTH CARE EDUCATION/TRAINING PROGRAM

## 2024-06-13 NOTE — PROGRESS NOTES
WOUND CHECK    Physical Exam:  Anatomic Location Affected:  tip of nose  Description of wound: well healing wound, free from bleeding at this time. Sutures well intact  Closure Type: V to Y advancement flap    Additional History of Present Condition:  s/p Mohs sx on 6/11/24     Assessment and Plan:  Based on a thorough discussion of this condition and the management approach to it (including a comprehensive discussion of the known risks, side effects and potential benefits of treatment), the patient (family) agrees to implement the following specific plan:  Patient will continue with pressure dressing placed today.   Patient will remove today's dressing in 48 hrs.   Patient will then gently cleanse the wound and apply vaseline and a bandage until seen next week for horizontal mattress stitch removal.          Scribe Attestation      I,:  Carly Sullivan RN am acting as a scribe while in the presence of the attending physician.:       I,:  Caitlyn Rick MD personally performed the services described in this documentation    as scribed in my presence.:

## 2024-06-20 ENCOUNTER — OFFICE VISIT (OUTPATIENT)
Dept: DERMATOLOGY | Facility: CLINIC | Age: 79
End: 2024-06-20

## 2024-06-20 DIAGNOSIS — Z48.89 ENCOUNTER FOR POST SURGICAL WOUND CHECK: Primary | ICD-10-CM

## 2024-06-20 PROCEDURE — 99024 POSTOP FOLLOW-UP VISIT: CPT | Performed by: STUDENT IN AN ORGANIZED HEALTH CARE EDUCATION/TRAINING PROGRAM

## 2024-06-20 NOTE — PROGRESS NOTES
WOUND CHECK    Physical Exam:  Anatomic Location Affected:  left nasal wall  Description of wound: well healing wound, free from signs or symptoms of infection  Closure Type: V to Y advancement flap    Additional History of Present Condition:  s/p Mohs sx on 6/11/24    Assessment and Plan:  Based on a thorough discussion of this condition and the management approach to it (including a comprehensive discussion of the known risks, side effects and potential benefits of treatment), the patient (family) agrees to implement the following specific plan:  3 remaining sutures removed.   Patient will continue to cleanse wound daily and apply vaseline ointment until fully healed.  To call if any further concerns.  Discussed resolution of bruising with time        Scribe Attestation      I,:  Carly Sullivan RN am acting as a scribe while in the presence of the attending physician.:       I,:  Caitlyn Rick MD personally performed the services described in this documentation    as scribed in my presence.:

## 2024-07-24 ENCOUNTER — OFFICE VISIT (OUTPATIENT)
Dept: PULMONOLOGY | Facility: CLINIC | Age: 79
End: 2024-07-24
Payer: MEDICARE

## 2024-07-24 VITALS
TEMPERATURE: 96.5 F | OXYGEN SATURATION: 96 % | SYSTOLIC BLOOD PRESSURE: 120 MMHG | WEIGHT: 115.4 LBS | BODY MASS INDEX: 22.54 KG/M2 | DIASTOLIC BLOOD PRESSURE: 66 MMHG | HEART RATE: 71 BPM

## 2024-07-24 DIAGNOSIS — J44.9 COPD, VERY SEVERE (HCC): Primary | ICD-10-CM

## 2024-07-24 DIAGNOSIS — J96.11 CHRONIC RESPIRATORY FAILURE WITH HYPOXIA (HCC): ICD-10-CM

## 2024-07-24 PROCEDURE — 99214 OFFICE O/P EST MOD 30 MIN: CPT | Performed by: INTERNAL MEDICINE

## 2024-07-24 RX ORDER — PREDNISONE 10 MG/1
TABLET ORAL
Qty: 30 TABLET | Refills: 0 | Status: SHIPPED | OUTPATIENT
Start: 2024-07-24

## 2024-07-24 RX ORDER — IPRATROPIUM BROMIDE AND ALBUTEROL SULFATE 2.5; .5 MG/3ML; MG/3ML
3 SOLUTION RESPIRATORY (INHALATION) 4 TIMES DAILY
Qty: 360 ML | Refills: 3 | Status: SHIPPED | OUTPATIENT
Start: 2024-07-24

## 2024-07-24 NOTE — PROGRESS NOTES
Pulmonary Follow Up Note   Dianelys Price 78 y.o. female MRN: [id number]  7/24/2024      Assessment/Plan:     COPD, very severe (HCC)  Despite having advanced lung disease, she is overall stable.  She will continue with Advair and Combivent.  I have prescribed DuoNebs to have on hand for the nebulizer in the event she is unable to use the Combivent for any reason, specifically if she develops an illness that makes it more challenging for her to use the inhalers.    I have also prescribed a prednisone taper to have on hand in the event she develops an exacerbation.  She already has Z-Reji at home and instructions were given as to when she would use these medications.    Chronic respiratory failure with hypoxia (HCC)  She uses oxygen at 2 L/min continuously.  She has an Inogen that she purchased out-of-pocket, however if there is any concern that it is not functioning properly, we could pursue coverage through Medicare.  This would necessitate an oxygen titration study for the prescription.      Return in about 6 months (around 1/24/2025).    Visit orders:    Diagnoses and all orders for this visit:    COPD, very severe (HCC)  -     predniSONE 10 mg tablet; 4 tabs daily x 3 D then 3 tabs daily x 3 D then 2 tabs daily x 3 D then 1 tab daily x 3 D  -     ipratropium-albuterol (DUO-NEB) 0.5-2.5 mg/3 mL nebulizer solution; Take 3 mL by nebulization 4 (four) times a day    Chronic respiratory failure with hypoxia (HCC)      History of Present Illness   HPI:  Dianelys Price is a 78 y.o. female who is here today for follow-up regarding severe COPD and chronic hypoxic respiratory failure.  She is overall stable from pulmonary perspective.  No recent ER visits or hospitalizations.  She uses oxygen at 2 L/min around-the-clock.  She purchased an Inogen device several years ago which has been working well for her, but she is concerned about its life expectancy.  She states that if she would experience failure of the equipment, she  would like to pursue obtaining an Inogen through Medicare.  She has no significant cough, wheeze or sputum production.  She has a nebulizer but does not have any medications for it.  She is compliant with Advair twice daily and DuoNeb 4 times daily.    Review of Systems   Constitutional:  Negative for appetite change and fever.   HENT:  Positive for postnasal drip and rhinorrhea. Negative for ear pain, sneezing, sore throat and trouble swallowing.    Respiratory:  Positive for shortness of breath.    Cardiovascular:  Negative for chest pain.   Musculoskeletal:  Negative for myalgias.   Neurological:  Negative for headaches.         Medical, Family and Social history reviewed and updated as appropriate    Historical Information   Past Medical History:   Diagnosis Date    Allergic 1961    Asthenia     Asthma     Basal cell carcinoma 03/19/2024    left nasal wall, mohs    Cataract     COPD (chronic obstructive pulmonary disease) (HCC)     History of screening mammography     last assessed: 10/31/2017    Osteoporosis     Pneumonia 1950    Urinary tract infection      Past Surgical History:   Procedure Laterality Date    APPENDECTOMY      onset: 1966    CATARACT EXTRACTION  2017    last assessed: 10/31/2017    COLONOSCOPY  2015    EYE SURGERY  cataracks 2017    HEMORROIDECTOMY  2006    MOHS SURGERY Left 06/11/2024    BCC left nasal wall, Dr Rick    TONSILLECTOMY  1954    onset: 1954     Family History   Problem Relation Age of Onset    Arthritis Mother     Breast cancer Mother 80    Heart disease Mother     Hypertension Mother     Osteoporosis Mother     Dementia Mother     COPD Mother     Hearing loss Mother     Diabetes Father     Heart disease Father     Hypertension Father     COPD Father     Ovarian cancer Sister 49    No Known Problems Maternal Grandmother     Hearing loss Maternal Grandfather     No Known Problems Paternal Grandmother     No Known Problems Paternal Grandfather     No Known Problems Maternal Aunt   "   No Known Problems Maternal Aunt     No Known Problems Paternal Aunt     No Known Problems Paternal Aunt     No Known Problems Paternal Aunt     Diabetes Brother     Diabetes Brother     Diabetes Brother     Hearing loss Brother     Cancer Sister         uterine       Social History     Tobacco Use   Smoking Status Former    Current packs/day: 0.00    Average packs/day: 1 pack/day for 40.0 years (40.0 ttl pk-yrs)    Types: Cigarettes    Start date: 3/7/1965    Quit date: 3/7/2005    Years since quittin.3   Smokeless Tobacco Never         Meds/Allergies     Current Outpatient Medications:     albuterol (PROVENTIL HFA,VENTOLIN HFA) 90 mcg/act inhaler, Inhale 2 puffs every 4 (four) hours as needed for wheezing, Disp: 18 g, Rfl: 0    ascorbic acid (VITAMIN C) 250 mg tablet, Take 250 mg by mouth daily, Disp: , Rfl:     b complex vitamins capsule, Take 1 capsule by mouth daily, Disp: , Rfl:     cholecalciferol (VITAMIN D3) 1,000 units tablet, Take 1,000 Units by mouth daily, Disp: , Rfl:     ciclopirox (LOPROX) 0.77 % cream, Apply topically once daily to face until scaly red areas resolve, Disp: 90 g, Rfl: 11    fluocinonide (LIDEX) 0.05 % external solution, Apply topically 2 (two) times a day As needed for itch on scalp., Disp: 60 mL, Rfl: 2    Fluticasone-Salmeterol (Advair Diskus) 250-50 mcg/dose inhaler, Inhale 1 puff 2 (two) times a day Rinse mouth after use., Disp: 180 blister, Rfl: 3    ipratropium-albuterol (COMBIVENT RESPIMAT) inhaler, Inhale 1 puff 4 (four) times a day, Disp: 12 g, Rfl: 3    ipratropium-albuterol (DUO-NEB) 0.5-2.5 mg/3 mL nebulizer solution, Take 3 mL by nebulization 4 (four) times a day, Disp: 360 mL, Rfl: 3    ketoconazole (NIZORAL) 2 % shampoo, Daily for 2 weeks straight and then on \",  and \":  Lather into scalp and skin on face; leave on for 5 minutes and then rinse off completely., Disp: 120 mL, Rfl: 11    Misc. Devices (Roller Walker) MISC, Use daily " Rollator, Disp: 1 each, Rfl: 0    predniSONE 10 mg tablet, 4 tabs daily x 3 D then 3 tabs daily x 3 D then 2 tabs daily x 3 D then 1 tab daily x 3 D, Disp: 30 tablet, Rfl: 0    torsemide (DEMADEX) 10 mg tablet, , Disp: , Rfl:   Allergies   Allergen Reactions    Clarinex [Desloratadine] Wheezing    Bee Venom Swelling and Facial Swelling    Clarithromycin Wheezing     Reaction Date: 29Apr2011;     Food Color Red - Food Allergy Itching    Levofloxacin Other (See Comments)     Blood clot in leg    Loratadine Wheezing     Reaction Date: 29Apr2011;        Vitals: Blood pressure 120/66, pulse 71, temperature (!) 96.5 °F (35.8 °C), weight 52.3 kg (115 lb 6.4 oz), SpO2 96%. Body mass index is 22.54 kg/m². Oxygen Therapy  SpO2: 96 %  Oxygen Therapy: Supplemental oxygen  O2 Delivery Method: Nasal cannula  O2 Flow Rate (L/min): 2 L/min    Physical Exam   Physical Exam  Constitutional:       General: She is not in acute distress.     Appearance: Normal appearance.   HENT:      Head: Normocephalic.      Mouth/Throat:      Pharynx: No oropharyngeal exudate.   Eyes:      General: No scleral icterus.  Neck:      Vascular: No JVD.   Cardiovascular:      Rate and Rhythm: Normal rate and regular rhythm.   Pulmonary:      Breath sounds: Wheezing (Scant expiratory) present.   Musculoskeletal:         General: No deformity.      Cervical back: Neck supple.   Lymphadenopathy:      Cervical: No cervical adenopathy.   Skin:     General: Skin is warm and dry.   Neurological:      Mental Status: She is alert and oriented to person, place, and time.   Psychiatric:         Mood and Affect: Mood normal.         Labs: I have personally reviewed pertinent lab results.  Lab Results   Component Value Date    WBC 12.21 (H) 09/14/2022    HGB 13.8 09/14/2022    HCT 44.4 09/14/2022    MCV 95 09/14/2022     (L) 09/14/2022     Lab Results   Component Value Date    GLUCOSE 114 02/26/2020    CALCIUM 9.4 08/10/2023     06/25/2015    K 4.2  "08/10/2023    CO2 35 (H) 08/10/2023     08/10/2023    BUN 12 08/10/2023    CREATININE 0.51 (L) 08/10/2023     No results found for: \"IGE\"  Lab Results   Component Value Date    ALT 31 08/10/2023    AST 20 08/10/2023    ALKPHOS 55 08/10/2023    BILITOT 0.51 06/25/2015     Pulmonary function testing:  Performed 5/28/20 and personally interpreted  FEV1/FVC ratio 38%   FEV1 23% predicted  FVC 51% predicted.  Spirometry with severe obstruction and reduced vital capacity likely due to air trapping    Answers submitted by the patient for this visit:  Pulmonology Questionnaire (Submitted on 7/21/2024)  Chief Complaint: Primary symptoms  When did you first notice your symptoms?: more than 1 year ago  How often do your symptoms occur?: daily  Since you first noticed this problem, how has it changed?: gradually worsening  Do you have shortness of breath that occurs with effort or exertion?: Yes  Do you have ear congestion?: Yes  Do you have heartburn?: No  Do you have fatigue?: No  Do you have nasal congestion?: No  Do you have shortness of breath when lying flat?: Yes  Do you have shortness of breath when you wake up?: Yes  Do you have sweats?: No  Have you experienced weight loss?: No  Which of the following makes your symptoms worse?: change in weather, climbing stairs, exercise, exposure to fumes, exposure to smoke, strenuous activity, URI  Which of the following makes your symptoms better?: beta-agonist, ipratropium, rest, steroid inhaler    "

## 2024-07-24 NOTE — ASSESSMENT & PLAN NOTE
She uses oxygen at 2 L/min continuously.  She has an Inogen that she purchased out-of-pocket, however if there is any concern that it is not functioning properly, we could pursue coverage through Medicare.  This would necessitate an oxygen titration study for the prescription.

## 2024-07-24 NOTE — PATIENT INSTRUCTIONS
I have sent prescriptions in for the following medications:    Prednisone taper in the event you develop wheezing that does not resolve with your Combivent and/or nebulizer regimen  DuoNeb for your nebulizer if you get sick and are unable to get the full benefit from your Combivent inhaler  The Z-Reji you have at home is to be used if you develop an infection which would be indicated by increased cough, congestion and sputum that is different in color from your usual.    If you need to initiate any of the above, please call the office for further instructions

## 2024-07-24 NOTE — ASSESSMENT & PLAN NOTE
Despite having advanced lung disease, she is overall stable.  She will continue with Advair and Combivent.  I have prescribed DuoNebs to have on hand for the nebulizer in the event she is unable to use the Combivent for any reason, specifically if she develops an illness that makes it more challenging for her to use the inhalers.    I have also prescribed a prednisone taper to have on hand in the event she develops an exacerbation.  She already has Z-Reji at home and instructions were given as to when she would use these medications.

## 2024-07-26 ENCOUNTER — APPOINTMENT (OUTPATIENT)
Dept: LAB | Facility: CLINIC | Age: 79
End: 2024-07-26
Payer: MEDICARE

## 2024-07-26 DIAGNOSIS — R94.6 ABNORMAL RESULTS OF THYROID FUNCTION STUDIES: ICD-10-CM

## 2024-07-26 DIAGNOSIS — J96.11 CHRONIC HYPOXEMIC RESPIRATORY FAILURE (HCC): ICD-10-CM

## 2024-07-26 DIAGNOSIS — R60.0 LEG EDEMA: ICD-10-CM

## 2024-07-26 DIAGNOSIS — J44.9 COPD, SEVERE (HCC): ICD-10-CM

## 2024-07-26 LAB
ALBUMIN SERPL BCG-MCNC: 4.1 G/DL (ref 3.5–5)
ALP SERPL-CCNC: 47 U/L (ref 34–104)
ALT SERPL W P-5'-P-CCNC: 15 U/L (ref 7–52)
ANION GAP SERPL CALCULATED.3IONS-SCNC: 5 MMOL/L (ref 4–13)
AST SERPL W P-5'-P-CCNC: 21 U/L (ref 13–39)
BILIRUB SERPL-MCNC: 0.42 MG/DL (ref 0.2–1)
BUN SERPL-MCNC: 13 MG/DL (ref 5–25)
CALCIUM SERPL-MCNC: 9.6 MG/DL (ref 8.4–10.2)
CHLORIDE SERPL-SCNC: 98 MMOL/L (ref 96–108)
CO2 SERPL-SCNC: 39 MMOL/L (ref 21–32)
CREAT SERPL-MCNC: 0.46 MG/DL (ref 0.6–1.3)
GFR SERPL CREATININE-BSD FRML MDRD: 95 ML/MIN/1.73SQ M
GLUCOSE P FAST SERPL-MCNC: 99 MG/DL (ref 65–99)
POTASSIUM SERPL-SCNC: 4.7 MMOL/L (ref 3.5–5.3)
PROT SERPL-MCNC: 6.7 G/DL (ref 6.4–8.4)
SODIUM SERPL-SCNC: 142 MMOL/L (ref 135–147)
TSH SERPL DL<=0.05 MIU/L-ACNC: 2.27 UIU/ML (ref 0.45–4.5)

## 2024-07-26 PROCEDURE — 84443 ASSAY THYROID STIM HORMONE: CPT

## 2024-07-26 PROCEDURE — 80053 COMPREHEN METABOLIC PANEL: CPT

## 2024-07-26 PROCEDURE — 36415 COLL VENOUS BLD VENIPUNCTURE: CPT

## 2024-09-19 ENCOUNTER — TELEPHONE (OUTPATIENT)
Age: 79
End: 2024-09-19

## 2024-09-19 NOTE — TELEPHONE ENCOUNTER
When pt comes in for her wellness on 11/11.; she needs a form filled and signed by her PCP Dr. Saha. Pt will be moving into an assisted living facility and needs to settle with them by 11/15 and form needs to be completed.

## 2024-09-26 ENCOUNTER — TELEPHONE (OUTPATIENT)
Dept: FAMILY MEDICINE CLINIC | Facility: MEDICAL CENTER | Age: 79
End: 2024-09-26

## 2024-10-08 NOTE — ASSESSMENT & PLAN NOTE
Oxygenation is adequate on 2 liters/minute 
Patient believes that the edema is related to steroids  This may be the case, but would also entertain whether she could have developed any element of right-sided heart failure  I suggested that we schedule an echocardiogram to be sure it has not changed since January 2020  At that time, she had grade 1 diastolic dysfunction but no evidence of RV dysfunction  She will also continue to elevate the legs    She has compression socks at home, but finds them difficult to tolerate
Patient has advanced COPD and has not regained her previous level of function since her admission in May  Steroids did not seem to be very beneficial and appear to have contributed to fluid retention  I hear no wheezing on exam today  She will continue with Advair and Combivent 
no

## 2024-10-09 DIAGNOSIS — J44.9 COPD, VERY SEVERE (HCC): ICD-10-CM

## 2024-10-10 RX ORDER — ALBUTEROL SULFATE 90 UG/1
2 INHALANT RESPIRATORY (INHALATION) EVERY 4 HOURS PRN
Qty: 18 G | Refills: 0 | Status: SHIPPED | OUTPATIENT
Start: 2024-10-10

## 2024-11-02 ENCOUNTER — APPOINTMENT (INPATIENT)
Dept: CT IMAGING | Facility: HOSPITAL | Age: 79
DRG: 190 | End: 2024-11-02
Payer: MEDICARE

## 2024-11-02 ENCOUNTER — HOSPITAL ENCOUNTER (INPATIENT)
Facility: HOSPITAL | Age: 79
LOS: 5 days | Discharge: HOME HEALTH | DRG: 190 | End: 2024-11-07
Attending: EMERGENCY MEDICINE | Admitting: FAMILY MEDICINE
Payer: MEDICARE

## 2024-11-02 ENCOUNTER — APPOINTMENT (EMERGENCY)
Dept: RADIOLOGY | Facility: HOSPITAL | Age: 79
DRG: 190 | End: 2024-11-02
Payer: MEDICARE

## 2024-11-02 DIAGNOSIS — E87.29 RESPIRATORY ACIDOSIS: ICD-10-CM

## 2024-11-02 DIAGNOSIS — J44.9 COPD, VERY SEVERE (HCC): ICD-10-CM

## 2024-11-02 DIAGNOSIS — J44.1 COPD WITH ACUTE EXACERBATION (HCC): Primary | ICD-10-CM

## 2024-11-02 DIAGNOSIS — J96.11 CHRONIC RESPIRATORY FAILURE WITH HYPOXIA (HCC): ICD-10-CM

## 2024-11-02 DIAGNOSIS — R47.01 GLOBAL APHASIA: ICD-10-CM

## 2024-11-02 PROBLEM — R65.10 SIRS (SYSTEMIC INFLAMMATORY RESPONSE SYNDROME) (HCC): Status: ACTIVE | Noted: 2024-11-02

## 2024-11-02 LAB
2HR DELTA HS TROPONIN: 5 NG/L
ALBUMIN SERPL BCG-MCNC: 3.6 G/DL (ref 3.5–5)
ALP SERPL-CCNC: 62 U/L (ref 34–104)
ALT SERPL W P-5'-P-CCNC: 14 U/L (ref 7–52)
ANION GAP SERPL CALCULATED.3IONS-SCNC: 7 MMOL/L (ref 4–13)
AST SERPL W P-5'-P-CCNC: 16 U/L (ref 13–39)
BASOPHILS # BLD AUTO: 0.04 THOUSANDS/ÂΜL (ref 0–0.1)
BASOPHILS NFR BLD AUTO: 0 % (ref 0–1)
BILIRUB SERPL-MCNC: 0.43 MG/DL (ref 0.2–1)
BUN SERPL-MCNC: 15 MG/DL (ref 5–25)
CA-I BLD-SCNC: 1.06 MMOL/L (ref 1.12–1.32)
CALCIUM SERPL-MCNC: 9.2 MG/DL (ref 8.4–10.2)
CARDIAC TROPONIN I PNL SERPL HS: 39 NG/L (ref 8–18)
CARDIAC TROPONIN I PNL SERPL HS: 47 NG/L
CARDIAC TROPONIN I PNL SERPL HS: 52 NG/L
CHLORIDE SERPL-SCNC: 95 MMOL/L (ref 96–108)
CO2 SERPL-SCNC: 39 MMOL/L (ref 21–32)
CREAT SERPL-MCNC: 0.33 MG/DL (ref 0.6–1.3)
EOSINOPHIL # BLD AUTO: 0 THOUSAND/ÂΜL (ref 0–0.61)
EOSINOPHIL NFR BLD AUTO: 0 % (ref 0–6)
ERYTHROCYTE [DISTWIDTH] IN BLOOD BY AUTOMATED COUNT: 11.9 % (ref 11.6–15.1)
FLUAV AG UPPER RESP QL IA.RAPID: NEGATIVE
FLUBV AG UPPER RESP QL IA.RAPID: NEGATIVE
GFR SERPL CREATININE-BSD FRML MDRD: 106 ML/MIN/1.73SQ M
GLUCOSE SERPL-MCNC: 136 MG/DL (ref 65–140)
GLUCOSE SERPL-MCNC: 222 MG/DL (ref 65–140)
GLUCOSE SERPL-MCNC: 239 MG/DL (ref 65–140)
HCT VFR BLD AUTO: 41 % (ref 34.8–46.1)
HCT VFR BLD CALC: 32 % (ref 34.8–46.1)
HGB BLD-MCNC: 12.2 G/DL (ref 11.5–15.4)
HGB BLDA-MCNC: 10.9 G/DL (ref 11.5–15.4)
IMM GRANULOCYTES # BLD AUTO: 0.08 THOUSAND/UL (ref 0–0.2)
IMM GRANULOCYTES NFR BLD AUTO: 1 % (ref 0–2)
LYMPHOCYTES # BLD AUTO: 0.95 THOUSANDS/ÂΜL (ref 0.6–4.47)
LYMPHOCYTES NFR BLD AUTO: 6 % (ref 14–44)
MAGNESIUM SERPL-MCNC: 1.9 MG/DL (ref 1.9–2.7)
MCH RBC QN AUTO: 28.7 PG (ref 26.8–34.3)
MCHC RBC AUTO-ENTMCNC: 29.8 G/DL (ref 31.4–37.4)
MCV RBC AUTO: 97 FL (ref 82–98)
MONOCYTES # BLD AUTO: 1.4 THOUSAND/ÂΜL (ref 0.17–1.22)
MONOCYTES NFR BLD AUTO: 9 % (ref 4–12)
NEUTROPHILS # BLD AUTO: 12.95 THOUSANDS/ÂΜL (ref 1.85–7.62)
NEUTS SEG NFR BLD AUTO: 84 % (ref 43–75)
NRBC BLD AUTO-RTO: 0 /100 WBCS
PCO2 BLD: >103 MM HG (ref 42–50)
PH BLD: 7.14 [PH] (ref 7.3–7.4)
PLATELET # BLD AUTO: 242 THOUSANDS/UL (ref 149–390)
PMV BLD AUTO: 10.3 FL (ref 8.9–12.7)
PO2 BLD: 60 MM HG (ref 35–45)
POTASSIUM BLD-SCNC: 2.9 MMOL/L (ref 3.5–5.3)
POTASSIUM SERPL-SCNC: 4 MMOL/L (ref 3.5–5.3)
PROCALCITONIN SERPL-MCNC: 0.26 NG/ML
PROT SERPL-MCNC: 7.1 G/DL (ref 6.4–8.4)
RBC # BLD AUTO: 4.25 MILLION/UL (ref 3.81–5.12)
SARS-COV+SARS-COV-2 AG RESP QL IA.RAPID: NEGATIVE
SODIUM BLD-SCNC: 146 MMOL/L (ref 136–145)
SODIUM SERPL-SCNC: 141 MMOL/L (ref 135–147)
SPECIMEN SOURCE: ABNORMAL
TSH SERPL DL<=0.05 MIU/L-ACNC: 0.68 UIU/ML (ref 0.45–4.5)
WBC # BLD AUTO: 15.42 THOUSAND/UL (ref 4.31–10.16)

## 2024-11-02 PROCEDURE — 82330 ASSAY OF CALCIUM: CPT

## 2024-11-02 PROCEDURE — 94640 AIRWAY INHALATION TREATMENT: CPT

## 2024-11-02 PROCEDURE — 85014 HEMATOCRIT: CPT

## 2024-11-02 PROCEDURE — 36415 COLL VENOUS BLD VENIPUNCTURE: CPT | Performed by: EMERGENCY MEDICINE

## 2024-11-02 PROCEDURE — 84484 ASSAY OF TROPONIN QUANT: CPT | Performed by: FAMILY MEDICINE

## 2024-11-02 PROCEDURE — NC001 PR NO CHARGE: Performed by: PHYSICIAN ASSISTANT

## 2024-11-02 PROCEDURE — 94660 CPAP INITIATION&MGMT: CPT

## 2024-11-02 PROCEDURE — 83735 ASSAY OF MAGNESIUM: CPT | Performed by: EMERGENCY MEDICINE

## 2024-11-02 PROCEDURE — 84443 ASSAY THYROID STIM HORMONE: CPT | Performed by: FAMILY MEDICINE

## 2024-11-02 PROCEDURE — 84484 ASSAY OF TROPONIN QUANT: CPT | Performed by: EMERGENCY MEDICINE

## 2024-11-02 PROCEDURE — 99285 EMERGENCY DEPT VISIT HI MDM: CPT

## 2024-11-02 PROCEDURE — 82948 REAGENT STRIP/BLOOD GLUCOSE: CPT

## 2024-11-02 PROCEDURE — 99285 EMERGENCY DEPT VISIT HI MDM: CPT | Performed by: EMERGENCY MEDICINE

## 2024-11-02 PROCEDURE — 84145 PROCALCITONIN (PCT): CPT | Performed by: FAMILY MEDICINE

## 2024-11-02 PROCEDURE — 80053 COMPREHEN METABOLIC PANEL: CPT | Performed by: EMERGENCY MEDICINE

## 2024-11-02 PROCEDURE — 84295 ASSAY OF SERUM SODIUM: CPT

## 2024-11-02 PROCEDURE — 87811 SARS-COV-2 COVID19 W/OPTIC: CPT | Performed by: EMERGENCY MEDICINE

## 2024-11-02 PROCEDURE — 99223 1ST HOSP IP/OBS HIGH 75: CPT | Performed by: FAMILY MEDICINE

## 2024-11-02 PROCEDURE — 85025 COMPLETE CBC W/AUTO DIFF WBC: CPT | Performed by: EMERGENCY MEDICINE

## 2024-11-02 PROCEDURE — 96365 THER/PROPH/DIAG IV INF INIT: CPT

## 2024-11-02 PROCEDURE — 82803 BLOOD GASES ANY COMBINATION: CPT

## 2024-11-02 PROCEDURE — 94664 DEMO&/EVAL PT USE INHALER: CPT

## 2024-11-02 PROCEDURE — 71045 X-RAY EXAM CHEST 1 VIEW: CPT

## 2024-11-02 PROCEDURE — 84132 ASSAY OF SERUM POTASSIUM: CPT

## 2024-11-02 PROCEDURE — 93005 ELECTROCARDIOGRAM TRACING: CPT

## 2024-11-02 PROCEDURE — 82947 ASSAY GLUCOSE BLOOD QUANT: CPT

## 2024-11-02 PROCEDURE — 94760 N-INVAS EAR/PLS OXIMETRY 1: CPT

## 2024-11-02 PROCEDURE — 87804 INFLUENZA ASSAY W/OPTIC: CPT | Performed by: EMERGENCY MEDICINE

## 2024-11-02 RX ORDER — GUAIFENESIN 600 MG/1
600 TABLET, EXTENDED RELEASE ORAL 2 TIMES DAILY
Status: DISCONTINUED | OUTPATIENT
Start: 2024-11-02 | End: 2024-11-07 | Stop reason: HOSPADM

## 2024-11-02 RX ORDER — METHYLPREDNISOLONE SOD SUCC 125 MG
1 VIAL (EA) INJECTION ONCE
Status: COMPLETED | OUTPATIENT
Start: 2024-11-02 | End: 2024-11-02

## 2024-11-02 RX ORDER — IPRATROPIUM BROMIDE AND ALBUTEROL SULFATE .5; 3 MG/3ML; MG/3ML
1 SOLUTION RESPIRATORY (INHALATION) ONCE
Status: COMPLETED | OUTPATIENT
Start: 2024-11-02 | End: 2024-11-02

## 2024-11-02 RX ORDER — DOXYCYCLINE 100 MG/1
100 CAPSULE ORAL EVERY 12 HOURS
Status: DISCONTINUED | OUTPATIENT
Start: 2024-11-02 | End: 2024-11-03

## 2024-11-02 RX ORDER — IPRATROPIUM BROMIDE AND ALBUTEROL SULFATE 2.5; .5 MG/3ML; MG/3ML
3 SOLUTION RESPIRATORY (INHALATION) ONCE
Status: COMPLETED | OUTPATIENT
Start: 2024-11-02 | End: 2024-11-02

## 2024-11-02 RX ORDER — ALBUTEROL SULFATE 0.83 MG/ML
2.5 SOLUTION RESPIRATORY (INHALATION) EVERY 4 HOURS PRN
Status: DISCONTINUED | OUTPATIENT
Start: 2024-11-02 | End: 2024-11-07 | Stop reason: HOSPADM

## 2024-11-02 RX ORDER — TORSEMIDE 10 MG/1
10 TABLET ORAL DAILY
Status: DISCONTINUED | OUTPATIENT
Start: 2024-11-02 | End: 2024-11-03

## 2024-11-02 RX ORDER — ENOXAPARIN SODIUM 100 MG/ML
40 INJECTION SUBCUTANEOUS DAILY
Status: DISCONTINUED | OUTPATIENT
Start: 2024-11-02 | End: 2024-11-07 | Stop reason: HOSPADM

## 2024-11-02 RX ORDER — MAGNESIUM SULFATE 1 G/100ML
1 INJECTION INTRAVENOUS ONCE
Status: COMPLETED | OUTPATIENT
Start: 2024-11-02 | End: 2024-11-02

## 2024-11-02 RX ORDER — METHYLPREDNISOLONE SODIUM SUCCINATE 40 MG/ML
40 INJECTION, POWDER, LYOPHILIZED, FOR SOLUTION INTRAMUSCULAR; INTRAVENOUS EVERY 8 HOURS
Status: DISCONTINUED | OUTPATIENT
Start: 2024-11-02 | End: 2024-11-03

## 2024-11-02 RX ORDER — FLUTICASONE FUROATE AND VILANTEROL 200; 25 UG/1; UG/1
1 POWDER RESPIRATORY (INHALATION)
Status: DISCONTINUED | OUTPATIENT
Start: 2024-11-02 | End: 2024-11-03

## 2024-11-02 RX ORDER — LEVALBUTEROL INHALATION SOLUTION 1.25 MG/3ML
1.25 SOLUTION RESPIRATORY (INHALATION)
Status: DISCONTINUED | OUTPATIENT
Start: 2024-11-02 | End: 2024-11-07 | Stop reason: HOSPADM

## 2024-11-02 RX ADMIN — ENOXAPARIN SODIUM 40 MG: 40 INJECTION SUBCUTANEOUS at 11:53

## 2024-11-02 RX ADMIN — IPRATROPIUM BROMIDE 0.5 MG: 0.5 SOLUTION RESPIRATORY (INHALATION) at 19:29

## 2024-11-02 RX ADMIN — AZITHROMYCIN MONOHYDRATE 500 MG: 500 INJECTION, POWDER, LYOPHILIZED, FOR SOLUTION INTRAVENOUS at 11:53

## 2024-11-02 RX ADMIN — MAGNESIUM SULFATE HEPTAHYDRATE 1 G: 1 INJECTION, SOLUTION INTRAVENOUS at 10:08

## 2024-11-02 RX ADMIN — TORSEMIDE 10 MG: 10 TABLET ORAL at 11:47

## 2024-11-02 RX ADMIN — DOXYCYCLINE HYCLATE 100 MG: 100 CAPSULE ORAL at 22:56

## 2024-11-02 RX ADMIN — IPRATROPIUM BROMIDE AND ALBUTEROL SULFATE 3 ML: 2.5; .5 SOLUTION RESPIRATORY (INHALATION) at 10:10

## 2024-11-02 RX ADMIN — LEVALBUTEROL HYDROCHLORIDE 1.25 MG: 1.25 SOLUTION RESPIRATORY (INHALATION) at 19:29

## 2024-11-02 RX ADMIN — LEVALBUTEROL HYDROCHLORIDE 1.25 MG: 1.25 SOLUTION RESPIRATORY (INHALATION) at 13:55

## 2024-11-02 RX ADMIN — METHYLPREDNISOLONE SODIUM SUCCINATE 40 MG: 40 INJECTION, POWDER, FOR SOLUTION INTRAMUSCULAR; INTRAVENOUS at 11:49

## 2024-11-02 RX ADMIN — DOXYCYCLINE HYCLATE 100 MG: 100 CAPSULE ORAL at 11:47

## 2024-11-02 RX ADMIN — IPRATROPIUM BROMIDE 0.5 MG: 0.5 SOLUTION RESPIRATORY (INHALATION) at 13:55

## 2024-11-02 RX ADMIN — GUAIFENESIN 600 MG: 600 TABLET ORAL at 11:48

## 2024-11-02 RX ADMIN — GUAIFENESIN 600 MG: 600 TABLET ORAL at 20:22

## 2024-11-02 RX ADMIN — METHYLPREDNISOLONE SODIUM SUCCINATE 40 MG: 40 INJECTION, POWDER, FOR SOLUTION INTRAMUSCULAR; INTRAVENOUS at 20:22

## 2024-11-02 NOTE — H&P
H&P - Hospitalist   Name: Dianelys Price 78 y.o. female I MRN: [id number]  Unit/Bed#: ED 12 I Date of Admission: 11/2/2024   Date of Service: 11/2/2024 I Hospital Day: 0     Assessment & Plan  COPD exacerbation (HCC)  Patient presented to the ED with complaints of worsening shortness of breath, wheezing with a productive cough with green sputum over the last few days.  Known history of very severe COPD, follows with -Pulmonology  Chest x-ray completed in the ER, awaiting final read- wet read, no PNA detected  Received 125 mg Solu-Medrol in the ER, Continue with IV Solu-Medrol 40 mg every 8 hours  Albuterol and ipratropium nebs ordered  Continue with Doxycycline 100 mg BID for COPD exacerbation  Incentive spirometer  Respiratory protocol  Sputum culture ordered, follow-up  SIRS (systemic inflammatory response syndrome) (HCC)  Meeting SIRS criteria on admission with tachycardia, tachypnea, and leukocytosis.  Likely secondary to COPD exacerbation, follow up sputum culture  Already received IV Azithromycin, will continue with PO Doxycycline for COPD exacerbation  Procalcitonin collected, results pending.  Covid, Flu negative.  Elevated TSH  Tsh wnl this admission  COPD, very severe (HCC)  Noted history  Follows with -Pulmonology; Last seen in the office, 7/24/24  Typically takes Advair, Combivent at home  Wears chronically 2 L of supplemental oxygen  Chronic respiratory failure with hypoxia (HCC)  Stable, currently on 3L   Chronically wears 2 L of supplemental oxygen at baseline  Currently 97% on home regimen  Continue to treat COPD exacerbation      VTE Pharmacologic Prophylaxis:    lovenox  Code Status: Level 1 - Full Code   Discussion with family:  no family at bedside, call will be made later on.     Anticipated Length of Stay: Patient will be admitted on an inpatient basis with an anticipated length of stay of greater than 2 midnights secondary to copd exacerbation requiring IV medications and close resp  monitoring.    History of Present Illness   Chief Complaint: sob, phlegm, cough    Dianelys Price is a 78 y.o. female with a PMH of COPD who presents with worsening sob for last 3-4 days, cough with phlegm , green sputum at times, no blood. No fever. Progressive weakness. PELAYO. No orthopnea.    Review of Systems   Constitutional:  Negative for chills and fever.   HENT:  Negative for ear pain and sore throat.    Eyes:  Negative for pain and visual disturbance.   Respiratory:  Positive for cough, shortness of breath and wheezing.    Cardiovascular:  Negative for chest pain and palpitations.   Gastrointestinal:  Negative for abdominal pain and vomiting.   Genitourinary:  Negative for dysuria and hematuria.   Musculoskeletal:  Negative for arthralgias and back pain.   Skin:  Negative for color change and rash.   Neurological:  Negative for seizures and syncope.   Psychiatric/Behavioral:  Negative for agitation, behavioral problems, confusion and decreased concentration.    All other systems reviewed and are negative.      Historical Information   Past Medical History:   Diagnosis Date    Allergic 1961    Asthenia     Asthma     Basal cell carcinoma 03/19/2024    left nasal wall, mohs    Cataract     COPD (chronic obstructive pulmonary disease) (HCC)     History of screening mammography     last assessed: 10/31/2017    Osteoporosis     Pneumonia 1950    Urinary tract infection      Past Surgical History:   Procedure Laterality Date    APPENDECTOMY      onset: 1966    CATARACT EXTRACTION  2017    last assessed: 10/31/2017    COLONOSCOPY  2015    EYE SURGERY  cataracks 2017    HEMORROIDECTOMY  2006    MOHS SURGERY Left 06/11/2024    BCC left nasal wall, Dr Rick    TONSILLECTOMY  1954    onset: 1954     Social History     Tobacco Use    Smoking status: Former     Current packs/day: 0.00     Average packs/day: 1 pack/day for 40.0 years (40.0 ttl pk-yrs)     Types: Cigarettes     Start date: 3/7/1965     Quit date: 3/7/2005      Years since quittin.6    Smokeless tobacco: Never   Vaping Use    Vaping status: Never Used   Substance and Sexual Activity    Alcohol use: Not Currently     Alcohol/week: 2.0 standard drinks of alcohol     Types: 2 Glasses of wine per week    Drug use: Never    Sexual activity: Not Currently     Partners: Male     Birth control/protection: Post-menopausal, None     E-Cigarette/Vaping    E-Cigarette Use Never User      E-Cigarette/Vaping Substances    Nicotine No     THC No     CBD No     Flavoring No     Other No     Unknown No      Family History   Problem Relation Age of Onset    Arthritis Mother     Breast cancer Mother 80    Heart disease Mother     Hypertension Mother     Osteoporosis Mother     Dementia Mother     COPD Mother     Hearing loss Mother     Diabetes Father     Heart disease Father     Hypertension Father     COPD Father     Ovarian cancer Sister 49    No Known Problems Maternal Grandmother     Hearing loss Maternal Grandfather     No Known Problems Paternal Grandmother     No Known Problems Paternal Grandfather     No Known Problems Maternal Aunt     No Known Problems Maternal Aunt     No Known Problems Paternal Aunt     No Known Problems Paternal Aunt     No Known Problems Paternal Aunt     Diabetes Brother     Diabetes Brother     Diabetes Brother     Hearing loss Brother     Cancer Sister         uterine     Social History:  Marital Status:        Meds/Allergies   I have reviewed home medications with patient personally.  Prior to Admission medications    Medication Sig Start Date End Date Taking? Authorizing Provider   albuterol (PROVENTIL HFA,VENTOLIN HFA) 90 mcg/act inhaler Inhale 2 puffs every 4 (four) hours as needed for wheezing 10/10/24   KELSEY Ruiz   ascorbic acid (VITAMIN C) 250 mg tablet Take 250 mg by mouth daily    Historical Provider, MD   b complex vitamins capsule Take 1 capsule by mouth daily    Historical Provider, MD   cholecalciferol (VITAMIN D3)  "1,000 units tablet Take 1,000 Units by mouth daily    Historical Provider, MD   ciclopirox (LOPROX) 0.77 % cream Apply topically once daily to face until scaly red areas resolve 3/19/24   Olamide Roche MD   fluocinonide (LIDEX) 0.05 % external solution Apply topically 2 (two) times a day As needed for itch on scalp. 2/22/23   Olamide Roche MD   Fluticasone-Salmeterol (Advair Diskus) 250-50 mcg/dose inhaler Inhale 1 puff 2 (two) times a day Rinse mouth after use. 12/8/23 12/2/24  Chloe Tolentino DO   ipratropium-albuterol (COMBIVENT RESPIMAT) inhaler Inhale 1 puff 4 (four) times a day 12/8/23   Chloe Tolentino DO   ipratropium-albuterol (DUO-NEB) 0.5-2.5 mg/3 mL nebulizer solution Take 3 mL by nebulization 4 (four) times a day 7/24/24   Chloe Tolentino DO   ketoconazole (NIZORAL) 2 % shampoo Daily for 2 weeks straight and then on \"Mondays, Wednesdays and Fridays\":  Lather into scalp and skin on face; leave on for 5 minutes and then rinse off completely. 3/19/24   Olamide Roche MD   [facility] [place]Roller Walker) MISC Use daily Rollator 9/1/21   Lisa Brewer MD   predniSONE 10 mg tablet 4 tabs daily x 3 D then 3 tabs daily x 3 D then 2 tabs daily x 3 D then 1 tab daily x 3 D 7/24/24   Chloe Tolentino DO   torsemide (DEMADEX) 10 mg tablet  2/18/22   Historical Provider, MD     Allergies   Allergen Reactions    Clarinex [Desloratadine] Wheezing    Bee Venom Swelling and Facial Swelling    Clarithromycin Wheezing     Reaction Date: 29Apr2011;     Food Color Red - Food Allergy Itching    Levofloxacin Other (See Comments)     Blood clot in leg    Loratadine Wheezing     Reaction Date: 29Apr2011;        Objective :  Temp:  [99.5 °F (37.5 °C)] 99.5 °F (37.5 °C)  HR:  [109] 109  BP: (172)/(77) 172/77  Resp:  [22] 22  SpO2:  [97 %] 97 %  O2 Device: Nasal cannula    Physical Exam General- Awake, alert and oriented x 3, looks uncomfortable  HEENT- Normocephalic, atraumatic, oral mucosa- moist  Neck- Supple, No " carotid bruit, no JVD  CVS- Normal S1/ S2, Regular rate and rhythm, No murmur, No edema  Respiratory system- restricted air entry, wheezing b/l  Abdomen- Soft, Non distended, no tenderness, Bowel sound- present 4 quads  Genitourinary- No suprapubic tenderness, No CVA tenderness  Skin- No new bruise or rash  Musculoskeletal- No gross deformity  Psych- No acute psychosis  CNS- CN II- XII grossly intact, No acute focal neurologic deficit noted      Lines/Drains:  peripheral IV          Lab Results: I have reviewed the following results:  Results from last 7 days   Lab Units 11/02/24  1010   WBC Thousand/uL 15.42*   HEMOGLOBIN g/dL 12.2   HEMATOCRIT % 41.0   PLATELETS Thousands/uL 242   SEGS PCT % 84*   LYMPHO PCT % 6*   MONO PCT % 9   EOS PCT % 0     Results from last 7 days   Lab Units 11/02/24  1010   SODIUM mmol/L 141   POTASSIUM mmol/L 4.0   CHLORIDE mmol/L 95*   CO2 mmol/L 39*   BUN mg/dL 15   CREATININE mg/dL 0.33*   ANION GAP mmol/L 7   CALCIUM mg/dL 9.2   ALBUMIN g/dL 3.6   TOTAL BILIRUBIN mg/dL 0.43   ALK PHOS U/L 62   ALT U/L 14   AST U/L 16   GLUCOSE RANDOM mg/dL 136             Lab Results   Component Value Date    HGBA1C 5.3 03/23/2022    HGBA1C 5.3 05/13/2021    HGBA1C 6.0 02/03/2020     Results from last 7 days   Lab Units 11/02/24  1147   PROCALCITONIN ng/ml 0.26*       Imaging Results Review: I reviewed radiology reports from this admission including: CT chest.  Other Study Results Review: EKG was reviewed. Sinus tachycardia, PVCs,non specific ST T wave changes    Administrative Statements   I have spent a total time of 76 minutes in caring for this patient on the day of the visit/encounter including Diagnostic results, Prognosis, Risks and benefits of tx options, Instructions for management, Patient and family education, Importance of tx compliance, Risk factor reductions, Impressions, Counseling / Coordination of care, Documenting in the medical record, Reviewing / ordering tests, medicine,  procedures  , Obtaining or reviewing history  , and Communicating with other healthcare professionals .    ** Please Note: This note has been constructed using a voice recognition system. **

## 2024-11-02 NOTE — RESPIRATORY THERAPY NOTE
RT Protocol Note  Dianelys Price 78 y.o. female MRN: [id number]  Unit/Bed#: ED 12 Encounter: [id number]    Assessment    Principal Problem:    COPD exacerbation (HCC)  Active Problems:    Elevated TSH    COPD, very severe (HCC)    Chronic respiratory failure with hypoxia (HCC)    SIRS (systemic inflammatory response syndrome) (HCC)      Home Pulmonary Medications:  Inhalers/neb    Home Devices/Therapy: Home O2    Past Medical History:   Diagnosis Date    Allergic     Asthenia     Asthma     Basal cell carcinoma 2024    left nasal wall, mohs    Cataract     COPD (chronic obstructive pulmonary disease) (HCC)     History of screening mammography     last assessed: 10/31/2017    Osteoporosis     Pneumonia 1950    Urinary tract infection      Social History     Socioeconomic History    Marital status:      Spouse name: None    Number of children: None    Years of education: None    Highest education level: None   Occupational History    None   Tobacco Use    Smoking status: Former     Current packs/day: 0.00     Average packs/day: 1 pack/day for 40.0 years (40.0 ttl pk-yrs)     Types: Cigarettes     Start date: 3/7/1965     Quit date: 3/7/2005     Years since quittin.6    Smokeless tobacco: Never   Vaping Use    Vaping status: Never Used   Substance and Sexual Activity    Alcohol use: Not Currently     Alcohol/week: 2.0 standard drinks of alcohol     Types: 2 Glasses of wine per week    Drug use: Never    Sexual activity: Not Currently     Partners: Male     Birth control/protection: Post-menopausal, None   Other Topics Concern    None   Social History Narrative    Caffeine use     Social Determinants of Health     Financial Resource Strain: Low Risk  (2023)    Overall Financial Resource Strain (CARDIA)     Difficulty of Paying Living Expenses: Not very hard   Food Insecurity: Not on file   Transportation Needs: No Transportation Needs (2023)    PRAPARE - Transportation     Lack of  Transportation (Medical): No     Lack of Transportation (Non-Medical): No   Physical Activity: Not on file   Stress: Not on file   Social Connections: Not on file   Intimate Partner Violence: Not on file   Housing Stability: Not on file       Subjective         Objective    Physical Exam:   Assessment Type: Assess only  General Appearance: Awake, Alert  Respiratory Pattern: Labored, Tachypneic  Chest Assessment: Chest expansion symmetrical  Bilateral Breath Sounds: Diminished, Scattered, Expiratory wheezes  Cough: (P) None  O2 Device: (P) NC    Vitals:  Blood pressure (!) 172/77, pulse (P) 94, temperature 99.5 °F (37.5 °C), resp. rate (!) (P) 25, SpO2 (P) 92%.          Imaging and other studies: Results Review Statement: No pertinent imaging studies reviewed.    O2 Device: (P) NC     Plan    Respiratory Plan: Mild Distress pathway        Resp Comments: (P) Protocol completed.  Pt w/ PMH of asthma/COPD and uses inhalers and just recently received Duoneb.  Pt utilizes O2 at home (2L).  Will order Xop/Atro TID to help improve respiratort status.

## 2024-11-02 NOTE — ASSESSMENT & PLAN NOTE
Noted history  Follows with [facility]; Last seen in the office, 7/24/24  Typically takes Advair, Combivent at home  Wears chronically 2 L of supplemental oxygen

## 2024-11-02 NOTE — PLAN OF CARE
Problem: PAIN - ADULT  Goal: Verbalizes/displays adequate comfort level or baseline comfort level  Description: Interventions:  - Encourage patient to monitor pain and request assistance  - Assess pain using appropriate pain scale  - Administer analgesics based on type and severity of pain and evaluate response  - Implement non-pharmacological measures as appropriate and evaluate response  - Consider cultural and social influences on pain and pain management  - Notify physician/advanced practitioner if interventions unsuccessful or patient reports new pain  Outcome: Progressing     Problem: INFECTION - ADULT  Goal: Absence or prevention of progression during hospitalization  Description: INTERVENTIONS:  - Assess and monitor for signs and symptoms of infection  - Monitor lab/diagnostic results  - Monitor all insertion sites, i.e. indwelling lines, tubes, and drains  - Monitor endotracheal if appropriate and nasal secretions for changes in amount and color  - Kingsley appropriate cooling/warming therapies per order  - Administer medications as ordered  - Instruct and encourage patient and family to use good hand hygiene technique  - Identify and instruct in appropriate isolation precautions for identified infection/condition  Outcome: Progressing  Goal: Absence of fever/infection during neutropenic period  Description: INTERVENTIONS:  - Monitor WBC    Outcome: Progressing     Problem: SAFETY ADULT  Goal: Patient will remain free of falls  Description: INTERVENTIONS:  - Educate patient/family on patient safety including physical limitations  - Instruct patient to call for assistance with activity   - Consult OT/PT to assist with strengthening/mobility   - Keep Call bell within reach  - Keep bed low and locked with side rails adjusted as appropriate  - Keep care items and personal belongings within reach  - Initiate and maintain comfort rounds  - Make Fall Risk Sign visible to staff  - Offer Toileting every 2 Hours,  in advance of need  - Initiate/Maintain bed alarm  - Obtain necessary fall risk management equipment: chair alarm  - Apply yellow socks and bracelet for high fall risk patients  - Consider moving patient to room near nurses station  Outcome: Progressing  Goal: Maintain or return to baseline ADL function  Description: INTERVENTIONS:  -  Assess patient's ability to carry out ADLs; assess patient's baseline for ADL function and identify physical deficits which impact ability to perform ADLs (bathing, care of mouth/teeth, toileting, grooming, dressing, etc.)  - Assess/evaluate cause of self-care deficits   - Assess range of motion  - Assess patient's mobility; develop plan if impaired  - Assess patient's need for assistive devices and provide as appropriate  - Encourage maximum independence but intervene and supervise when necessary  - Involve family in performance of ADLs  - Assess for home care needs following discharge   - Consider OT consult to assist with ADL evaluation and planning for discharge  - Provide patient education as appropriate  Outcome: Progressing  Goal: Maintains/Returns to pre admission functional level  Description: INTERVENTIONS:  - Perform AM-PAC 6 Click Basic Mobility/ Daily Activity assessment daily.  - Set and communicate daily mobility goal to care team and patient/family/caregiver.   - Collaborate with rehabilitation services on mobility goals if consulted  - Perform Range of Motion 3 times a day.  - Reposition patient every 3 hours.  - Dangle patient 3 times a day  - Stand patient 3 times a day  - Ambulate patient 3 times a day  - Out of bed to chair 3 times a day   - Out of bed for meals 3 times a day  - Out of bed for toileting  - Record patient progress and toleration of activity level   Outcome: Progressing     Problem: DISCHARGE PLANNING  Goal: Discharge to home or other facility with appropriate resources  Description: INTERVENTIONS:  - Identify barriers to discharge w/patient and  caregiver  - Arrange for needed discharge resources and transportation as appropriate  - Identify discharge learning needs (meds, wound care, etc.)  - Arrange for interpretive services to assist at discharge as needed  - Refer to Case Management Department for coordinating discharge planning if the patient needs post-hospital services based on physician/advanced practitioner order or complex needs related to functional status, cognitive ability, or social support system  Outcome: Progressing     Problem: Knowledge Deficit  Goal: Patient/family/caregiver demonstrates understanding of disease process, treatment plan, medications, and discharge instructions  Description: Complete learning assessment and assess knowledge base.  Interventions:  - Provide teaching at level of understanding  - Provide teaching via preferred learning methods  Outcome: Progressing     Problem: RESPIRATORY - ADULT  Goal: Achieves optimal ventilation and oxygenation  Description: INTERVENTIONS:  - Assess for changes in respiratory status  - Assess for changes in mentation and behavior  - Position to facilitate oxygenation and minimize respiratory effort  - Oxygen administered by appropriate delivery if ordered  - Initiate smoking cessation education as indicated  - Encourage broncho-pulmonary hygiene including cough, deep breathe, Incentive Spirometry  - Assess the need for suctioning and aspirate as needed  - Assess and instruct to report SOB or any respiratory difficulty  - Respiratory Therapy support as indicated  Outcome: Progressing

## 2024-11-02 NOTE — ASSESSMENT & PLAN NOTE
Patient presented to the ED with complaints of worsening shortness of breath, wheezing with a productive cough with green sputum over the last few days.  Known history of very severe COPD, follows with [facility]  Chest x-ray completed in the ER, awaiting final read- wet read, no PNA detected  Received 125 mg Solu-Medrol in the ER, Continue with IV Solu-Medrol 40 mg every 8 hours  Albuterol and ipratropium nebs ordered  Continue with Doxycycline 100 mg BID for COPD exacerbation  Incentive spirometer  Respiratory protocol  Sputum culture ordered, follow-up

## 2024-11-02 NOTE — ED PROVIDER NOTES
Time reflects when diagnosis was documented in both MDM as applicable and the Disposition within this note       Time User Action Codes Description Comment    11/2/2024 11:14 AM Irene Garcia Add [J44.1] COPD with acute exacerbation (HCC)           ED Disposition       ED Disposition   Admit    Condition   Stable    Date/Time   Sat Nov 2, 2024 11:14 AM    Comment   Case was discussed with Dr. Mike Crenshaw and the patient's admission status was agreed to be Admission Status: inpatient status to the service of Dr. Crenshaw .               Assessment & Plan       Medical Decision Making  Differential diagnoses considered include but not limited to COPD with acute exacerbation, asthma exacerbation, pneumonia, viral respiratory infection.  Low suspicion for ACS.    Per my independent interpretation of EKG sinus tachycardia with heart rate of 107, narrow QRS, intervals reassuring, occasional PVC, no STEMI, nonspecific ST-T abnormalities although there is baseline artifact.    Presentation and exam consistent with COPD with acute exacerbation.  Patient received IV Solu-Medrol via EMS.  Started on antibiotics here.  Received DuoNeb treatment.  Still wheezy and short of breath.  Discussed internal medicine for hospitalization.    Amount and/or Complexity of Data Reviewed  Labs: ordered.  Radiology: ordered.    Risk  Prescription drug management.  Decision regarding hospitalization.             Medications   azithromycin (ZITHROMAX) 500 mg in sodium chloride 0.9% 250mL IVPB 500 mg (has no administration in time range)   ipratropium-albuterol (FOR EMS ONLY) (DUO-NEB) 0.5-2.5 mg/3 mL inhalation solution 3 mL (0 mL Does not apply Given to EMS 11/2/24 0934)   methylPREDNISolone sodium succinate (FOR EMS ONLY) (Solu-MEDROL) 125 MG injection 125 mg (0 mg Does not apply Given to EMS 11/2/24 0934)   ipratropium-albuterol (DUO-NEB) 0.5-2.5 mg/3 mL inhalation solution 3 mL (3 mL Nebulization Given 11/2/24 1010)   magnesium sulfate IVPB  (premix) SOLN 1 g (1 g Intravenous New Bag 11/2/24 1008)       ED Risk Strat Scores                           SBIRT 20yo+      Flowsheet Row Most Recent Value   Initial Alcohol Screen: US AUDIT-C     1. How often do you have a drink containing alcohol? 0 Filed at: 11/02/2024 0931   2. How many drinks containing alcohol do you have on a typical day you are drinking?  0 Filed at: 11/02/2024 0931   3a. Male UNDER 65: How often do you have five or more drinks on one occasion? 0 Filed at: 11/02/2024 0931   3b. FEMALE Any Age, or MALE 65+: How often do you have 4 or more drinks on one occassion? 0 Filed at: 11/02/2024 0931   Audit-C Score 0 Filed at: 11/02/2024 0931   JEFFREY: How many times in the past year have you...    Used an illegal drug or used a prescription medication for non-medical reasons? Never Filed at: 11/02/2024 0931                            History of Present Illness       Chief Complaint   Patient presents with    Shortness of Breath     Pt brought in by EMS for cough, shortness of breath, and lower back pain. Pt has hx of COPD wears chronic O2       Past Medical History:   Diagnosis Date    Allergic 1961    Asthenia     Asthma     Basal cell carcinoma 03/19/2024    left nasal wall, mohs    Cataract     COPD (chronic obstructive pulmonary disease) (HCC)     History of screening mammography     last assessed: 10/31/2017    Osteoporosis     Pneumonia 1950    Urinary tract infection       Past Surgical History:   Procedure Laterality Date    APPENDECTOMY      onset: 1966    CATARACT EXTRACTION  2017    last assessed: 10/31/2017    COLONOSCOPY  2015    EYE SURGERY  cataracks 2017    HEMORROIDECTOMY  2006    MOHS SURGERY Left 06/11/2024    BCC left nasal wall, Dr Rick    TONSILLECTOMY  1954    onset: 1954      Family History   Problem Relation Age of Onset    Arthritis Mother     Breast cancer Mother 80    Heart disease Mother     Hypertension Mother     Osteoporosis Mother     Dementia Mother     COPD Mother      Hearing loss Mother     Diabetes Father     Heart disease Father     Hypertension Father     COPD Father     Ovarian cancer Sister 49    No Known Problems Maternal Grandmother     Hearing loss Maternal Grandfather     No Known Problems Paternal Grandmother     No Known Problems Paternal Grandfather     No Known Problems Maternal Aunt     No Known Problems Maternal Aunt     No Known Problems Paternal Aunt     No Known Problems Paternal Aunt     No Known Problems Paternal Aunt     Diabetes Brother     Diabetes Brother     Diabetes Brother     Hearing loss Brother     Cancer Sister         uterine      Social History     Tobacco Use    Smoking status: Former     Current packs/day: 0.00     Average packs/day: 1 pack/day for 40.0 years (40.0 ttl pk-yrs)     Types: Cigarettes     Start date: 3/7/1965     Quit date: 3/7/2005     Years since quittin.6    Smokeless tobacco: Never   Vaping Use    Vaping status: Never Used   Substance Use Topics    Alcohol use: Not Currently     Alcohol/week: 2.0 standard drinks of alcohol     Types: 2 Glasses of wine per week    Drug use: Never      E-Cigarette/Vaping    E-Cigarette Use Never User       E-Cigarette/Vaping Substances    Nicotine No     THC No     CBD No     Flavoring No     Other No     Unknown No       I have reviewed and agree with the history as documented.     78-year-old female presenting to the emergency department with worsening shortness of breath and productive cough since this Wednesday.  States on Monday she had a sore throat but that has resolved.  History of emphysema, asthma, chronic respiratory failure with hypoxia on 2 L of nasal cannula at baseline.  She denies any pain to me.  Denies any nausea or vomiting.  Does state her ribs are sore from coughing.  No fevers, no known sick contacts.  No abdominal pain.  Denies urinary symptoms.  Denies any recent antibiotic or oral steroid use.  States she did get a nebulizer treatment via EMS which did help her.   She was using her home nebulizer treatments without relief.        Review of Systems   Constitutional:  Negative for chills and fever.   Respiratory:  Positive for cough, shortness of breath and wheezing.    Cardiovascular:  Negative for chest pain.   Gastrointestinal:  Negative for abdominal pain, nausea and vomiting.   Musculoskeletal:  Negative for back pain and myalgias.           Objective       ED Triage Vitals   Temperature Pulse Blood Pressure Respirations SpO2 Patient Position - Orthostatic VS   11/02/24 0930 11/02/24 0930 11/02/24 0930 11/02/24 0930 11/02/24 0930 11/02/24 0930   99.5 °F (37.5 °C) (!) 109 (!) 172/77 22 97 % Lying      Temp src Heart Rate Source BP Location FiO2 (%) Pain Score    -- 11/02/24 0930 11/02/24 0930 -- 11/02/24 1024     Monitor Right arm  No Pain      Vitals      Date and Time Temp Pulse SpO2 Resp BP Pain Score FACES Pain Rating User   11/02/24 1024 -- -- -- -- -- No Pain -- GP   11/02/24 0930 99.5 °F (37.5 °C) 109 97 % 22 172/77 -- -- AA            Physical Exam  Constitutional:       Appearance: She is ill-appearing.   HENT:      Head: Normocephalic and atraumatic.      Nose: Nose normal.      Mouth/Throat:      Mouth: Mucous membranes are moist.   Eyes:      Extraocular Movements: Extraocular movements intact.      Pupils: Pupils are equal, round, and reactive to light.   Cardiovascular:      Rate and Rhythm: Regular rhythm. Tachycardia present.   Pulmonary:      Comments: Diffuse inspiratory and expiratory wheezing appreciated upon auscultation  Abdominal:      General: There is no distension.      Tenderness: There is no abdominal tenderness.   Musculoskeletal:         General: No swelling or deformity. Normal range of motion.      Cervical back: Normal range of motion and neck supple.   Skin:     General: Skin is warm.      Findings: No erythema.   Neurological:      Mental Status: She is alert. Mental status is at baseline.         Results Reviewed       Procedure  Component Value Units Date/Time    HS Troponin 0hr (reflex protocol) [[id number]]  (Normal) Collected: 11/02/24 1010    Lab Status: Final result Specimen: Blood from Arm, Left Updated: 11/02/24 1043     hs TnI 0hr 47 ng/L     HS Troponin I 2hr [[id number]]     Lab Status: No result Specimen: Blood     FLU/COVID Rapid Antigen (30 min. TAT) - Preferred screening test in ED [[id number]]  (Normal) Collected: 11/02/24 1010    Lab Status: Final result Specimen: Nares from Nose Updated: 11/02/24 1039     SARS COV Rapid Antigen Negative     Influenza A Rapid Antigen Negative     Influenza B Rapid Antigen Negative    Narrative:      This test has been performed using the [employer] Juhi 2 FLU+SARS Antigen test under the Emergency Use Authorization (EUA). This test has been validated by the  and verified by the performing laboratory. The Juhi uses lateral flow immunofluorescent sandwich assay to detect SARS-COV, Influenza A and Influenza B Antigen.     The [employer]idel Juhi 2 SARS Antigen test does not differentiate between SARS-CoV and SARS-CoV-2.     Negative results are presumptive and may be confirmed with a molecular assay, if necessary, for patient management. Negative results do not rule out SARS-CoV-2 or influenza infection and should not be used as the sole basis for treatment or patient management decisions. A negative test result may occur if the level of antigen in a sample is below the limit of detection of this test.     Positive results are indicative of the presence of viral antigens, but do not rule out bacterial infection or co-infection with other viruses.     All test results should be used as an adjunct to clinical observations and other information available to the provider.    FOR PEDIATRIC PATIENTS - copy/paste COVID Guidelines URL to browser: [email or web address]    Comprehensive metabolic panel [[id number]]  (Abnormal) Collected: 11/02/24 1010     Lab Status: Final result Specimen: Blood from Arm, Left Updated: 11/02/24 1036     Sodium 141 mmol/L      Potassium 4.0 mmol/L      Chloride 95 mmol/L      CO2 39 mmol/L      ANION GAP 7 mmol/L      BUN 15 mg/dL      Creatinine 0.33 mg/dL      Glucose 136 mg/dL      Calcium 9.2 mg/dL      AST 16 U/L      ALT 14 U/L      Alkaline Phosphatase 62 U/L      Total Protein 7.1 g/dL      Albumin 3.6 g/dL      Total Bilirubin 0.43 mg/dL      eGFR 106 ml/min/1.73sq m     Narrative:      National Kidney Disease Foundation guidelines for Chronic Kidney Disease (CKD):     Stage 1 with normal or high GFR (GFR > 90 mL/min/1.73 square meters)    Stage 2 Mild CKD (GFR = 60-89 mL/min/1.73 square meters)    Stage 3A Moderate CKD (GFR = 45-59 mL/min/1.73 square meters)    Stage 3B Moderate CKD (GFR = 30-44 mL/min/1.73 square meters)    Stage 4 Severe CKD (GFR = 15-29 mL/min/1.73 square meters)    Stage 5 End Stage CKD (GFR <15 mL/min/1.73 square meters)  Note: GFR calculation is accurate only with a steady state creatinine    Magnesium [[id number]]  (Normal) Collected: 11/02/24 1010    Lab Status: Final result Specimen: Blood from Arm, Left Updated: 11/02/24 1036     Magnesium 1.9 mg/dL     CBC and differential [[id number]]  (Abnormal) Collected: 11/02/24 1010    Lab Status: Final result Specimen: Blood from Arm, Left Updated: 11/02/24 1031     WBC 15.42 Thousand/uL      RBC 4.25 Million/uL      Hemoglobin 12.2 g/dL      Hematocrit 41.0 %      MCV 97 fL      MCH 28.7 pg      MCHC 29.8 g/dL      RDW 11.9 %      MPV 10.3 fL      Platelets 242 Thousands/uL      nRBC 0 /100 WBCs      Segmented % 84 %      Immature Grans % 1 %      Lymphocytes % 6 %      Monocytes % 9 %      Eosinophils Relative 0 %      Basophils Relative 0 %      Absolute Neutrophils 12.95 Thousands/µL      Absolute Immature Grans 0.08 Thousand/uL      Absolute Lymphocytes 0.95 Thousands/µL      Absolute Monocytes 1.40 Thousand/µL      Eosinophils Absolute 0.00 Thousand/µL   "    Basophils Absolute 0.04 Thousands/µL             XR chest 1 view portable    (Results Pending)       Procedures    ED Medication and Procedure Management   Prior to Admission Medications   Prescriptions Last Dose Informant Patient Reported? Taking?   Fluticasone-Salmeterol (Advair Diskus) 250-50 mcg/dose inhaler  Self No No   Sig: Inhale 1 puff 2 (two) times a day Rinse mouth after use.   Misc. Devices (Roller Walker) MISC  Self No No   Sig: Use daily Rollator   albuterol (PROVENTIL HFA,VENTOLIN HFA) 90 mcg/act inhaler   No No   Sig: Inhale 2 puffs every 4 (four) hours as needed for wheezing   ascorbic acid (VITAMIN C) 250 mg tablet  Self Yes No   Sig: Take 250 mg by mouth daily   b complex vitamins capsule  Self Yes No   Sig: Take 1 capsule by mouth daily   cholecalciferol (VITAMIN D3) 1,000 units tablet  Self Yes No   Sig: Take 1,000 Units by mouth daily   ciclopirox (LOPROX) 0.77 % cream  Self No No   Sig: Apply topically once daily to face until scaly red areas resolve   fluocinonide (LIDEX) 0.05 % external solution  Self No No   Sig: Apply topically 2 (two) times a day As needed for itch on scalp.   ipratropium-albuterol (COMBIVENT RESPIMAT) inhaler  Self No No   Sig: Inhale 1 puff 4 (four) times a day   ipratropium-albuterol (DUO-NEB) 0.5-2.5 mg/3 mL nebulizer solution   No No   Sig: Take 3 mL by nebulization 4 (four) times a day   ketoconazole (NIZORAL) 2 % shampoo  Self No No   Sig: Daily for 2 weeks straight and then on \",  and \":  Lather into scalp and skin on face; leave on for 5 minutes and then rinse off completely.   predniSONE 10 mg tablet   No No   Si tabs daily x 3 D then 3 tabs daily x 3 D then 2 tabs daily x 3 D then 1 tab daily x 3 D   torsemide (DEMADEX) 10 mg tablet  Self Yes No      Facility-Administered Medications: None     Patient's Medications   Discharge Prescriptions    No medications on file     No discharge procedures on file.  ED SEPSIS DOCUMENTATION "   Time reflects when diagnosis was documented in both MDM as applicable and the Disposition within this note       Time User Action Codes Description Comment    11/2/2024 11:14 AM Irene Garcia Add [J44.1] COPD with acute exacerbation (HCC)                  Irene Garcia DO  11/02/24 1116

## 2024-11-02 NOTE — RESPIRATORY THERAPY NOTE
RT Protocol Note  Dianelys Price 78 y.o. female MRN: [id number]  Unit/Bed#: -01 Encounter: [id number]    Assessment    Principal Problem:    COPD exacerbation (HCC)  Active Problems:    Elevated TSH    COPD, very severe (HCC)    Chronic respiratory failure with hypoxia (HCC)    SIRS (systemic inflammatory response syndrome) ([facility])      Home Pulmonary Medications:     24   Respiratory Protocol   Protocol Initiated? Yes   Protocol Selection Respiratory   Language Barrier? No   Medical & Social History Reviewed? Yes   Diagnostic Studies Reviewed? Yes   Physical Assessment Performed? Yes   Home Devices/Therapy Home O2   Respiratory Plan Mild Distress pathway   Respiratory Assessment   Assessment Type Assess only   General Appearance Awake;Alert   Respiratory Pattern Normal   Chest Assessment Chest expansion symmetrical   Bilateral Breath Sounds Expiratory wheezes   Location Specific No   Cough None   Resp Comments Will continue with current tx plan   O2 Device NC   Cough Description   Sputum Amount None   Additional Assessments   Pulse 96   Respirations 20   SpO2 92 %       Home Devices/Therapy: Home O2    Past Medical History:   Diagnosis Date    Allergic     Asthenia     Asthma     Basal cell carcinoma 2024    left nasal wall, mohs    Cataract     COPD (chronic obstructive pulmonary disease) ([facility])     History of screening mammography     last assessed: 10/31/2017    Osteoporosis     Pneumonia 1950    Urinary tract infection      Social History     Socioeconomic History    Marital status:      Spouse name: None    Number of children: None    Years of education: None    Highest education level: None   Occupational History    None   Tobacco Use    Smoking status: Former     Current packs/day: 0.00     Average packs/day: 1 pack/day for 40.0 years (40.0 ttl pk-yrs)     Types: Cigarettes     Start date: 3/7/1965     Quit date: 3/7/2005     Years since quittin.6    Smokeless tobacco: Never    Vaping Use    Vaping status: Never Used   Substance and Sexual Activity    Alcohol use: Never     Alcohol/week: 2.0 standard drinks of alcohol     Types: 2 Glasses of wine per week    Drug use: Never    Sexual activity: Not Currently     Partners: Male     Birth control/protection: Post-menopausal, None   Other Topics Concern    None   Social History Narrative    Caffeine use     Social Determinants of Health     Financial Resource Strain: Low Risk  (2023)    Overall Financial Resource Strain (CARDIA)     Difficulty of Paying Living Expenses: Not very hard   Food Insecurity: No Food Insecurity (2024)    Nursing - Inadequate Food Risk Classification     Worried About Running Out of Food in the Last Year: Not on file     Ran Out of Food in the Last Year: Not on file     Ran Out of Food in the Last Year: 1   Transportation Needs: No Transportation Needs (2024)    Nursing - Transportation Risk Classification     Lack of Transportation: Not on file     Lack of Transportation: 2   Physical Activity: Not on file   Stress: Not on file   Social Connections: Not on file   Intimate Partner Violence: Unknown (2024)    Nursing IPS     Feels Physically and Emotionally Safe: Not on file     Physically Hurt by Someone: Not on file     Humiliated or Emotionally Abused by Someone: Not on file     Physically Hurt by Someone: 2     Hurt or Threatened by Someone: 2   Housing Stability: Unknown (2024)    Nursing: Inadequate Housing Risk Classification     Has Housing: Not on file     Worried About Losing Housing: Not on file     Unable to Get Utilities: Not on file     Unable to Pay for Housing in the Last Year: 2     Has Housin       Subjective         Objective    Physical Exam:   Assessment Type: Assess only  General Appearance: Awake, Alert  Respiratory Pattern: Normal  Chest Assessment: Chest expansion symmetrical  Bilateral Breath Sounds: Expiratory wheezes  Location Specific: No  Cough: None  O2  Device: NC    Vitals:  Blood pressure 137/65, pulse 96, temperature (!) 97.3 °F (36.3 °C), temperature source Oral, resp. rate 20, SpO2 92%.          Imaging and other studies: Results Review Statement: No pertinent imaging studies reviewed.    O2 Device: NC     Plan    Respiratory Plan: Mild Distress pathway        Resp Comments: Will continue with current tx plan

## 2024-11-02 NOTE — ASSESSMENT & PLAN NOTE
Stable, currently on 3L   Chronically wears 2 L of supplemental oxygen at baseline  Currently 97% on home regimen  Continue to treat COPD exacerbation

## 2024-11-02 NOTE — ASSESSMENT & PLAN NOTE
Meeting SIRS criteria on admission with tachycardia, tachypnea, and leukocytosis.  Likely secondary to COPD exacerbation, follow up sputum culture  Already received IV Azithromycin, will continue with PO Doxycycline for COPD exacerbation  Procalcitonin collected, results pending.  Covid, Flu negative.

## 2024-11-03 PROBLEM — J96.01 ACUTE HYPOXEMIC RESPIRATORY FAILURE (HCC): Status: ACTIVE | Noted: 2024-11-03

## 2024-11-03 PROBLEM — E87.29 RESPIRATORY ACIDOSIS: Status: ACTIVE | Noted: 2024-11-03

## 2024-11-03 LAB
ANION GAP SERPL CALCULATED.3IONS-SCNC: 5 MMOL/L (ref 4–13)
ARTERIAL PATENCY WRIST A: YES
ARTERIAL PATENCY WRIST A: YES
ATRIAL RATE: 107 BPM
BASE EXCESS BLDA CALC-SCNC: 10.2 MMOL/L
BASE EXCESS BLDA CALC-SCNC: 11 MMOL/L
BASOPHILS # BLD AUTO: 0.03 THOUSANDS/ÂΜL (ref 0–0.1)
BASOPHILS NFR BLD AUTO: 0 % (ref 0–1)
BUN SERPL-MCNC: 19 MG/DL (ref 5–25)
CALCIUM SERPL-MCNC: 8.9 MG/DL (ref 8.4–10.2)
CHLORIDE SERPL-SCNC: 94 MMOL/L (ref 96–108)
CO2 SERPL-SCNC: 39 MMOL/L (ref 21–32)
CREAT SERPL-MCNC: 0.34 MG/DL (ref 0.6–1.3)
EOSINOPHIL # BLD AUTO: 0 THOUSAND/ÂΜL (ref 0–0.61)
EOSINOPHIL NFR BLD AUTO: 0 % (ref 0–6)
ERYTHROCYTE [DISTWIDTH] IN BLOOD BY AUTOMATED COUNT: 11.9 % (ref 11.6–15.1)
GFR SERPL CREATININE-BSD FRML MDRD: 105 ML/MIN/1.73SQ M
GLUCOSE SERPL-MCNC: 165 MG/DL (ref 65–140)
HCO3 BLDA-SCNC: 40 MMOL/L (ref 22–28)
HCO3 BLDA-SCNC: 41.7 MMOL/L (ref 22–28)
HCT VFR BLD AUTO: 36.8 % (ref 34.8–46.1)
HGB BLD-MCNC: 10.8 G/DL (ref 11.5–15.4)
IMM GRANULOCYTES # BLD AUTO: 0.07 THOUSAND/UL (ref 0–0.2)
IMM GRANULOCYTES NFR BLD AUTO: 1 % (ref 0–2)
IPAP: 12
LYMPHOCYTES # BLD AUTO: 0.66 THOUSANDS/ÂΜL (ref 0.6–4.47)
LYMPHOCYTES NFR BLD AUTO: 5 % (ref 14–44)
MAGNESIUM SERPL-MCNC: 1.9 MG/DL (ref 1.9–2.7)
MCH RBC QN AUTO: 28.2 PG (ref 26.8–34.3)
MCHC RBC AUTO-ENTMCNC: 29.3 G/DL (ref 31.4–37.4)
MCV RBC AUTO: 96 FL (ref 82–98)
MONOCYTES # BLD AUTO: 0.68 THOUSAND/ÂΜL (ref 0.17–1.22)
MONOCYTES NFR BLD AUTO: 5 % (ref 4–12)
NASAL CANNULA: 2
NEUTROPHILS # BLD AUTO: 13.39 THOUSANDS/ÂΜL (ref 1.85–7.62)
NEUTS SEG NFR BLD AUTO: 89 % (ref 43–75)
NON VENT- BIPAP: ABNORMAL
NRBC BLD AUTO-RTO: 0 /100 WBCS
O2 CT BLDA-SCNC: 16.8 ML/DL (ref 16–23)
O2 CT BLDA-SCNC: 16.8 ML/DL (ref 16–23)
OXYHGB MFR BLDA: 91.9 % (ref 94–97)
OXYHGB MFR BLDA: 96.8 % (ref 94–97)
P AXIS: 83 DEGREES
PCO2 BLDA: 84.6 MM HG (ref 36–44)
PCO2 BLDA: 95.2 MM HG (ref 36–44)
PEEP MAX SETTING VENT: 6 CM[H2O]
PH BLDA: 7.26 [PH] (ref 7.35–7.45)
PH BLDA: 7.29 [PH] (ref 7.35–7.45)
PLATELET # BLD AUTO: 228 THOUSANDS/UL (ref 149–390)
PMV BLD AUTO: 10.3 FL (ref 8.9–12.7)
PO2 BLDA: 108.4 MM HG (ref 75–129)
PO2 BLDA: 62.2 MM HG (ref 75–129)
POTASSIUM SERPL-SCNC: 3.9 MMOL/L (ref 3.5–5.3)
PR INTERVAL: 170 MS
PROCALCITONIN SERPL-MCNC: 0.41 NG/ML
QRS AXIS: -14 DEGREES
QRSD INTERVAL: 62 MS
QT INTERVAL: 290 MS
QTC INTERVAL: 387 MS
RBC # BLD AUTO: 3.83 MILLION/UL (ref 3.81–5.12)
SODIUM SERPL-SCNC: 138 MMOL/L (ref 135–147)
SPECIMEN SOURCE: ABNORMAL
T WAVE AXIS: 80 DEGREES
VENT BIPAP FIO2: 40 %
VENTRICULAR RATE: 107 BPM
WBC # BLD AUTO: 14.83 THOUSAND/UL (ref 4.31–10.16)

## 2024-11-03 PROCEDURE — 84145 PROCALCITONIN (PCT): CPT | Performed by: FAMILY MEDICINE

## 2024-11-03 PROCEDURE — 83735 ASSAY OF MAGNESIUM: CPT | Performed by: FAMILY MEDICINE

## 2024-11-03 PROCEDURE — 99223 1ST HOSP IP/OBS HIGH 75: CPT | Performed by: INTERNAL MEDICINE

## 2024-11-03 PROCEDURE — 80048 BASIC METABOLIC PNL TOTAL CA: CPT | Performed by: FAMILY MEDICINE

## 2024-11-03 PROCEDURE — 94640 AIRWAY INHALATION TREATMENT: CPT

## 2024-11-03 PROCEDURE — 99232 SBSQ HOSP IP/OBS MODERATE 35: CPT | Performed by: NURSE PRACTITIONER

## 2024-11-03 PROCEDURE — 94664 DEMO&/EVAL PT USE INHALER: CPT

## 2024-11-03 PROCEDURE — 82805 BLOOD GASES W/O2 SATURATION: CPT | Performed by: PHYSICIAN ASSISTANT

## 2024-11-03 PROCEDURE — 94760 N-INVAS EAR/PLS OXIMETRY 1: CPT

## 2024-11-03 PROCEDURE — 85025 COMPLETE CBC W/AUTO DIFF WBC: CPT | Performed by: FAMILY MEDICINE

## 2024-11-03 PROCEDURE — 36600 WITHDRAWAL OF ARTERIAL BLOOD: CPT

## 2024-11-03 PROCEDURE — 87070 CULTURE OTHR SPECIMN AEROBIC: CPT | Performed by: FAMILY MEDICINE

## 2024-11-03 PROCEDURE — 87449 NOS EACH ORGANISM AG IA: CPT | Performed by: NURSE PRACTITIONER

## 2024-11-03 PROCEDURE — 93010 ELECTROCARDIOGRAM REPORT: CPT | Performed by: STUDENT IN AN ORGANIZED HEALTH CARE EDUCATION/TRAINING PROGRAM

## 2024-11-03 PROCEDURE — 87205 SMEAR GRAM STAIN: CPT | Performed by: FAMILY MEDICINE

## 2024-11-03 RX ORDER — METHYLPREDNISOLONE SODIUM SUCCINATE 40 MG/ML
40 INJECTION, POWDER, LYOPHILIZED, FOR SOLUTION INTRAMUSCULAR; INTRAVENOUS EVERY 8 HOURS
Status: DISCONTINUED | OUTPATIENT
Start: 2024-11-03 | End: 2024-11-05

## 2024-11-03 RX ORDER — BUDESONIDE 0.5 MG/2ML
0.5 INHALANT ORAL
Status: DISCONTINUED | OUTPATIENT
Start: 2024-11-03 | End: 2024-11-07 | Stop reason: HOSPADM

## 2024-11-03 RX ORDER — ACETAMINOPHEN 325 MG/1
650 TABLET ORAL EVERY 6 HOURS PRN
Status: DISCONTINUED | OUTPATIENT
Start: 2024-11-03 | End: 2024-11-07 | Stop reason: HOSPADM

## 2024-11-03 RX ORDER — FORMOTEROL FUMARATE DIHYDRATE 20 UG/2ML
20 SOLUTION RESPIRATORY (INHALATION)
Status: DISCONTINUED | OUTPATIENT
Start: 2024-11-03 | End: 2024-11-07 | Stop reason: HOSPADM

## 2024-11-03 RX ADMIN — IPRATROPIUM BROMIDE 0.5 MG: 0.5 SOLUTION RESPIRATORY (INHALATION) at 13:53

## 2024-11-03 RX ADMIN — ENOXAPARIN SODIUM 40 MG: 40 INJECTION SUBCUTANEOUS at 08:10

## 2024-11-03 RX ADMIN — METHYLPREDNISOLONE SODIUM SUCCINATE 40 MG: 40 INJECTION, POWDER, FOR SOLUTION INTRAMUSCULAR; INTRAVENOUS at 13:00

## 2024-11-03 RX ADMIN — CEFTRIAXONE SODIUM 1000 MG: 10 INJECTION, POWDER, FOR SOLUTION INTRAVENOUS at 11:13

## 2024-11-03 RX ADMIN — GUAIFENESIN 600 MG: 600 TABLET ORAL at 08:11

## 2024-11-03 RX ADMIN — LEVALBUTEROL HYDROCHLORIDE 1.25 MG: 1.25 SOLUTION RESPIRATORY (INHALATION) at 19:16

## 2024-11-03 RX ADMIN — FORMOTEROL FUMARATE DIHYDRATE 20 MCG: 20 SOLUTION RESPIRATORY (INHALATION) at 19:16

## 2024-11-03 RX ADMIN — METHYLPREDNISOLONE SODIUM SUCCINATE 40 MG: 40 INJECTION, POWDER, FOR SOLUTION INTRAMUSCULAR; INTRAVENOUS at 21:20

## 2024-11-03 RX ADMIN — LEVALBUTEROL HYDROCHLORIDE 1.25 MG: 1.25 SOLUTION RESPIRATORY (INHALATION) at 07:13

## 2024-11-03 RX ADMIN — IPRATROPIUM BROMIDE 0.5 MG: 0.5 SOLUTION RESPIRATORY (INHALATION) at 07:13

## 2024-11-03 RX ADMIN — BUDESONIDE 0.5 MG: 0.5 INHALANT RESPIRATORY (INHALATION) at 19:16

## 2024-11-03 RX ADMIN — TORSEMIDE 10 MG: 10 TABLET ORAL at 08:11

## 2024-11-03 RX ADMIN — IPRATROPIUM BROMIDE 0.5 MG: 0.5 SOLUTION RESPIRATORY (INHALATION) at 19:16

## 2024-11-03 RX ADMIN — GUAIFENESIN 600 MG: 600 TABLET ORAL at 16:34

## 2024-11-03 RX ADMIN — METHYLPREDNISOLONE SODIUM SUCCINATE 40 MG: 40 INJECTION, POWDER, FOR SOLUTION INTRAMUSCULAR; INTRAVENOUS at 07:01

## 2024-11-03 RX ADMIN — LEVALBUTEROL HYDROCHLORIDE 1.25 MG: 1.25 SOLUTION RESPIRATORY (INHALATION) at 13:53

## 2024-11-03 NOTE — TREATMENT PLAN
Rapid response for altered mentation, patient lethargic and tracking but not verbal or following commands. Stroke alert called. Bedside workup is showing significant CO2 retention and acidosis, patient mentation improving by end of rapid and answering questions but still altered. Critical care recommending BiPAP and reassess CO2 and pH. Will remain on the floor for now.    Attempted to call sister and niece, per patient request. No answer on either number and unverified mailboxes, no message left.     =========================  Repeat ABG around 0200 showing slight improvement to CO2 to 95.2 with improvement in pH to 7.259. Will continue course with BiPAP and repeat ABG in AM

## 2024-11-03 NOTE — ASSESSMENT & PLAN NOTE
Acute on chronic hypoxemic respiratory failure currently requiring around 4 L of oxygen by nasal cannula at baseline she is on 2 L continuous likely secondary to her severe COPD exacerbation  Titrate down as able to maintain oxygen saturation greater than 89%

## 2024-11-03 NOTE — ASSESSMENT & PLAN NOTE
Stable, currently on 3L   Chronically wears 2 L of supplemental oxygen at baseline  Currently 99% on home regimen  Continue to treat COPD exacerbation

## 2024-11-03 NOTE — ASSESSMENT & PLAN NOTE
Noted history  Follows with [facility]; Last seen in the office, 7/24/24  Typically takes Advair, Combivent at home  Wears chronically 2 L of supplemental oxygen  Will ask Palliative care to see the patient as well

## 2024-11-03 NOTE — CONSULTS
Consultation - Pulmonology   Name: Dianelys Price 78 y.o. female I MRN: [id number]  Unit/Bed#: -01 I Date of Admission: 11/2/2024   Date of Service: 11/3/2024 I Hospital Day: 1   Inpatient consult to Pulmonology  Consult performed by: Romelia Carlos MD  Consult ordered by: KELSEY Cai      Physician Requesting Evaluation: Leroy Wilder MD   Reason for Evaluation / Principal Problem: COPD exacerbation    Assessment & Plan  COPD exacerbation (HCC)  Severe COPD with acute exacerbation with acute tracheobronchitis  Flu COVID testing has been negative  Will continue with her nebulizer treatments around-the-clock  She has been on Advair at home, does not want to take the Breo here switched to nebulizer treatments with budesonide and Perforomist twice a day  Will continue with Solu-Medrol 40 mg every 8 hours  Continue with airway clearance measures  Given increased white cell count at the time of admission along with increased procalcitonin chest x-ray with no definite evidence of any pneumonia with bilateral increased interstitial markings bilaterally still continue with the antibiotics ceftriaxone for a total duration of 5 days  Outpatient pulmonary follow-up for repeat PFTs and outpatient pulmonary rehab  Palliative care consult  Will continue to follow  COPD, very severe (HCC)  COPD severe with FEV1 of 23% with air trapping and severely decreased DLCO at 23% prior PFTs in 2019  Also had evaluation for lung volume reduction was not a candidate  States she did do the pulmonary rehab sessions in 2019  She states they had talked about lung transplant evaluation given her age she had declined  Chronic respiratory failure with hypoxia (HCC)  On 2 L of oxygen continuous at baseline would need reassessment again at the time of hospital discharge  SIRS (systemic inflammatory response syndrome) (HCC)    Respiratory acidosis    Acute hypoxemic respiratory failure (HCC)  Acute on chronic hypoxemic respiratory failure  currently requiring around 4 L of oxygen by nasal cannula at baseline she is on 2 L continuous likely secondary to her severe COPD exacerbation  Titrate down as able to maintain oxygen saturation greater than 89%  I have discussed the above management plan in detail with the primary service.     History of Present Illness   Dianelys Price is a 78 y.o. female who presents with approximately 40-pack-year smoking history who quit about 19 years ago, diagnosed with severe COPD and has been following up with pulmonary at [place] with Dr. Tolentino,.  She has been using Advair 1 puff twice a day and Combivent twice a day, she does have DuoNeb nebulizer on hand but states she did not have the need to use it, she has not had any exacerbations in the past year and the prior hospital admission for COPD exacerbation was in 2021 did not have any need for outpatient prednisone use either,  Does have very severe COPD with FEV1 of 23% predicted and air trapping and severely decreased DLCO at 23%  Chronic hypoxemic respiratory failure on 2 L of oxygen by nasal cannula continuous  At baseline she lives alone and has been independently managing, avoids going to grocery stores, tries managing online  Recently about a week ago she states she went to [facility]  her prescriptions and she thinks she might have picked up about there, since then she has been having some chest tightness more coughing spells no fevers and yesterday morning she states that it became worse and she had to come into the ER found to be on acute on chronic hypoxemic respiratory failure also had to be placed on BiPAP for some time      Review of Systems   Constitutional:  Positive for fatigue.   HENT: Negative.     Eyes: Negative.    Respiratory:  Positive for cough, shortness of breath and wheezing.    Cardiovascular: Negative.    Gastrointestinal: Negative.    Endocrine: Negative.    Genitourinary: Negative.    Musculoskeletal: Negative.     Allergic/Immunologic: Negative.    Neurological: Negative.    Psychiatric/Behavioral: Negative.         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  Family History:non-contributory    Objective :  Temp:  [97.3 °F (36.3 °C)-97.8 °F (36.6 °C)] 97.6 °F (36.4 °C)  HR:  [72-99] 72  BP: (127-163)/(65-92) 127/72  Resp:  [18-25] 18  SpO2:  [91 %-99 %] 94 %  O2 Device: Nasal cannula  Nasal Cannula O2 Flow Rate (L/min):  [2 L/min-3 L/min] 2 L/min    Physical Exam  Currently in bed in no acute respiratory distress  Eyes anicteric Sleeter, conjunctiva is clear  ENT nares is patent, no evidence of any discharge  Neck no JVD no cervical lymphadenopathy  Lungs diminished breath sounds bilaterally no rhonchi  Heart first and second heart sounds are heard no murmur or gallop is heard  Abdomen soft nontender bowel sounds are present  Skin no rash no bruises  Extremities no pedal edema  CNS awake alert oriented x 3.    Lab Results: I have reviewed the following results:  .     11/02/24  2311 11/03/24  0436   WBC  --  14.83*   HGB 10.9* 10.8*   HCT 32* 36.8   PLT  --  228   SODIUM  --  138   K  --  3.9   CL  --  94*   CO2  --  39*   BUN  --  19   CREATININE  --  0.34*   GLUC  --  165*   CAIONIZED 1.06*  --    MG  --  1.9     ABG:   .     11/03/24  0159 11/03/24  0751   PHART 7.259* 7.293*   QCQ3XWJ 95.2* 84.6*   PO2ART 108.4 62.2*   COQ1PQZ 41.7* 40.0*   BEART 11.0 10.2       Imaging Results Review: I personally reviewed the following image studies in PACS and associated radiology reports: chest xray. My interpretation of the radiology images/reports is: Increased interstitial markings bilaterally no evidence of any pneumonia.  Other Study Results Review: No additional pertinent studies reviewed.  PFT Results Reviewed:  From 2019 with very severe obstructive airflow limitation with decreased DLCO    VTE Prophylaxis: Sequential compression device (Venodyne)

## 2024-11-03 NOTE — RESPIRATORY THERAPY NOTE
11/02/24 2337   Respiratory Assessment   Assessment Type Assess only   General Appearance Drowsy   Respiratory Pattern Normal   Chest Assessment Chest expansion symmetrical   Bilateral Breath Sounds Diminished;Expiratory wheezes   Location Specific No   Cough None   Resp Comments Pt placed on bipap at this time   O2 Device V60   Non-Invasive Information   O2 Interface Device Face mask   Non-Invasive Ventilation Mode BiPAP   $ Intermittent NIV Yes   SpO2 95 %   $ Pulse Oximetry Spot Check Charge Completed   Non-Invasive Settings   IPAP (cm) 12 cm   EPAP (cm) 6 cm   Rate (Set) 12   FiO2 (%) 40   Pressure Support (cm H2O) 6   Rise Time 2   Non-Invasive Readings   Skin Intervention Skin intact   Total Rate 22   Vt (mL) (Mech) 654   MV (Mech) 12   Peak Pressure (Obs) 14   Spontaneous Vt (mL) 458   Leak (lpm) 0   Non-Invasive Alarms   Insp Pressure High (cm H20) 20   Insp Pressure Low (cm H20) 5   Low Insp Pressure Time (sec) 20 sec   MV Low (L/min) 3   Vt High (mL) 1200   Vt Low (mL) 200   High Resp Rate (BPM) 40 BPM   Low Resp Rate (BPM) 12 BPM   Apnea Interval (sec) 30     RT Ventilator Management Note  Dianelys ZHANG Albert 78 y.o. female MRN: [id number]  Unit/Bed#: -01 Encounter: [id number]      Daily Screen    No data found in the last 10 encounters.           Physical Exam:   Assessment Type: Assess only  General Appearance: Drowsy  Respiratory Pattern: Normal  Chest Assessment: Chest expansion symmetrical  Bilateral Breath Sounds: Diminished, Expiratory wheezes  Location Specific: No  Cough: None  O2 Device: V60      Resp Comments: Pt placed on bipap at this time

## 2024-11-03 NOTE — ASSESSMENT & PLAN NOTE
Meeting SIRS criteria on admission with tachycardia, tachypnea, and leukocytosis.  Likely secondary to COPD exacerbation, follow up sputum culture  Her procalcitonin is elevated, 0.41 today, however, no definitive pneumonia seen on imaging.  Will transition patient from PO Doxy to IV Ceftriaxone today.  Covid, Flu negative.  Follow up on urine for strep pneumoniae/legionella

## 2024-11-03 NOTE — RAPID RESPONSE
Rapid Response Note  Dianelys Price 78 y.o. female MRN: [id number]  Unit/Bed#: -01 Encounter: [id number]    Rapid Response Notification(s):   Response called date/time:  11/2/2024 11:03 PM  Response team arrival date/time:  11/2/2024 11:04 PM  Response end date/time:  11/2/2024 11:22 PM  Level of care:  [facility]  Rapid response location:  [facility] unit  Primary reason for rapid response call:  Acute change in neuro status    Rapid Response Intervention(s):   Airway:  None  Breathing:  None  Circulation:  None  Fluids administered:  None  Comments:  Stroke alert called, cancelled by neurology       Assessment:   AMS, global aphasia, unreponsiveness  Respiratory acidosis    Plan:   Patient initially with concern for possible basilar stroke with global aphasia, no movement, but intact EOMs and reflexes.   Stroke alert called  iSTAT with high CO2, respiratory acidosis  Patient became more responsive and quickly oriented  Spoke with neurology, recommended canceling of the stroke alert  Will place on BiPAP, repeat ABG in 2 hours  Breathing treatment  Differential also includes seizures so close monitoring for any additional concerns      Rapid Response Outcome:   Transfer:  Remain on floor  Primary service notified of transfer: Yes    Code Status: Level 1 (Full Code)      Family notified: Primary team to notify Family Member       Background/Situation:   Dianelys Price is a 78 y.o. female who presented today for COPD exacerbation. She was last seen at 20:22. RN went into room and patient was unresponsive and therefore RRT was called. By the time of my arrival patient was looking around, could blink to threat but was non-verbal, not following commands. Stroke alert was called. Patient had iSTAT ABG drawn that showed a pH of 7.122 and undetectably high CO2. She suddenly yelled out and was quickly back to baseline upon obtaining additional IV access. Spoke with neurology who requested I cancel the stroke alert. She was placed  on BiPAP and given a DuoNeb.     Review of Systems   All other systems reviewed and are negative.      Objective:   Vitals:    11/02/24 1929 11/02/24 2304 11/02/24 2306 11/02/24 2312   BP:  160/92 163/81 143/83   BP Location:       Pulse: 96 99 98 97   Resp: 20      Temp:  97.7 °F (36.5 °C) 97.7 °F (36.5 °C)    TempSrc:       SpO2: 92% 97% 97% 97%   Weight: 48.4 kg (106 lb 11.2 oz)      Height: 5' (1.524 m)        Physical Exam  Vitals and nursing note reviewed.   Constitutional:       General: She is awake.   HENT:      Head: Normocephalic and atraumatic.   Eyes:      Conjunctiva/sclera: Conjunctivae normal.      Pupils: Pupils are equal, round, and reactive to light.   Cardiovascular:      Rate and Rhythm: Normal rate and regular rhythm.   Pulmonary:      Breath sounds: Decreased air movement present. Decreased breath sounds and wheezing present.   Abdominal:      General: There is no distension.      Palpations: Abdomen is soft.      Tenderness: There is no abdominal tenderness.   Genitourinary:     Comments: Deferred  Musculoskeletal:         General: No tenderness or signs of injury.      Cervical back: Neck supple.   Skin:     General: Skin is warm and dry.      Capillary Refill: Capillary refill takes less than 2 seconds.      Comments: Cool ankles and feet   Neurological:      Comments: Initially upon my entering, patient with eyes open, able to look at examiner, blink to threat. Did not follow any other commands, did not speak, did not move when obtaining blood work initialy.    By end of RRT, awake, alert, oriented to person, place, time, city. Follows commands in all extremities. No CN deficits, no weakness, no sensory deficits, no complaints.

## 2024-11-03 NOTE — RESPIRATORY THERAPY NOTE
RT Protocol Note  Dianelys Price 78 y.o. female MRN: [id number]  Unit/Bed#: -01 Encounter: [id number]    Assessment    Principal Problem:    COPD exacerbation (HCC)  Active Problems:    COPD, very severe (HCC)    Chronic respiratory failure with hypoxia (HCC)    SIRS (systemic inflammatory response syndrome) (HCC)    Respiratory acidosis      Home Pulmonary Medications:    Home Devices/Therapy: Home O2    Past Medical History:   Diagnosis Date    Allergic     Asthenia     Asthma     Basal cell carcinoma 2024    left nasal wall, mohs    Cataract     COPD (chronic obstructive pulmonary disease) (HCC)     History of screening mammography     last assessed: 10/31/2017    Osteoporosis     Pneumonia 1950    Urinary tract infection      Social History     Socioeconomic History    Marital status:      Spouse name: None    Number of children: None    Years of education: None    Highest education level: None   Occupational History    None   Tobacco Use    Smoking status: Former     Current packs/day: 0.00     Average packs/day: 1 pack/day for 40.0 years (40.0 ttl pk-yrs)     Types: Cigarettes     Start date: 3/7/1965     Quit date: 3/7/2005     Years since quittin.6    Smokeless tobacco: Never   Vaping Use    Vaping status: Never Used   Substance and Sexual Activity    Alcohol use: Never     Alcohol/week: 2.0 standard drinks of alcohol     Types: 2 Glasses of wine per week    Drug use: Never    Sexual activity: Not Currently     Partners: Male     Birth control/protection: Post-menopausal, None   Other Topics Concern    None   Social History Narrative    Caffeine use     Social Determinants of Health     Financial Resource Strain: Low Risk  (2023)    Overall Financial Resource Strain (CARDIA)     Difficulty of Paying Living Expenses: Not very hard   Food Insecurity: No Food Insecurity (2024)    Nursing - Inadequate Food Risk Classification     Worried About Running Out of Food in the Last  Year: Not on file     Ran Out of Food in the Last Year: Not on file     Ran Out of Food in the Last Year: 1   Transportation Needs: No Transportation Needs (2024)    Nursing - Transportation Risk Classification     Lack of Transportation: Not on file     Lack of Transportation: 2   Physical Activity: Not on file   Stress: Not on file   Social Connections: Not on file   Intimate Partner Violence: Unknown (2024)    Nursing IPS     Feels Physically and Emotionally Safe: Not on file     Physically Hurt by Someone: Not on file     Humiliated or Emotionally Abused by Someone: Not on file     Physically Hurt by Someone: 2     Hurt or Threatened by Someone: 2   Housing Stability: Unknown (2024)    Nursing: Inadequate Housing Risk Classification     Has Housing: Not on file     Worried About Losing Housing: Not on file     Unable to Get Utilities: Not on file     Unable to Pay for Housing in the Last Year: 2     Has Housin       Subjective         Objective    Physical Exam:   Assessment Type: Assess only  General Appearance: Awake, Alert  Respiratory Pattern: Normal  Chest Assessment: Chest expansion symmetrical  Bilateral Breath Sounds: Diminished  Location Specific: No  Cough: None  O2 Device: V60    Vitals:  Blood pressure 127/72, pulse 72, temperature 97.6 °F (36.4 °C), temperature source Oral, resp. rate 18, height 5' (1.524 m), weight 48.4 kg (106 lb 11.2 oz), SpO2 94%.    Results from last 7 days   Lab Units 24  0751 24  0159   PH ART  7.293* 7.259*   PCO2 ART mm Hg 84.6* 95.2*   PO2 ART mm Hg 62.2* 108.4   HCO3 ART mmol/L 40.0* 41.7*   BASE EXC ART mmol/L 10.2 11.0   O2 CONTENT ART mL/dL 16.8 16.8   O2 HGB, ARTERIAL % 91.9* 96.8   ABG SOURCE   --  Radial, Right   ANIVAL TEST  Yes Yes       Imaging and other studies: Results Review Statement: No pertinent imaging studies reviewed.    O2 Device: V60     Plan    Respiratory Plan: Mild Distress pathway        Resp Comments: pt with hx of  copd, currently not in any distress, will continue with xop/atr tid

## 2024-11-03 NOTE — NURSING NOTE
Woke patient up to administer medication, pt found sleeping in bed, snoring, in upright position. Upon wakening, patient opened her eyes but unable to respond verbally, unable to squeeze my hands or move legs and feet upon request. Patient continued to stare out and unable to respond verbally. Initiated rapid response for change in mental status. RRT en route.

## 2024-11-03 NOTE — ASSESSMENT & PLAN NOTE
Patient was a rapid response overnight for altered mental status changes  Found to have a co2 level greater than 103 on istat.  Initiated on BiPAP with improvement to CO2 levels.  Pulmonology consult, appreciate input

## 2024-11-03 NOTE — ASSESSMENT & PLAN NOTE
On 2 L of oxygen continuous at baseline would need reassessment again at the time of hospital discharge

## 2024-11-03 NOTE — ASSESSMENT & PLAN NOTE
Severe COPD with acute exacerbation with acute tracheobronchitis  Flu COVID testing has been negative  Will continue with her nebulizer treatments around-the-clock  She has been on Advair at home, does not want to take the Breo here switched to nebulizer treatments with budesonide and Perforomist twice a day  Will continue with Solu-Medrol 40 mg every 8 hours  Continue with airway clearance measures  Given increased white cell count at the time of admission along with increased procalcitonin chest x-ray with no definite evidence of any pneumonia with bilateral increased interstitial markings bilaterally still continue with the antibiotics ceftriaxone for a total duration of 5 days  Outpatient pulmonary follow-up for repeat PFTs and outpatient pulmonary rehab  Palliative care consult  Will continue to follow

## 2024-11-03 NOTE — ASSESSMENT & PLAN NOTE
COPD severe with FEV1 of 23% with air trapping and severely decreased DLCO at 23% prior PFTs in 2019  Also had evaluation for lung volume reduction was not a candidate  States she did do the pulmonary rehab sessions in 2019  She states they had talked about lung transplant evaluation given her age she had declined

## 2024-11-03 NOTE — PROGRESS NOTES
Progress Note - Hospitalist   Name: Dianelys Price 78 y.o. female I MRN: [id number]  Unit/Bed#: -01 I Date of Admission: 11/2/2024   Date of Service: 11/3/2024 I Hospital Day: 1    Assessment & Plan  COPD exacerbation (HCC)  Patient presented to the ED with complaints of worsening shortness of breath, wheezing with a productive cough with green sputum over the last few days.  Known history of very severe COPD, follows with -Pulmonology  Chest x-ray (11/2/24): No acute cardiopulmonary disease. Probable mild peripheral pulmonary fibrotic changes.   Received 125 mg Solu-Medrol in the ER, Continue with IV Solu-Medrol 40 mg every 8 hours  Albuterol and ipratropium nebs ordered  Will give patient IV Ceftriaxone instead of Doxy today  Incentive spirometer  Respiratory protocol  Sputum culture ordered, follow-up  Pulmonology consult, appreciate input  SIRS (systemic inflammatory response syndrome) (HCC)  Meeting SIRS criteria on admission with tachycardia, tachypnea, and leukocytosis.  Likely secondary to COPD exacerbation, follow up sputum culture  Her procalcitonin is elevated, 0.41 today, however, no definitive pneumonia seen on imaging.  Will transition patient from PO Doxy to IV Ceftriaxone today.  Covid, Flu negative.  Follow up on urine for strep pneumoniae/legionella  COPD, very severe (HCC)  Noted history  Follows with -Pulmonology; Last seen in the office, 7/24/24  Typically takes Advair, Combivent at home  Wears chronically 2 L of supplemental oxygen  Will ask Palliative care to see the patient as well  Chronic respiratory failure with hypoxia (HCC)  Stable, currently on 3L   Chronically wears 2 L of supplemental oxygen at baseline  Currently 99% on home regimen  Continue to treat COPD exacerbation  Respiratory acidosis  Patient was a rapid response overnight for altered mental status changes  Found to have a co2 level greater than 103 on istat.  Initiated on BiPAP with improvement to CO2  levels.  Pulmonology consult, appreciate input    VTE Pharmacologic Prophylaxis:    Enoxaparin    Mobility:   Basic Mobility Inpatient Raw Score: 22  JH-HLM Goal: 7: Walk 25 feet or more  JH-HLM Achieved: 7: Walk 25 feet or more  JH-HLM Goal achieved. Continue to encourage appropriate mobility.    Patient Centered Rounds: I performed bedside rounds with nursing staff today.   Discussions with Specialists or Other Care Team Provider:     Education and Discussions with Family / Patient: Patient declined call to .     Current Length of Stay: 1 day(s)  Current Patient Status: Inpatient   Certification Statement: The patient will continue to require additional inpatient hospital stay due to ongoing treatment in setting of COPD exacerbation, respiratory acidosis and need for further management as well as pulmonology consultation.  Discharge Plan: Anticipate discharge in 48-72 hrs to home.    Code Status: Level 1 - Full Code    Subjective   Patient resting in bed, eating breakfast.  Reports that her breathing feels okay today but that she went to the bathroom and bending forward really caused her to be out of breath.    Objective :  Temp:  [97.3 °F (36.3 °C)-97.8 °F (36.6 °C)] 97.6 °F (36.4 °C)  HR:  [72-99] 72  BP: (127-163)/(65-92) 127/72  Resp:  [18-25] 18  SpO2:  [91 %-99 %] 94 %  O2 Device: Nasal cannula  Nasal Cannula O2 Flow Rate (L/min):  [2 L/min-3 L/min] 2 L/min    Body mass index is 20.84 kg/m².     Input and Output Summary (last 24 hours):     Intake/Output Summary (Last 24 hours) at 11/3/2024 1119  Last data filed at 11/3/2024 0100  Gross per 24 hour   Intake 360 ml   Output --   Net 360 ml       Physical Exam  Vitals and nursing note reviewed.   Constitutional:       General: She is not in acute distress.     Appearance: She is normal weight. She is ill-appearing.   Cardiovascular:      Rate and Rhythm: Normal rate.      Pulses: Normal pulses.           Dorsalis pedis pulses are 2+ on the right  side and 2+ on the left side.      Heart sounds: Normal heart sounds.   Pulmonary:      Effort: Accessory muscle usage present. No tachypnea, bradypnea or respiratory distress.      Breath sounds: Decreased breath sounds and wheezing present.      Comments: 2 L 92-94% at rest  Abdominal:      General: Bowel sounds are normal.      Palpations: Abdomen is soft.      Tenderness: There is no abdominal tenderness.   Musculoskeletal:         General: Normal range of motion.      Right lower le+ Pitting Edema present.      Left lower le+ Pitting Edema present.   Skin:     General: Skin is warm and dry.   Neurological:      Mental Status: She is alert and oriented to person, place, and time.   Psychiatric:         Mood and Affect: Mood normal.           Lines/Drains:              Lab Results: I have reviewed the following results:   Results from last 7 days   Lab Units 24  0436   WBC Thousand/uL 14.83*   HEMOGLOBIN g/dL 10.8*   HEMATOCRIT % 36.8   PLATELETS Thousands/uL 228   SEGS PCT % 89*   LYMPHO PCT % 5*   MONO PCT % 5   EOS PCT % 0     Results from last 7 days   Lab Units 24  0436 24  1010   SODIUM mmol/L 138 141   POTASSIUM mmol/L 3.9 4.0   CHLORIDE mmol/L 94* 95*   CO2 mmol/L 39* 39*   BUN mg/dL 19 15   CREATININE mg/dL 0.34* 0.33*   ANION GAP mmol/L 5 7   CALCIUM mg/dL 8.9 9.2   ALBUMIN g/dL  --  3.6   TOTAL BILIRUBIN mg/dL  --  0.43   ALK PHOS U/L  --  62   ALT U/L  --  14   AST U/L  --  16   GLUCOSE RANDOM mg/dL 165* 136         Results from last 7 days   Lab Units 24  2304   POC GLUCOSE mg/dl 239*         Results from last 7 days   Lab Units 24  0436 24  1147   PROCALCITONIN ng/ml 0.41* 0.26*       Recent Cultures (last 7 days):         Imaging Results Review: No pertinent imaging studies reviewed.  Other Study Results Review: No additional pertinent studies reviewed.    Last 24 Hours Medication List:     Current Facility-Administered Medications:     acetaminophen  (TYLENOL) tablet 650 mg, Q6H PRN    albuterol inhalation solution 2.5 mg, Q4H PRN    cefTRIAXone (ROCEPHIN) 1,000 mg in dextrose 5 % 50 mL IVPB, Q24H, Last Rate: 1,000 mg (11/03/24 1113)    enoxaparin (LOVENOX) subcutaneous injection 40 mg, Daily    fluticasone-vilanterol 200-25 mcg/actuation 1 puff, Daily    guaiFENesin (MUCINEX) 12 hr tablet 600 mg, BID    ipratropium (ATROVENT) 0.02 % inhalation solution 0.5 mg, TID    levalbuterol (XOPENEX) inhalation solution 1.25 mg, TID    methylPREDNISolone sodium succinate (Solu-MEDROL) injection 40 mg, Q8H    torsemide (DEMADEX) tablet 10 mg, Daily    Administrative Statements   Today, Patient Was Seen By: KELSEY Cai  I have spent a total time of 39 minutes in caring for this patient on the day of the visit/encounter including Diagnostic results, Risks and benefits of tx options, Instructions for management, Patient and family education, Importance of tx compliance, Risk factor reductions, Impressions, Counseling / Coordination of care, Documenting in the medical record, Reviewing / ordering tests, medicine, procedures  , and Communicating with other healthcare professionals .    **Please Note: This note may have been constructed using a voice recognition system.**

## 2024-11-03 NOTE — ASSESSMENT & PLAN NOTE
Patient presented to the ED with complaints of worsening shortness of breath, wheezing with a productive cough with green sputum over the last few days.  Known history of very severe COPD, follows with [facility]  Chest x-ray (11/2/24): No acute cardiopulmonary disease. Probable mild peripheral pulmonary fibrotic changes.   Received 125 mg Solu-Medrol in the ER, Continue with IV Solu-Medrol 40 mg every 8 hours  Albuterol and ipratropium nebs ordered  Will give patient IV Ceftriaxone instead of Doxy today  Incentive spirometer  Respiratory protocol  Sputum culture ordered, follow-up  Pulmonology consult, appreciate input

## 2024-11-03 NOTE — RESPIRATORY THERAPY NOTE
Attending rapid response with Istat and MAURO, called for altered mental status, respirations are even and non labored, Spo2 97% on 2L o2 nc, Istat ran at bedside with venous blood, will place pt on bipap per Istat results

## 2024-11-03 NOTE — PLAN OF CARE
Problem: PAIN - ADULT  Goal: Verbalizes/displays adequate comfort level or baseline comfort level  Description: Interventions:  - Encourage patient to monitor pain and request assistance  - Assess pain using appropriate pain scale  - Administer analgesics based on type and severity of pain and evaluate response  - Implement non-pharmacological measures as appropriate and evaluate response  - Consider cultural and social influences on pain and pain management  - Notify physician/advanced practitioner if interventions unsuccessful or patient reports new pain  Outcome: Progressing     Problem: INFECTION - ADULT  Goal: Absence or prevention of progression during hospitalization  Description: INTERVENTIONS:  - Assess and monitor for signs and symptoms of infection  - Monitor lab/diagnostic results  - Monitor all insertion sites, i.e. indwelling lines, tubes, and drains  - Monitor endotracheal if appropriate and nasal secretions for changes in amount and color  - [place] appropriate cooling/warming therapies per order  - Administer medications as ordered  - Instruct and encourage patient and family to use good hand hygiene technique  - Identify and instruct in appropriate isolation precautions for identified infection/condition  Outcome: Progressing  Goal: Absence of fever/infection during neutropenic period  Description: INTERVENTIONS:  - Monitor WBC    Outcome: Progressing     Problem: SAFETY ADULT  Goal: Patient will remain free of falls  Description: INTERVENTIONS:  - Educate patient/family on patient safety including physical limitations  - Instruct patient to call for assistance with activity   - Consult OT/PT to assist with strengthening/mobility   - Keep Call bell within reach  - Keep bed low and locked with side rails adjusted as appropriate  - Keep care items and personal belongings within reach  - Initiate and maintain comfort rounds  - Make Fall Risk Sign visible to staff  - Offer Toileting every 2 Hours,  in advance of need  - Initiate/Maintain bed alarm  - Obtain necessary fall risk management equipment: chair alarm  - Apply yellow socks and bracelet for high fall risk patients  - Consider moving patient to room near nurses station  Outcome: Progressing  Goal: Maintain or return to baseline ADL function  Description: INTERVENTIONS:  -  Assess patient's ability to carry out ADLs; assess patient's baseline for ADL function and identify physical deficits which impact ability to perform ADLs (bathing, care of mouth/teeth, toileting, grooming, dressing, etc.)  - Assess/evaluate cause of self-care deficits   - Assess range of motion  - Assess patient's mobility; develop plan if impaired  - Assess patient's need for assistive devices and provide as appropriate  - Encourage maximum independence but intervene and supervise when necessary  - Involve family in performance of ADLs  - Assess for home care needs following discharge   - Consider OT consult to assist with ADL evaluation and planning for discharge  - Provide patient education as appropriate  Outcome: Progressing  Goal: Maintains/Returns to pre admission functional level  Description: INTERVENTIONS:  - Perform AM-PAC 6 Click Basic Mobility/ Daily Activity assessment daily.  - Set and communicate daily mobility goal to care team and patient/family/caregiver.   - Collaborate with rehabilitation services on mobility goals if consulted  - Perform Range of Motion 3 times a day.  - Reposition patient every 3 hours.  - Dangle patient 3 times a day  - Stand patient 3 times a day  - Ambulate patient 3 times a day  - Out of bed to chair 3 times a day   - Out of bed for meals 3 times a day  - Out of bed for toileting  - Record patient progress and toleration of activity level   Outcome: Progressing     Problem: DISCHARGE PLANNING  Goal: Discharge to home or other facility with appropriate resources  Description: INTERVENTIONS:  - Identify barriers to discharge w/patient and  caregiver  - Arrange for needed discharge resources and transportation as appropriate  - Identify discharge learning needs (meds, wound care, etc.)  - Arrange for interpretive services to assist at discharge as needed  - Refer to Case Management Department for coordinating discharge planning if the patient needs post-hospital services based on physician/advanced practitioner order or complex needs related to functional status, cognitive ability, or social support system  Outcome: Progressing     Problem: Knowledge Deficit  Goal: Patient/family/caregiver demonstrates understanding of disease process, treatment plan, medications, and discharge instructions  Description: Complete learning assessment and assess knowledge base.  Interventions:  - Provide teaching at level of understanding  - Provide teaching via preferred learning methods  Outcome: Progressing     Problem: RESPIRATORY - ADULT  Goal: Achieves optimal ventilation and oxygenation  Description: INTERVENTIONS:  - Assess for changes in respiratory status  - Assess for changes in mentation and behavior  - Position to facilitate oxygenation and minimize respiratory effort  - Oxygen administered by appropriate delivery if ordered  - Initiate smoking cessation education as indicated  - Encourage broncho-pulmonary hygiene including cough, deep breathe, Incentive Spirometry  - Assess the need for suctioning and aspirate as needed  - Assess and instruct to report SOB or any respiratory difficulty  - Respiratory Therapy support as indicated  Outcome: Progressing     Problem: Prexisting or High Potential for Compromised Skin Integrity  Goal: Skin integrity is maintained or improved  Description: INTERVENTIONS:  - Identify patients at risk for skin breakdown  - Assess and monitor skin integrity  - Assess and monitor nutrition and hydration status  - Monitor labs   - Assess for incontinence   - Turn and reposition patient  - Assist with mobility/ambulation  - Relieve  pressure over bony prominences  - Avoid friction and shearing  - Provide appropriate hygiene as needed including keeping skin clean and dry  - Evaluate need for skin moisturizer/barrier cream  - Collaborate with interdisciplinary team   - Patient/family teaching  - Consider wound care consult   Outcome: Progressing

## 2024-11-04 LAB
ANION GAP SERPL CALCULATED.3IONS-SCNC: 3 MMOL/L (ref 4–13)
BUN SERPL-MCNC: 16 MG/DL (ref 5–25)
CALCIUM SERPL-MCNC: 9 MG/DL (ref 8.4–10.2)
CHLORIDE SERPL-SCNC: 92 MMOL/L (ref 96–108)
CO2 SERPL-SCNC: 45 MMOL/L (ref 21–32)
CREAT SERPL-MCNC: 0.37 MG/DL (ref 0.6–1.3)
ERYTHROCYTE [DISTWIDTH] IN BLOOD BY AUTOMATED COUNT: 11.6 % (ref 11.6–15.1)
GFR SERPL CREATININE-BSD FRML MDRD: 102 ML/MIN/1.73SQ M
GLUCOSE SERPL-MCNC: 155 MG/DL (ref 65–140)
HCT VFR BLD AUTO: 34.8 % (ref 34.8–46.1)
HGB BLD-MCNC: 10.5 G/DL (ref 11.5–15.4)
L PNEUMO1 AG UR QL IA.RAPID: NEGATIVE
MCH RBC QN AUTO: 28.7 PG (ref 26.8–34.3)
MCHC RBC AUTO-ENTMCNC: 30.2 G/DL (ref 31.4–37.4)
MCV RBC AUTO: 95 FL (ref 82–98)
PLATELET # BLD AUTO: 256 THOUSANDS/UL (ref 149–390)
PMV BLD AUTO: 10.2 FL (ref 8.9–12.7)
POTASSIUM SERPL-SCNC: 4.1 MMOL/L (ref 3.5–5.3)
PROCALCITONIN SERPL-MCNC: 0.27 NG/ML
RBC # BLD AUTO: 3.66 MILLION/UL (ref 3.81–5.12)
S PNEUM AG UR QL: NEGATIVE
SODIUM SERPL-SCNC: 140 MMOL/L (ref 135–147)
WBC # BLD AUTO: 19.79 THOUSAND/UL (ref 4.31–10.16)

## 2024-11-04 PROCEDURE — 94760 N-INVAS EAR/PLS OXIMETRY 1: CPT

## 2024-11-04 PROCEDURE — 99232 SBSQ HOSP IP/OBS MODERATE 35: CPT | Performed by: INTERNAL MEDICINE

## 2024-11-04 PROCEDURE — 99223 1ST HOSP IP/OBS HIGH 75: CPT | Performed by: NURSE PRACTITIONER

## 2024-11-04 PROCEDURE — 94640 AIRWAY INHALATION TREATMENT: CPT

## 2024-11-04 PROCEDURE — 94664 DEMO&/EVAL PT USE INHALER: CPT

## 2024-11-04 PROCEDURE — 80048 BASIC METABOLIC PNL TOTAL CA: CPT | Performed by: NURSE PRACTITIONER

## 2024-11-04 PROCEDURE — G0316 PR PROLONG INPT EVAL ADD15 M: HCPCS | Performed by: NURSE PRACTITIONER

## 2024-11-04 PROCEDURE — 85027 COMPLETE CBC AUTOMATED: CPT | Performed by: NURSE PRACTITIONER

## 2024-11-04 PROCEDURE — 84145 PROCALCITONIN (PCT): CPT | Performed by: NURSE PRACTITIONER

## 2024-11-04 RX ADMIN — LEVALBUTEROL HYDROCHLORIDE 1.25 MG: 1.25 SOLUTION RESPIRATORY (INHALATION) at 07:09

## 2024-11-04 RX ADMIN — CEFTRIAXONE SODIUM 1000 MG: 10 INJECTION, POWDER, FOR SOLUTION INTRAVENOUS at 09:40

## 2024-11-04 RX ADMIN — GUAIFENESIN 600 MG: 600 TABLET ORAL at 09:40

## 2024-11-04 RX ADMIN — METHYLPREDNISOLONE SODIUM SUCCINATE 40 MG: 40 INJECTION, POWDER, FOR SOLUTION INTRAMUSCULAR; INTRAVENOUS at 14:41

## 2024-11-04 RX ADMIN — FORMOTEROL FUMARATE DIHYDRATE 20 MCG: 20 SOLUTION RESPIRATORY (INHALATION) at 07:09

## 2024-11-04 RX ADMIN — FORMOTEROL FUMARATE DIHYDRATE 20 MCG: 20 SOLUTION RESPIRATORY (INHALATION) at 18:05

## 2024-11-04 RX ADMIN — BUDESONIDE 0.5 MG: 0.5 INHALANT RESPIRATORY (INHALATION) at 18:05

## 2024-11-04 RX ADMIN — METHYLPREDNISOLONE SODIUM SUCCINATE 40 MG: 40 INJECTION, POWDER, FOR SOLUTION INTRAMUSCULAR; INTRAVENOUS at 05:37

## 2024-11-04 RX ADMIN — IPRATROPIUM BROMIDE 0.5 MG: 0.5 SOLUTION RESPIRATORY (INHALATION) at 13:29

## 2024-11-04 RX ADMIN — BUDESONIDE 0.5 MG: 0.5 INHALANT RESPIRATORY (INHALATION) at 07:09

## 2024-11-04 RX ADMIN — LEVALBUTEROL HYDROCHLORIDE 1.25 MG: 1.25 SOLUTION RESPIRATORY (INHALATION) at 13:29

## 2024-11-04 RX ADMIN — METHYLPREDNISOLONE SODIUM SUCCINATE 40 MG: 40 INJECTION, POWDER, FOR SOLUTION INTRAMUSCULAR; INTRAVENOUS at 21:08

## 2024-11-04 RX ADMIN — IPRATROPIUM BROMIDE 0.5 MG: 0.5 SOLUTION RESPIRATORY (INHALATION) at 18:05

## 2024-11-04 RX ADMIN — GUAIFENESIN 600 MG: 600 TABLET ORAL at 17:22

## 2024-11-04 RX ADMIN — LEVALBUTEROL HYDROCHLORIDE 1.25 MG: 1.25 SOLUTION RESPIRATORY (INHALATION) at 18:05

## 2024-11-04 RX ADMIN — ENOXAPARIN SODIUM 40 MG: 40 INJECTION SUBCUTANEOUS at 09:40

## 2024-11-04 RX ADMIN — IPRATROPIUM BROMIDE 0.5 MG: 0.5 SOLUTION RESPIRATORY (INHALATION) at 07:09

## 2024-11-04 NOTE — CASE MANAGEMENT
Case Management Progress Note    Patient name Dianelys Price  Location /-01 MRN [id number]  : 1945 Date 2024       LOS (days): 2  Geometric Mean LOS (GMLOS) (days):   Days to GMLOS:        OBJECTIVE:        Current admission status: Inpatient  Preferred Pharmacy:   [id number] - [address] [address] - [address] [address]  Phone: [phone] Fax: [phone]    [employer] [facility]  [address]  [place] [address]  Phone: [phone] Fax: [phone]    Primary Care Provider: Noe Victoria DO    Primary Insurance: MEDICARE  Secondary Insurance: AARP    PROGRESS NOTE:  As per pt request, CM spoke with Janelle from [facility] admission dept [phone] regarding pt dc plans. Referral sent to [facility]  via [employer]. Facility to review referral and let CM know if acceptance.

## 2024-11-04 NOTE — RESPIRATORY THERAPY NOTE
11/03/24 2223   Respiratory Assessment   Resp Comments Pt is refusing to wear bipap at this time. pt says the mask hurts her. Will monitor patient and assess.   Non-Invasive Information   SpO2 98 %     RT Ventilator Management Note  Dianelys Price 78 y.o. female MRN: [id number]  Unit/Bed#: -01 Encounter: [id number]      Daily Screen    No data found in the last 10 encounters.           Physical Exam:   Assessment Type: During-treatment  General Appearance: Awake, Alert  Respiratory Pattern: Normal  Chest Assessment: Chest expansion symmetrical  Bilateral Breath Sounds: Diminished  Cough: None  O2 Device: NC      Resp Comments: Pt is refusing to wear bipap at this time. pt says the mask hurts her. Will monitor patient and assess.

## 2024-11-04 NOTE — ASSESSMENT & PLAN NOTE
Severe COPD with acute exacerbation with acute tracheobronchitis  Flu COVID testing has been negative  Will continue with her nebulizer treatments around-the-clock  Continue perforomist and budesonide  Her home regimen is Advair and albuterol PRN  Will continue with Solu-Medrol 40 mg every 8 hours and decrease to q 12 tomorrow  Continue with airway clearance measures  Complete 5 days of antobiotics given leukocytosis and elevated procalcitonin  Outpatient pulmonary follow-up for repeat PFTs and outpatient pulmonary rehab  Palliative care consult  Will continue to follow

## 2024-11-04 NOTE — ASSESSMENT & PLAN NOTE
Patient presented to the ED with complaints of worsening shortness of breath, wheezing with a productive cough with green sputum over the last few days.  Known history of very severe COPD, follows with [facility]  Chest x-ray (11/2/24): No acute cardiopulmonary disease. Probable mild peripheral pulmonary fibrotic changes.   Received 125 mg Solu-Medrol in the ER, Continue with IV Solu-Medrol 40 mg every 8 hours  Likely transition to 40 every 12 tomorrow per pulmonology  Albuterol and ipratropium nebs ordered  Continue ceftriaxone for now  Follow-up further pulmonology recommendations

## 2024-11-04 NOTE — RESPIRATORY THERAPY NOTE
RT Protocol Note  Dianelys Price 78 y.o. female MRN: [id number]  Unit/Bed#: -01 Encounter: [id number]    Assessment    Principal Problem:    COPD exacerbation (HCC)  Active Problems:    COPD, very severe (HCC)    Chronic respiratory failure with hypoxia (HCC)    SIRS (systemic inflammatory response syndrome) (HCC)    Respiratory acidosis    Acute hypoxemic respiratory failure (HCC)    Physical Exam:   Assessment Type: Post-treatment  General Appearance: Alert, Awake  Respiratory Pattern: Dyspnea with exertion  Chest Assessment: Chest expansion symmetrical  Bilateral Breath Sounds: Diminished  O2 Device: nc    Vitals:  Blood pressure 135/65, pulse 99, temperature 97.7 °F (36.5 °C), resp. rate 19, height 5' (1.524 m), weight 48.4 kg (106 lb 11.2 oz), SpO2 96%.    Results from last 7 days   Lab Units 11/03/24  0751 11/03/24  0159   PH ART  7.293* 7.259*   PCO2 ART mm Hg 84.6* 95.2*   PO2 ART mm Hg 62.2* 108.4   HCO3 ART mmol/L 40.0* 41.7*   BASE EXC ART mmol/L 10.2 11.0   O2 CONTENT ART mL/dL 16.8 16.8   O2 HGB, ARTERIAL % 91.9* 96.8   ABG SOURCE   --  Radial, Right   ANIVAL TEST  Yes Yes       O2 Device: nc     Plan    Respiratory Plan: Mild Distress pathway        Resp Comments: will cont w current regimen, bbs dimished. dyspnea w exertion. pt saturating adequatley on chronic o2

## 2024-11-04 NOTE — PLAN OF CARE
Problem: PAIN - ADULT  Goal: Verbalizes/displays adequate comfort level or baseline comfort level  Description: Interventions:  - Encourage patient to monitor pain and request assistance  - Assess pain using appropriate pain scale  - Administer analgesics based on type and severity of pain and evaluate response  - Implement non-pharmacological measures as appropriate and evaluate response  - Consider cultural and social influences on pain and pain management  - Notify physician/advanced practitioner if interventions unsuccessful or patient reports new pain  Outcome: Progressing     Problem: INFECTION - ADULT  Goal: Absence or prevention of progression during hospitalization  Description: INTERVENTIONS:  - Assess and monitor for signs and symptoms of infection  - Monitor lab/diagnostic results  - Monitor all insertion sites, i.e. indwelling lines, tubes, and drains  - Monitor endotracheal if appropriate and nasal secretions for changes in amount and color  - Kyle appropriate cooling/warming therapies per order  - Administer medications as ordered  - Instruct and encourage patient and family to use good hand hygiene technique  - Identify and instruct in appropriate isolation precautions for identified infection/condition  Outcome: Progressing

## 2024-11-04 NOTE — CONSULTS
Consultation - Palliative & Supportive Care   Dianelys Price 78 y.o. female MRN: [id number]  Unit/Bed#: -01 Encounter: [id number]      Physician Requesting Consult: Alexei Bowser MD  Reason for Consult / Principal Problem: severe copd, goals of care      Assessment/Plan:  Acute on chronic respiratory failure with hypoxia on 4L NC, BiPAP  Severe COPD with exacerbation  SIRS  Debility, deconditioning  Palliative care encounter  Goals of care    Code Status: Code status changed to level 3 today.  Advance Directive and Living Will: Yes    Power of :  No  POLST:  No  Emergency contact/niece/[place]: [phone]    GOALS OF CARE  Disease focused care with DNR/DNI limits placed.   Once settled into her new Senior Living Facility she will consider whether or not she has a need to follow Palliative Medicine on an outpt basis.  Today, we will sign off as patient does not require our assistance with symptom management and her goals are very clear.  Please reach out to us via [employer] secure chat should that change    History of Present Illness   HPI: Dianelys Price is a 78 y.o. year old female who presents with worsening shortness of breath, productive cough, and progressive weakness. PMH significant for COPD on home O2 2L NC. Pulmonology consulted. Per note, patient not a candidate for lung volume reduction, declined evaluation for lung transplant. Rapid response called later that evening, initial concern for stroke-alert called then canceled after discussion with Neurology, placed on BiPAP. CXR showed no acute cardiopulmonary disease. Probable mild peripheral pulmonary fibrotic changes. Palliative care consulted for goals of care.     Patient seen with no family present today.  She is pleasant and in good spirits.  She denies any symptoms that require management today including pain, anxiety/depression, N/V or constipation.      Discussed GOC.  Currently her goal is to live long enough to sell her house, into a  "senior living facility and get her affairs in order.  She is currently scheduled to move into a senior living facility on November 19.  She is under contract for her house and would like to have all of these things taken care of so that her niece who is her power of  does not have to do it after she passes.  Her goals are very clear and she is anxious to get discharged and get all of this arranged.    Discussed code status. Currently a level 1 however advanced directives reflect a desire to forego any heroic measures including CPR and intubation.  Explained CPR and intubation in depth and discussed likelihood of survival.  At this time patient wishes to transition to level 3 code status stating \" being on a ventilator would be the worst possible thing that could happen.\"    Review of Systems   All other systems reviewed and are negative.      Historical Information   Past Medical History:   Diagnosis Date    Allergic 1961    Asthenia     Asthma     Basal cell carcinoma 03/19/2024    left nasal wall, mohs    Cataract     COPD (chronic obstructive pulmonary disease) (HCC)     History of screening mammography     last assessed: 10/31/2017    Osteoporosis     Pneumonia 1950    Urinary tract infection      Patient Active Problem List   Diagnosis    Pulmonary emphysema (HCC)    Elevated glucose level    Prediabetes    Vitamin D deficiency    Osteoporosis    Elevated TSH    Other anaphylactic reaction    Asthma    Diabetes mellitus, latent    COPD, very severe (HCC)    Pulmonary nodule    Non-seasonal allergic rhinitis due to pollen    Hypoxia    Need for influenza vaccination    Chronic respiratory failure with hypoxia (HCC)    Allergic rhinitis    Shortness of breath    Swelling of lower extremity    Skin lesion of face    COPD exacerbation (HCC)    SIRS (systemic inflammatory response syndrome) (HCC)    Respiratory acidosis    Acute hypoxemic respiratory failure (HCC)     Past Surgical History:   Procedure " Laterality Date    APPENDECTOMY      onset:     CATARACT EXTRACTION  2017    last assessed: 10/31/2017    COLONOSCOPY  2015    EYE SURGERY  cataracks 2017    HEMORROIDECTOMY  2006    MOHS SURGERY Left 2024    BCC left nasal wall, Dr Rick    TONSILLECTOMY      onset:      Social History     Socioeconomic History    Marital status:      Spouse name: None    Number of children: None    Years of education: None    Highest education level: None   Occupational History    None   Tobacco Use    Smoking status: Former     Current packs/day: 0.00     Average packs/day: 1 pack/day for 40.0 years (40.0 ttl pk-yrs)     Types: Cigarettes     Start date: 3/7/1965     Quit date: 3/7/2005     Years since quittin.6    Smokeless tobacco: Never   Vaping Use    Vaping status: Never Used   Substance and Sexual Activity    Alcohol use: Never     Alcohol/week: 2.0 standard drinks of alcohol     Types: 2 Glasses of wine per week    Drug use: Never    Sexual activity: Not Currently     Partners: Male     Birth control/protection: Post-menopausal, None   Other Topics Concern    None   Social History Narrative    Caffeine use     Social Determinants of Health     Financial Resource Strain: Low Risk  (2023)    Overall Financial Resource Strain (CARDIA)     Difficulty of Paying Living Expenses: Not very hard   Food Insecurity: No Food Insecurity (2024)    Nursing - Inadequate Food Risk Classification     Worried About Running Out of Food in the Last Year: Not on file     Ran Out of Food in the Last Year: Not on file     Ran Out of Food in the Last Year: 1   Transportation Needs: No Transportation Needs (2024)    Nursing - Transportation Risk Classification     Lack of Transportation: Not on file     Lack of Transportation: 2   Physical Activity: Not on file   Stress: Not on file   Social Connections: Not on file   Intimate Partner Violence: Unknown (2024)    Nursing IPS     Feels Physically and  Emotionally Safe: Not on file     Physically Hurt by Someone: Not on file     Humiliated or Emotionally Abused by Someone: Not on file     Physically Hurt by Someone: 2     Hurt or Threatened by Someone: 2   Housing Stability: Unknown (2024)    Nursing: Inadequate Housing Risk Classification     Has Housing: Not on file     Worried About Losing Housing: Not on file     Unable to Get Utilities: Not on file     Unable to Pay for Housing in the Last Year: 2     Has Housin     Family History   Problem Relation Age of Onset    Arthritis Mother     Breast cancer Mother 80    Heart disease Mother     Hypertension Mother     Osteoporosis Mother     Dementia Mother     COPD Mother     Hearing loss Mother     Diabetes Father     Heart disease Father     Hypertension Father     COPD Father     Ovarian cancer Sister 49    No Known Problems Maternal Grandmother     Hearing loss Maternal Grandfather     No Known Problems Paternal Grandmother     No Known Problems Paternal Grandfather     No Known Problems Maternal Aunt     No Known Problems Maternal Aunt     No Known Problems Paternal Aunt     No Known Problems Paternal Aunt     No Known Problems Paternal Aunt     Diabetes Brother     Diabetes Brother     Diabetes Brother     Hearing loss Brother     Cancer Sister         uterine       Meds/Allergies   all current active meds have been reviewed    Allergies   Allergen Reactions    Clarinex [Desloratadine] Wheezing    Bee Venom Swelling and Facial Swelling    Clarithromycin Wheezing     Reaction Date: 2011;     Food Color Red - Food Allergy Itching    Levofloxacin Other (See Comments)     Blood clot in leg    Loratadine Wheezing     Reaction Date: 2011;        I have reviewed the patient's controlled substance dispensing history in the Prescription Drug Monitoring Program in compliance with the [place] regulations before prescribing any controlled substances.     Objective   /65   Pulse 99   Temp 97.7 °F  (36.5 °C)   Resp 19   Ht 5' (1.524 m)   Wt 48.4 kg (106 lb 11.2 oz)   SpO2 96%   BMI 20.84 kg/m²   Physical Exam  Vitals and nursing note reviewed.   HENT:      Head: Normocephalic and atraumatic.      Mouth/Throat:      Mouth: Mucous membranes are moist.      Pharynx: Oropharynx is clear.   Eyes:      Conjunctiva/sclera: Conjunctivae normal.   Cardiovascular:      Rate and Rhythm: Normal rate and regular rhythm.   Pulmonary:      Effort: Pulmonary effort is normal. No respiratory distress.   Musculoskeletal:      Cervical back: Normal range of motion.   Skin:     General: Skin is warm and dry.   Neurological:      Mental Status: She is alert and oriented to person, place, and time.   Psychiatric:         Mood and Affect: Mood normal.         Behavior: Behavior normal.         Thought Content: Thought content normal.         Judgment: Judgment normal.         Lab Results: I have personally reviewed pertinent labs., CBC:   Lab Results   Component Value Date    WBC 19.79 (H) 11/04/2024    HGB 10.5 (L) 11/04/2024    HCT 34.8 11/04/2024    MCV 95 11/04/2024     11/04/2024    RBC 3.66 (L) 11/04/2024    MCH 28.7 11/04/2024    MCHC 30.2 (L) 11/04/2024    RDW 11.6 11/04/2024    MPV 10.2 11/04/2024   , CMP:   Lab Results   Component Value Date    SODIUM 140 11/04/2024    K 4.1 11/04/2024    CL 92 (L) 11/04/2024    CO2 45 (H) 11/04/2024    BUN 16 11/04/2024    CREATININE 0.37 (L) 11/04/2024    CALCIUM 9.0 11/04/2024    EGFR 102 11/04/2024   , BMP:  Lab Results   Component Value Date    SODIUM 140 11/04/2024    K 4.1 11/04/2024    CL 92 (L) 11/04/2024    CO2 45 (H) 11/04/2024    BUN 16 11/04/2024    CREATININE 0.37 (L) 11/04/2024    GLUC 155 (H) 11/04/2024    CALCIUM 9.0 11/04/2024    AGAP 3 (L) 11/04/2024    EGFR 102 11/04/2024     Imaging Studies: Results Review Statement: I reviewed radiology reports from this admission including: chest xray.  EKG, Pathology, and Other Studies: Results Review Statement: I  reviewed radiology reports from this admission including: ekg.  QTC: 290    Counseling / Coordination of Care  Total floor / unit time spent today 90 minutes. Greater than 50% of total time was spent with the patient and / or family counseling and / or coordination of care. A description of the counseling / coordination of care: Introduced role of Palliative Medicine. Reviewed expected disease trajectory, prognosis, code status, and comfort care versus disease directed therapy  Spent time supportive listening regarding frustrations of diagnosis and treatment options available at this time.      Jennifer P. Bloch, MSN, CRNP, ACHPN

## 2024-11-04 NOTE — PROGRESS NOTES
Progress Note - Hospitalist   Name: Dianelys Price 78 y.o. female I MRN: [id number]  Unit/Bed#: -01 I Date of Admission: 11/2/2024   Date of Service: 11/4/2024 I Hospital Day: 2    Assessment & Plan  COPD exacerbation (HCC)  Patient presented to the ED with complaints of worsening shortness of breath, wheezing with a productive cough with green sputum over the last few days.  Known history of very severe COPD, follows with -Pulmonology  Chest x-ray (11/2/24): No acute cardiopulmonary disease. Probable mild peripheral pulmonary fibrotic changes.   Received 125 mg Solu-Medrol in the ER, Continue with IV Solu-Medrol 40 mg every 8 hours  Likely transition to 40 every 12 tomorrow per pulmonology  Albuterol and ipratropium nebs ordered  Continue ceftriaxone for now  Follow-up further pulmonology recommendations  SIRS (systemic inflammatory response syndrome) (HCC)  Meeting SIRS criteria on admission with tachycardia, tachypnea, and leukocytosis.  Likely secondary to COPD exacerbation, follow up sputum culture  Her procalcitonin is elevated, 0.41 today, however, no definitive pneumonia seen on imaging.  Will transition patient from PO Doxy to IV Ceftriaxone today.  Covid, Flu negative.  Follow up on urine for strep pneumoniae/legionella  COPD, very severe (HCC)  Noted history  Follows with -Pulmonology; Last seen in the office, 7/24/24  Typically takes Advair, Combivent at home  Wears chronically 2 L of supplemental oxygen  Palliative care consulted follow-up recommendations  Chronic respiratory failure with hypoxia (HCC)  Stable, currently on 3L   Chronically wears 2 L of supplemental oxygen at baseline  Currently 99% on home regimen  Continue to treat COPD exacerbation  Respiratory acidosis  Patient was a rapid response overnight for altered mental status changes  Found to have a co2 level greater than 103 on istat.  Initiated on BiPAP with improvement to CO2 levels.  Pulmonology consult, appreciate input  Acute  hypoxemic respiratory failure (HCC)      VTE Pharmacologic Prophylaxis:   Moderate Risk (Score 3-4) - Pharmacological DVT Prophylaxis Ordered: enoxaparin (Lovenox).    Mobility:   Basic Mobility Inpatient Raw Score: 22  JH-HLM Goal: 7: Walk 25 feet or more  JH-HLM Achieved: 7: Walk 25 feet or more  JH-HLM Goal achieved. Continue to encourage appropriate mobility.    Patient Centered Rounds: I performed bedside rounds with nursing staff today.   Discussions with Specialists or Other Care Team Provider: cm, nursing    Education and Discussions with Family / Patient: Patient declined call to .     Current Length of Stay: 2 day(s)  Current Patient Status: Inpatient   Certification Statement: The patient will continue to require additional inpatient hospital stay due to see below  Discharge Plan:  Still requiring IV steroids.  Anticipate approximately 48 to 72 hours    Code Status: Level 1 - Full Code    Subjective   Denies chest pain, shortness of breath, cough, fevers, chills    Objective :  Temp:  [97.7 °F (36.5 °C)] 97.7 °F (36.5 °C)  HR:  [] 99  BP: (125-143)/(65-77) 135/65  Resp:  [19-20] 19  SpO2:  [89 %-99 %] 96 %  O2 Device: Nasal cannula  Nasal Cannula O2 Flow Rate (L/min):  [4 L/min] 4 L/min    Body mass index is 20.84 kg/m².     Input and Output Summary (last 24 hours):     Intake/Output Summary (Last 24 hours) at 11/4/2024 1135  Last data filed at 11/4/2024 0850  Gross per 24 hour   Intake 180 ml   Output 1300 ml   Net -1120 ml       Physical Exam  Constitutional:       General: She is not in acute distress.     Appearance: She is well-developed. She is not diaphoretic.   HENT:      Head: Normocephalic and atraumatic.      Nose: Nose normal.      Mouth/Throat:      Pharynx: No oropharyngeal exudate.   Eyes:      General: No scleral icterus.        Right eye: No discharge.         Left eye: No discharge.      Conjunctiva/sclera: Conjunctivae normal.   Neck:      Thyroid: No thyromegaly.       Vascular: No JVD.   Cardiovascular:      Rate and Rhythm: Normal rate and regular rhythm.      Heart sounds: Normal heart sounds. No murmur heard.     No friction rub. No gallop.   Pulmonary:      Effort: Pulmonary effort is normal. No respiratory distress.      Breath sounds: Normal breath sounds. No wheezing or rales.   Chest:      Chest wall: No tenderness.   Abdominal:      General: Bowel sounds are normal. There is no distension.      Palpations: Abdomen is soft.      Tenderness: There is no abdominal tenderness. There is no guarding or rebound.   Musculoskeletal:         General: No tenderness or deformity. Normal range of motion.      Cervical back: Normal range of motion and neck supple.   Skin:     General: Skin is warm and dry.      Findings: No erythema or rash.   Neurological:      Mental Status: She is alert. Mental status is at baseline.      Cranial Nerves: No cranial nerve deficit.      Sensory: No sensory deficit.      Motor: No abnormal muscle tone.      Coordination: Coordination normal.           Lines/Drains:              Lab Results: I have reviewed the following results:   Results from last 7 days   Lab Units 11/04/24 0435 11/03/24 0436   WBC Thousand/uL 19.79* 14.83*   HEMOGLOBIN g/dL 10.5* 10.8*   HEMATOCRIT % 34.8 36.8   PLATELETS Thousands/uL 256 228   SEGS PCT %  --  89*   LYMPHO PCT %  --  5*   MONO PCT %  --  5   EOS PCT %  --  0     Results from last 7 days   Lab Units 11/04/24 0435 11/03/24 0436 11/02/24  1010   SODIUM mmol/L 140   < > 141   POTASSIUM mmol/L 4.1   < > 4.0   CHLORIDE mmol/L 92*   < > 95*   CO2 mmol/L 45*   < > 39*   BUN mg/dL 16   < > 15   CREATININE mg/dL 0.37*   < > 0.33*   ANION GAP mmol/L 3*   < > 7   CALCIUM mg/dL 9.0   < > 9.2   ALBUMIN g/dL  --   --  3.6   TOTAL BILIRUBIN mg/dL  --   --  0.43   ALK PHOS U/L  --   --  62   ALT U/L  --   --  14   AST U/L  --   --  16   GLUCOSE RANDOM mg/dL 155*   < > 136    < > = values in this interval not displayed.          Results from last 7 days   Lab Units 11/02/24  2304   POC GLUCOSE mg/dl 239*         Results from last 7 days   Lab Units 11/04/24  0435 11/03/24  0436 11/02/24  1147   PROCALCITONIN ng/ml 0.27* 0.41* 0.26*       Recent Cultures (last 7 days):   Results from last 7 days   Lab Units 11/03/24  1117 11/03/24  1029   SPUTUM CULTURE  Culture too young- will reincubate  --    GRAM STAIN RESULT  Rare Epithelial cells per low power field*  1+ Polys*  2+ Gram positive cocci in pairs*  --    LEGIONELLA URINARY ANTIGEN   --  Negative       Imaging Results Review: I personally reviewed the following image studies/reports in PACS and discussed pertinent findings with Radiology: chest xray. My interpretation of the radiology images/reports is:  .  Other Study Results Review: EKG was reviewed.     Last 24 Hours Medication List:     Current Facility-Administered Medications:     acetaminophen (TYLENOL) tablet 650 mg, Q6H PRN    albuterol inhalation solution 2.5 mg, Q4H PRN    budesonide (PULMICORT) inhalation solution 0.5 mg, Q12H    cefTRIAXone (ROCEPHIN) 1,000 mg in dextrose 5 % 50 mL IVPB, Q24H, Last Rate: 1,000 mg (11/04/24 0940)    enoxaparin (LOVENOX) subcutaneous injection 40 mg, Daily    formoterol (PERFOROMIST) nebulizer solution 20 mcg, Q12H    guaiFENesin (MUCINEX) 12 hr tablet 600 mg, BID    ipratropium (ATROVENT) 0.02 % inhalation solution 0.5 mg, TID    levalbuterol (XOPENEX) inhalation solution 1.25 mg, TID    methylPREDNISolone sodium succinate (Solu-MEDROL) injection 40 mg, Q8H    Administrative Statements   Today, Patient Was Seen By: Alexei Bowser MD  I have spent a total time of 30 minutes in caring for this patient on the day of the visit/encounter including Diagnostic results.    **Please Note: This note may have been constructed using a voice recognition system.**

## 2024-11-04 NOTE — PROGRESS NOTES
Progress Note - Pulmonology   Name: Dianelys Price 78 y.o. female I MRN: [id number]  Unit/Bed#: -01 I Date of Admission: 11/2/2024   Date of Service: 11/4/2024 I Hospital Day: 2    Assessment & Plan  COPD exacerbation (HCC)  Severe COPD with acute exacerbation with acute tracheobronchitis  Flu COVID testing has been negative  Will continue with her nebulizer treatments around-the-clock  Continue perforomist and budesonide  Her home regimen is Advair and albuterol PRN  Will continue with Solu-Medrol 40 mg every 8 hours and decrease to q 12 tomorrow  Continue with airway clearance measures  Complete 5 days of antobiotics given leukocytosis and elevated procalcitonin  Outpatient pulmonary follow-up for repeat PFTs and outpatient pulmonary rehab  Palliative care consult  Will continue to follow  COPD, very severe (HCC)  COPD severe with FEV1 of 23% with air trapping and severely decreased DLCO at 23% prior PFTs in 2019  Also had evaluation for lung volume reduction was not a candidate  States she did do the pulmonary rehab sessions in 2019  She states they had talked about lung transplant evaluation given her age she had declined  Chronic respiratory failure with hypoxia (HCC)  On 2 L of oxygen continuous at baseline would need reassessment again at the time of hospital discharge  SIRS (systemic inflammatory response syndrome) (HCC)    Respiratory acidosis    Acute hypoxemic respiratory failure (HCC)  Acute on chronic hypoxemic respiratory failure currently requiring around 4 L of oxygen by nasal cannula at baseline she is on 2 L continuous likely secondary to her severe COPD exacerbation  Titrate down as able to maintain oxygen saturation greater than 89%    24 Hour Events : No events  Subjective : Patient resting in the chair.  Feeling slightly better today, still with shortness of breath.  Had been having trouble with the nebulizer but feels it worked better this morning.    Objective :  Temp:  [97.7 °F (36.5 °C)]  97.7 °F (36.5 °C)  HR:  [] 99  BP: (125-143)/(65-77) 135/65  Resp:  [19-20] 19  SpO2:  [89 %-99 %] 96 %  O2 Device: Nasal cannula  Nasal Cannula O2 Flow Rate (L/min):  [4 L/min] 4 L/min    Physical Exam  Vitals reviewed.   Constitutional:       General: She is not in acute distress.     Appearance: Normal appearance. She is well-developed. She is not ill-appearing.   HENT:      Head: Normocephalic and atraumatic.      Mouth/Throat:      Pharynx: Oropharynx is clear.   Eyes:      Conjunctiva/sclera: Conjunctivae normal.   Cardiovascular:      Rate and Rhythm: Normal rate and regular rhythm.   Pulmonary:      Effort: Pulmonary effort is normal. No respiratory distress.      Breath sounds: Examination of the right-lower field reveals decreased breath sounds. Examination of the left-lower field reveals decreased breath sounds. Decreased breath sounds and wheezing present. No rhonchi or rales.   Abdominal:      General: Abdomen is flat. There is no distension.   Musculoskeletal:         General: Normal range of motion.      Cervical back: Normal range of motion.      Right lower leg: No edema.      Left lower leg: No edema.   Skin:     General: Skin is warm and dry.   Neurological:      Mental Status: She is alert and oriented to person, place, and time.   Psychiatric:         Mood and Affect: Mood normal.         Behavior: Behavior normal.           Lab Results: I have reviewed the following results:   .     11/04/24  0435   WBC 19.79*   HGB 10.5*   HCT 34.8      SODIUM 140   K 4.1   CL 92*   CO2 45*   BUN 16   CREATININE 0.37*   GLUC 155*     ABG: No new results in last 24 hours.    Imaging Results Review: I reviewed radiology reports from this admission including: chest xray.  Other Study Results Review: No additional pertinent studies reviewed.  PFT Results Reviewed: reviewed

## 2024-11-04 NOTE — ASSESSMENT & PLAN NOTE
Noted history  Follows with [facility]; Last seen in the office, 7/24/24  Typically takes Advair, Combivent at home  Wears chronically 2 L of supplemental oxygen  Palliative care consulted follow-up recommendations

## 2024-11-04 NOTE — CASE MANAGEMENT
Case Management Assessment & Discharge Planning Note    Patient name Dianelys Price  Location /-01 MRN [id number]  : 1945 Date 2024       Current Admission Date: 2024  Current Admission Diagnosis:COPD exacerbation (HCC)   Patient Active Problem List    Diagnosis Date Noted Date Diagnosed    Respiratory acidosis 2024     Acute hypoxemic respiratory failure (HCC) 2024     COPD exacerbation (HCC) 2024     SIRS (systemic inflammatory response syndrome) (HCC) 2024     Skin lesion of face 11/10/2023     Swelling of lower extremity 2021     Shortness of breath 2021     Allergic rhinitis 2021     Chronic respiratory failure with hypoxia (HCC) 2021     Need for influenza vaccination 10/13/2020     Hypoxia 2020     Pulmonary nodule 2020     Non-seasonal allergic rhinitis due to pollen 2020     COPD, very severe (HCC) 2019     Diabetes mellitus, latent 2019     Osteoporosis 10/31/2017     Elevated TSH 10/25/2016     Prediabetes 10/24/2016     Asthma 2013     Other anaphylactic reaction 2013     Pulmonary emphysema (HCC) 2012     Elevated glucose level 2012     Vitamin D deficiency 2010       LOS (days): 2  Geometric Mean LOS (GMLOS) (days):   Days to GMLOS:     OBJECTIVE:    Risk of Unplanned Readmission Score: 10.2         Current admission status: Inpatient       Preferred Pharmacy:   [id number] [address] [address]  Phone: [phone] Fax: [phone]    [facility]  [address]  [place] [address]  Phone: [phone] Fax: [phone]    Primary Care Provider: Noe Victoria DO    Primary Insurance: MEDICARE  Secondary Insurance: AARP    ASSESSMENT:  Active Health Care Proxies    There are no active Health Care Proxies on file.       Advance Directives  Does patient have a Health Care POA?:  Yes  Does patient have Advance Directives?: Yes  Advance Directives: Power of  for health care  Primary Contact: Elvira Snowden (Niece)  [phone]         Readmission Root Cause  30 Day Readmission: No    Patient Information  Admitted from:: Home  Mental Status: Alert  During Assessment patient was accompanied by: Not accompanied during assessment  Assessment information provided by:: Patient  Primary Caregiver: Self  Support Systems: Self, Family members  County of Residence: [place]  What city do you live in?: [place]  Home entry access options. Select all that apply.: Stairs  Number of steps to enter home.: 4  Do the steps have railings?: Yes  Type of Current Residence: [place]  Is patient a ?: No    Activities of Daily Living Prior to Admission  Functional Status: Independent  Completes ADLs independently?: Yes  Ambulates independently?: Yes  Does patient use assisted devices?: No  Does patient currently own DME?: Yes  What DME does the patient currently own?: Walker, Wheelchair, Shower Chair, Home Oxygen concentrator, Portable Oxygen concentrator (O2 from [employer])  Does patient have a history of Outpatient Therapy (PT/OT)?: No  Does the patient have a history of Short-Term Rehab?: No  Does patient have a history of HHC?: No  Does patient currently have HHC?: No         Patient Information Continued  Income Source: SSI/SSD  Does patient have prescription coverage?: Yes  Does patient receive dialysis treatments?: No  Does patient have a history of substance abuse?: No  Does patient have a history of Mental Health Diagnosis?: No         Means of Transportation  Means of Transport to Appts:: Drives Self          DISCHARGE DETAILS:    Discharge planning discussed with:: Pt  Freedom of Choice: Yes  Comments - Freedom of Choice: CM met with pt at bedside and introduced self/role. Pt is alert and oriented x3 able to make her needs known and encourged to do so. FOC discussed at length. Pt states that  she is fairly independent with all of her ADLs and IADLs. Pt lives alone and is about to sell her home and move into an indepedent living pending her hospital course. Pt requested private caregivers and list provided at bedside. CM continues to follow and assist with pt dc plans.  CM contacted family/caregiver?: Yes (Unable to leave message to do mail box full)  Were Treatment Team discharge recommendations reviewed with patient/caregiver?: Yes  Did patient/caregiver verbalize understanding of patient care needs?: Yes  Were patient/caregiver advised of the risks associated with not following Treatment Team discharge recommendations?: Yes    Contacts  Patient Contacts: Elvira Snowden (Niece)  Relationship to Patient:: Family  Contact Method: Phone  Phone Number: [phone]  Reason/Outcome: Continuity of Care, Emergency Contact, Referral, Discharge Planning         DME Referral Provided  Referral made for DME?: No    Other Referral/Resources/Interventions Provided:  Interventions: HHC, Other (Specify) (Private Caregivers)    Would you like to participate in our [facility] service program?  : No - Declined    Treatment Team Recommendation: Home with Home Health Care  Discharge Destination Plan:: Home with Home Health Care  Transport at Discharge : Family

## 2024-11-04 NOTE — RESPIRATORY THERAPY NOTE
RT Protocol Note  Dianelys Price 78 y.o. female MRN: [id number]  Unit/Bed#: -01 Encounter: [id number]    Assessment    Principal Problem:    COPD exacerbation (HCC)  Active Problems:    COPD, very severe (HCC)    Chronic respiratory failure with hypoxia (HCC)    SIRS (systemic inflammatory response syndrome) (HCC)    Respiratory acidosis    Acute hypoxemic respiratory failure (HCC)      Home Pulmonary Medications:     24   Respiratory Protocol   Protocol Initiated? No   Protocol Selection Respiratory   Language Barrier? No   Medical & Social History Reviewed? Yes   Diagnostic Studies Reviewed? Yes   Physical Assessment Performed? Yes   Home Devices/Therapy Home O2   Respiratory Plan Mild Distress pathway   Respiratory Assessment   Assessment Type During-treatment   General Appearance Awake;Alert   Respiratory Pattern Normal   Chest Assessment Chest expansion symmetrical   Bilateral Breath Sounds Diminished   Cough None   Resp Comments Will continue with current tx plan   O2 Device NC   Cough Description   Sputum Amount None   Additional Assessments   Pulse 103   Respirations 20   SpO2 95 %       Home Devices/Therapy: Home O2    Past Medical History:   Diagnosis Date    Allergic     Asthenia     Asthma     Basal cell carcinoma 2024    left nasal wall, mohs    Cataract     COPD (chronic obstructive pulmonary disease) ([facility])     History of screening mammography     last assessed: 10/31/2017    Osteoporosis     Pneumonia 1950    Urinary tract infection      Social History     Socioeconomic History    Marital status:      Spouse name: None    Number of children: None    Years of education: None    Highest education level: None   Occupational History    None   Tobacco Use    Smoking status: Former     Current packs/day: 0.00     Average packs/day: 1 pack/day for 40.0 years (40.0 ttl pk-yrs)     Types: Cigarettes     Start date: 3/7/1965     Quit date: 3/7/2005     Years since quittin.6     Smokeless tobacco: Never   Vaping Use    Vaping status: Never Used   Substance and Sexual Activity    Alcohol use: Never     Alcohol/week: 2.0 standard drinks of alcohol     Types: 2 Glasses of wine per week    Drug use: Never    Sexual activity: Not Currently     Partners: Male     Birth control/protection: Post-menopausal, None   Other Topics Concern    None   Social History Narrative    Caffeine use     Social Determinants of Health     Financial Resource Strain: Low Risk  (2023)    Overall Financial Resource Strain (CARDIA)     Difficulty of Paying Living Expenses: Not very hard   Food Insecurity: No Food Insecurity (2024)    Nursing - Inadequate Food Risk Classification     Worried About Running Out of Food in the Last Year: Not on file     Ran Out of Food in the Last Year: Not on file     Ran Out of Food in the Last Year: 1   Transportation Needs: No Transportation Needs (2024)    Nursing - Transportation Risk Classification     Lack of Transportation: Not on file     Lack of Transportation: 2   Physical Activity: Not on file   Stress: Not on file   Social Connections: Not on file   Intimate Partner Violence: Unknown (2024)    Nursing IPS     Feels Physically and Emotionally Safe: Not on file     Physically Hurt by Someone: Not on file     Humiliated or Emotionally Abused by Someone: Not on file     Physically Hurt by Someone: 2     Hurt or Threatened by Someone: 2   Housing Stability: Unknown (2024)    Nursing: Inadequate Housing Risk Classification     Has Housing: Not on file     Worried About Losing Housing: Not on file     Unable to Get Utilities: Not on file     Unable to Pay for Housing in the Last Year: 2     Has Housin       Subjective         Objective    Physical Exam:   Assessment Type: During-treatment  General Appearance: Awake, Alert  Respiratory Pattern: Normal  Chest Assessment: Chest expansion symmetrical  Bilateral Breath Sounds: Diminished  Cough: None  O2  Device: NC    Vitals:  Blood pressure 143/77, pulse 103, temperature 97.6 °F (36.4 °C), temperature source Oral, resp. rate 20, height 5' (1.524 m), weight 48.4 kg (106 lb 11.2 oz), SpO2 95%.    Results from last 7 days   Lab Units 11/03/24  0751 11/03/24  0159   PH ART  7.293* 7.259*   PCO2 ART mm Hg 84.6* 95.2*   PO2 ART mm Hg 62.2* 108.4   HCO3 ART mmol/L 40.0* 41.7*   BASE EXC ART mmol/L 10.2 11.0   O2 CONTENT ART mL/dL 16.8 16.8   O2 HGB, ARTERIAL % 91.9* 96.8   ABG SOURCE   --  Radial, Right   ANIVAL TEST  Yes Yes       Imaging and other studies: Results Review Statement: No pertinent imaging studies reviewed.    O2 Device: NC     Plan    Respiratory Plan: Mild Distress pathway        Resp Comments: Will continue with current tx plan

## 2024-11-05 PROBLEM — J96.21 ACUTE ON CHRONIC RESPIRATORY FAILURE WITH HYPOXIA AND HYPERCAPNIA (HCC): Status: ACTIVE | Noted: 2024-11-05

## 2024-11-05 PROBLEM — J96.22 ACUTE ON CHRONIC RESPIRATORY FAILURE WITH HYPOXIA AND HYPERCAPNIA (HCC): Status: ACTIVE | Noted: 2024-11-05

## 2024-11-05 PROBLEM — G93.41 ACUTE METABOLIC ENCEPHALOPATHY: Status: ACTIVE | Noted: 2024-11-05

## 2024-11-05 PROBLEM — J96.01 ACUTE HYPOXEMIC RESPIRATORY FAILURE (HCC): Status: RESOLVED | Noted: 2024-11-03 | Resolved: 2024-11-05

## 2024-11-05 LAB
BACTERIA SPT RESP CULT: ABNORMAL
BASOPHILS # BLD AUTO: 0.03 THOUSANDS/ÂΜL (ref 0–0.1)
BASOPHILS NFR BLD AUTO: 0 % (ref 0–1)
BUN SERPL-MCNC: 20 MG/DL (ref 5–25)
CALCIUM SERPL-MCNC: 8.6 MG/DL (ref 8.4–10.2)
CHLORIDE SERPL-SCNC: 92 MMOL/L (ref 96–108)
CO2 SERPL-SCNC: >45 MMOL/L (ref 21–32)
CREAT SERPL-MCNC: 0.38 MG/DL (ref 0.6–1.3)
EOSINOPHIL # BLD AUTO: 0 THOUSAND/ÂΜL (ref 0–0.61)
EOSINOPHIL NFR BLD AUTO: 0 % (ref 0–6)
ERYTHROCYTE [DISTWIDTH] IN BLOOD BY AUTOMATED COUNT: 11.7 % (ref 11.6–15.1)
GFR SERPL CREATININE-BSD FRML MDRD: 101 ML/MIN/1.73SQ M
GLUCOSE SERPL-MCNC: 161 MG/DL (ref 65–140)
GRAM STN SPEC: ABNORMAL
HCT VFR BLD AUTO: 37 % (ref 34.8–46.1)
HGB BLD-MCNC: 10.6 G/DL (ref 11.5–15.4)
IMM GRANULOCYTES # BLD AUTO: 0.36 THOUSAND/UL (ref 0–0.2)
IMM GRANULOCYTES NFR BLD AUTO: 2 % (ref 0–2)
LYMPHOCYTES # BLD AUTO: 0.71 THOUSANDS/ÂΜL (ref 0.6–4.47)
LYMPHOCYTES NFR BLD AUTO: 4 % (ref 14–44)
MCH RBC QN AUTO: 27.7 PG (ref 26.8–34.3)
MCHC RBC AUTO-ENTMCNC: 28.6 G/DL (ref 31.4–37.4)
MCV RBC AUTO: 97 FL (ref 82–98)
MONOCYTES # BLD AUTO: 0.63 THOUSAND/ÂΜL (ref 0.17–1.22)
MONOCYTES NFR BLD AUTO: 4 % (ref 4–12)
NEUTROPHILS # BLD AUTO: 14.56 THOUSANDS/ÂΜL (ref 1.85–7.62)
NEUTS SEG NFR BLD AUTO: 90 % (ref 43–75)
NRBC BLD AUTO-RTO: 0 /100 WBCS
PLATELET # BLD AUTO: 261 THOUSANDS/UL (ref 149–390)
PMV BLD AUTO: 9.8 FL (ref 8.9–12.7)
POTASSIUM SERPL-SCNC: 4.6 MMOL/L (ref 3.5–5.3)
RBC # BLD AUTO: 3.83 MILLION/UL (ref 3.81–5.12)
SODIUM SERPL-SCNC: 140 MMOL/L (ref 135–147)
WBC # BLD AUTO: 16.29 THOUSAND/UL (ref 4.31–10.16)

## 2024-11-05 PROCEDURE — 80048 BASIC METABOLIC PNL TOTAL CA: CPT | Performed by: INTERNAL MEDICINE

## 2024-11-05 PROCEDURE — 94640 AIRWAY INHALATION TREATMENT: CPT

## 2024-11-05 PROCEDURE — 99232 SBSQ HOSP IP/OBS MODERATE 35: CPT | Performed by: INTERNAL MEDICINE

## 2024-11-05 PROCEDURE — 85025 COMPLETE CBC W/AUTO DIFF WBC: CPT | Performed by: INTERNAL MEDICINE

## 2024-11-05 PROCEDURE — 94760 N-INVAS EAR/PLS OXIMETRY 1: CPT

## 2024-11-05 PROCEDURE — 99233 SBSQ HOSP IP/OBS HIGH 50: CPT | Performed by: STUDENT IN AN ORGANIZED HEALTH CARE EDUCATION/TRAINING PROGRAM

## 2024-11-05 PROCEDURE — 94664 DEMO&/EVAL PT USE INHALER: CPT

## 2024-11-05 RX ORDER — METHYLPREDNISOLONE SODIUM SUCCINATE 40 MG/ML
40 INJECTION, POWDER, LYOPHILIZED, FOR SOLUTION INTRAMUSCULAR; INTRAVENOUS EVERY 12 HOURS SCHEDULED
Status: DISCONTINUED | OUTPATIENT
Start: 2024-11-05 | End: 2024-11-06

## 2024-11-05 RX ORDER — ECHINACEA PURPUREA EXTRACT 125 MG
1 TABLET ORAL
Status: DISCONTINUED | OUTPATIENT
Start: 2024-11-05 | End: 2024-11-07 | Stop reason: HOSPADM

## 2024-11-05 RX ADMIN — SALINE NASAL SPRAY 1 SPRAY: 1.5 SOLUTION NASAL at 11:41

## 2024-11-05 RX ADMIN — LEVALBUTEROL HYDROCHLORIDE 1.25 MG: 1.25 SOLUTION RESPIRATORY (INHALATION) at 07:08

## 2024-11-05 RX ADMIN — SALINE NASAL SPRAY 1 SPRAY: 1.5 SOLUTION NASAL at 09:27

## 2024-11-05 RX ADMIN — METHYLPREDNISOLONE SODIUM SUCCINATE 40 MG: 40 INJECTION, POWDER, FOR SOLUTION INTRAMUSCULAR; INTRAVENOUS at 22:35

## 2024-11-05 RX ADMIN — ENOXAPARIN SODIUM 40 MG: 40 INJECTION SUBCUTANEOUS at 08:11

## 2024-11-05 RX ADMIN — BUDESONIDE 0.5 MG: 0.5 INHALANT RESPIRATORY (INHALATION) at 07:08

## 2024-11-05 RX ADMIN — FORMOTEROL FUMARATE DIHYDRATE 20 MCG: 20 SOLUTION RESPIRATORY (INHALATION) at 19:32

## 2024-11-05 RX ADMIN — FORMOTEROL FUMARATE DIHYDRATE 20 MCG: 20 SOLUTION RESPIRATORY (INHALATION) at 07:08

## 2024-11-05 RX ADMIN — GUAIFENESIN 600 MG: 600 TABLET ORAL at 08:11

## 2024-11-05 RX ADMIN — IPRATROPIUM BROMIDE 0.5 MG: 0.5 SOLUTION RESPIRATORY (INHALATION) at 13:17

## 2024-11-05 RX ADMIN — METHYLPREDNISOLONE SODIUM SUCCINATE 40 MG: 40 INJECTION, POWDER, FOR SOLUTION INTRAMUSCULAR; INTRAVENOUS at 05:17

## 2024-11-05 RX ADMIN — LEVALBUTEROL HYDROCHLORIDE 1.25 MG: 1.25 SOLUTION RESPIRATORY (INHALATION) at 13:17

## 2024-11-05 RX ADMIN — LEVALBUTEROL HYDROCHLORIDE 1.25 MG: 1.25 SOLUTION RESPIRATORY (INHALATION) at 19:31

## 2024-11-05 RX ADMIN — CEFTRIAXONE SODIUM 1000 MG: 10 INJECTION, POWDER, FOR SOLUTION INTRAVENOUS at 08:11

## 2024-11-05 RX ADMIN — BUDESONIDE 0.5 MG: 0.5 INHALANT RESPIRATORY (INHALATION) at 19:32

## 2024-11-05 RX ADMIN — IPRATROPIUM BROMIDE 0.5 MG: 0.5 SOLUTION RESPIRATORY (INHALATION) at 07:08

## 2024-11-05 RX ADMIN — IPRATROPIUM BROMIDE 0.5 MG: 0.5 SOLUTION RESPIRATORY (INHALATION) at 19:31

## 2024-11-05 NOTE — ASSESSMENT & PLAN NOTE
Patient presented to the ED with complaints of worsening shortness of breath, wheezing with a productive cough with green sputum over the last few days.  Known history of very severe COPD, follows with [facility]  Chest x-ray (11/2/24): No acute cardiopulmonary disease. Probable mild peripheral pulmonary fibrotic changes.   Received 125 mg Solu-Medrol in the ER, Continue with IV Solu-Medrol 40 mg every 8 hours  IV methylprednisone 40 mg every 12 hourly  Albuterol and ipratropium nebs ordered  Continue ceftriaxone for now  Follow-up further pulmonology recommendations

## 2024-11-05 NOTE — CASE MANAGEMENT
Case Management Progress Note    Patient name Dianelys Price  Location /-01 MRN [id number]  : 1945 Date 2024       LOS (days): 3  Geometric Mean LOS (GMLOS) (days):   Days to GMLOS:        OBJECTIVE:        Current admission status: Inpatient  Preferred Pharmacy:   [id number] - [place] - [address] [facility] [address]  Phone: [phone] Fax: [phone]    [facility] [employer] [facility]  [address].  [place] [address]  Phone: [phone] Fax: [phone]    Primary Care Provider: Noe Victoria DO    Primary Insurance: MEDICARE  Secondary Insurance: AARP    PROGRESS NOTE:  As per [facility] rounds, pt is anticipated for dc within 24hrs. CM called Janelle from [facility] admission dept [phone] regarding decision if pt is able to be discharge to them. No decision at this time. CM continues to follow.

## 2024-11-05 NOTE — RESPIRATORY THERAPY NOTE
11/05/24 0400   Respiratory Assessment   Resp Comments Pt refusing to wear bipap   Non-Invasive Information   SpO2 98 %     RT Ventilator Management Note  Dianelys Price 78 y.o. female MRN: [id number]  Unit/Bed#: -01 Encounter: [id number]      Daily Screen    No data found in the last 10 encounters.           Physical Exam:          Resp Comments: Pt refusing to wear bipap

## 2024-11-05 NOTE — ASSESSMENT & PLAN NOTE
ABG showed acute on chronic respiratory acidosis with hypoxemia possibly secondary to COPD exacerbation.  Plan to taper her IV methylprednisone to per oral gradually

## 2024-11-05 NOTE — ASSESSMENT & PLAN NOTE
Meeting SIRS criteria on admission with tachycardia, tachypnea, and leukocytosis.  Likely secondary to COPD exacerbation, follow up sputum culture  Her procalcitonin is elevated, 0.41 today, however, no definitive pneumonia seen on imaging.  Will transition patient from PO Doxy to IV Ceftriaxone today.  Covid, Flu negative.  Negative urine for strep pneumoniae/legionella

## 2024-11-05 NOTE — RESPIRATORY THERAPY NOTE
RT Protocol Note  Dianelys Price 78 y.o. female MRN: [id number]  Unit/Bed#: -01 Encounter: [id number]    Assessment    Principal Problem:    COPD exacerbation (HCC)  Active Problems:    COPD, very severe (HCC)    Chronic respiratory failure with hypoxia (HCC)    SIRS (systemic inflammatory response syndrome) (HCC)    Respiratory acidosis    Acute hypoxemic respiratory failure (HCC)    Physical Exam:   Assessment Type: Post-treatment  General Appearance: Alert, Awake  Respiratory Pattern: Normal  Chest Assessment: Chest expansion symmetrical  Bilateral Breath Sounds: Expiratory wheezes, Diminished  O2 Device: nc    Vitals:  Blood pressure 111/50, pulse 81, temperature 98.4 °F (36.9 °C), resp. rate 17, height 5' (1.524 m), weight 48.4 kg (106 lb 11.2 oz), SpO2 96%.    Results from last 7 days   Lab Units 11/03/24  0751 11/03/24  0159   PH ART  7.293* 7.259*   PCO2 ART mm Hg 84.6* 95.2*   PO2 ART mm Hg 62.2* 108.4   HCO3 ART mmol/L 40.0* 41.7*   BASE EXC ART mmol/L 10.2 11.0   O2 CONTENT ART mL/dL 16.8 16.8   O2 HGB, ARTERIAL % 91.9* 96.8   ABG SOURCE   --  Radial, Right   ANIVAL TEST  Yes Yes       O2 Device: nc     Plan    Respiratory Plan: Mild Distress pathway        Resp Comments: will cont w current txs, no acute changes pt c/o stuffy nose which was relayed back to nurse. no other changes bbs diminished/ex wheezes spo2 98%

## 2024-11-05 NOTE — PLAN OF CARE
Problem: INFECTION - ADULT  Goal: Absence or prevention of progression during hospitalization  Description: INTERVENTIONS:  - Assess and monitor for signs and symptoms of infection  - Monitor lab/diagnostic results  - Monitor all insertion sites, i.e. indwelling lines, tubes, and drains  - Monitor endotracheal if appropriate and nasal secretions for changes in amount and color  - [place] appropriate cooling/warming therapies per order  - Administer medications as ordered  - Instruct and encourage patient and family to use good hand hygiene technique  - Identify and instruct in appropriate isolation precautions for identified infection/condition  Outcome: Progressing  Goal: Absence of fever/infection during neutropenic period  Description: INTERVENTIONS:  - Monitor WBC    Outcome: Progressing     Problem: SAFETY ADULT  Goal: Patient will remain free of falls  Description: INTERVENTIONS:  - Educate patient/family on patient safety including physical limitations  - Instruct patient to call for assistance with activity   - Consult OT/PT to assist with strengthening/mobility   - Keep Call bell within reach  - Keep bed low and locked with side rails adjusted as appropriate  - Keep care items and personal belongings within reach  - Initiate and maintain comfort rounds  - Make Fall Risk Sign visible to staff  - Offer Toileting every 2 Hours, in advance of need  - Initiate/Maintain bed alarm  - Obtain necessary fall risk management equipment: yellow socks  - Apply yellow socks and bracelet for high fall risk patients  - Consider moving patient to room near nurses station  Outcome: Progressing  Goal: Maintain or return to baseline ADL function  Description: INTERVENTIONS:  -  Assess patient's ability to carry out ADLs; assess patient's baseline for ADL function and identify physical deficits which impact ability to perform ADLs (bathing, care of mouth/teeth, toileting, grooming, dressing, etc.)  - Assess/evaluate cause of  self-care deficits   - Assess range of motion  - Assess patient's mobility; develop plan if impaired  - Assess patient's need for assistive devices and provide as appropriate  - Encourage maximum independence but intervene and supervise when necessary  - Involve family in performance of ADLs  - Assess for home care needs following discharge   - Consider OT consult to assist with ADL evaluation and planning for discharge  - Provide patient education as appropriate  Outcome: Progressing  Goal: Maintains/Returns to pre admission functional level  Description: INTERVENTIONS:  - Perform AM-PAC 6 Click Basic Mobility/ Daily Activity assessment daily.  - Set and communicate daily mobility goal to care team and patient/family/caregiver.   - Collaborate with rehabilitation services on mobility goals if consulted  - Perform Range of Motion 2 times a day.  - Reposition patient every 2 hours.  - Dangle patient 2 times a day  - Stand patient 2 times a day  - Ambulate patient 2 times a day  - Out of bed to chair 2 times a day   - Out of bed for meals 2 times a day  - Out of bed for toileting  - Record patient progress and toleration of activity level   Outcome: Progressing     Problem: RESPIRATORY - ADULT  Goal: Achieves optimal ventilation and oxygenation  Description: INTERVENTIONS:  - Assess for changes in respiratory status  - Assess for changes in mentation and behavior  - Position to facilitate oxygenation and minimize respiratory effort  - Oxygen administered by appropriate delivery if ordered  - Initiate smoking cessation education as indicated  - Encourage broncho-pulmonary hygiene including cough, deep breathe, Incentive Spirometry  - Assess the need for suctioning and aspirate as needed  - Assess and instruct to report SOB or any respiratory difficulty  - Respiratory Therapy support as indicated  Outcome: Progressing     Problem: Prexisting or High Potential for Compromised Skin Integrity  Goal: Skin integrity is  maintained or improved  Description: INTERVENTIONS:  - Identify patients at risk for skin breakdown  - Assess and monitor skin integrity  - Assess and monitor nutrition and hydration status  - Monitor labs   - Assess for incontinence   - Turn and reposition patient  - Assist with mobility/ambulation  - Relieve pressure over bony prominences  - Avoid friction and shearing  - Provide appropriate hygiene as needed including keeping skin clean and dry  - Evaluate need for skin moisturizer/barrier cream  - Collaborate with interdisciplinary team   - Patient/family teaching  - Consider wound care consult   Outcome: Progressing

## 2024-11-05 NOTE — ASSESSMENT & PLAN NOTE
on day of admission (now resolved) due to acidosis in the setting of acute on chronic respiratory failure a/e/b 2/2 acute neurologic change with AMS, global aphasia and unresponsiveness treated with RRT, neurological evaluation, neuro checks, fall precautions, delirium precautions stat ABG, BiPAP and supportive care.

## 2024-11-05 NOTE — ASSESSMENT & PLAN NOTE
Severe COPD with acute exacerbation with acute tracheobronchitis  Flu COVID testing has been negative  Will continue with her nebulizer treatments around-the-clock  Continue perforomist and budesonide  Her home regimen is Advair and albuterol PRN  Will decrease solu-medrol to q12 hr today  Continue with airway clearance measures  Complete 5 days of antobiotics given leukocytosis and elevated procalcitonin  Outpatient pulmonary follow-up for repeat PFTs and outpatient pulmonary rehab  Palliative care consult  Will continue to follow

## 2024-11-05 NOTE — PLAN OF CARE
Problem: PAIN - ADULT  Goal: Verbalizes/displays adequate comfort level or baseline comfort level  Description: Interventions:  - Encourage patient to monitor pain and request assistance  - Assess pain using appropriate pain scale  - Administer analgesics based on type and severity of pain and evaluate response  - Implement non-pharmacological measures as appropriate and evaluate response  - Consider cultural and social influences on pain and pain management  - Notify physician/advanced practitioner if interventions unsuccessful or patient reports new pain  Outcome: Progressing     Problem: INFECTION - ADULT  Goal: Absence or prevention of progression during hospitalization  Description: INTERVENTIONS:  - Assess and monitor for signs and symptoms of infection  - Monitor lab/diagnostic results  - Monitor all insertion sites, i.e. indwelling lines, tubes, and drains  - Monitor endotracheal if appropriate and nasal secretions for changes in amount and color  - [place] appropriate cooling/warming therapies per order  - Administer medications as ordered  - Instruct and encourage patient and family to use good hand hygiene technique  - Identify and instruct in appropriate isolation precautions for identified infection/condition  Outcome: Progressing  Goal: Absence of fever/infection during neutropenic period  Description: INTERVENTIONS:  - Monitor WBC    Outcome: Progressing     Problem: SAFETY ADULT  Goal: Patient will remain free of falls  Description: INTERVENTIONS:  - Educate patient/family on patient safety including physical limitations  - Instruct patient to call for assistance with activity   - Consult OT/PT to assist with strengthening/mobility   - Keep Call bell within reach  - Keep bed low and locked with side rails adjusted as appropriate  - Keep care items and personal belongings within reach  - Initiate and maintain comfort rounds  - Make Fall Risk Sign visible to staff  - Offer Toileting every 2 Hours,  in advance of need  - Initiate/Maintain bed alarm  - Obtain necessary fall risk management equipment: call bell within reach  - Apply yellow socks and bracelet for high fall risk patients  - Consider moving patient to room near nurses station  Outcome: Progressing  Goal: Maintain or return to baseline ADL function  Description: INTERVENTIONS:  -  Assess patient's ability to carry out ADLs; assess patient's baseline for ADL function and identify physical deficits which impact ability to perform ADLs (bathing, care of mouth/teeth, toileting, grooming, dressing, etc.)  - Assess/evaluate cause of self-care deficits   - Assess range of motion  - Assess patient's mobility; develop plan if impaired  - Assess patient's need for assistive devices and provide as appropriate  - Encourage maximum independence but intervene and supervise when necessary  - Involve family in performance of ADLs  - Assess for home care needs following discharge   - Consider OT consult to assist with ADL evaluation and planning for discharge  - Provide patient education as appropriate  Outcome: Progressing  Goal: Maintains/Returns to pre admission functional level  Description: INTERVENTIONS:  - Perform AM-PAC 6 Click Basic Mobility/ Daily Activity assessment daily.  - Set and communicate daily mobility goal to care team and patient/family/caregiver.   - Collaborate with rehabilitation services on mobility goals if consulted  - Perform Range of Motion 3 times a day.  - Reposition patient every 2 hours.  - Dangle patient 3 times a day  - Stand patient 3 times a day  - Ambulate patient 3 times a day  - Out of bed to chair 3 times a day   - Out of bed for meals 3 times a day  - Out of bed for toileting  - Record patient progress and toleration of activity level   Outcome: Progressing

## 2024-11-05 NOTE — PROGRESS NOTES
Progress Note - Hospitalist   Name: Dianelys Price 78 y.o. female I MRN: [id number]  Unit/Bed#: -01 I Date of Admission: 11/2/2024   Date of Service: 11/5/2024 I Hospital Day: 3    Assessment & Plan  COPD exacerbation (HCC)  Patient presented to the ED with complaints of worsening shortness of breath, wheezing with a productive cough with green sputum over the last few days.  Known history of very severe COPD, follows with -Pulmonology  Chest x-ray (11/2/24): No acute cardiopulmonary disease. Probable mild peripheral pulmonary fibrotic changes.   Received 125 mg Solu-Medrol in the ER, Continue with IV Solu-Medrol 40 mg every 8 hours  IV methylprednisone 40 mg every 12 hourly  Albuterol and ipratropium nebs ordered  Continue ceftriaxone for now  Follow-up further pulmonology recommendations  SIRS (systemic inflammatory response syndrome) (HCC)  Meeting SIRS criteria on admission with tachycardia, tachypnea, and leukocytosis.  Likely secondary to COPD exacerbation, follow up sputum culture  Her procalcitonin is elevated, 0.41 today, however, no definitive pneumonia seen on imaging.  Will transition patient from PO Doxy to IV Ceftriaxone today.  Covid, Flu negative.  Negative urine for strep pneumoniae/legionella  COPD, very severe (HCC)  Noted history  Follows with -Pulmonology; Last seen in the office, 7/24/24  Typically takes Advair, Combivent at home  Wears chronically 2 L of supplemental oxygen  Palliative care consulted follow-up recommendations  Chronic respiratory failure with hypoxia (HCC)  Stable, currently on 3L   Chronically wears 2 L of supplemental oxygen at baseline  Currently 99% on home regimen  Continue to treat COPD exacerbation  Respiratory acidosis  Patient was a rapid response overnight for altered mental status changes  Found to have a co2 level greater than 103 on istat.  Initiated on BiPAP with improvement to CO2 levels.  Pulmonology consult, appreciate input  Acute metabolic  encephalopathy   on day of admission (now resolved) due to acidosis in the setting of acute on chronic respiratory failure a/e/b 2/2 acute neurologic change with AMS, global aphasia and unresponsiveness treated with RRT, neurological evaluation, neuro checks, fall precautions, delirium precautions stat ABG, BiPAP and supportive care.  Acute on chronic respiratory failure with hypoxia and hypercapnia (HCC)  ABG showed acute on chronic respiratory acidosis with hypoxemia possibly secondary to COPD exacerbation.  Plan to taper her IV methylprednisone to per oral gradually    VTE Pharmacologic Prophylaxis:   Moderate Risk (Score 3-4) - Pharmacological DVT Prophylaxis Ordered: heparin.    Mobility:   Basic Mobility Inpatient Raw Score: 22  JH-HLM Goal: 7: Walk 25 feet or more  JH-HLM Achieved: 6: Walk 10 steps or more  JH-HLM Goal achieved. Continue to encourage appropriate mobility.    Patient Centered Rounds: I performed bedside rounds with nursing staff today.   Discussions with Specialists or Other Care Team Provider: Pulmonology    Education and Discussions with Family / Patient:  Updated patient.     Current Length of Stay: 3 day(s)  Current Patient Status: Inpatient   Certification Statement: The patient will continue to require additional inpatient hospital stay due to COPD exacerbation  Discharge Plan: Anticipate discharge in 24-48 hrs to home.    Code Status: Level 3 - DNAR and DNI    Subjective   Patient seen and examined at bedside  She reported her improvement is 50% as compared to day of admission  She denied complaint of chest pain, nausea, vomiting, diarrhea    Objective :  Temp:  [97.3 °F (36.3 °C)-98.4 °F (36.9 °C)] 98.4 °F (36.9 °C)  HR:  [72-96] 81  BP: (111-166)/(50-69) 111/50  Resp:  [16-20] 17  SpO2:  [93 %-98 %] 95 %  O2 Device: Nasal cannula  Nasal Cannula O2 Flow Rate (L/min):  [3 L/min] 3 L/min    Body mass index is 20.84 kg/m².     Input and Output Summary (last 24 hours):     Intake/Output  Summary (Last 24 hours) at 11/5/2024 1427  Last data filed at 11/5/2024 1025  Gross per 24 hour   Intake 120 ml   Output 1100 ml   Net -980 ml       Physical Exam  Constitutional: COPD cachexia, mild respiratory distress  HEENT: No pallor or icterus  CVS: S1 plus S2  Respiratory: Decreased breath sounds bilaterally without any crackles and wheeze  Gastroenterology: Soft nontender without any palpable mass  Skin: No bruises or ecchymosis  Neurology: No focal logical deficit     Lines/Drains:              Lab Results: I have reviewed the following results:   Results from last 7 days   Lab Units 11/05/24 0523   WBC Thousand/uL 16.29*   HEMOGLOBIN g/dL 10.6*   HEMATOCRIT % 37.0   PLATELETS Thousands/uL 261   SEGS PCT % 90*   LYMPHO PCT % 4*   MONO PCT % 4   EOS PCT % 0     Results from last 7 days   Lab Units 11/05/24  0523 11/04/24 0435 11/03/24 0436 11/02/24  1010   SODIUM mmol/L 140 140   < > 141   POTASSIUM mmol/L 4.6 4.1   < > 4.0   CHLORIDE mmol/L 92* 92*   < > 95*   CO2 mmol/L >45* 45*   < > 39*   BUN mg/dL 20 16   < > 15   CREATININE mg/dL 0.38* 0.37*   < > 0.33*   ANION GAP mmol/L  --  3*   < > 7   CALCIUM mg/dL 8.6 9.0   < > 9.2   ALBUMIN g/dL  --   --   --  3.6   TOTAL BILIRUBIN mg/dL  --   --   --  0.43   ALK PHOS U/L  --   --   --  62   ALT U/L  --   --   --  14   AST U/L  --   --   --  16   GLUCOSE RANDOM mg/dL 161* 155*   < > 136    < > = values in this interval not displayed.         Results from last 7 days   Lab Units 11/02/24  2304   POC GLUCOSE mg/dl 239*         Results from last 7 days   Lab Units 11/04/24  0435 11/03/24  0436 11/02/24  1147   PROCALCITONIN ng/ml 0.27* 0.41* 0.26*       Recent Cultures (last 7 days):   Results from last 7 days   Lab Units 11/03/24  1117 11/03/24  1029   SPUTUM CULTURE  4+ Growth of  --    GRAM STAIN RESULT  Rare Epithelial cells per low power field*  1+ Polys*  2+ Gram positive cocci in pairs*  --    LEGIONELLA URINARY ANTIGEN   --  Negative       Imaging  Results Review: I personally reviewed the following image studies/reports in PACS and discussed pertinent findings with Radiology: chest xray. My interpretation of the radiology images/reports is: No fluid overload/pleural effusion and pneumonia.  Other Study Results Review: My interpretation of other studies include: CBC and BMP.    Last 24 Hours Medication List:     Current Facility-Administered Medications:     acetaminophen (TYLENOL) tablet 650 mg, Q6H PRN    albuterol inhalation solution 2.5 mg, Q4H PRN    budesonide (PULMICORT) inhalation solution 0.5 mg, Q12H    cefTRIAXone (ROCEPHIN) 1,000 mg in dextrose 5 % 50 mL IVPB, Q24H, Last Rate: Stopped (11/05/24 0900)    enoxaparin (LOVENOX) subcutaneous injection 40 mg, Daily    formoterol (PERFOROMIST) nebulizer solution 20 mcg, Q12H    guaiFENesin (MUCINEX) 12 hr tablet 600 mg, BID    ipratropium (ATROVENT) 0.02 % inhalation solution 0.5 mg, TID    levalbuterol (XOPENEX) inhalation solution 1.25 mg, TID    methylPREDNISolone sodium succinate (Solu-MEDROL) injection 40 mg, Q12H KAMI    sodium chloride (OCEAN) 0.65 % nasal spray 1 spray, Q1H PRN    Administrative Statements   Today, Patient Was Seen By: Mynor Leon MD  I have spent a total time of 52 minutes in caring for this patient on the day of the visit/encounter including Impressions, Counseling / Coordination of care, Documenting in the medical record, Reviewing / ordering tests, medicine, procedures  , Obtaining or reviewing history  , and Communicating with other healthcare professionals .    **Please Note: This note may have been constructed using a voice recognition system.**

## 2024-11-05 NOTE — PROGRESS NOTES
Progress Note - Pulmonology   Name: Dianelys Price 78 y.o. female I MRN: [id number]  Unit/Bed#: -01 I Date of Admission: 11/2/2024   Date of Service: 11/5/2024 I Hospital Day: 3    Assessment & Plan  COPD exacerbation (HCC)  Severe COPD with acute exacerbation with acute tracheobronchitis  Flu COVID testing has been negative  Will continue with her nebulizer treatments around-the-clock  Continue perforomist and budesonide  Her home regimen is Advair and albuterol PRN  Will decrease solu-medrol to q12 hr today  Continue with airway clearance measures  Complete 5 days of antobiotics given leukocytosis and elevated procalcitonin  Outpatient pulmonary follow-up for repeat PFTs and outpatient pulmonary rehab  Palliative care consult  Will continue to follow  COPD, very severe (HCC)  COPD severe with FEV1 of 23% with air trapping and severely decreased DLCO at 23% prior PFTs in 2019  Also had evaluation for lung volume reduction was not a candidate  States she did do the pulmonary rehab sessions in 2019  She states they had talked about lung transplant evaluation given her age she had declined  Chronic respiratory failure with hypoxia (HCC)  On 2 L of oxygen continuous at baseline would need reassessment again at the time of hospital discharge  SIRS (systemic inflammatory response syndrome) (HCC)    Respiratory acidosis    Acute hypoxemic respiratory failure (HCC)  Acute on chronic hypoxemic respiratory failure currently requiring around 4 L of oxygen by nasal cannula at baseline she is on 2 L continuous likely secondary to her severe COPD exacerbation  Titrate down as able to maintain oxygen saturation greater than 89%    24 Hour Events : No events  Subjective : Patient resting in bed. She is feeling slightly better with her breathing.     Objective :  Temp:  [97.3 °F (36.3 °C)-98.4 °F (36.9 °C)] 98.4 °F (36.9 °C)  HR:  [72-96] 81  BP: (111-166)/(50-69) 111/50  Resp:  [16-20] 17  SpO2:  [93 %-98 %] 96 %  O2 Device:  Nasal cannula  Nasal Cannula O2 Flow Rate (L/min):  [3 L/min] 3 L/min    Physical Exam  Vitals reviewed.   Constitutional:       Appearance: Normal appearance. She is not ill-appearing.   HENT:      Head: Normocephalic and atraumatic.      Mouth/Throat:      Pharynx: Oropharynx is clear.   Eyes:      Conjunctiva/sclera: Conjunctivae normal.   Cardiovascular:      Rate and Rhythm: Normal rate and regular rhythm.   Pulmonary:      Effort: Pulmonary effort is normal.      Breath sounds: Decreased breath sounds and wheezing present. No rhonchi or rales.   Abdominal:      General: Abdomen is flat. There is no distension.   Musculoskeletal:         General: Normal range of motion.      Cervical back: Normal range of motion.      Right lower leg: No edema.      Left lower leg: No edema.   Skin:     General: Skin is warm and dry.   Neurological:      Mental Status: She is alert and oriented to person, place, and time.   Psychiatric:         Mood and Affect: Mood normal.         Behavior: Behavior normal.           Lab Results: I have reviewed the following results:   .     11/05/24  0523   WBC 16.29*   HGB 10.6*   HCT 37.0      SODIUM 140   K 4.6   CL 92*   CO2 >45*   BUN 20   CREATININE 0.38*   GLUC 161*     ABG: No new results in last 24 hours.    Imaging Results Review: I reviewed radiology reports from this admission including: chest xray.  Other Study Results Review: No additional pertinent studies reviewed.  PFT Results Reviewed: reviewed

## 2024-11-06 LAB
BASOPHILS # BLD MANUAL: 0 THOUSAND/UL (ref 0–0.1)
BASOPHILS NFR MAR MANUAL: 0 % (ref 0–1)
BUN SERPL-MCNC: 16 MG/DL (ref 5–25)
CALCIUM SERPL-MCNC: 8.5 MG/DL (ref 8.4–10.2)
CHLORIDE SERPL-SCNC: 91 MMOL/L (ref 96–108)
CO2 SERPL-SCNC: >45 MMOL/L (ref 21–32)
CREAT SERPL-MCNC: 0.35 MG/DL (ref 0.6–1.3)
EOSINOPHIL # BLD MANUAL: 0 THOUSAND/UL (ref 0–0.4)
EOSINOPHIL NFR BLD MANUAL: 0 % (ref 0–6)
ERYTHROCYTE [DISTWIDTH] IN BLOOD BY AUTOMATED COUNT: 11.9 % (ref 11.6–15.1)
GFR SERPL CREATININE-BSD FRML MDRD: 104 ML/MIN/1.73SQ M
GLUCOSE SERPL-MCNC: 186 MG/DL (ref 65–140)
HCT VFR BLD AUTO: 37.5 % (ref 34.8–46.1)
HGB BLD-MCNC: 10.9 G/DL (ref 11.5–15.4)
LYMPHOCYTES # BLD AUTO: 0.59 THOUSAND/UL (ref 0.6–4.47)
LYMPHOCYTES # BLD AUTO: 4 % (ref 14–44)
MCH RBC QN AUTO: 27.9 PG (ref 26.8–34.3)
MCHC RBC AUTO-ENTMCNC: 29.1 G/DL (ref 31.4–37.4)
MCV RBC AUTO: 96 FL (ref 82–98)
MONOCYTES # BLD AUTO: 0.44 THOUSAND/UL (ref 0–1.22)
MONOCYTES NFR BLD: 3 % (ref 4–12)
NEUTROPHILS # BLD MANUAL: 13.61 THOUSAND/UL (ref 1.85–7.62)
NEUTS BAND NFR BLD MANUAL: 2 % (ref 0–8)
NEUTS SEG NFR BLD AUTO: 91 % (ref 43–75)
PLATELET # BLD AUTO: 256 THOUSANDS/UL (ref 149–390)
PLATELET BLD QL SMEAR: ADEQUATE
PMV BLD AUTO: 9.6 FL (ref 8.9–12.7)
POTASSIUM SERPL-SCNC: 4.6 MMOL/L (ref 3.5–5.3)
RBC # BLD AUTO: 3.9 MILLION/UL (ref 3.81–5.12)
RBC MORPH BLD: NORMAL
SODIUM SERPL-SCNC: 140 MMOL/L (ref 135–147)
WBC # BLD AUTO: 14.63 THOUSAND/UL (ref 4.31–10.16)

## 2024-11-06 PROCEDURE — 85027 COMPLETE CBC AUTOMATED: CPT | Performed by: INTERNAL MEDICINE

## 2024-11-06 PROCEDURE — 94640 AIRWAY INHALATION TREATMENT: CPT

## 2024-11-06 PROCEDURE — 99232 SBSQ HOSP IP/OBS MODERATE 35: CPT | Performed by: INTERNAL MEDICINE

## 2024-11-06 PROCEDURE — 85007 BL SMEAR W/DIFF WBC COUNT: CPT | Performed by: INTERNAL MEDICINE

## 2024-11-06 PROCEDURE — 94760 N-INVAS EAR/PLS OXIMETRY 1: CPT

## 2024-11-06 PROCEDURE — 80048 BASIC METABOLIC PNL TOTAL CA: CPT | Performed by: INTERNAL MEDICINE

## 2024-11-06 PROCEDURE — 99233 SBSQ HOSP IP/OBS HIGH 50: CPT | Performed by: STUDENT IN AN ORGANIZED HEALTH CARE EDUCATION/TRAINING PROGRAM

## 2024-11-06 RX ORDER — METHYLPREDNISOLONE SODIUM SUCCINATE 40 MG/ML
40 INJECTION, POWDER, LYOPHILIZED, FOR SOLUTION INTRAMUSCULAR; INTRAVENOUS DAILY
Status: DISCONTINUED | OUTPATIENT
Start: 2024-11-07 | End: 2024-11-07

## 2024-11-06 RX ORDER — ALPRAZOLAM 0.5 MG
0.5 TABLET ORAL 2 TIMES DAILY PRN
Status: DISCONTINUED | OUTPATIENT
Start: 2024-11-06 | End: 2024-11-07 | Stop reason: HOSPADM

## 2024-11-06 RX ADMIN — LEVALBUTEROL HYDROCHLORIDE 1.25 MG: 1.25 SOLUTION RESPIRATORY (INHALATION) at 13:05

## 2024-11-06 RX ADMIN — METHYLPREDNISOLONE SODIUM SUCCINATE 40 MG: 40 INJECTION, POWDER, FOR SOLUTION INTRAMUSCULAR; INTRAVENOUS at 08:10

## 2024-11-06 RX ADMIN — IPRATROPIUM BROMIDE 0.5 MG: 0.5 SOLUTION RESPIRATORY (INHALATION) at 19:22

## 2024-11-06 RX ADMIN — ENOXAPARIN SODIUM 40 MG: 40 INJECTION SUBCUTANEOUS at 08:06

## 2024-11-06 RX ADMIN — LEVALBUTEROL HYDROCHLORIDE 1.25 MG: 1.25 SOLUTION RESPIRATORY (INHALATION) at 19:22

## 2024-11-06 RX ADMIN — IPRATROPIUM BROMIDE 0.5 MG: 0.5 SOLUTION RESPIRATORY (INHALATION) at 07:08

## 2024-11-06 RX ADMIN — FORMOTEROL FUMARATE DIHYDRATE 20 MCG: 20 SOLUTION RESPIRATORY (INHALATION) at 07:09

## 2024-11-06 RX ADMIN — FORMOTEROL FUMARATE DIHYDRATE 20 MCG: 20 SOLUTION RESPIRATORY (INHALATION) at 19:22

## 2024-11-06 RX ADMIN — LEVALBUTEROL HYDROCHLORIDE 1.25 MG: 1.25 SOLUTION RESPIRATORY (INHALATION) at 07:08

## 2024-11-06 RX ADMIN — BUDESONIDE 0.5 MG: 0.5 INHALANT RESPIRATORY (INHALATION) at 19:22

## 2024-11-06 RX ADMIN — CEFTRIAXONE SODIUM 1000 MG: 10 INJECTION, POWDER, FOR SOLUTION INTRAVENOUS at 08:06

## 2024-11-06 RX ADMIN — IPRATROPIUM BROMIDE 0.5 MG: 0.5 SOLUTION RESPIRATORY (INHALATION) at 13:05

## 2024-11-06 RX ADMIN — BUDESONIDE 0.5 MG: 0.5 INHALANT RESPIRATORY (INHALATION) at 07:08

## 2024-11-06 NOTE — CASE MANAGEMENT
Case Management Progress Note    Patient name Dianelys Price  Location /-01 MRN [id number]  : 1945 Date 2024       LOS (days): 4  Geometric Mean LOS (GMLOS) (days): 3.4  Days to GMLOS:-0.6        OBJECTIVE:        Current admission status: Inpatient  Preferred Pharmacy:   [id number] - [place] - [address] [facility] [address]  Phone: [phone] Fax: [phone]    [facility] [email or web address] [facility]  [address] [facility] [address] [facility]  [place] [address]  Phone: [phone] Fax: [phone]    Primary Care Provider: Noe Victoria DO    Primary Insurance: MEDICARE  Secondary Insurance: A[employer]    PROGRESS NOTE:  As per SLIM rounds, pt is anticipated for dc 24-48hrs due to transitioning to PO steroids. CM continues to follow and assist with pt dc plans.

## 2024-11-06 NOTE — PLAN OF CARE
Problem: PAIN - ADULT  Goal: Verbalizes/displays adequate comfort level or baseline comfort level  Description: Interventions:  - Encourage patient to monitor pain and request assistance  - Assess pain using appropriate pain scale  - Administer analgesics based on type and severity of pain and evaluate response  - Implement non-pharmacological measures as appropriate and evaluate response  - Consider cultural and social influences on pain and pain management  - Notify physician/advanced practitioner if interventions unsuccessful or patient reports new pain  Outcome: Progressing     Problem: INFECTION - ADULT  Goal: Absence or prevention of progression during hospitalization  Description: INTERVENTIONS:  - Assess and monitor for signs and symptoms of infection  - Monitor lab/diagnostic results  - Monitor all insertion sites, i.e. indwelling lines, tubes, and drains  - Monitor endotracheal if appropriate and nasal secretions for changes in amount and color  - [place] appropriate cooling/warming therapies per order  - Administer medications as ordered  - Instruct and encourage patient and family to use good hand hygiene technique  - Identify and instruct in appropriate isolation precautions for identified infection/condition  Outcome: Progressing  Goal: Absence of fever/infection during neutropenic period  Description: INTERVENTIONS:  - Monitor WBC    Outcome: Progressing     Problem: SAFETY ADULT  Goal: Patient will remain free of falls  Description: INTERVENTIONS:  - Educate patient/family on patient safety including physical limitations  - Instruct patient to call for assistance with activity   - Consult OT/PT to assist with strengthening/mobility   - Keep Call bell within reach  - Keep bed low and locked with side rails adjusted as appropriate  - Keep care items and personal belongings within reach  - Initiate and maintain comfort rounds  - Make Fall Risk Sign visible to staff  - Offer Toileting every 2 Hours,  in advance of need  - Initiate/Maintain bed alarm  - Obtain necessary fall risk management equipment: bed alarm  - Apply yellow socks and bracelet for high fall risk patients  - Consider moving patient to room near nurses station  Outcome: Progressing  Goal: Maintain or return to baseline ADL function  Description: INTERVENTIONS:  -  Assess patient's ability to carry out ADLs; assess patient's baseline for ADL function and identify physical deficits which impact ability to perform ADLs (bathing, care of mouth/teeth, toileting, grooming, dressing, etc.)  - Assess/evaluate cause of self-care deficits   - Assess range of motion  - Assess patient's mobility; develop plan if impaired  - Assess patient's need for assistive devices and provide as appropriate  - Encourage maximum independence but intervene and supervise when necessary  - Involve family in performance of ADLs  - Assess for home care needs following discharge   - Consider OT consult to assist with ADL evaluation and planning for discharge  - Provide patient education as appropriate  Outcome: Progressing  Goal: Maintains/Returns to pre admission functional level  Description: INTERVENTIONS:  - Perform AM-PAC 6 Click Basic Mobility/ Daily Activity assessment daily.  - Set and communicate daily mobility goal to care team and patient/family/caregiver.   - Collaborate with rehabilitation services on mobility goals if consulted  - Perform Range of Motion 3 times a day.  - Reposition patient every 3 hours.  - Dangle patient 3 times a day  - Stand patient 3 times a day  - Ambulate patient 3 times a day  - Out of bed to chair 3 times a day   - Out of bed for meals 3 times a day  - Out of bed for toileting  - Record patient progress and toleration of activity level   Outcome: Progressing     Problem: DISCHARGE PLANNING  Goal: Discharge to home or other facility with appropriate resources  Description: INTERVENTIONS:  - Identify barriers to discharge w/patient and  caregiver  - Arrange for needed discharge resources and transportation as appropriate  - Identify discharge learning needs (meds, wound care, etc.)  - Arrange for interpretive services to assist at discharge as needed  - Refer to Case Management Department for coordinating discharge planning if the patient needs post-hospital services based on physician/advanced practitioner order or complex needs related to functional status, cognitive ability, or social support system  Outcome: Progressing     Problem: Knowledge Deficit  Goal: Patient/family/caregiver demonstrates understanding of disease process, treatment plan, medications, and discharge instructions  Description: Complete learning assessment and assess knowledge base.  Interventions:  - Provide teaching at level of understanding  - Provide teaching via preferred learning methods  Outcome: Progressing     Problem: RESPIRATORY - ADULT  Goal: Achieves optimal ventilation and oxygenation  Description: INTERVENTIONS:  - Assess for changes in respiratory status  - Assess for changes in mentation and behavior  - Position to facilitate oxygenation and minimize respiratory effort  - Oxygen administered by appropriate delivery if ordered  - Initiate smoking cessation education as indicated  - Encourage broncho-pulmonary hygiene including cough, deep breathe, Incentive Spirometry  - Assess the need for suctioning and aspirate as needed  - Assess and instruct to report SOB or any respiratory difficulty  - Respiratory Therapy support as indicated  Outcome: Progressing     Problem: Prexisting or High Potential for Compromised Skin Integrity  Goal: Skin integrity is maintained or improved  Description: INTERVENTIONS:  - Identify patients at risk for skin breakdown  - Assess and monitor skin integrity  - Assess and monitor nutrition and hydration status  - Monitor labs   - Assess for incontinence   - Turn and reposition patient  - Assist with mobility/ambulation  - Relieve  pressure over bony prominences  - Avoid friction and shearing  - Provide appropriate hygiene as needed including keeping skin clean and dry  - Evaluate need for skin moisturizer/barrier cream  - Collaborate with interdisciplinary team   - Patient/family teaching  - Consider wound care consult   Outcome: Progressing

## 2024-11-06 NOTE — RESPIRATORY THERAPY NOTE
11/06/24 0200   Respiratory Assessment   Resp Comments Pt refuses bipap   Non-Invasive Information   SpO2 99 %     RT Ventilator Management Note  Dianelys Price 78 y.o. female MRN: [id number]  Unit/Bed#: -01 Encounter: [id number]      Daily Screen    No data found in the last 10 encounters.           Physical Exam:   Assessment Type: During-treatment  General Appearance: Alert, Awake  Respiratory Pattern: Normal  Chest Assessment: Chest expansion symmetrical  Bilateral Breath Sounds: Diminished, Expiratory wheezes  Cough: None  O2 Device: NC      Resp Comments: Pt refuses bipap

## 2024-11-06 NOTE — PROGRESS NOTES
Progress Note - Hospitalist   Name: Dianelys Price 78 y.o. female I MRN: [id number]  Unit/Bed#: -01 I Date of Admission: 11/2/2024   Date of Service: 11/6/2024 I Hospital Day: 4    Assessment & Plan  COPD exacerbation (HCC)  Patient presented to the ED with complaints of worsening shortness of breath, wheezing with a productive cough with green sputum over the last few days.  Known history of very severe COPD, follows with -Pulmonology  Chest x-ray (11/2/24): No acute cardiopulmonary disease. Probable mild peripheral pulmonary fibrotic changes.   Received 125 mg Solu-Medrol in the ER, Continue with IV Solu-Medrol 40 mg every 8 hours  Taper down IV methylprednisolone to 40 mg daily and switch to per oral tomorrow  Albuterol and ipratropium nebs ordered  With complete antibiotic for total of 5 days  Follow-up further pulmonology recommendations  SIRS (systemic inflammatory response syndrome) (HCC)  Meeting SIRS criteria on admission with tachycardia, tachypnea, and leukocytosis.  Likely secondary to COPD exacerbation, follow up sputum culture  Her procalcitonin is elevated, 0.41 today, however, no definitive pneumonia seen on imaging  Covid, Flu negative.  Negative urine for strep pneumoniae/legionella  COPD, very severe (HCC)  Noted history  Follows with -Pulmonology; Last seen in the office, 7/24/24  Typically takes Advair, Combivent at home  Wears chronically 2 L of supplemental oxygen  Palliative care consulted follow-up recommendations  Chronic respiratory failure with hypoxia (HCC)  Stable, currently on 3L   Chronically wears 2 L of supplemental oxygen at baseline  Currently 99% on home regimen  Continue to treat COPD exacerbation  Respiratory acidosis  Patient was a rapid response overnight for altered mental status changes  Found to have a co2 level greater than 103 on istat.  Initiated on BiPAP with improvement to CO2 levels.  Pulmonology consult, appreciate input  Acute metabolic encephalopathy   on  day of admission (now resolved) due to acidosis in the setting of acute on chronic respiratory failure a/e/b 2/2 acute neurologic change with AMS, global aphasia and unresponsiveness treated with RRT, neurological evaluation, neuro checks, fall precautions, delirium precautions stat ABG, BiPAP and supportive care.  Acute on chronic respiratory failure with hypoxia and hypercapnia (HCC)  ABG showed acute on chronic respiratory acidosis with hypoxemia possibly secondary to COPD exacerbation.  Plan to taper her IV methylprednisone to per oral gradually    VTE Pharmacologic Prophylaxis: VTE Score: 3 Moderate Risk (Score 3-4) - Pharmacological DVT Prophylaxis Ordered: heparin.    Mobility:   Basic Mobility Inpatient Raw Score: 22  JH-HLM Goal: 7: Walk 25 feet or more  JH-HLM Achieved: 6: Walk 10 steps or more  JH-HLM Goal achieved. Continue to encourage appropriate mobility.    Patient Centered Rounds: I performed bedside rounds with nursing staff today.   Discussions with Specialists or Other Care Team Provider: Pulmonology    Education and Discussions with Family / Patient:  Updated patient.     Current Length of Stay: 4 day(s)  Current Patient Status: Inpatient   Certification Statement: The patient will continue to require additional inpatient hospital stay due to COPD exacerbation  Discharge Plan: Anticipate discharge in 24-48 hrs to home.    Code Status: Level 3 - DNAR and DNI    Subjective   Patient seen and examined at bedside  Her breathing is at her baseline  No cough, nausea vomiting    Objective :  Temp:  [97.8 °F (36.6 °C)-98.3 °F (36.8 °C)] 97.8 °F (36.6 °C)  HR:  [69-86] 69  BP: (123-161)/(67-81) 123/67  Resp:  [16-20] 16  SpO2:  [93 %-99 %] 95 %  O2 Device: Nasal cannula  Nasal Cannula O2 Flow Rate (L/min):  [2 L/min-3 L/min] 3 L/min    Body mass index is 20.84 kg/m².     Input and Output Summary (last 24 hours):   No intake or output data in the 24 hours ending 11/06/24 1325    Physical  Exam  Constitutional: COPD cachexia  HEENT: No pallor or icterus  CVS: S1 plus S2  Respiratory: Normal vascular breathe, decreased breath sounds with occasional expiratory wheeze  Gastroenterology: Soft nontender without any palpable mass  Skin: No bruises or ecchymosis  Neurology: No focal logical deficit     Lines/Drains:              Lab Results: I have reviewed the following results:   Results from last 7 days   Lab Units 11/06/24 0557 11/05/24 0523   WBC Thousand/uL 14.63* 16.29*   HEMOGLOBIN g/dL 10.9* 10.6*   HEMATOCRIT % 37.5 37.0   PLATELETS Thousands/uL 256 261   BANDS PCT % 2  --    SEGS PCT %  --  90*   LYMPHO PCT % 4* 4*   MONO PCT % 3* 4   EOS PCT % 0 0     Results from last 7 days   Lab Units 11/06/24 0557 11/05/24 0523 11/04/24 0435 11/03/24 0436 11/02/24  1010   SODIUM mmol/L 140   < > 140   < > 141   POTASSIUM mmol/L 4.6   < > 4.1   < > 4.0   CHLORIDE mmol/L 91*   < > 92*   < > 95*   CO2 mmol/L >45*   < > 45*   < > 39*   BUN mg/dL 16   < > 16   < > 15   CREATININE mg/dL 0.35*   < > 0.37*   < > 0.33*   ANION GAP mmol/L  --   --  3*   < > 7   CALCIUM mg/dL 8.5   < > 9.0   < > 9.2   ALBUMIN g/dL  --   --   --   --  3.6   TOTAL BILIRUBIN mg/dL  --   --   --   --  0.43   ALK PHOS U/L  --   --   --   --  62   ALT U/L  --   --   --   --  14   AST U/L  --   --   --   --  16   GLUCOSE RANDOM mg/dL 186*   < > 155*   < > 136    < > = values in this interval not displayed.         Results from last 7 days   Lab Units 11/02/24  2304   POC GLUCOSE mg/dl 239*         Results from last 7 days   Lab Units 11/04/24 0435 11/03/24 0436 11/02/24  1147   PROCALCITONIN ng/ml 0.27* 0.41* 0.26*       Recent Cultures (last 7 days):   Results from last 7 days   Lab Units 11/03/24  1117 11/03/24  1029   SPUTUM CULTURE  4+ Growth of  --    GRAM STAIN RESULT  Rare Epithelial cells per low power field*  1+ Polys*  2+ Gram positive cocci in pairs*  --    LEGIONELLA URINARY ANTIGEN   --  Negative       Imaging Results  Review: No pertinent imaging studies reviewed.  Other Study Results Review: Other studies reviewed include: cbc and bmp    Last 24 Hours Medication List:     Current Facility-Administered Medications:     acetaminophen (TYLENOL) tablet 650 mg, Q6H PRN    albuterol inhalation solution 2.5 mg, Q4H PRN    budesonide (PULMICORT) inhalation solution 0.5 mg, Q12H    [START ON 11/7/2024] cefTRIAXone (ROCEPHIN) 1,000 mg in dextrose 5 % 50 mL IVPB, Q24H    enoxaparin (LOVENOX) subcutaneous injection 40 mg, Daily    formoterol (PERFOROMIST) nebulizer solution 20 mcg, Q12H    guaiFENesin (MUCINEX) 12 hr tablet 600 mg, BID    ipratropium (ATROVENT) 0.02 % inhalation solution 0.5 mg, TID    levalbuterol (XOPENEX) inhalation solution 1.25 mg, TID    [START ON 11/7/2024] methylPREDNISolone sodium succinate (Solu-MEDROL) injection 40 mg, Daily    sodium chloride (OCEAN) 0.65 % nasal spray 1 spray, Q1H PRN    Administrative Statements   Today, Patient Was Seen By: Mynor Leon MD  I have spent a total time of 52 minutes in caring for this patient on the day of the visit/encounter including Impressions, Counseling / Coordination of care, Documenting in the medical record, Reviewing / ordering tests, medicine, procedures  , Obtaining or reviewing history  , and Communicating with other healthcare professionals .    **Please Note: This note may have been constructed using a voice recognition system.**

## 2024-11-06 NOTE — ASSESSMENT & PLAN NOTE
Severe COPD with acute exacerbation with acute tracheobronchitis  Flu COVID testing has been negative  Will continue with her nebulizer treatments around-the-clock  Continue perforomist and budesonide  Her home regimen is Advair and albuterol PRN  Will decrease solu-medrol to daily today  Continue with airway clearance measures  Today is day 5/5 of antibiotics  Outpatient pulmonary follow-up for repeat PFTs and outpatient pulmonary rehab  Palliative care consult  Anticipate discharge home tomorrow

## 2024-11-06 NOTE — PROGRESS NOTES
Progress Note - Pulmonology   Name: Dianelys Price 78 y.o. female I MRN: [id number]  Unit/Bed#: -01 I Date of Admission: 11/2/2024   Date of Service: 11/6/2024 I Hospital Day: 4    Assessment & Plan  COPD exacerbation (HCC)  Severe COPD with acute exacerbation with acute tracheobronchitis  Flu COVID testing has been negative  Will continue with her nebulizer treatments around-the-clock  Continue perforomist and budesonide  Her home regimen is Advair and albuterol PRN  Will decrease solu-medrol to daily today  Continue with airway clearance measures  Today is day 5/5 of antibiotics  Outpatient pulmonary follow-up for repeat PFTs and outpatient pulmonary rehab  Palliative care consult  Anticipate discharge home tomorrow    COPD, very severe (HCC)  COPD severe with FEV1 of 23% with air trapping and severely decreased DLCO at 23% prior PFTs in 2019  Also had evaluation for lung volume reduction was not a candidate  States she did do the pulmonary rehab sessions in 2019  She states they had talked about lung transplant evaluation given her age she had declined  Chronic respiratory failure with hypoxia (HCC)  On 2 L of oxygen continuous at baseline would need reassessment again at the time of hospital discharge  SIRS (systemic inflammatory response syndrome) (HCC)    Respiratory acidosis    Acute on chronic respiratory failure with hypoxia and hypercapnia (HCC)      24 Hour Events : No events  Subjective : Patient resting in bed.  Reports continued improvement in her breathing, she still does have dyspnea on exertion.    Objective :  Temp:  [97.8 °F (36.6 °C)-98.3 °F (36.8 °C)] 97.8 °F (36.6 °C)  HR:  [69-86] 69  BP: (123-161)/(67-81) 123/67  Resp:  [16-20] 16  SpO2:  [93 %-99 %] 97 %  O2 Device: Nasal cannula  Nasal Cannula O2 Flow Rate (L/min):  [2 L/min-3 L/min] 2 L/min    Physical Exam  Vitals reviewed.   Constitutional:       General: She is not in acute distress.     Appearance: Normal appearance. She is not  ill-appearing.   HENT:      Head: Normocephalic and atraumatic.      Mouth/Throat:      Pharynx: Oropharynx is clear.   Eyes:      Conjunctiva/sclera: Conjunctivae normal.   Cardiovascular:      Rate and Rhythm: Normal rate and regular rhythm.   Pulmonary:      Effort: Pulmonary effort is normal.      Breath sounds: Decreased breath sounds present. No wheezing, rhonchi or rales.   Abdominal:      General: Abdomen is flat. There is no distension.   Musculoskeletal:         General: Normal range of motion.      Cervical back: Normal range of motion.      Right lower leg: No edema.      Left lower leg: No edema.   Skin:     General: Skin is warm and dry.   Neurological:      Mental Status: She is alert and oriented to person, place, and time.   Psychiatric:         Mood and Affect: Mood normal.         Behavior: Behavior normal.           Lab Results: I have reviewed the following results:   .     11/06/24  0557   WBC 14.63*   HGB 10.9*   HCT 37.5      BANDSPCT 2   SODIUM 140   K 4.6   CL 91*   CO2 >45*   BUN 16   CREATININE 0.35*   GLUC 186*     ABG: No new results in last 24 hours.    Imaging Results Review: I reviewed radiology reports from this admission including: chest xray.  Other Study Results Review: No additional pertinent studies reviewed.  PFT Results Reviewed: reviewed

## 2024-11-06 NOTE — ASSESSMENT & PLAN NOTE
Patient presented to the ED with complaints of worsening shortness of breath, wheezing with a productive cough with green sputum over the last few days.  Known history of very severe COPD, follows with [facility]  Chest x-ray (11/2/24): No acute cardiopulmonary disease. Probable mild peripheral pulmonary fibrotic changes.   Received 125 mg Solu-Medrol in the ER, Continue with IV Solu-Medrol 40 mg every 8 hours  Taper down IV methylprednisolone to 40 mg daily and switch to per oral tomorrow  Albuterol and ipratropium nebs ordered  With complete antibiotic for total of 5 days  Follow-up further pulmonology recommendations

## 2024-11-06 NOTE — PLAN OF CARE
Problem: PAIN - ADULT  Goal: Verbalizes/displays adequate comfort level or baseline comfort level  Description: Interventions:  - Encourage patient to monitor pain and request assistance  - Assess pain using appropriate pain scale  - Administer analgesics based on type and severity of pain and evaluate response  - Implement non-pharmacological measures as appropriate and evaluate response  - Consider cultural and social influences on pain and pain management  - Notify physician/advanced practitioner if interventions unsuccessful or patient reports new pain  Outcome: Progressing     Problem: INFECTION - ADULT  Goal: Absence or prevention of progression during hospitalization  Description: INTERVENTIONS:  - Assess and monitor for signs and symptoms of infection  - Monitor lab/diagnostic results  - Monitor all insertion sites, i.e. indwelling lines, tubes, and drains  - Monitor endotracheal if appropriate and nasal secretions for changes in amount and color  - [place] appropriate cooling/warming therapies per order  - Administer medications as ordered  - Instruct and encourage patient and family to use good hand hygiene technique  - Identify and instruct in appropriate isolation precautions for identified infection/condition  Outcome: Progressing  Goal: Absence of fever/infection during neutropenic period  Description: INTERVENTIONS:  - Monitor WBC    Outcome: Progressing     Problem: SAFETY ADULT  Goal: Patient will remain free of falls  Description: INTERVENTIONS:  - Educate patient/family on patient safety including physical limitations  - Instruct patient to call for assistance with activity   - Consult OT/PT to assist with strengthening/mobility   - Keep Call bell within reach  - Keep bed low and locked with side rails adjusted as appropriate  - Keep care items and personal belongings within reach  - Initiate and maintain comfort rounds  - Make Fall Risk Sign visible to staff  - Offer Toileting every 2 Hours,  in advance of need  - Initiate/Maintain bed alarm  - Obtain necessary fall risk management equipment: bed alarm, yellow socks  - Apply yellow socks and bracelet for high fall risk patients  - Consider moving patient to room near nurses station  Outcome: Progressing  Goal: Maintain or return to baseline ADL function  Description: INTERVENTIONS:  -  Assess patient's ability to carry out ADLs; assess patient's baseline for ADL function and identify physical deficits which impact ability to perform ADLs (bathing, care of mouth/teeth, toileting, grooming, dressing, etc.)  - Assess/evaluate cause of self-care deficits   - Assess range of motion  - Assess patient's mobility; develop plan if impaired  - Assess patient's need for assistive devices and provide as appropriate  - Encourage maximum independence but intervene and supervise when necessary  - Involve family in performance of ADLs  - Assess for home care needs following discharge   - Consider OT consult to assist with ADL evaluation and planning for discharge  - Provide patient education as appropriate  Outcome: Progressing  Goal: Maintains/Returns to pre admission functional level  Description: INTERVENTIONS:  - Perform AM-PAC 6 Click Basic Mobility/ Daily Activity assessment daily.  - Set and communicate daily mobility goal to care team and patient/family/caregiver.   - Collaborate with rehabilitation services on mobility goals if consulted  - Perform Range of Motion 3 times a day.  - Reposition patient every 2 hours.  - Dangle patient 3 times a day  - Stand patient 3 times a day  - Ambulate patient 3 times a day  - Out of bed to chair 3 times a day   - Out of bed for meals 3 times a day  - Out of bed for toileting  - Record patient progress and toleration of activity level   Outcome: Progressing     Problem: DISCHARGE PLANNING  Goal: Discharge to home or other facility with appropriate resources  Description: INTERVENTIONS:  - Identify barriers to discharge  w/patient and caregiver  - Arrange for needed discharge resources and transportation as appropriate  - Identify discharge learning needs (meds, wound care, etc.)  - Arrange for interpretive services to assist at discharge as needed  - Refer to Case Management Department for coordinating discharge planning if the patient needs post-hospital services based on physician/advanced practitioner order or complex needs related to functional status, cognitive ability, or social support system  Outcome: Progressing     Problem: Knowledge Deficit  Goal: Patient/family/caregiver demonstrates understanding of disease process, treatment plan, medications, and discharge instructions  Description: Complete learning assessment and assess knowledge base.  Interventions:  - Provide teaching at level of understanding  - Provide teaching via preferred learning methods  Outcome: Progressing     Problem: RESPIRATORY - ADULT  Goal: Achieves optimal ventilation and oxygenation  Description: INTERVENTIONS:  - Assess for changes in respiratory status  - Assess for changes in mentation and behavior  - Position to facilitate oxygenation and minimize respiratory effort  - Oxygen administered by appropriate delivery if ordered  - Initiate smoking cessation education as indicated  - Encourage broncho-pulmonary hygiene including cough, deep breathe, Incentive Spirometry  - Assess the need for suctioning and aspirate as needed  - Assess and instruct to report SOB or any respiratory difficulty  - Respiratory Therapy support as indicated  Outcome: Progressing     Problem: Prexisting or High Potential for Compromised Skin Integrity  Goal: Skin integrity is maintained or improved  Description: INTERVENTIONS:  - Identify patients at risk for skin breakdown  - Assess and monitor skin integrity  - Assess and monitor nutrition and hydration status  - Monitor labs   - Assess for incontinence   - Turn and reposition patient  - Assist with  mobility/ambulation  - Relieve pressure over bony prominences  - Avoid friction and shearing  - Provide appropriate hygiene as needed including keeping skin clean and dry  - Evaluate need for skin moisturizer/barrier cream  - Collaborate with interdisciplinary team   - Patient/family teaching  - Consider wound care consult   Outcome: Progressing

## 2024-11-06 NOTE — RESPIRATORY THERAPY NOTE
RT Protocol Note  Dianelys Price 78 y.o. female MRN: [id number]  Unit/Bed#: -01 Encounter: [id number]    Assessment    Principal Problem:    COPD exacerbation (HCC)  Active Problems:    COPD, very severe (HCC)    Chronic respiratory failure with hypoxia (HCC)    SIRS (systemic inflammatory response syndrome) (HCC)    Respiratory acidosis    Acute metabolic encephalopathy    Acute on chronic respiratory failure with hypoxia and hypercapnia (HCC)      Home Pulmonary Medications:     11/05/24 1932   Respiratory Protocol   Protocol Initiated? No   Protocol Selection Respiratory   Language Barrier? No   Medical & Social History Reviewed? Yes   Diagnostic Studies Reviewed? Yes   Physical Assessment Performed? Yes   Home Devices/Therapy Home O2   Respiratory Plan Mild Distress pathway   Respiratory Assessment   Assessment Type During-treatment   General Appearance Alert;Awake   Respiratory Pattern Normal   Chest Assessment Chest expansion symmetrical   Bilateral Breath Sounds Diminished;Expiratory wheezes   Cough None   Resp Comments Will continue with current tx plan   O2 Device NC   Cough Description   Sputum Amount None   Additional Assessments   Pulse 75   Respirations 20   SpO2 97 %       Home Devices/Therapy: Home O2    Past Medical History:   Diagnosis Date    Allergic 1961    Asthenia     Asthma     Basal cell carcinoma 03/19/2024    left nasal wall, mohs    Cataract     COPD (chronic obstructive pulmonary disease) ([facility])     History of screening mammography     last assessed: 10/31/2017    Osteoporosis     Pneumonia 1950    Urinary tract infection      Social History     Socioeconomic History    Marital status:      Spouse name: None    Number of children: None    Years of education: None    Highest education level: None   Occupational History    None   Tobacco Use    Smoking status: Former     Current packs/day: 0.00     Average packs/day: 1 pack/day for 40.0 years (40.0 ttl pk-yrs)     Types: Cigarettes      Start date: 3/7/1965     Quit date: 3/7/2005     Years since quittin.6    Smokeless tobacco: Never   Vaping Use    Vaping status: Never Used   Substance and Sexual Activity    Alcohol use: Never     Alcohol/week: 2.0 standard drinks of alcohol     Types: 2 Glasses of wine per week    Drug use: Never    Sexual activity: Not Currently     Partners: Male     Birth control/protection: Post-menopausal, None   Other Topics Concern    None   Social History Narrative    Caffeine use     Social Determinants of Health     Financial Resource Strain: Low Risk  (2023)    Overall Financial Resource Strain (CARDIA)     Difficulty of Paying Living Expenses: Not very hard   Food Insecurity: No Food Insecurity (2024)    Nursing - Inadequate Food Risk Classification     Worried About Running Out of Food in the Last Year: Not on file     Ran Out of Food in the Last Year: Not on file     Ran Out of Food in the Last Year: 1   Transportation Needs: No Transportation Needs (2024)    Nursing - Transportation Risk Classification     Lack of Transportation: Not on file     Lack of Transportation: 2   Physical Activity: Not on file   Stress: Not on file   Social Connections: Not on file   Intimate Partner Violence: Unknown (2024)    Nursing IPS     Feels Physically and Emotionally Safe: Not on file     Physically Hurt by Someone: Not on file     Humiliated or Emotionally Abused by Someone: Not on file     Physically Hurt by Someone: 2     Hurt or Threatened by Someone: 2   Housing Stability: Unknown (2024)    Nursing: Inadequate Housing Risk Classification     Has Housing: Not on file     Worried About Losing Housing: Not on file     Unable to Get Utilities: Not on file     Unable to Pay for Housing in the Last Year: 2     Has Housin       Subjective         Objective    Physical Exam:   Assessment Type: During-treatment  General Appearance: Alert, Awake  Respiratory Pattern: Normal  Chest Assessment:  Chest expansion symmetrical  Bilateral Breath Sounds: Diminished, Expiratory wheezes  Cough: None  O2 Device: NC    Vitals:  Blood pressure 161/81, pulse 75, temperature 97.8 °F (36.6 °C), resp. rate 20, height 5' (1.524 m), weight 48.4 kg (106 lb 11.2 oz), SpO2 97%.    Results from last 7 days   Lab Units 11/03/24  0751 11/03/24  0159   PH ART  7.293* 7.259*   PCO2 ART mm Hg 84.6* 95.2*   PO2 ART mm Hg 62.2* 108.4   HCO3 ART mmol/L 40.0* 41.7*   BASE EXC ART mmol/L 10.2 11.0   O2 CONTENT ART mL/dL 16.8 16.8   O2 HGB, ARTERIAL % 91.9* 96.8   ABG SOURCE   --  Radial, Right   ANIVAL TEST  Yes Yes       Imaging and other studies: Results Review Statement: No pertinent imaging studies reviewed.    O2 Device: NC     Plan    Respiratory Plan: Mild Distress pathway        Resp Comments: Will continue with current tx plan

## 2024-11-06 NOTE — ASSESSMENT & PLAN NOTE
Meeting SIRS criteria on admission with tachycardia, tachypnea, and leukocytosis.  Likely secondary to COPD exacerbation, follow up sputum culture  Her procalcitonin is elevated, 0.41 today, however, no definitive pneumonia seen on imaging  Covid, Flu negative.  Negative urine for strep pneumoniae/legionella

## 2024-11-07 VITALS
HEIGHT: 60 IN | BODY MASS INDEX: 20.95 KG/M2 | SYSTOLIC BLOOD PRESSURE: 166 MMHG | OXYGEN SATURATION: 93 % | TEMPERATURE: 98.4 F | WEIGHT: 106.7 LBS | DIASTOLIC BLOOD PRESSURE: 91 MMHG | HEART RATE: 96 BPM | RESPIRATION RATE: 18 BRPM

## 2024-11-07 LAB
ARTERIAL PATENCY WRIST A: YES
BASE EXCESS BLDA CALC-SCNC: 16.3 MMOL/L
BUN SERPL-MCNC: 16 MG/DL (ref 5–25)
CALCIUM SERPL-MCNC: 8.7 MG/DL (ref 8.4–10.2)
CHLORIDE SERPL-SCNC: 93 MMOL/L (ref 96–108)
CO2 SERPL-SCNC: >45 MMOL/L (ref 21–32)
CREAT SERPL-MCNC: 0.37 MG/DL (ref 0.6–1.3)
DME PARACHUTE DELIVERY DATE REQUESTED: NORMAL
DME PARACHUTE ITEM DESCRIPTION: NORMAL
DME PARACHUTE ITEM DESCRIPTION: NORMAL
DME PARACHUTE ORDER STATUS: NORMAL
DME PARACHUTE SUPPLIER NAME: NORMAL
DME PARACHUTE SUPPLIER PHONE: NORMAL
ERYTHROCYTE [DISTWIDTH] IN BLOOD BY AUTOMATED COUNT: 11.8 % (ref 11.6–15.1)
GFR SERPL CREATININE-BSD FRML MDRD: 102 ML/MIN/1.73SQ M
GLUCOSE SERPL-MCNC: 90 MG/DL (ref 65–140)
HCO3 BLDA-SCNC: 43.9 MMOL/L (ref 22–28)
HCT VFR BLD AUTO: 37.5 % (ref 34.8–46.1)
HGB BLD-MCNC: 11 G/DL (ref 11.5–15.4)
MCH RBC QN AUTO: 27.8 PG (ref 26.8–34.3)
MCHC RBC AUTO-ENTMCNC: 29.3 G/DL (ref 31.4–37.4)
MCV RBC AUTO: 95 FL (ref 82–98)
NASAL CANNULA: ABNORMAL
O2 CT BLDA-SCNC: 16.6 ML/DL (ref 16–23)
OXYHGB MFR BLDA: 90.2 % (ref 94–97)
PCO2 BLDA: 67.3 MM HG (ref 36–44)
PH BLDA: 7.43 [PH] (ref 7.35–7.45)
PLATELET # BLD AUTO: 215 THOUSANDS/UL (ref 149–390)
PMV BLD AUTO: 9.3 FL (ref 8.9–12.7)
PO2 BLDA: 57.2 MM HG (ref 75–129)
POTASSIUM SERPL-SCNC: 4.1 MMOL/L (ref 3.5–5.3)
RBC # BLD AUTO: 3.95 MILLION/UL (ref 3.81–5.12)
SODIUM SERPL-SCNC: 141 MMOL/L (ref 135–147)
SPECIMEN SOURCE: ABNORMAL
WBC # BLD AUTO: 14.54 THOUSAND/UL (ref 4.31–10.16)

## 2024-11-07 PROCEDURE — 36600 WITHDRAWAL OF ARTERIAL BLOOD: CPT

## 2024-11-07 PROCEDURE — 80048 BASIC METABOLIC PNL TOTAL CA: CPT | Performed by: INTERNAL MEDICINE

## 2024-11-07 PROCEDURE — 94760 N-INVAS EAR/PLS OXIMETRY 1: CPT

## 2024-11-07 PROCEDURE — 99239 HOSP IP/OBS DSCHRG MGMT >30: CPT | Performed by: STUDENT IN AN ORGANIZED HEALTH CARE EDUCATION/TRAINING PROGRAM

## 2024-11-07 PROCEDURE — 99232 SBSQ HOSP IP/OBS MODERATE 35: CPT | Performed by: INTERNAL MEDICINE

## 2024-11-07 PROCEDURE — 85027 COMPLETE CBC AUTOMATED: CPT | Performed by: INTERNAL MEDICINE

## 2024-11-07 PROCEDURE — 82805 BLOOD GASES W/O2 SATURATION: CPT | Performed by: INTERNAL MEDICINE

## 2024-11-07 PROCEDURE — 94640 AIRWAY INHALATION TREATMENT: CPT

## 2024-11-07 RX ORDER — ACETAZOLAMIDE 250 MG/1
250 TABLET ORAL ONCE
Status: COMPLETED | OUTPATIENT
Start: 2024-11-07 | End: 2024-11-07

## 2024-11-07 RX ORDER — PREDNISONE 20 MG/1
TABLET ORAL
Qty: 20 TABLET | Refills: 0 | Status: SHIPPED | OUTPATIENT
Start: 2024-11-08 | End: 2024-11-24

## 2024-11-07 RX ORDER — PREDNISONE 20 MG/1
TABLET ORAL
Qty: 20 TABLET | Refills: 0 | Status: SHIPPED | OUTPATIENT
Start: 2024-11-08 | End: 2024-11-07

## 2024-11-07 RX ORDER — PREDNISONE 20 MG/1
40 TABLET ORAL DAILY
Status: DISCONTINUED | OUTPATIENT
Start: 2024-11-08 | End: 2024-11-07 | Stop reason: HOSPADM

## 2024-11-07 RX ADMIN — LEVALBUTEROL HYDROCHLORIDE 1.25 MG: 1.25 SOLUTION RESPIRATORY (INHALATION) at 07:03

## 2024-11-07 RX ADMIN — CEFTRIAXONE SODIUM 1000 MG: 10 INJECTION, POWDER, FOR SOLUTION INTRAVENOUS at 08:05

## 2024-11-07 RX ADMIN — BUDESONIDE 0.5 MG: 0.5 INHALANT RESPIRATORY (INHALATION) at 07:03

## 2024-11-07 RX ADMIN — IPRATROPIUM BROMIDE 0.5 MG: 0.5 SOLUTION RESPIRATORY (INHALATION) at 07:03

## 2024-11-07 RX ADMIN — ACETAZOLAMIDE 250 MG: 250 TABLET ORAL at 11:51

## 2024-11-07 RX ADMIN — METHYLPREDNISOLONE SODIUM SUCCINATE 40 MG: 40 INJECTION, POWDER, FOR SOLUTION INTRAMUSCULAR; INTRAVENOUS at 08:04

## 2024-11-07 RX ADMIN — LEVALBUTEROL HYDROCHLORIDE 1.25 MG: 1.25 SOLUTION RESPIRATORY (INHALATION) at 14:08

## 2024-11-07 RX ADMIN — ENOXAPARIN SODIUM 40 MG: 40 INJECTION SUBCUTANEOUS at 08:11

## 2024-11-07 RX ADMIN — FORMOTEROL FUMARATE DIHYDRATE 20 MCG: 20 SOLUTION RESPIRATORY (INHALATION) at 07:03

## 2024-11-07 RX ADMIN — IPRATROPIUM BROMIDE 0.5 MG: 0.5 SOLUTION RESPIRATORY (INHALATION) at 14:08

## 2024-11-07 NOTE — CASE MANAGEMENT
Case Management Discharge Planning Note    Patient name Dianelys Price  Location /-01 MRN [id number]  : 1945 Date 2024       Current Admission Date: 2024  Current Admission Diagnosis:COPD exacerbation (HCC)   Patient Active Problem List    Diagnosis Date Noted Date Diagnosed    Acute metabolic encephalopathy 2024     Acute on chronic respiratory failure with hypoxia and hypercapnia (HCC) 2024     Respiratory acidosis 2024     COPD exacerbation (HCC) 2024     SIRS (systemic inflammatory response syndrome) (HCC) 2024     Skin lesion of face 11/10/2023     Swelling of lower extremity 2021     Shortness of breath 2021     Allergic rhinitis 2021     Chronic respiratory failure with hypoxia (HCC) 2021     Need for influenza vaccination 10/13/2020     Hypoxia 2020     Pulmonary nodule 2020     Non-seasonal allergic rhinitis due to pollen 2020     COPD, very severe (HCC) 2019     Diabetes mellitus, latent 2019     Osteoporosis 10/31/2017     Elevated TSH 10/25/2016     Prediabetes 10/24/2016     Asthma 2013     Other anaphylactic reaction 2013     Pulmonary emphysema ([facility]) 2012     Elevated glucose level 2012     Vitamin D deficiency 2010       LOS (days): 5  Geometric Mean LOS (GMLOS) (days): 3.4  Days to GMLOS:-1.7     OBJECTIVE:  Risk of Unplanned Readmission Score: 11.08         Current admission status: Inpatient   Preferred Pharmacy:   [facility] [address] [address]  Phone: [phone] Fax: [phone]    Primary Care Provider: Noe Victoria DO    Primary Insurance: MEDICARE  Secondary Insurance: [facility]    DISCHARGE DETAILS:  Pt is being dc home today. Pt choice for HHC was reviewed with pt and her sister at bedside. Pt choice was [facility]. VNA reserved via [employer]. CM also ordered a portable  tank for pt via parachute as pt is active with frestyl. CM continue to follow and assist with pt dc plans.             Requested Home Health Care         Is the patient interested in HHC at discharge?: Yes  Home Health Discipline requested:: Nursing  Home Health Agency Name:: [facility]  HHA External Referral Reason (only applicable if external HHA name selected): Patient has established relationship with provider  Home Health Follow-Up Provider:: PCP  Home Health Services Needed:: Heart Failure Management, Strengthening/Theraputic Exercises to Improve Function, Oxygen Via Nasal Cannula, Evaluate Functional Status and Safety, Gait/ADL Training  Oxygen LPM Ordered (if applicable based on home health services needed):: 2 LPM  Homebound Criteria Met:: Requires the Assistance of Another Person for Safe Ambulation or to Leave the Home  Supporting Clincal Findings:: Limited Endurance, Requires Oxygen

## 2024-11-07 NOTE — PROGRESS NOTES
Progress Note - Pulmonology   Name: Dianelys Price 78 y.o. female I MRN: [id number]  Unit/Bed#: -01 I Date of Admission: 11/2/2024   Date of Service: 11/7/2024 I Hospital Day: 5     Assessment & Plan  COPD exacerbation (HCC)  Severe COPD with acute exacerbation with acute tracheobronchitis  Flu COVID testing has been negative  Was on perforomist and budesonide and duoneb nebulizer treatments around-the-clock  Change steroids to prednisone - starting at 40mg tomorrow and wean by 10mg every 3 days    Her home regimen is Advair and combivent prn at discharge    Continue with airway clearance measures  Completed antibiotics 11/6  Close outpatient pulmonary follow up  Palliative care consult      COPD, very severe (HCC)  COPD severe with FEV1 of 23% with air trapping and severely decreased DLCO at 23% prior PFTs in 2019  Also had evaluation for lung volume reduction was not a candidate  States she did do the pulmonary rehab sessions in 2019 - would likely benefit from pulmonary rehab - she is currently moving, could discuss at next pulm appt to arrange closer to [facility]    She states they had talked about lung transplant evaluation given her age she had declined  Chronic respiratory failure with hypoxia (HCC)  On 2 L of oxygen continuous at baseline would need reassessment again at the time of hospital discharge  Acute on chronic respiratory failure with hypoxia and hypercapnia (HCC)  Has been intolerant to bipap  Today's ABG shows that she now has a relative metabolic alkalosis - likely related to her acute CO2 retention at time of exacerbation  Will give one dose of diamox 250mg now  Can re-evaluate as an outpt the need for nocturnal ventilation - at this time she is hesitant to use    She is stable for discharge from a pulmonary standpoint  I discussed with Dr Leon    24 Hour Events : doing better  Subjective : overall she is better but not back to baseline  She is able to ambulate to bathroom with walker  She  only used bipap on the first night and then has not used it since    Objective :  Temp:  [97.6 °F (36.4 °C)-98 °F (36.7 °C)] 97.8 °F (36.6 °C)  HR:  [68-90] 75  BP: (120-152)/(75-80) 144/80  Resp:  [18] 18  SpO2:  [92 %-98 %] 92 %  O2 Device: Nasal cannula  Nasal Cannula O2 Flow Rate (L/min):  [2.5 L/min-3 L/min] 2.5 L/min    Physical Exam    General:  Patient is awake, alert, non-toxic and in no acute respiratory distress  Eyes:  no scleral icterus  Neck: No JVD  CV:  Regular, +S1 and S2, No murmurs, gallops or rubs appreciated  Lungs: Poor air movement bilateral with diffuse end expiratory wheeze  Abdomen: Soft, +BS, Non-tender, non-distended  Extremities: No clubbing, cyanosis or edema  Neuro: No focal motor/sensory deficits  Skin: Warm, No rashes       Lab Results: I have reviewed the following results:   .     11/07/24  0613   WBC 14.54*   HGB 11.0*   HCT 37.5      SODIUM 141   K 4.1   CL 93*   CO2 >45*   BUN 16   CREATININE 0.37*   GLUC 90     ABG:   .     11/07/24  1029   PHART 7.432   TQH1LLK 67.3*   PO2ART 57.2*   HXG3JXQ 43.9*   BEART 16.3

## 2024-11-07 NOTE — ASSESSMENT & PLAN NOTE
Has been intolerant to bipap  Today's ABG shows that she now has a relative metabolic alkalosis - likely related to her acute CO2 retention at time of exacerbation  Will give one dose of diamox 250mg now  Can re-evaluate as an outpt the need for nocturnal ventilation - at this time she is hesitant to use

## 2024-11-07 NOTE — PLAN OF CARE
Problem: PAIN - ADULT  Goal: Verbalizes/displays adequate comfort level or baseline comfort level  Description: Interventions:  - Encourage patient to monitor pain and request assistance  - Assess pain using appropriate pain scale  - Administer analgesics based on type and severity of pain and evaluate response  - Implement non-pharmacological measures as appropriate and evaluate response  - Consider cultural and social influences on pain and pain management  - Notify physician/advanced practitioner if interventions unsuccessful or patient reports new pain  Outcome: Progressing     Problem: INFECTION - ADULT  Goal: Absence or prevention of progression during hospitalization  Description: INTERVENTIONS:  - Assess and monitor for signs and symptoms of infection  - Monitor lab/diagnostic results  - Monitor all insertion sites, i.e. indwelling lines, tubes, and drains  - Monitor endotracheal if appropriate and nasal secretions for changes in amount and color  - [place] appropriate cooling/warming therapies per order  - Administer medications as ordered  - Instruct and encourage patient and family to use good hand hygiene technique  - Identify and instruct in appropriate isolation precautions for identified infection/condition  Outcome: Progressing  Goal: Absence of fever/infection during neutropenic period  Description: INTERVENTIONS:  - Monitor WBC    Outcome: Progressing     Problem: SAFETY ADULT  Goal: Patient will remain free of falls  Description: INTERVENTIONS:  - Educate patient/family on patient safety including physical limitations  - Instruct patient to call for assistance with activity   - Consult OT/PT to assist with strengthening/mobility   - Keep Call bell within reach  - Keep bed low and locked with side rails adjusted as appropriate  - Keep care items and personal belongings within reach  - Initiate and maintain comfort rounds  - Make Fall Risk Sign visible to staff  - Offer Toileting every 2 Hours,  in advance of need  - Initiate/Maintain bed alarm alarm  - Obtain necessary fall risk management equipment: non skid foot wear bed alarm   - Apply yellow socks and bracelet for high fall risk patients  - Consider moving patient to room near nurses station  Outcome: Progressing  Goal: Maintain or return to baseline ADL function  Description: INTERVENTIONS:  -  Assess patient's ability to carry out ADLs; assess patient's baseline for ADL function and identify physical deficits which impact ability to perform ADLs (bathing, care of mouth/teeth, toileting, grooming, dressing, etc.)  - Assess/evaluate cause of self-care deficits   - Assess range of motion  - Assess patient's mobility; develop plan if impaired  - Assess patient's need for assistive devices and provide as appropriate  - Encourage maximum independence but intervene and supervise when necessary  - Involve family in performance of ADLs  - Assess for home care needs following discharge   - Consider OT consult to assist with ADL evaluation and planning for discharge  - Provide patient education as appropriate  Outcome: Progressing  Goal: Maintains/Returns to pre admission functional level  Description: INTERVENTIONS:  - Perform AM-PAC 6 Click Basic Mobility/ Daily Activity assessment daily.  - Set and communicate daily mobility goal to care team and patient/family/caregiver.   - Collaborate with rehabilitation services on mobility goals if consulted  - Perform Range of Motion 3 times a day.  - Reposition patient every 3 hours.  - Dangle patient 3 times a day  - Stand patient 3 times a day  - Ambulate patient 3 times a day  - Out of bed to chair 3 times a day   - Out of bed for meals 3 times a day  - Out of bed for toileting  - Record patient progress and toleration of activity level   Outcome: Progressing     Problem: DISCHARGE PLANNING  Goal: Discharge to home or other facility with appropriate resources  Description: INTERVENTIONS:  - Identify barriers to  discharge w/patient and caregiver  - Arrange for needed discharge resources and transportation as appropriate  - Identify discharge learning needs (meds, wound care, etc.)  - Arrange for interpretive services to assist at discharge as needed  - Refer to Case Management Department for coordinating discharge planning if the patient needs post-hospital services based on physician/advanced practitioner order or complex needs related to functional status, cognitive ability, or social support system  Outcome: Progressing     Problem: Knowledge Deficit  Goal: Patient/family/caregiver demonstrates understanding of disease process, treatment plan, medications, and discharge instructions  Description: Complete learning assessment and assess knowledge base.  Interventions:  - Provide teaching at level of understanding  - Provide teaching via preferred learning methods  Outcome: Progressing     Problem: RESPIRATORY - ADULT  Goal: Achieves optimal ventilation and oxygenation  Description: INTERVENTIONS:  - Assess for changes in respiratory status  - Assess for changes in mentation and behavior  - Position to facilitate oxygenation and minimize respiratory effort  - Oxygen administered by appropriate delivery if ordered  - Initiate smoking cessation education as indicated  - Encourage broncho-pulmonary hygiene including cough, deep breathe, Incentive Spirometry  - Assess the need for suctioning and aspirate as needed  - Assess and instruct to report SOB or any respiratory difficulty  - Respiratory Therapy support as indicated  Outcome: Progressing     Problem: Prexisting or High Potential for Compromised Skin Integrity  Goal: Skin integrity is maintained or improved  Description: INTERVENTIONS:  - Identify patients at risk for skin breakdown  - Assess and monitor skin integrity  - Assess and monitor nutrition and hydration status  - Monitor labs   - Assess for incontinence   - Turn and reposition patient  - Assist with  mobility/ambulation  - Relieve pressure over bony prominences  - Avoid friction and shearing  - Provide appropriate hygiene as needed including keeping skin clean and dry  - Evaluate need for skin moisturizer/barrier cream  - Collaborate with interdisciplinary team   - Patient/family teaching  - Consider wound care consult   Outcome: Progressing

## 2024-11-07 NOTE — PLAN OF CARE
Problem: PAIN - ADULT  Goal: Verbalizes/displays adequate comfort level or baseline comfort level  Description: Interventions:  - Encourage patient to monitor pain and request assistance  - Assess pain using appropriate pain scale  - Administer analgesics based on type and severity of pain and evaluate response  - Implement non-pharmacological measures as appropriate and evaluate response  - Consider cultural and social influences on pain and pain management  - Notify physician/advanced practitioner if interventions unsuccessful or patient reports new pain  Outcome: Progressing     Problem: INFECTION - ADULT  Goal: Absence or prevention of progression during hospitalization  Description: INTERVENTIONS:  - Assess and monitor for signs and symptoms of infection  - Monitor lab/diagnostic results  - Monitor all insertion sites, i.e. indwelling lines, tubes, and drains  - Monitor endotracheal if appropriate and nasal secretions for changes in amount and color  - [place] appropriate cooling/warming therapies per order  - Administer medications as ordered  - Instruct and encourage patient and family to use good hand hygiene technique  - Identify and instruct in appropriate isolation precautions for identified infection/condition  Outcome: Progressing  Goal: Absence of fever/infection during neutropenic period  Description: INTERVENTIONS:  - Monitor WBC    Outcome: Progressing     Problem: SAFETY ADULT  Goal: Patient will remain free of falls  Description: INTERVENTIONS:  - Educate patient/family on patient safety including physical limitations  - Instruct patient to call for assistance with activity   - Consult OT/PT to assist with strengthening/mobility   - Keep Call bell within reach  - Keep bed low and locked with side rails adjusted as appropriate  - Keep care items and personal belongings within reach  - Initiate and maintain comfort rounds  - Make Fall Risk Sign visible to staff  - Offer Toileting every 2 Hours,  in advance of need  - Initiate/Maintain bed alarm  - Obtain necessary fall risk management equipment: bed alarm  - Apply yellow socks and bracelet for high fall risk patients  - Consider moving patient to room near nurses station  Outcome: Progressing  Goal: Maintain or return to baseline ADL function  Description: INTERVENTIONS:  -  Assess patient's ability to carry out ADLs; assess patient's baseline for ADL function and identify physical deficits which impact ability to perform ADLs (bathing, care of mouth/teeth, toileting, grooming, dressing, etc.)  - Assess/evaluate cause of self-care deficits   - Assess range of motion  - Assess patient's mobility; develop plan if impaired  - Assess patient's need for assistive devices and provide as appropriate  - Encourage maximum independence but intervene and supervise when necessary  - Involve family in performance of ADLs  - Assess for home care needs following discharge   - Consider OT consult to assist with ADL evaluation and planning for discharge  - Provide patient education as appropriate  Outcome: Progressing  Goal: Maintains/Returns to pre admission functional level  Description: INTERVENTIONS:  - Perform AM-PAC 6 Click Basic Mobility/ Daily Activity assessment daily.  - Set and communicate daily mobility goal to care team and patient/family/caregiver.   - Collaborate with rehabilitation services on mobility goals if consulted  - Perform Range of Motion 3 times a day.  - Reposition patient every 3 hours.  - Dangle patient 3 times a day  - Stand patient 3 times a day  - Ambulate patient 3 times a day  - Out of bed to chair 3 times a day   - Out of bed for meals 3 times a day  - Out of bed for toileting  - Record patient progress and toleration of activity level   Outcome: Progressing     Problem: DISCHARGE PLANNING  Goal: Discharge to home or other facility with appropriate resources  Description: INTERVENTIONS:  - Identify barriers to discharge w/patient and  caregiver  - Arrange for needed discharge resources and transportation as appropriate  - Identify discharge learning needs (meds, wound care, etc.)  - Arrange for interpretive services to assist at discharge as needed  - Refer to Case Management Department for coordinating discharge planning if the patient needs post-hospital services based on physician/advanced practitioner order or complex needs related to functional status, cognitive ability, or social support system  Outcome: Progressing     Problem: Knowledge Deficit  Goal: Patient/family/caregiver demonstrates understanding of disease process, treatment plan, medications, and discharge instructions  Description: Complete learning assessment and assess knowledge base.  Interventions:  - Provide teaching at level of understanding  - Provide teaching via preferred learning methods  Outcome: Progressing     Problem: RESPIRATORY - ADULT  Goal: Achieves optimal ventilation and oxygenation  Description: INTERVENTIONS:  - Assess for changes in respiratory status  - Assess for changes in mentation and behavior  - Position to facilitate oxygenation and minimize respiratory effort  - Oxygen administered by appropriate delivery if ordered  - Initiate smoking cessation education as indicated  - Encourage broncho-pulmonary hygiene including cough, deep breathe, Incentive Spirometry  - Assess the need for suctioning and aspirate as needed  - Assess and instruct to report SOB or any respiratory difficulty  - Respiratory Therapy support as indicated  Outcome: Progressing     Problem: Prexisting or High Potential for Compromised Skin Integrity  Goal: Skin integrity is maintained or improved  Description: INTERVENTIONS:  - Identify patients at risk for skin breakdown  - Assess and monitor skin integrity  - Assess and monitor nutrition and hydration status  - Monitor labs   - Assess for incontinence   - Turn and reposition patient  - Assist with mobility/ambulation  - Relieve  pressure over bony prominences  - Avoid friction and shearing  - Provide appropriate hygiene as needed including keeping skin clean and dry  - Evaluate need for skin moisturizer/barrier cream  - Collaborate with interdisciplinary team   - Patient/family teaching  - Consider wound care consult   Outcome: Progressing

## 2024-11-07 NOTE — CASE MANAGEMENT
Case Management Progress Note    Patient name Dianelys Pirce  Location /-01 MRN [id number]  : 1945 Date 2024       LOS (days): 5  Geometric Mean LOS (GMLOS) (days): 3.4  Days to GMLOS:-1.6        OBJECTIVE:        Current admission status: Inpatient  Preferred Pharmacy:   [id number] - [place] - [address] [facility] [address]  Phone: [phone] Fax: [phone]    [facility] [employer] [facility]  [address]  [facility] [address]  Phone: [phone] Fax: [phone]    Primary Care Provider: Noe Victoria DO    Primary Insurance: MEDICARE  Secondary Insurance: [employer]    PROGRESS NOTE:  As per SLIM rounds, pt is anticipated for dc today vs tmr pending transition from IV steroids to PO. CM provided pt with pt choice list for [facility]. No decisions made at this time. CM continues to follow.

## 2024-11-07 NOTE — ASSESSMENT & PLAN NOTE
Severe COPD with acute exacerbation with acute tracheobronchitis  Flu COVID testing has been negative  Was on perforomist and budesonide and duoneb nebulizer treatments around-the-clock  Change steroids to prednisone - starting at 40mg tomorrow and wean by 10mg every 3 days    Her home regimen is Advair and combivent prn at discharge    Continue with airway clearance measures  Completed antibiotics 11/6  Close outpatient pulmonary follow up  Palliative care consult

## 2024-11-07 NOTE — DISCHARGE SUMMARY
Medical Problems       Resolved Problems  Date Reviewed: 11/4/2024   None       Discharging Physician / Practitioner: Mynor Leon MD  PCP: Noe Victoria DO  Admission Date:   Admission Orders (From admission, onward)       Ordered        11/02/24 1114  INPATIENT ADMISSION  Once                          Discharge Date: 11/07/24    Consultations During Hospital Stay:  Pulmonology    Procedures Performed:   None    Significant Findings / Test Results:   XR chest 1 view portable   Final Result      No acute cardiopulmonary disease. Probable mild peripheral pulmonary fibrotic changes.            Workstation performed: [id number]               Incidental Findings:   none    Test Results Pending at Discharge (will require follow up):   none     Outpatient Tests Requested:  none    Complications:  none    Reason for Admission: Shortness of breath, phlegm and cough    Hospital Course:   Dianelys Price is a 78 y.o. female patient who originally presented to the hospital on 11/2/2024 due to COPD exacerbation.  Infectious workup negative including labs and imaging studies.  Consulted pulmonary started on high-dose of IV steroids during hospital and recommended to send him home on tapering dose of p.o. prednisone.  Patient has been informed and pulmonary is going to get her in office or virtual visit as soon as possible after discharge.      Condition at Discharge: good    Discharge Day Visit / Exam:   Subjective: No overnight event.  No active complaint  Vitals: Blood Pressure: 144/80 (11/07/24 1100)  Pulse: 75 (11/07/24 1100)  Temperature: 97.8 °F (36.6 °C) (11/07/24 1100)  Temp Source: Axillary (11/07/24 1100)  Respirations: 18 (11/07/24 1100)  Height: 5' (152.4 cm) (11/02/24 1929)  Weight - Scale: 48.4 kg (106 lb 11.2 oz) (11/02/24 1929)  SpO2: 92 % (11/07/24 1100)  Exam:   Physical Exam   Constitutional: Cachectic due to COPD  HEENT: No pallor or icterus  CVS: S1 plus S2  Respiratory: Decreased breath sounds bilaterally  due to emphysema  Gastroenterology: Soft nontender without any palpable mass  Skin: No bruises or ecchymosis  Neurology: No focal logical deficit     Discussion with Family:  Updated patient.     Discharge instructions/Information to patient and family:   See after visit summary for information provided to patient and family.      Provisions for Follow-Up Care:  See after visit summary for information related to follow-up care and any pertinent home health orders.      Mobility at time of Discharge:   Basic Mobility Inpatient Raw Score: 22  JH-HLM Goal: 7: Walk 25 feet or more  JH-HLM Achieved: 6: Walk 10 steps or more  HLM Goal achieved. Continue to encourage appropriate mobility.     Disposition:   Home    Planned Readmission: none     Discharge Statement:  I spent 36 minutes discharging the patient. This time was spent on the day of discharge. I had direct contact with the patient on the day of discharge. Greater than 50% of the total time was spent examining patient, answering all patient questions, arranging and discussing plan of care with patient as well as directly providing post-discharge instructions.  Additional time then spent on discharge activities.    Discharge Medications:  See after visit summary for reconciled discharge medications provided to patient and/or family.      **Please Note: This note may have been constructed using a voice recognition system**

## 2024-11-07 NOTE — NURSING NOTE
Reviewed AVS with patient. No questions at this time. IV and masimo removed. Patient discharged with portable oxygen.

## 2024-11-07 NOTE — RESPIRATORY THERAPY NOTE
11/06/24 1922   Respiratory Protocol   Protocol Initiated? No   Protocol Selection Respiratory   Language Barrier? No   Medical & Social History Reviewed? Yes   Diagnostic Studies Reviewed? Yes   Physical Assessment Performed? Yes   Home Devices/Therapy Home O2   Respiratory Plan Mild Distress pathway   Respiratory Assessment   General Appearance Awake;Alert   Respiratory Pattern Normal   Chest Assessment Chest expansion symmetrical   Bilateral Breath Sounds Diminished   R Breath Sounds Expiratory wheezes;Scattered   L Breath Sounds Expiratory wheezes;Scattered   Cough Non-productive;Dry;Strong   Resp Comments respirations are even and non labored, pt c/o dry cough   O2 Device 3L o2 nc   Additional Assessments   SpO2 94 %

## 2024-11-07 NOTE — ASSESSMENT & PLAN NOTE
COPD severe with FEV1 of 23% with air trapping and severely decreased DLCO at 23% prior PFTs in 2019  Also had evaluation for lung volume reduction was not a candidate  States she did do the pulmonary rehab sessions in 2019 - would likely benefit from pulmonary rehab - she is currently moving, could discuss at next pulm appt to arrange closer to [facility]    She states they had talked about lung transplant evaluation given her age she had declined

## 2024-11-07 NOTE — DISCHARGE INSTR - AVS FIRST PAGE
Please start taking prednisone 40 mg for 4 days, 30 mg for 4 days, 20 mg for 4 days then 10 mg for 4 days and stop taking it.  Please follow-up with pulmonology as per schedule

## 2024-11-08 ENCOUNTER — HOME CARE VISIT (OUTPATIENT)
Dept: HOME HEALTH SERVICES | Facility: HOME HEALTHCARE | Age: 79
End: 2024-11-08

## 2024-11-10 ENCOUNTER — HOME CARE VISIT (OUTPATIENT)
Dept: HOME HEALTH SERVICES | Facility: HOME HEALTHCARE | Age: 79
End: 2024-11-10

## 2024-11-10 NOTE — CASE COMMUNICATION
Pt informed SN that she will be moving on the 19th to [facility] in [facility]. SN informed pt that our nursing service does not cover that area. Pt could not be started on service. Pt has all of her meds, o2 sat on 2L 96%, and her sister is staying with her until she moves. Pt will follow up with PCP.

## 2024-11-10 NOTE — CASE COMMUNICATION
Pt informed SN that she will be moving on the 19th to [facility] in [facility]. SN informed pt that our nursing service does not cover that area. PT could not be started on service. Pt has all of her meds, o2 sat on 2L 96%, and her sister is staying with her until she moves. Pt will follow up with PCP.

## 2024-11-11 ENCOUNTER — PATIENT OUTREACH (OUTPATIENT)
Dept: CASE MANAGEMENT | Facility: OTHER | Age: 79
End: 2024-11-11

## 2024-11-11 ENCOUNTER — TELEPHONE (OUTPATIENT)
Dept: PULMONOLOGY | Facility: CLINIC | Age: 79
End: 2024-11-11

## 2024-11-11 ENCOUNTER — OFFICE VISIT (OUTPATIENT)
Dept: FAMILY MEDICINE CLINIC | Facility: MEDICAL CENTER | Age: 79
End: 2024-11-11
Payer: MEDICARE

## 2024-11-11 ENCOUNTER — TRANSITIONAL CARE MANAGEMENT (OUTPATIENT)
Dept: FAMILY MEDICINE CLINIC | Facility: MEDICAL CENTER | Age: 79
End: 2024-11-11

## 2024-11-11 ENCOUNTER — TELEPHONE (OUTPATIENT)
Age: 79
End: 2024-11-11

## 2024-11-11 VITALS
RESPIRATION RATE: 20 BRPM | HEIGHT: 60 IN | HEART RATE: 73 BPM | WEIGHT: 107.4 LBS | SYSTOLIC BLOOD PRESSURE: 110 MMHG | OXYGEN SATURATION: 97 % | DIASTOLIC BLOOD PRESSURE: 70 MMHG | TEMPERATURE: 97.5 F | BODY MASS INDEX: 21.09 KG/M2

## 2024-11-11 VITALS
OXYGEN SATURATION: 97 % | RESPIRATION RATE: 20 BRPM | TEMPERATURE: 97.5 F | DIASTOLIC BLOOD PRESSURE: 70 MMHG | BODY MASS INDEX: 21.09 KG/M2 | WEIGHT: 107.4 LBS | HEART RATE: 73 BPM | HEIGHT: 60 IN | SYSTOLIC BLOOD PRESSURE: 110 MMHG

## 2024-11-11 DIAGNOSIS — Z76.89 ENCOUNTER FOR SUPPORT AND COORDINATION OF TRANSITION OF CARE: Primary | ICD-10-CM

## 2024-11-11 DIAGNOSIS — Z00.00 MEDICARE ANNUAL WELLNESS VISIT, SUBSEQUENT: Primary | ICD-10-CM

## 2024-11-11 PROCEDURE — G0439 PPPS, SUBSEQ VISIT: HCPCS | Performed by: STUDENT IN AN ORGANIZED HEALTH CARE EDUCATION/TRAINING PROGRAM

## 2024-11-11 PROCEDURE — 99496 TRANSJ CARE MGMT HIGH F2F 7D: CPT | Performed by: STUDENT IN AN ORGANIZED HEALTH CARE EDUCATION/TRAINING PROGRAM

## 2024-11-11 NOTE — PROGRESS NOTES
Ambulatory Visit  Name: Dianelys Price      : 1945      MRN: [id number]  Encounter Provider: Noe Victoria DO  Encounter Date: 2024   Encounter department: [facility]    Assessment & Plan  Medicare annual wellness visit, subsequent  Counseled about update with COVID-19 vaccine and new RSV vaccine  Counseled about influenza vaccine  Declines tetanus booster  Declined Shingrix vaccination series  Counseled about PCV 20 vaccine         Depression Screening and Follow-up Plan: Patient was screened for depression during today's encounter. They screened negative with a PHQ-2 score of 0.      Patient will be transferring care as she is moving to independent living facility out of the area.  Patient advised to not hesitate to contact if she needs any support in the interim.    Preventive health issues were discussed with patient, and age appropriate screening tests were ordered as noted in patient's After Visit Summary. Personalized health advice and appropriate referrals for health education or preventive services given if needed, as noted in patient's After Visit Summary.    History of Present Illness     HPI   Patient Care Team:  Noe Victoria DO as PCP - General (Family Medicine)  Hilary Alcazar MD as PCP - Endocrinology (Endocrinology)    Review of Systems    As noted in HPI   Medical History Reviewed by provider this encounter:  Tobacco  Allergies  Meds  Problems  Med Hx  Surg Hx  Fam Hx       Annual Wellness Visit Questionnaire   Dianelys is here for her Subsequent Wellness visit.     Health Risk Assessment:   Patient rates overall health as poor. Patient feels that their physical health rating is slightly worse. Patient is satisfied with their life. Eyesight was rated as same. Hearing was rated as same. Patient feels that their emotional and mental health rating is slightly better. Patients states they are never, rarely angry. Patient states they are often unusually  tired/fatigued. Pain experienced in the last 7 days has been none. Patient states that she has experienced weight loss or gain in last 6 months.     Depression Screening:   PHQ-2 Score: 0      Fall Risk Screening:   In the past year, patient has experienced: no history of falling in past year      Urinary Incontinence Screening:   Patient has leaked urine accidently in the last six months.     Home Safety:  Patient has trouble with stairs inside or outside of their home. Patient has working smoke alarms and has working carbon monoxide detector. Home safety hazards include: none.     Nutrition:   Current diet is Regular.     Medications:   Patient is currently taking over-the-counter supplements. OTC medications include: see medication list. Patient is able to manage medications.     Activities of Daily Living (ADLs)/Instrumental Activities of Daily Living (IADLs):   Walk and transfer into and out of bed and chair?: Yes  Dress and groom yourself?: Yes    Bathe or shower yourself?: Yes    Feed yourself? Yes  Do your laundry/housekeeping?: No  Manage your money, pay your bills and track your expenses?: Yes  Make your own meals?: Yes    Do your own shopping?: No    Previous Hospitalizations:   Any hospitalizations or ED visits within the last 12 months?: Yes    How many hospitalizations have you had in the last year?: 1-2    Advance Care Planning:   Living will: Yes    Durable POA for healthcare: Yes    Advanced directive: Yes      Cognitive Screening:   Provider or family/friend/caregiver concerned regarding cognition?: No    PREVENTIVE SCREENINGS      Cardiovascular Screening:    General: Screening Current      Diabetes Screening:     General: Screening Current      Colorectal Cancer Screening:     General: Risks and Benefits Discussed and Patient Declines      Breast Cancer Screening:     General: Risks and Benefits Discussed and Patient Declines      Cervical Cancer Screening:    General: Screening Not Indicated       Osteoporosis Screening:    General: History Osteoporosis, Risks and Benefits Discussed and Patient Declines      Abdominal Aortic Aneurysm (AAA) Screening:        General: Screening Not Indicated      Lung Cancer Screening:     General: Screening Not Indicated      Hepatitis C Screening:    General: Risks and Benefits Discussed and Patient Declines    Screening, Brief Intervention, and Referral to Treatment (SBIRT)    Screening  Typical number of drinks in a day: 0  Typical number of drinks in a week: 0  Interpretation: Low risk drinking behavior.    Single Item Drug Screening:  How often have you used an illegal drug (including marijuana) or a prescription medication for non-medical reasons in the past year? never    Single Item Drug Screen Score: 0  Interpretation: Negative screen for possible drug use disorder    Social Determinants of Health     Financial Resource Strain: Low Risk  (11/7/2023)    Overall Financial Resource Strain (CARDIA)     Difficulty of Paying Living Expenses: Not very hard   Food Insecurity: No Food Insecurity (11/11/2024)    Hunger Vital Sign     Worried About Running Out of Food in the Last Year: Never true     Ran Out of Food in the Last Year: Never true   Transportation Needs: No Transportation Needs (11/11/2024)    PRAPARE - Transportation     Lack of Transportation (Medical): No     Lack of Transportation (Non-Medical): No   Housing Stability: Low Risk  (11/11/2024)    Housing Stability Vital Sign     Unable to Pay for Housing in the Last Year: No     Number of Times Moved in the Last Year: 0     Homeless in the Last Year: No   Utilities: Not At Risk (11/11/2024)    [facility] Utilities     Threatened with loss of utilities: No     No results found.    Objective     /70 (BP Location: Left arm, Patient Position: Sitting, Cuff Size: Standard)   Pulse 73   Temp 97.5 °F (36.4 °C) (Temporal)   Resp 20   Ht 5' (1.524 m)   Wt 48.7 kg (107 lb 6.4 oz)   SpO2 97%   BMI 20.98 kg/m²      Physical Exam  Vitals reviewed.   Constitutional:       General: She is not in acute distress.     Appearance: She is not ill-appearing or toxic-appearing.      Interventions: Nasal cannula in place.   HENT:      Head: Normocephalic and atraumatic.      Right Ear: Tympanic membrane, ear canal and external ear normal.      Left Ear: Tympanic membrane, ear canal and external ear normal.      Nose: Nose normal.      Mouth/Throat:      Mouth: Mucous membranes are moist.      Pharynx: Oropharynx is clear.   Eyes:      Extraocular Movements: Extraocular movements intact.      Conjunctiva/sclera: Conjunctivae normal.      Pupils: Pupils are equal, round, and reactive to light.   Cardiovascular:      Rate and Rhythm: Normal rate and regular rhythm.      Pulses: Normal pulses.      Heart sounds: Normal heart sounds.   Pulmonary:      Effort: Pulmonary effort is normal. No respiratory distress.      Breath sounds: Wheezes: Diffuse faint end expiratory wheezing.      Comments: 2L O2  Abdominal:      General: Abdomen is flat. Bowel sounds are normal.      Palpations: Abdomen is soft.      Tenderness: There is no abdominal tenderness.   Musculoskeletal:      Cervical back: Neck supple.      Right lower leg: Edema present.      Left lower leg: Edema present.   Lymphadenopathy:      Cervical: No cervical adenopathy.   Skin:     General: Skin is warm and dry.      Capillary Refill: Capillary refill takes less than 2 seconds.   Neurological:      Mental Status: She is alert and oriented to person, place, and time.   Psychiatric:         Mood and Affect: Mood normal.         Behavior: Behavior normal.         Thought Content: Thought content normal.

## 2024-11-11 NOTE — TELEPHONE ENCOUNTER
Called patient to get her scheduled for a hfu. She states she's trying to get a hold of you to do a virtual visit. Also would have to be sometime this week because she is moving. Please advise

## 2024-11-11 NOTE — PROGRESS NOTES
[facility] - Clinic Note  Noe Victoria DO, 24     Dianelys Price MRN: [id number] : 1945 Age: 78 y.o.     Assessment/Plan     1. Encounter for support and coordination of transition of care    -Recent hospitalization for COPD exacerbation  -Continue prednisone taper as prescribed  -Reminded to schedule virtual follow-up with her pulmonologist postdischarge    Dianelys Price acknowledged understanding of treatment plan, all questions answered.    Subjective      Dianelys Price is a 78 y.o. female who presents for transition of care appointment.  Patient was hospitalized from 2024 to 2024 at [facility] for COPD exacerbation.  Patient presents today with her sister from [place].  Patient received IV steroids during hospital stay and was discharged on oral prednisone taper which she has been adherent with.  She is doing well today.    Hospital course per discharge summary:    Dianelys Price is a 78 y.o. female patient who originally presented to the hospital on 2024 due to COPD exacerbation.  Infectious workup negative including labs and imaging studies.  Consulted pulmonary started on high-dose of IV steroids during hospital and recommended to send him home on tapering dose of p.o. prednisone.  Patient has been informed and pulmonary is going to get her in office or virtual visit as soon as possible after discharge.     TCM Call       Date and time call was made  2024  8:48 AM    Hospital care reviewed  Records reviewed    Patient was hospitialized at  [facility]    Date of Admission  24    Date of discharge  24    Diagnosis  COPD exacerbation    Disposition  Home    Were the patients medications reviewed and updated  Yes    Current Symptoms  None          TCM Call       Post hospital issues  None    Scheduled for follow up?  Yes    Patients specialists  [facility]    Did you obtain your prescribed medications  Yes     Do you need help managing your prescriptions or medications  No    Is transportation to your appointment needed  No    I have advised the patient to call PCP with any new or worsening symptoms  Leny Alfred    Living Arrangements  Spouse or Significiant other    Support System  Family    The type of support provided  Emotional; Physical    Are you recieving any outpatient services  Yes    Are you recieving home care services  Yes    Types of home care services  Home health aid    Are you using any community resources  No    Have you fallen in the last 12 months  No    Counseling  Patient            The following portions of the patient's history were reviewed and updated as appropriate: allergies, current medications, past family history, past medical history, past social history, past surgical history and problem list.     Past Medical History:   Diagnosis Date    Allergic 1961    Asthenia     Asthma     Basal cell carcinoma 03/19/2024    left nasal wall, mohs    Cataract     COPD (chronic obstructive pulmonary disease) ([facility])     History of screening mammography     last assessed: 10/31/2017    Osteoporosis     Pneumonia 1950    Urinary tract infection        Allergies   Allergen Reactions    Clarinex [Desloratadine] Wheezing    Bee Venom Swelling and Facial Swelling    Clarithromycin Wheezing     Reaction Date: 29Apr2011;     Food Color Red - Food Allergy Itching    Levofloxacin Other (See Comments)     Blood clot in leg    Loratadine Wheezing     Reaction Date: 29Apr2011;        Past Surgical History:   Procedure Laterality Date    APPENDECTOMY      onset: 1966    CATARACT EXTRACTION  2017    last assessed: 10/31/2017    COLONOSCOPY  2015    EYE SURGERY  cataracks 2017    HEMORROIDECTOMY  2006    MOHS SURGERY Left 06/11/2024    BCC left nasal wall, Dr Rick    TONSILLECTOMY  1954    onset: 1954       Family History   Problem Relation Age of Onset    Arthritis Mother     Breast cancer Mother 80    Heart  disease Mother     Hypertension Mother     Osteoporosis Mother     Dementia Mother     COPD Mother     Hearing loss Mother     Diabetes Father     Heart disease Father     Hypertension Father     COPD Father     Ovarian cancer Sister 49    No Known Problems Maternal Grandmother     Hearing loss Maternal Grandfather     No Known Problems Paternal Grandmother     No Known Problems Paternal Grandfather     No Known Problems Maternal Aunt     No Known Problems Maternal Aunt     No Known Problems Paternal Aunt     No Known Problems Paternal Aunt     No Known Problems Paternal Aunt     Diabetes Brother     Diabetes Brother     Diabetes Brother     Hearing loss Brother     Cancer Sister         uterine       Social History     Socioeconomic History    Marital status:      Spouse name: None    Number of children: None    Years of education: None    Highest education level: None   Occupational History    None   Tobacco Use    Smoking status: Former     Current packs/day: 0.00     Average packs/day: 1 pack/day for 40.0 years (40.0 ttl pk-yrs)     Types: Cigarettes     Start date: 3/7/1965     Quit date: 3/7/2005     Years since quittin.6    Smokeless tobacco: Never   Vaping Use    Vaping status: Never Used   Substance and Sexual Activity    Alcohol use: Never     Alcohol/week: 2.0 standard drinks of alcohol     Types: 2 Glasses of wine per week    Drug use: Never    Sexual activity: Not Currently     Partners: Male     Birth control/protection: Post-menopausal, None   Other Topics Concern    None   Social History Narrative    Caffeine use     Social Determinants of Health     Financial Resource Strain: Low Risk  (2023)    Overall Financial Resource Strain (CARDIA)     Difficulty of Paying Living Expenses: Not very hard   Food Insecurity: No Food Insecurity (2024)    Hunger Vital Sign     Worried About Running Out of Food in the Last Year: Never true     Ran Out of Food in the Last Year: Never true  "  Transportation Needs: No Transportation Needs (11/11/2024)    PRAPARE - Transportation     Lack of Transportation (Medical): No     Lack of Transportation (Non-Medical): No   Physical Activity: Not on file   Stress: Not on file   Social Connections: Not on file   Intimate Partner Violence: Unknown (11/2/2024)    Nursing IPS     Feels Physically and Emotionally Safe: Not on file     Physically Hurt by Someone: Not on file     Humiliated or Emotionally Abused by Someone: Not on file     Physically Hurt by Someone: 2     Hurt or Threatened by Someone: 2   Housing Stability: Low Risk  (11/11/2024)    Housing Stability Vital Sign     Unable to Pay for Housing in the Last Year: No     Number of Times Moved in the Last Year: 0     Homeless in the Last Year: No       Current Outpatient Medications   Medication Sig Dispense Refill    albuterol (PROVENTIL HFA,VENTOLIN HFA) 90 mcg/act inhaler Inhale 2 puffs every 4 (four) hours as needed for wheezing 18 g 0    ascorbic acid (VITAMIN C) 250 mg tablet Take 250 mg by mouth daily      b complex vitamins capsule Take 1 capsule by mouth daily      cholecalciferol (VITAMIN D3) 1,000 units tablet Take 1,000 Units by mouth daily      fluocinonide (LIDEX) 0.05 % external solution Apply topically 2 (two) times a day As needed for itch on scalp. 60 mL 2    Fluticasone-Salmeterol (Advair Diskus) 250-50 mcg/dose inhaler Inhale 1 puff 2 (two) times a day Rinse mouth after use. 180 blister 3    ipratropium-albuterol (COMBIVENT RESPIMAT) inhaler Inhale 1 puff 4 (four) times a day 12 g 3    ipratropium-albuterol (DUO-NEB) 0.5-2.5 mg/3 mL nebulizer solution Take 3 mL by nebulization 4 (four) times a day 360 mL 3    ketoconazole (NIZORAL) 2 % shampoo Daily for 2 weeks straight and then on \"Mondays, Wednesdays and Fridays\":  Lather into scalp and skin on face; leave on for 5 minutes and then rinse off completely. 120 mL 11    Misc. Devices (Roller Walker) MISC Use daily Rollator 1 each 0    " predniSONE 20 mg tablet Take 2 tablets (40 mg total) by mouth daily for 4 days, THEN 1.5 tablets (30 mg total) daily for 4 days, THEN 1 tablet (20 mg total) daily for 4 days, THEN 0.5 tablets (10 mg total) daily for 4 days. Do not start before November 8, 2024. 20 tablet 0    torsemide (DEMADEX) 10 mg tablet as needed      ciclopirox (LOPROX) 0.77 % cream Apply topically once daily to face until scaly red areas resolve (Patient not taking: Reported on 11/2/2024) 90 g 11     No current facility-administered medications for this visit.       Review of Systems     As noted in HPI    Objective      /70 (BP Location: Left arm, Patient Position: Sitting, Cuff Size: Standard)   Pulse 73   Temp 97.5 °F (36.4 °C) (Temporal)   Resp 20   Ht 5' (1.524 m)   Wt 48.7 kg (107 lb 6.4 oz)   SpO2 97%   BMI 20.98 kg/m²     Physical Exam  Vitals reviewed.   Constitutional:       General: She is not in acute distress.     Appearance: Normal appearance. She is not toxic-appearing.      Interventions: Nasal cannula in place.   HENT:      Head: Normocephalic and atraumatic.      Right Ear: Tympanic membrane, ear canal and external ear normal.      Left Ear: Tympanic membrane, ear canal and external ear normal.      Nose: Nose normal.      Mouth/Throat:      Mouth: Mucous membranes are moist.      Pharynx: Oropharynx is clear.   Eyes:      Extraocular Movements: Extraocular movements intact.      Conjunctiva/sclera: Conjunctivae normal.      Pupils: Pupils are equal, round, and reactive to light.   Cardiovascular:      Rate and Rhythm: Normal rate and regular rhythm.      Pulses: Normal pulses.      Heart sounds: Normal heart sounds.   Pulmonary:      Effort: Pulmonary effort is normal. No respiratory distress.      Breath sounds: Wheezing (Faint end expiratory wheezing) present.   Abdominal:      General: Abdomen is flat. Bowel sounds are normal.      Palpations: Abdomen is soft.      Tenderness: There is no abdominal  "tenderness.   Musculoskeletal:      Cervical back: Neck supple.      Right lower leg: Edema present.      Left lower leg: Edema present.   Lymphadenopathy:      Cervical: No cervical adenopathy.   Skin:     General: Skin is warm and dry.      Capillary Refill: Capillary refill takes less than 2 seconds.   Neurological:      Mental Status: She is alert and oriented to person, place, and time.   Psychiatric:         Mood and Affect: Mood normal.         Behavior: Behavior normal.         Thought Content: Thought content normal.             Some portions of this record may have been generated with voice recognition software. There may be translation, syntax, or grammatical errors. Occasional wrong word or \"sound-a-like\" substitutions may have occurred due to the inherent limitations of the voice recognition software. Read the chart carefully and recognize, using context, where substations may have occurred. If you have any questions, please contact the dictating provider for clarification or correction, as needed.  "

## 2024-11-11 NOTE — PATIENT INSTRUCTIONS
Medicare Preventive Visit Patient Instructions  Thank you for completing your Welcome to Medicare Visit or Medicare Annual Wellness Visit today. Your next wellness visit will be due in one year (11/12/2025).  The screening/preventive services that you may require over the next 5-10 years are detailed below. Some tests may not apply to you based off risk factors and/or age. Screening tests ordered at today's visit but not completed yet may show as past due. Also, please note that scanned in results may not display below.  Preventive Screenings:  Service Recommendations Previous Testing/Comments   Colorectal Cancer Screening  * Colonoscopy    * Fecal Occult Blood Test (FOBT)/Fecal Immunochemical Test (FIT)  * Fecal DNA/Cologuard Test  * Flexible Sigmoidoscopy Age: 45-75 years old   Colonoscopy: every 10 years (may be performed more frequently if at higher risk)  OR  FOBT/FIT: every 1 year  OR  Cologuard: every 3 years  OR  Sigmoidoscopy: every 5 years  Screening may be recommended earlier than age 45 if at higher risk for colorectal cancer. Also, an individualized decision between you and your healthcare provider will decide whether screening between the ages of 76-85 would be appropriate. Colonoscopy: 09/22/2016  FOBT/FIT: Not on file  Cologuard: Not on file  Sigmoidoscopy: Not on file          Breast Cancer Screening Age: 40+ years old  Frequency: every 1-2 years  Not required if history of left and right mastectomy Mammogram: 10/11/2019        Cervical Cancer Screening Between the ages of 21-29, pap smear recommended once every 3 years.   Between the ages of 30-65, can perform pap smear with HPV co-testing every 5 years.   Recommendations may differ for women with a history of total hysterectomy, cervical cancer, or abnormal pap smears in past. Pap Smear: Not on file        Hepatitis C Screening Once for adults born between 1945 and 1965  More frequently in patients at high risk for Hepatitis C Hep C Antibody: Not  on file        Diabetes Screening 1-2 times per year if you're at risk for diabetes or have pre-diabetes Fasting glucose: 99 mg/dL (7/26/2024)  A1C: 5.3 % (3/23/2022)      Cholesterol Screening Once every 5 years if you don't have a lipid disorder. May order more often based on risk factors. Lipid panel: 03/23/2022          Other Preventive Screenings Covered by Medicare:  Abdominal Aortic Aneurysm (AAA) Screening: covered once if your at risk. You're considered to be at risk if you have a family history of AAA.  Lung Cancer Screening: covers low dose CT scan once per year if you meet all of the following conditions: (1) Age 55-77; (2) No signs or symptoms of lung cancer; (3) Current smoker or have quit smoking within the last 15 years; (4) You have a tobacco smoking history of at least 20 pack years (packs per day multiplied by number of years you smoked); (5) You get a written order from a healthcare provider.  Glaucoma Screening: covered annually if you're considered high risk: (1) You have diabetes OR (2) Family history of glaucoma OR (3)  aged 50 and older OR (4)  American aged 65 and older  Osteoporosis Screening: covered every 2 years if you meet one of the following conditions: (1) You're estrogen deficient and at risk for osteoporosis based off medical history and other findings; (2) Have a vertebral abnormality; (3) On glucocorticoid therapy for more than 3 months; (4) Have primary hyperparathyroidism; (5) On osteoporosis medications and need to assess response to drug therapy.   Last bone density test (DXA Scan): 10/11/2019.  HIV Screening: covered annually if you're between the age of 15-65. Also covered annually if you are younger than 15 and older than 65 with risk factors for HIV infection. For pregnant patients, it is covered up to 3 times per pregnancy.    Immunizations:  Immunization Recommendations   Influenza Vaccine Annual influenza vaccination during flu season is  recommended for all persons aged >= 6 months who do not have contraindications   Pneumococcal Vaccine   * Pneumococcal conjugate vaccine = PCV13 (Prevnar 13), PCV15 (Vaxneuvance), PCV20 (Prevnar 20)  * Pneumococcal polysaccharide vaccine = PPSV23 (Pneumovax) Adults 19-65 yo with certain risk factors or if 65+ yo  If never received any pneumonia vaccine: recommend Prevnar 20 (PCV20)  Give PCV20 if previously received 1 dose of PCV13 or PPSV23   Hepatitis B Vaccine 3 dose series if at intermediate or high risk (ex: diabetes, end stage renal disease, liver disease)   Respiratory syncytial virus (RSV) Vaccine - COVERED BY MEDICARE PART D  * RSVPreF3 (Arexvy) CDC recommends that adults 60 years of age and older may receive a single dose of RSV vaccine using shared clinical decision-making (SCDM)   Tetanus (Td) Vaccine - COST NOT COVERED BY MEDICARE PART B Following completion of primary series, a booster dose should be given every 10 years to maintain immunity against tetanus. Td may also be given as tetanus wound prophylaxis.   Tdap Vaccine - COST NOT COVERED BY MEDICARE PART B Recommended at least once for all adults. For pregnant patients, recommended with each pregnancy.   Shingles Vaccine (Shingrix) - COST NOT COVERED BY MEDICARE PART B  2 shot series recommended in those 19 years and older who have or will have weakened immune systems or those 50 years and older     Health Maintenance Due:      Topic Date Due   • Hepatitis C Screening  Never done   • DXA SCAN  10/11/2024   • Colorectal Cancer Screening  Discontinued     Immunizations Due:      Topic Date Due   • Influenza Vaccine (1) 09/01/2024   • COVID-19 Vaccine (5 - 2023-24 season) 09/01/2024     Advance Directives   What are advance directives?  Advance directives are legal documents that state your wishes and plans for medical care. These plans are made ahead of time in case you lose your ability to make decisions for yourself. Advance directives can apply to  any medical decision, such as the treatments you want, and if you want to donate organs.   What are the types of advance directives?  There are many types of advance directives, and each state has rules about how to use them. You may choose a combination of any of the following:  Living will:  This is a written record of the treatment you want. You can also choose which treatments you do not want, which to limit, and which to stop at a certain time. This includes surgery, medicine, IV fluid, and tube feedings.   Durable power of  for healthcare (DPAHC):  This is a written record that states who you want to make healthcare choices for you when you are unable to make them for yourself. This person, called a proxy, is usually a family member or a friend. You may choose more than 1 proxy.  Do not resuscitate (DNR) order:  A DNR order is used in case your heart stops beating or you stop breathing. It is a request not to have certain forms of treatment, such as CPR. A DNR order may be included in other types of advance directives.  Medical directive:  This covers the care that you want if you are in a coma, near death, or unable to make decisions for yourself. You can list the treatments you want for each condition. Treatment may include pain medicine, surgery, blood transfusions, dialysis, IV or tube feedings, and a ventilator (breathing machine).  Values history:  This document has questions about your views, beliefs, and how you feel and think about life. This information can help others choose the care that you would choose.  Why are advance directives important?  An advance directive helps you control your care. Although spoken wishes may be used, it is better to have your wishes written down. Spoken wishes can be misunderstood, or not followed. Treatments may be given even if you do not want them. An advance directive may make it easier for your family to make difficult choices about your care.   Urinary  Incontinence   Urinary incontinence (UI)  is when you lose control of your bladder. UI develops because your bladder cannot store or empty urine properly. The 3 most common types of UI are stress incontinence, urge incontinence, or both.  Medicines:   May be given to help strengthen your bladder control. Report any side effects of medication to your healthcare provider.  Do pelvic muscle exercises often:  Your pelvic muscles help you stop urinating. Squeeze these muscles tight for 5 seconds, then relax for 5 seconds. Gradually work up to squeezing for 10 seconds. Do 3 sets of 15 repetitions a day, or as directed. This will help strengthen your pelvic muscles and improve bladder control.  Train your bladder:  Go to the bathroom at set times, such as every 2 hours, even if you do not feel the urge to go. You can also try to hold your urine when you feel the urge to go. For example, hold your urine for 5 minutes when you feel the urge to go. As that becomes easier, hold your urine for 10 minutes.   Self-care:   Keep a UI record.  Write down how often you leak urine and how much you leak. Make a note of what you were doing when you leaked urine.  Drink liquids as directed. You may need to limit the amount of liquid you drink to help control your urine leakage. Do not drink any liquid right before you go to bed. Limit or do not have drinks that contain caffeine or alcohol.   Prevent constipation.  Eat a variety of high-fiber foods. Good examples are high-fiber cereals, beans, vegetables, and whole-grain breads. Walking is the best way to trigger your intestines to have a bowel movement.  Exercise regularly and maintain a healthy weight.  Weight loss and exercise will decrease pressure on your bladder and help you control your leakage.   Use a catheter as directed  to help empty your bladder. A catheter is a tiny, plastic tube that is put into your bladder to drain your urine.   Go to behavior therapy as directed.   Behavior therapy may be used to help you learn to control your urge to urinate.     © Copyright [employer] 2018 Information is for End User's use only and may not be sold, redistributed or otherwise used for commercial purposes. All illustrations and images included in CareNotes® are the copyrighted property of [employer].A[employer] Inc. or [employer]

## 2024-11-11 NOTE — TELEPHONE ENCOUNTER
Pt calls in and states she was recently d/c from hospital and is instructed to follow up with provider but pt is moving Monday and will need to be seen this week. Please advise

## 2024-11-11 NOTE — PROGRESS NOTES
OP RT CM received discharge information regarding patient. Referral placed.   .OP RT CM called and spoke with patient regarding her breathing, medications and O2.   Dianelys stated she is feeling better. She is using Advair Diskus 2 puffs BID and rinsing out mouth.  She is also using DUOneb QID. She fluctuates between Combivent BID and Duoneb BID  but getting medication QID.   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  She is wearing 2lpm nasal cannula SpO2 97%. We discussed SpO2 levels.   Dianelys stated she received COPD booklet and zone tools. We reviewed the zone tools.    She is a former smoker.  She had lung reduction valves evaluation in the past and she is not a candidate. She stated she was calling pulnate DC to scheduled a virtual appt.  I offered to make appt. For her and she declined.     Dianelys stated she is moving to [facility] next week and likely will be changing physicians. She accepted my contact information.     I offered to outreach next week and she declined due to moving however accepted my contact information. No further outreach with OP RT CM at this time.

## 2024-11-11 NOTE — PATIENT INSTRUCTIONS
Medicare Preventive Visit Patient Instructions  Thank you for completing your Welcome to Medicare Visit or Medicare Annual Wellness Visit today. Your next wellness visit will be due in one year (11/12/2025).  The screening/preventive services that you may require over the next 5-10 years are detailed below. Some tests may not apply to you based off risk factors and/or age. Screening tests ordered at today's visit but not completed yet may show as past due. Also, please note that scanned in results may not display below.  Preventive Screenings:  Service Recommendations Previous Testing/Comments   Colorectal Cancer Screening  * Colonoscopy    * Fecal Occult Blood Test (FOBT)/Fecal Immunochemical Test (FIT)  * Fecal DNA/Cologuard Test  * Flexible Sigmoidoscopy Age: 45-75 years old   Colonoscopy: every 10 years (may be performed more frequently if at higher risk)  OR  FOBT/FIT: every 1 year  OR  Cologuard: every 3 years  OR  Sigmoidoscopy: every 5 years  Screening may be recommended earlier than age 45 if at higher risk for colorectal cancer. Also, an individualized decision between you and your healthcare provider will decide whether screening between the ages of 76-85 would be appropriate. Colonoscopy: 09/22/2016  FOBT/FIT: Not on file  Cologuard: Not on file  Sigmoidoscopy: Not on file          Breast Cancer Screening Age: 40+ years old  Frequency: every 1-2 years  Not required if history of left and right mastectomy Mammogram: 10/11/2019        Cervical Cancer Screening Between the ages of 21-29, pap smear recommended once every 3 years.   Between the ages of 30-65, can perform pap smear with HPV co-testing every 5 years.   Recommendations may differ for women with a history of total hysterectomy, cervical cancer, or abnormal pap smears in past. Pap Smear: Not on file    Screening Not Indicated   Hepatitis C Screening Once for adults born between 1945 and 1965  More frequently in patients at high risk for Hepatitis  C Hep C Antibody: Not on file        Diabetes Screening 1-2 times per year if you're at risk for diabetes or have pre-diabetes Fasting glucose: 99 mg/dL (7/26/2024)  A1C: 5.3 % (3/23/2022)  Screening Current   Cholesterol Screening Once every 5 years if you don't have a lipid disorder. May order more often based on risk factors. Lipid panel: 03/23/2022    Screening Current     Other Preventive Screenings Covered by Medicare:  Abdominal Aortic Aneurysm (AAA) Screening: covered once if your at risk. You're considered to be at risk if you have a family history of AAA.  Lung Cancer Screening: covers low dose CT scan once per year if you meet all of the following conditions: (1) Age 55-77; (2) No signs or symptoms of lung cancer; (3) Current smoker or have quit smoking within the last 15 years; (4) You have a tobacco smoking history of at least 20 pack years (packs per day multiplied by number of years you smoked); (5) You get a written order from a healthcare provider.  Glaucoma Screening: covered annually if you're considered high risk: (1) You have diabetes OR (2) Family history of glaucoma OR (3)  aged 50 and older OR (4)  American aged 65 and older  Osteoporosis Screening: covered every 2 years if you meet one of the following conditions: (1) You're estrogen deficient and at risk for osteoporosis based off medical history and other findings; (2) Have a vertebral abnormality; (3) On glucocorticoid therapy for more than 3 months; (4) Have primary hyperparathyroidism; (5) On osteoporosis medications and need to assess response to drug therapy.   Last bone density test (DXA Scan): 10/11/2019.  HIV Screening: covered annually if you're between the age of 15-65. Also covered annually if you are younger than 15 and older than 65 with risk factors for HIV infection. For pregnant patients, it is covered up to 3 times per pregnancy.    Immunizations:  Immunization Recommendations   Influenza Vaccine  Annual influenza vaccination during flu season is recommended for all persons aged >= 6 months who do not have contraindications   Pneumococcal Vaccine   * Pneumococcal conjugate vaccine = PCV13 (Prevnar 13), PCV15 (Vaxneuvance), PCV20 (Prevnar 20)  * Pneumococcal polysaccharide vaccine = PPSV23 (Pneumovax) Adults 19-65 yo with certain risk factors or if 65+ yo  If never received any pneumonia vaccine: recommend Prevnar 20 (PCV20)  Give PCV20 if previously received 1 dose of PCV13 or PPSV23   Hepatitis B Vaccine 3 dose series if at intermediate or high risk (ex: diabetes, end stage renal disease, liver disease)   Respiratory syncytial virus (RSV) Vaccine - COVERED BY MEDICARE PART D  * RSVPreF3 (Arexvy) CDC recommends that adults 60 years of age and older may receive a single dose of RSV vaccine using shared clinical decision-making (SCDM)   Tetanus (Td) Vaccine - COST NOT COVERED BY MEDICARE PART B Following completion of primary series, a booster dose should be given every 10 years to maintain immunity against tetanus. Td may also be given as tetanus wound prophylaxis.   Tdap Vaccine - COST NOT COVERED BY MEDICARE PART B Recommended at least once for all adults. For pregnant patients, recommended with each pregnancy.   Shingles Vaccine (Shingrix) - COST NOT COVERED BY MEDICARE PART B  2 shot series recommended in those 19 years and older who have or will have weakened immune systems or those 50 years and older     Health Maintenance Due:      Topic Date Due   • Hepatitis C Screening  Never done   • DXA SCAN  10/11/2024   • Colorectal Cancer Screening  Discontinued     Immunizations Due:      Topic Date Due   • Influenza Vaccine (1) 09/01/2024   • COVID-19 Vaccine (5 - 2023-24 season) 09/01/2024     Advance Directives   What are advance directives?  Advance directives are legal documents that state your wishes and plans for medical care. These plans are made ahead of time in case you lose your ability to make  decisions for yourself. Advance directives can apply to any medical decision, such as the treatments you want, and if you want to donate organs.   What are the types of advance directives?  There are many types of advance directives, and each state has rules about how to use them. You may choose a combination of any of the following:  Living will:  This is a written record of the treatment you want. You can also choose which treatments you do not want, which to limit, and which to stop at a certain time. This includes surgery, medicine, IV fluid, and tube feedings.   Durable power of  for healthcare (DPAHC):  This is a written record that states who you want to make healthcare choices for you when you are unable to make them for yourself. This person, called a proxy, is usually a family member or a friend. You may choose more than 1 proxy.  Do not resuscitate (DNR) order:  A DNR order is used in case your heart stops beating or you stop breathing. It is a request not to have certain forms of treatment, such as CPR. A DNR order may be included in other types of advance directives.  Medical directive:  This covers the care that you want if you are in a coma, near death, or unable to make decisions for yourself. You can list the treatments you want for each condition. Treatment may include pain medicine, surgery, blood transfusions, dialysis, IV or tube feedings, and a ventilator (breathing machine).  Values history:  This document has questions about your views, beliefs, and how you feel and think about life. This information can help others choose the care that you would choose.  Why are advance directives important?  An advance directive helps you control your care. Although spoken wishes may be used, it is better to have your wishes written down. Spoken wishes can be misunderstood, or not followed. Treatments may be given even if you do not want them. An advance directive may make it easier for your family  to make difficult choices about your care.   Urinary Incontinence   Urinary incontinence (UI)  is when you lose control of your bladder. UI develops because your bladder cannot store or empty urine properly. The 3 most common types of UI are stress incontinence, urge incontinence, or both.  Medicines:   May be given to help strengthen your bladder control. Report any side effects of medication to your healthcare provider.  Do pelvic muscle exercises often:  Your pelvic muscles help you stop urinating. Squeeze these muscles tight for 5 seconds, then relax for 5 seconds. Gradually work up to squeezing for 10 seconds. Do 3 sets of 15 repetitions a day, or as directed. This will help strengthen your pelvic muscles and improve bladder control.  Train your bladder:  Go to the bathroom at set times, such as every 2 hours, even if you do not feel the urge to go. You can also try to hold your urine when you feel the urge to go. For example, hold your urine for 5 minutes when you feel the urge to go. As that becomes easier, hold your urine for 10 minutes.   Self-care:   Keep a UI record.  Write down how often you leak urine and how much you leak. Make a note of what you were doing when you leaked urine.  Drink liquids as directed. You may need to limit the amount of liquid you drink to help control your urine leakage. Do not drink any liquid right before you go to bed. Limit or do not have drinks that contain caffeine or alcohol.   Prevent constipation.  Eat a variety of high-fiber foods. Good examples are high-fiber cereals, beans, vegetables, and whole-grain breads. Walking is the best way to trigger your intestines to have a bowel movement.  Exercise regularly and maintain a healthy weight.  Weight loss and exercise will decrease pressure on your bladder and help you control your leakage.   Use a catheter as directed  to help empty your bladder. A catheter is a tiny, plastic tube that is put into your bladder to drain your  urine.   Go to behavior therapy as directed.  Behavior therapy may be used to help you learn to control your urge to urinate.     © Copyright [employer] 2018 Information is for End User's use only and may not be sold, redistributed or otherwise used for commercial purposes. All illustrations and images included in CareNotes® are the copyrighted property of [employer] or [employer]

## 2024-11-13 ENCOUNTER — TELEMEDICINE (OUTPATIENT)
Age: 79
End: 2024-11-13
Payer: MEDICARE

## 2024-11-13 VITALS — OXYGEN SATURATION: 95 % | HEIGHT: 60 IN | BODY MASS INDEX: 21.2 KG/M2 | HEART RATE: 85 BPM | WEIGHT: 108 LBS

## 2024-11-13 DIAGNOSIS — J96.11 CHRONIC RESPIRATORY FAILURE WITH HYPOXIA (HCC): ICD-10-CM

## 2024-11-13 DIAGNOSIS — J44.1 COPD EXACERBATION (HCC): Primary | ICD-10-CM

## 2024-11-13 PROCEDURE — 99213 OFFICE O/P EST LOW 20 MIN: CPT | Performed by: INTERNAL MEDICINE

## 2024-11-13 NOTE — PROGRESS NOTES
Virtual Regular Visit - Pulmonary  Name: Dianelys Price      : 1945      MRN: [id number]  Encounter Provider: Chloe Tolentino DO  Encounter Date: 2024   Encounter department: [facility]    Verification of patient location:    Patient is located at Home in the following state in which I hold an active license [place]    Assessment & Plan  COPD exacerbation (HCC)  Patient is slowly recovering from recent hospitalization due to COPD exacerbation.  She is currently on prednisone, decreasing to 30 mg daily yesterday to complete a taper early next week.  She is steadily improving.  She will continue with Advair twice daily in addition to DuoNeb 3-4 times per day.  She finds that in the latter half of the nebulizer treatment, she becomes tremulous and shaky.  I instructed her to try using half vial dosing to see if that minimizes the symptoms.  She finds that the prednisone is extremely beneficial in terms of helping her breathe, however we discussed the risks and benefits of long-term low-dose prednisone.  She did try low-dose prednisone chronically in the past, but favors avoiding long-term use again in the future.  I certainly support this and would only do low-dose prednisone once we have fully evaluated the risks and benefits.  She does not have any significant eosinophilia, therefore Dupixent is not likely to be beneficial.  May want to consider Ensifentrine (PDE 3/4 inhibitor) in the future, though unclear how the medication will be covered by insurance plans.     Chronic respiratory failure with hypoxia (HCC)  Oxygenation is adequate on 2 L/min.  Patient understands that goal saturations should be somewhere between 88-92% at all times.    She can follow-up with me in 3 months or sooner if needed at the [place] location.  In the event that she elects to transition her care to a more local pulmonologist with her upcoming move, we can certainly share our records with  their team.    Encounter provider Chloe Tolentino DO    The patient was identified by name and date of birth. Dianelys Price was informed that this is a telemedicine visit and that the visit is being conducted through the Epic Embedded platform. She agrees to proceed..  My office door was closed. No one else was in the room.  She acknowledged consent and understanding of privacy and security of the video platform. The patient has agreed to participate and understands they can discontinue the visit at any time.    Patient is aware this is a billable service.     History of Present Illness     Dianelys Price is a 78 y.o. female who presents via virtual visit for hospital follow-up.  She was admitted on 11/2/2024, discharged 11/7/2024.  She was started on IV steroids, transitioned to prednisone taper.  Since being home, she is steadily improving.  She continues to get shortness of breath with exertion, but with the prednisone, has been a bit more active than she had been prior to the exacerbation.  She denies cough, wheeze or sputum production.  She is using oxygen at 2 L/min and saturations have remained in the 90s.  She has no fevers or chills.  She has some lower extremity/pedal edema.  Patient is preparing to move into an apartment/independent living facility in [facility].  She is not yet sure whether she will transition her medical care out of the [facility] system or establish care with her sisters pulmonologist who will be closer to her.    History obtained from : patient  Review of Systems otherwise negative  Medical History Reviewed by provider this encounter:       Current Outpatient Medications on File Prior to Visit   Medication Sig Dispense Refill    albuterol (PROVENTIL HFA,VENTOLIN HFA) 90 mcg/act inhaler Inhale 2 puffs every 4 (four) hours as needed for wheezing 18 g 0    ascorbic acid (VITAMIN C) 250 mg tablet Take 250 mg by mouth daily      b complex vitamins capsule Take 1 capsule by mouth  "daily      cholecalciferol (VITAMIN D3) 1,000 units tablet Take 1,000 Units by mouth daily      fluocinonide (LIDEX) 0.05 % external solution Apply topically 2 (two) times a day As needed for itch on scalp. 60 mL 2    Fluticasone-Salmeterol (Advair Diskus) 250-50 mcg/dose inhaler Inhale 1 puff 2 (two) times a day Rinse mouth after use. 180 blister 3    ipratropium-albuterol (COMBIVENT RESPIMAT) inhaler Inhale 1 puff 4 (four) times a day 12 g 3    ipratropium-albuterol (DUO-NEB) 0.5-2.5 mg/3 mL nebulizer solution Take 3 mL by nebulization 4 (four) times a day 360 mL 3    ketoconazole (NIZORAL) 2 % shampoo Daily for 2 weeks straight and then on \"Mondays, Wednesdays and Fridays\":  Lather into scalp and skin on face; leave on for 5 minutes and then rinse off completely. 120 mL 11    Misc. Devices (Roller Walker) MISC Use daily Rollator 1 each 0    predniSONE 20 mg tablet Take 2 tablets (40 mg total) by mouth daily for 4 days, THEN 1.5 tablets (30 mg total) daily for 4 days, THEN 1 tablet (20 mg total) daily for 4 days, THEN 0.5 tablets (10 mg total) daily for 4 days. Do not start before November 8, 2024. 20 tablet 0    torsemide (DEMADEX) 10 mg tablet as needed      ciclopirox (LOPROX) 0.77 % cream Apply topically once daily to face until scaly red areas resolve (Patient not taking: Reported on 11/2/2024) 90 g 11     No current facility-administered medications on file prior to visit.          Objective     Pulse 85   Ht 5' (1.524 m)   Wt 49 kg (108 lb)   SpO2 95%   BMI 21.09 kg/m²   Physical Exam  Constitutional:       Appearance: Normal appearance. She is not ill-appearing.   HENT:      Head: Normocephalic and atraumatic.      Mouth/Throat:      Mouth: Mucous membranes are moist.   Eyes:      General: No scleral icterus.  Pulmonary:      Effort: No respiratory distress.   Neurological:      Mental Status: She is alert and oriented to person, place, and time.   Psychiatric:         Mood and Affect: Mood normal. "         Visit Time  Total Visit Duration: 24 minutes

## 2024-11-13 NOTE — ASSESSMENT & PLAN NOTE
Patient is slowly recovering from recent hospitalization due to COPD exacerbation.  She is currently on prednisone, decreasing to 30 mg daily yesterday to complete a taper early next week.  She is steadily improving.  She will continue with Advair twice daily in addition to DuoNeb 3-4 times per day.  She finds that in the latter half of the nebulizer treatment, she becomes tremulous and shaky.  I instructed her to try using half vial dosing to see if that minimizes the symptoms.  She finds that the prednisone is extremely beneficial in terms of helping her breathe, however we discussed the risks and benefits of long-term low-dose prednisone.  She did try low-dose prednisone chronically in the past, but favors avoiding long-term use again in the future.  I certainly support this and would only do low-dose prednisone once we have fully evaluated the risks and benefits.  She does not have any significant eosinophilia, therefore Dupixent is not likely to be beneficial.  May want to consider Ensifentrine (PDE 3/4 inhibitor) in the future, though unclear how the medication will be covered by insurance plans.

## 2024-11-13 NOTE — ASSESSMENT & PLAN NOTE
Oxygenation is adequate on 2 L/min.  Patient understands that goal saturations should be somewhere between 88-92% at all times.

## 2024-11-21 ENCOUNTER — TELEPHONE (OUTPATIENT)
Age: 79
End: 2024-11-21

## 2024-11-21 NOTE — TELEPHONE ENCOUNTER
Will from [facility] called to update Dr. QUARLES.     Patient has moved to a facility in [facility].  She has PT and other nursing orders that will need to be signed, until the patient see's her new PCP.    Would Dr. QUARLES, be able to sign for orders until then?    Please advise, Hieu is requesting a call back.

## 2024-12-03 ENCOUNTER — TELEPHONE (OUTPATIENT)
Age: 79
End: 2024-12-03

## 2024-12-03 NOTE — TELEPHONE ENCOUNTER
Made recall appt for dr anderson for march in [facility]  pt moved  will need to put in new address

## 2025-02-26 NOTE — ASSESSMENT & PLAN NOTE
Patient presenting with 2 to 3-week history of worsening dyspnea on exertion.  Usually can walk a block or so without being short of breath however now can only take a few steps.  Does have pulmonary fibrosis and follows with  [facility].  She was scheduled to have an appointment with pulmonologist tomorrow however her shortness of breath became so severe that she comes into the ED instead.  Her chest x-ray does not show any new acute findings.   Troponin negative, dimer negative  ECHO with mild-moderate AS noted with murmur on exam - symptomatology due to fibrosis + AS  Stable for discharge   Pulmonary consult appreciated   Prednisone taper as delineated   She has cardiology follow up to discuss AS noted on ECHO        · Patient will consider PCV 20 in future, counseled   · Advised about 2nd COVID-19 booster  · Received influenza vaccine   · Declines shingles vaccines